# Patient Record
Sex: MALE | Race: WHITE | NOT HISPANIC OR LATINO | Employment: FULL TIME | URBAN - METROPOLITAN AREA
[De-identification: names, ages, dates, MRNs, and addresses within clinical notes are randomized per-mention and may not be internally consistent; named-entity substitution may affect disease eponyms.]

---

## 2017-01-12 DIAGNOSIS — N05.1 FSGS (FOCAL SEGMENTAL GLOMERULOSCLEROSIS): ICD-10-CM

## 2017-01-12 LAB
ALBUMIN SERPL-MCNC: 2.5 G/DL (ref 3.4–5)
ANION GAP SERPL CALCULATED.3IONS-SCNC: 6 MMOL/L (ref 3–14)
BUN SERPL-MCNC: 11 MG/DL (ref 7–30)
CALCIUM SERPL-MCNC: 8.3 MG/DL (ref 8.5–10.1)
CHLORIDE SERPL-SCNC: 103 MMOL/L (ref 94–109)
CO2 SERPL-SCNC: 32 MMOL/L (ref 20–32)
CREAT SERPL-MCNC: 0.74 MG/DL (ref 0.66–1.25)
CREAT UR-MCNC: 119 MG/DL
GFR SERPL CREATININE-BSD FRML MDRD: ABNORMAL ML/MIN/1.7M2
GLUCOSE SERPL-MCNC: 92 MG/DL (ref 70–99)
PHOSPHATE SERPL-MCNC: 4.2 MG/DL (ref 2.5–4.5)
POTASSIUM SERPL-SCNC: 4.1 MMOL/L (ref 3.4–5.3)
PROT UR-MCNC: 3.29 G/L
PROT/CREAT 24H UR: 2.76 G/G CR (ref 0–0.2)
SODIUM SERPL-SCNC: 141 MMOL/L (ref 133–144)

## 2017-02-13 DIAGNOSIS — N04.9 NEPHROTIC SYNDROME: ICD-10-CM

## 2017-02-13 DIAGNOSIS — N05.1 FSGS (FOCAL SEGMENTAL GLOMERULOSCLEROSIS): Primary | ICD-10-CM

## 2017-02-13 NOTE — NURSING NOTE
Labs per clinic 2A protocol.  Follow up/FSGS  Last OV: 12/6/16  Seema Peralta LPN  Nephrology  [facility]  [phone]

## 2017-02-16 ENCOUNTER — TELEPHONE (OUTPATIENT)
Dept: NEPHROLOGY | Facility: CLINIC | Age: 22
End: 2017-02-16

## 2017-02-16 NOTE — TELEPHONE ENCOUNTER
Call to patient regarding prednisone prescription 100 mg daily. Dr. Perez will discuss with patient when she sees him 2.23. Left voice message for patient to call if is out of this medication and that Dr. Welsh will discuss dosing next week.  Seema Peralta LPN  Nephrology  [facility]  [phone]

## 2017-02-17 ENCOUNTER — TELEPHONE (OUTPATIENT)
Dept: NEPHROLOGY | Facility: CLINIC | Age: 22
End: 2017-02-17

## 2017-02-17 DIAGNOSIS — N05.1 FSGS (FOCAL SEGMENTAL GLOMERULOSCLEROSIS): ICD-10-CM

## 2017-02-17 RX ORDER — PREDNISONE 50 MG/1
100 TABLET ORAL DAILY
Qty: 60 TABLET | Refills: 0 | Status: SHIPPED | OUTPATIENT
Start: 2017-02-17 | End: 2017-02-23

## 2017-02-17 NOTE — TELEPHONE ENCOUNTER
Reviewed with bayron Steele to refill prednisone 100 mg daily for 1 month. Informed patient and sent to pharmacy.  Seema Peralta LPN  Nephrology  [facility]  [phone]

## 2017-02-23 ENCOUNTER — OFFICE VISIT (OUTPATIENT)
Dept: NEPHROLOGY | Facility: CLINIC | Age: 22
End: 2017-02-23
Attending: INTERNAL MEDICINE
Payer: COMMERCIAL

## 2017-02-23 VITALS
DIASTOLIC BLOOD PRESSURE: 79 MMHG | WEIGHT: 256.8 LBS | BODY MASS INDEX: 31.93 KG/M2 | HEIGHT: 75 IN | HEART RATE: 90 BPM | SYSTOLIC BLOOD PRESSURE: 125 MMHG | TEMPERATURE: 97.8 F | OXYGEN SATURATION: 96 %

## 2017-02-23 DIAGNOSIS — N05.1 FSGS (FOCAL SEGMENTAL GLOMERULOSCLEROSIS): Primary | ICD-10-CM

## 2017-02-23 DIAGNOSIS — N04.9 NEPHROTIC SYNDROME: ICD-10-CM

## 2017-02-23 DIAGNOSIS — N05.1 FSGS (FOCAL SEGMENTAL GLOMERULOSCLEROSIS): ICD-10-CM

## 2017-02-23 LAB
ALBUMIN SERPL-MCNC: 3 G/DL (ref 3.4–5)
ANION GAP SERPL CALCULATED.3IONS-SCNC: 8 MMOL/L (ref 3–14)
BUN SERPL-MCNC: 12 MG/DL (ref 7–30)
CALCIUM SERPL-MCNC: 8.4 MG/DL (ref 8.5–10.1)
CHLORIDE SERPL-SCNC: 103 MMOL/L (ref 94–109)
CO2 SERPL-SCNC: 29 MMOL/L (ref 20–32)
CREAT SERPL-MCNC: 0.86 MG/DL (ref 0.66–1.25)
CREAT UR-MCNC: 254 MG/DL
FERRITIN SERPL-MCNC: 74 NG/ML (ref 26–388)
GFR SERPL CREATININE-BSD FRML MDRD: ABNORMAL ML/MIN/1.7M2
GLUCOSE SERPL-MCNC: 112 MG/DL (ref 70–99)
HGB BLD-MCNC: 15.5 G/DL (ref 13.3–17.7)
IRON SATN MFR SERPL: 52 % (ref 15–46)
IRON SERPL-MCNC: 129 UG/DL (ref 35–180)
PHOSPHATE SERPL-MCNC: 3.2 MG/DL (ref 2.5–4.5)
POTASSIUM SERPL-SCNC: 3.6 MMOL/L (ref 3.4–5.3)
PROT UR-MCNC: 3.04 G/L
PROT/CREAT 24H UR: 1.19 G/G CR (ref 0–0.2)
PTH-INTACT SERPL-MCNC: 12 PG/ML (ref 12–72)
SODIUM SERPL-SCNC: 140 MMOL/L (ref 133–144)
TIBC SERPL-MCNC: 248 UG/DL (ref 240–430)

## 2017-02-23 PROCEDURE — 82306 VITAMIN D 25 HYDROXY: CPT | Performed by: INTERNAL MEDICINE

## 2017-02-23 PROCEDURE — 83970 ASSAY OF PARATHORMONE: CPT | Performed by: INTERNAL MEDICINE

## 2017-02-23 PROCEDURE — 84156 ASSAY OF PROTEIN URINE: CPT | Performed by: INTERNAL MEDICINE

## 2017-02-23 PROCEDURE — 85018 HEMOGLOBIN: CPT | Performed by: INTERNAL MEDICINE

## 2017-02-23 PROCEDURE — 99213 OFFICE O/P EST LOW 20 MIN: CPT | Mod: ZF

## 2017-02-23 PROCEDURE — 82728 ASSAY OF FERRITIN: CPT | Performed by: INTERNAL MEDICINE

## 2017-02-23 PROCEDURE — 83540 ASSAY OF IRON: CPT | Performed by: INTERNAL MEDICINE

## 2017-02-23 PROCEDURE — 83550 IRON BINDING TEST: CPT | Performed by: INTERNAL MEDICINE

## 2017-02-23 PROCEDURE — 36415 COLL VENOUS BLD VENIPUNCTURE: CPT | Performed by: INTERNAL MEDICINE

## 2017-02-23 PROCEDURE — 80069 RENAL FUNCTION PANEL: CPT | Performed by: INTERNAL MEDICINE

## 2017-02-23 RX ORDER — FUROSEMIDE 20 MG
20 TABLET ORAL DAILY
Qty: 30 TABLET | Refills: 11 | Status: SHIPPED | OUTPATIENT
Start: 2017-02-23 | End: 2017-12-23

## 2017-02-23 RX ORDER — LISINOPRIL 20 MG/1
20 TABLET ORAL DAILY
Qty: 30 TABLET | Refills: 11 | Status: SHIPPED | OUTPATIENT
Start: 2017-02-23 | End: 2017-05-24

## 2017-02-23 RX ORDER — PREDNISONE 50 MG/1
50 TABLET ORAL DAILY
Qty: 60 TABLET | Refills: 11 | Status: SHIPPED | OUTPATIENT
Start: 2017-02-23 | End: 2018-01-31

## 2017-02-23 RX ORDER — PREDNISONE 10 MG/1
10 TABLET ORAL DAILY
Qty: 60 TABLET | Refills: 11 | Status: SHIPPED | OUTPATIENT
Start: 2017-02-23 | End: 2021-10-18

## 2017-02-23 RX ORDER — PREDNISONE 20 MG/1
20 TABLET ORAL DAILY
Qty: 60 TABLET | Refills: 11 | Status: SHIPPED | OUTPATIENT
Start: 2017-02-23 | End: 2021-10-18

## 2017-02-23 NOTE — MR AVS SNAPSHOT
After Visit Summary   2/23/2017    Ethan Trevino    MRN: [id number]           Patient Information     Date Of Birth          1995        Visit Information        Provider Department      2/23/2017 2:05 PM Anna Welsh MD [facility]        Today's Diagnoses     FSGS (focal segmental glomerulosclerosis)    -  1    Nephrotic syndrome           Follow-ups after your visit        Follow-up notes from your care team     Return in about 3 months (around 6/7/2017).      Your next 10 appointments already scheduled     Mar 30, 2017  9:30 AM CDT   Lab with  LAB   [facility] ([facility])    [address] [place] [id number] [phone]            May 04, 2017  9:30 AM CDT   Lab with  LAB   [facility] ([facility])    [address] [place] [id number] [phone]            May 24, 2017  9:55 AM CDT   (Arrive by 9:25 AM)   Return Visit with Anna Welsh MD   [facility] ([facility])    [address] [place] [id number] [phone]              Who to contact     If you have questions or need follow up information about today's clinic visit or your schedule please contact [facility] directly at [phone].  Normal or non-critical lab and imaging results will be communicated to you by MyChart, letter or phone within 4 business days after the clinic has received the results. If you do not hear from us within 7 days, please contact the clinic through MyChart or phone. If you have a critical or abnormal lab result, we will notify you by phone as soon as possible.  Submit refill requests through [employer] or call your pharmacy and they will forward the refill request to us. Please allow 3 business days for your refill to be completed.          Additional Information About Your Visit        [facility]  "Information     [facility] gives you secure access to your electronic health record. If you see a primary care provider, you can also send messages to your care team and make appointments. If you have questions, please call your primary care clinic.  If you do not have a primary care provider, please call [phone] and they will assist you.        Care EveryWhere ID     This is your Care EveryWhere ID. This could be used by other organizations to access your [facility] medical records  [id number]        Your Vitals Were     Pulse Temperature Height Pulse Oximetry BMI (Body Mass Index)       90 97.8  F (36.6  C) (Oral) 1.892 m (6' 2.5\") 96% 32.53 kg/m2        Blood Pressure from Last 3 Encounters:   02/23/17 125/79   12/08/16 113/75   09/15/16 123/79    Weight from Last 3 Encounters:   02/23/17 116.5 kg (256 lb 12.8 oz)   12/08/16 111.5 kg (245 lb 14.4 oz)   09/15/16 110.7 kg (244 lb)              Today, you had the following     No orders found for display         Today's Medication Changes          These changes are accurate as of: 2/23/17  2:42 PM.  If you have any questions, ask your nurse or doctor.               These medicines have changed or have updated prescriptions.        Dose/Directions    furosemide 20 MG tablet   Commonly known as:  LASIX   This may have changed:    - medication strength  - how much to take  - additional instructions   Used for:  Nephrotic syndrome, FSGS (focal segmental glomerulosclerosis)   Changed by:  Anna Welsh MD        Dose:  20 mg   Take 1 tablet (20 mg) by mouth daily As needed   Quantity:  30 tablet   Refills:  11       lisinopril 20 MG tablet   Commonly known as:  PRINIVIL/ZESTRIL   This may have changed:  how much to take   Used for:  Nephrotic syndrome   Changed by:  Anna Welsh MD        Dose:  20 mg   Take 1 tablet (20 mg) by mouth daily   Quantity:  30 tablet   Refills:  11       * predniSONE 50 MG tablet   Commonly known as:  DELTASONE   This " may have changed:  Another medication with the same name was added. Make sure you understand how and when to take each.   Used for:  FSGS (focal segmental glomerulosclerosis)   Changed by:  Anna Welsh MD        Dose:  100 mg   Take 2 tablets (100 mg) by mouth daily   Quantity:  60 tablet   Refills:  0       * predniSONE 20 MG tablet   Commonly known as:  DELTASONE   This may have changed:  You were already taking a medication with the same name, and this prescription was added. Make sure you understand how and when to take each.   Used for:  FSGS (focal segmental glomerulosclerosis)   Changed by:  Anna Welsh MD        Dose:  20 mg   Take 1 tablet (20 mg) by mouth daily   Quantity:  60 tablet   Refills:  11       * predniSONE 10 MG tablet   Commonly known as:  DELTASONE   This may have changed:  You were already taking a medication with the same name, and this prescription was added. Make sure you understand how and when to take each.   Used for:  FSGS (focal segmental glomerulosclerosis)   Changed by:  Anna Welsh MD        Dose:  10 mg   Take 1 tablet (10 mg) by mouth daily   Quantity:  60 tablet   Refills:  11       * Notice:  This list has 3 medication(s) that are the same as other medications prescribed for you. Read the directions carefully, and ask your doctor or other care provider to review them with you.         Where to get your medicines      These medications were sent to [facility]/pharmacy #[id number] - [place], [place] - [address] [address]     Phone:  [phone]     furosemide 20 MG tablet    lisinopril 20 MG tablet    predniSONE 10 MG tablet    predniSONE 20 MG tablet                Primary Care Provider Fax #    [facility] [phone]       [address] [place] [id number]        Thank you!     Thank you for choosing [facility]  for your care. Our goal is always to provide you with excellent  care. Hearing back from our patients is one way we can continue to improve our services. Please take a few minutes to complete the written survey that you may receive in the mail after your visit with us. Thank you!             Your Updated Medication List - Protect others around you: Learn how to safely use, store and throw away your medicines at www.disposemymeds.org.          This list is accurate as of: 2/23/17  2:42 PM.  Always use your most recent med list.                   Brand Name Dispense Instructions for use    furosemide 20 MG tablet    LASIX    30 tablet    Take 1 tablet (20 mg) by mouth daily As needed       lisinopril 20 MG tablet    PRINIVIL/ZESTRIL    30 tablet    Take 1 tablet (20 mg) by mouth daily       omeprazole 20 MG CR capsule    priLOSEC    30 capsule    Take 1 capsule (20 mg) by mouth daily       * predniSONE 50 MG tablet    DELTASONE    60 tablet    Take 2 tablets (100 mg) by mouth daily       * predniSONE 20 MG tablet    DELTASONE    60 tablet    Take 1 tablet (20 mg) by mouth daily       * predniSONE 10 MG tablet    DELTASONE    60 tablet    Take 1 tablet (10 mg) by mouth daily       sulfamethoxazole-trimethoprim 800-160 MG per tablet    BACTRIM DS/SEPTRA DS    15 tablet    Take 1 tablet by mouth three times a week       * Notice:  This list has 3 medication(s) that are the same as other medications prescribed for you. Read the directions carefully, and ask your doctor or other care provider to review them with you.

## 2017-02-23 NOTE — PROGRESS NOTES
Assessment and Plan:   22 year old male with 6+ months history of edema and discovered with nephrotic syndrome, biopsy in August 2016 showing FSGS. Steroids started 8/23/2016. He returns for followup.    1. FSGS- he had 8-9 grams of proteinuria, down to 6 grams in December 2016, which is partial response after 3 months of therapy and today his proteinuria is down to   . Biopsy done and prednisone 100mg daily started end of August. He is tolerating failry well so far.  - will decrease 2/23 to 80mg, if labs stable, in 6 weeks, will decrease to 60mg , then decrease by 10mg every 4-6 weeks thereafter   His SCr is stable near 0.8mg/dL   - proteinuria is significantly improved, with albumin improved from 1.4 to 2.1 to 3 now  2. Renal function- preserved  3. Blood pressure- low normal, on lisinopril 20mg, not taking furosemide 1-2 times a week   - tolerating  lisinopril 20mg daily, increase to 30mg today and plan to increase to 40mg in a few weeks if tolerated.  4. Anemia- normal hemoglobin  4. Edema- has some edema which has improved with lasix - it is mild at this point, using compression stockings.  5. Prophylaxis- on bactrim thrice weekly, omeprazole; had GI upset first week but now settled  6. Cramps- occasionally with furosemide use, encouraged high K diet   7. Electrolytes- potassium low normal- high K diet especially if taking furosemide  Assessment and plan was discussed with patient and he voiced his understanding and agreement.    Reason for Visit:  Ethan Trevino is a 22 year old male with FSGS, who presents for routine follow up.     HPI:  He is a pleasant young male who in summer 2016 presented with several months history of swelling, noted to be nephrotic, and renal biopsy in August 2016 showed FSGS. He was started on steroids 8/23/16 and he presents for followup,.    He has been on steroids since end of August and is tolerating fairly. He has more and more abdominal striae and his acne is significant,  but he is managing with topical treatment and willing to tolerate this for a few more months.   His proteinuria is down to 1-2 grams now, 6 months into therapy, from >9 grams, which is good. He is tolerating lisinopril at 20mg   Labs today show Scr stable at 0.8 mg/dL .  He is taking furosemide as needed, usually 1-2 times a week  He is happy to hear about weaning steroids      Home BP: Not checked    Baseline Cr: 0.8    ROS:  A comprehensive review of systems was obtained and negative, except as noted in the HPI or PMH.    Active Medical Problems:  Patient Active Problem List   Diagnosis     FSGS (focal segmental glomerulosclerosis)     Nephrotic syndrome       Personal Hx:   Social History     Social History     Marital status: Single     Spouse name: N/A     Number of children: N/A     Years of education: N/A     Occupational History     Not on file.     Social History Main Topics     Smoking status: Never Smoker     Smokeless tobacco: Not on file     Alcohol use Yes      Comment: social     Drug use: No     Sexual activity: Not on file     Other Topics Concern     Not on file     Social History Narrative       Allergies:  Allergies   Allergen Reactions     Erythromycin      Penicillins        Medications:  Prior to Admission medications    Medication Sig Start Date End Date Taking? Authorizing Provider   lisinopril (PRINIVIL,ZESTRIL) 20 MG tablet Take 1 tablet (20 mg) by mouth daily 9/15/16  Yes Anna Welsh MD   predniSONE (DELTASONE) 50 MG tablet Take 2 tablets (100 mg) by mouth daily 8/23/16  Yes Anna Welsh MD   omeprazole (PRILOSEC) 20 MG capsule Take 1 capsule (20 mg) by mouth daily 8/23/16  Yes Anna Welsh MD   sulfamethoxazole-trimethoprim (BACTRIM DS,SEPTRA DS) 800-160 MG per tablet Take 1 tablet by mouth three times a week 8/23/16  Yes Anna Welsh MD   FUROSEMIDE PO Take by mouth daily   Yes Reported, Patient       Vitals:  /79  Pulse 90  Temp  "97.8  F (36.6  C) (Oral)  Ht 1.892 m (6' 2.5\")  Wt 116.5 kg (256 lb 12.8 oz)  SpO2 96%  BMI 32.53 kg/m2    Exam:  GENERAL APPEARANCE: alert and no distress  HENT: mouth without ulcers or lesions  LYMPHATICS: no cervical or supraclavicular nodes  RESP: lungs clear to auscultation - no rales, rhonchi or wheezes  CV: regular rhythm, normal rate, no rub, no murmur  EDEMA: trace LE edema bilaterally  ABDOMEN: soft, nondistended, nontender, bowel sounds normal  MS: extremities normal - no gross deformities noted, no evidence of inflammation in joints, no muscle tenderness  SKIN: no rash    Results:  Component      Latest Ref Rng & Units 9/15/2016 10/10/2016 12/8/2016 1/12/2017   Sodium      133 - 144 mmol/L 138 137 139 141   Potassium      3.4 - 5.3 mmol/L 3.9 4.5 4.4 4.1   Chloride      94 - 109 mmol/L 104 100 102 103   Carbon Dioxide      20 - 32 mmol/L 31 29 30 32   Anion Gap      3 - 14 mmol/L 3 7 7 6   Glucose      70 - 99 mg/dL 91 114 (H) 95 92   Urea Nitrogen      7 - 30 mg/dL 12 10 21 11   Creatinine      0.66 - 1.25 mg/dL 0.82 0.92 0.86 0.74   GFR Estimate      >60 mL/min/1.7m2 >90 . . . >90 . . . >90 . . . >90 . . .   GFR Estimate If Black      >60 mL/min/1.7m2 >90 . . . >90 . . . >90 . . . >90 . . .   Calcium      8.5 - 10.1 mg/dL 7.9 (L) 8.2 (L) 8.1 (L) 8.3 (L)   Phosphorus      2.5 - 4.5 mg/dL 2.5 3.6 3.2 4.2   Albumin      3.4 - 5.0 g/dL 1.4 (L) 1.7 (L) 2.1 (L) 2.5 (L)   Protein Random Urine      g/L 1.26 2.35 2.49 3.29   Protein Total Urine g/gr Creatinine      0 - 0.2 g/g Cr 8.68 (H) 9.20 (H) 5.60 (H) 2.76 (H)   Creatinine Urine      mg/dL    119     Component      Latest Ref Rng & Units 2/23/2017   Sodium      133 - 144 mmol/L 140   Potassium      3.4 - 5.3 mmol/L 3.6   Chloride      94 - 109 mmol/L 103   Carbon Dioxide      20 - 32 mmol/L 29   Anion Gap      3 - 14 mmol/L 8   Glucose      70 - 99 mg/dL 112 (H)   Urea Nitrogen      7 - 30 mg/dL 12   Creatinine      0.66 - 1.25 mg/dL 0.86   GFR Estimate    "   >60 mL/min/1.7m2 >90 . . .   GFR Estimate If Black      >60 mL/min/1.7m2 >90 . . .   Calcium      8.5 - 10.1 mg/dL 8.4 (L)   Phosphorus      2.5 - 4.5 mg/dL 3.2   Albumin      3.4 - 5.0 g/dL 3.0 (L)   Protein Random Urine      g/L 3.04   Protein Total Urine g/gr Creatinine      0 - 0.2 g/g Cr 1.19 (H)   Creatinine Urine      mg/dL 254     Component      Latest Ref Rng 7/27/2016 8/10/2016            8:40 AM   WBC      4.0 - 11.0 10e9/L  7.0   RBC Count      4.4 - 5.9 10e12/L  5.18   Hemoglobin      13.3 - 17.7 g/dL  15.1   Hematocrit      40.0 - 53.0 %  44.0   MCV      78 - 100 fl  85   MCH      26.5 - 33.0 pg  29.2   MCHC      31.5 - 36.5 g/dL  34.3   RDW      10.0 - 15.0 %  13.2   Platelet Count      150 - 450 10e9/L  340   Diff Method        Automated Method   % Neutrophils        40.5   % Lymphocytes        45.7   % Monocytes        7.0   % Eosinophils        5.6   % Basophils        0.9   % Immature Granulocytes        0.3   Nucleated RBCs      0 /100  0   Absolute Neutrophil      1.6 - 8.3 10e9/L  2.8   Absolute Lymphocytes      0.8 - 5.3 10e9/L  3.2   Absolute Monocytes      0.0 - 1.3 10e9/L  0.5   Absolute Eosinophils      0.0 - 0.7 10e9/L  0.4   Absolute Basophils      0.0 - 0.2 10e9/L  0.1   Abs Immature Granulocytes      0 - 0.4 10e9/L  0.0   Absolute Nucleated RBC        0.0   Sodium      133 - 144 mmol/L  144   Potassium      3.4 - 5.3 mmol/L  3.6   Chloride      94 - 109 mmol/L  109   Carbon Dioxide      20 - 32 mmol/L  26   Anion Gap      3 - 14 mmol/L  9   Glucose      70 - 99 mg/dL  75   Urea Nitrogen      7 - 30 mg/dL  8   Creatinine      0.66 - 1.25 mg/dL  0.88   GFR Estimate      >60 mL/min/1.7m2  >90 . . .   GFR Estimate If Black      >60 mL/min/1.7m2  >90 . . .   Calcium      8.5 - 10.1 mg/dL  7.5 (L)   Phosphorus      2.5 - 4.5 mg/dL     Albumin      3.4 - 5.0 g/dL     Protein Random Urine       14.29    Protein Total Urine g/gr Creatinine      0 - 0.2 g/g Cr 8.41 (H)    INR      0.86 - 1.14   1.11   Copath Report           Creatinine Urine             Component      Latest Ref Rng 8/10/2016           9:15 AM   WBC      4.0 - 11.0 10e9/L    RBC Count      4.4 - 5.9 10e12/L    Hemoglobin      13.3 - 17.7 g/dL    Hematocrit      40.0 - 53.0 %    MCV      78 - 100 fl    MCH      26.5 - 33.0 pg    MCHC      31.5 - 36.5 g/dL    RDW      10.0 - 15.0 %    Platelet Count      150 - 450 10e9/L    Diff Method          % Neutrophils          % Lymphocytes          % Monocytes          % Eosinophils          % Basophils          % Immature Granulocytes          Nucleated RBCs      0 /100    Absolute Neutrophil      1.6 - 8.3 10e9/L    Absolute Lymphocytes      0.8 - 5.3 10e9/L    Absolute Monocytes      0.0 - 1.3 10e9/L    Absolute Eosinophils      0.0 - 0.7 10e9/L    Absolute Basophils      0.0 - 0.2 10e9/L    Abs Immature Granulocytes      0 - 0.4 10e9/L    Absolute Nucleated RBC          Sodium      133 - 144 mmol/L    Potassium      3.4 - 5.3 mmol/L    Chloride      94 - 109 mmol/L    Carbon Dioxide      20 - 32 mmol/L    Anion Gap      3 - 14 mmol/L    Glucose      70 - 99 mg/dL    Urea Nitrogen      7 - 30 mg/dL    Creatinine      0.66 - 1.25 mg/dL    GFR Estimate      >60 mL/min/1.7m2    GFR Estimate If Black      >60 mL/min/1.7m2    Calcium      8.5 - 10.1 mg/dL    Phosphorus      2.5 - 4.5 mg/dL    Albumin      3.4 - 5.0 g/dL    Protein Random Urine          Protein Total Urine g/gr Creatinine      0 - 0.2 g/g Cr    INR      0.86 - 1.14    Copath Report       Patient Name: HAM SWAIN Марина [place]   Creatinine Urine            Component      Latest Ref Rng 8/10/2016 9/15/2016           2:30 PM    WBC      4.0 - 11.0 10e9/L     RBC Count      4.4 - 5.9 10e12/L     Hemoglobin      13.3 - 17.7 g/dL 14.0 15.4   Hematocrit      40.0 - 53.0 %     MCV      78 - 100 fl     MCH      26.5 - 33.0 pg     MCHC      31.5 - 36.5 g/dL     RDW      10.0 - 15.0 %     Platelet Count      150 - 450 10e9/L     Diff Method            % Neutrophils           % Lymphocytes           % Monocytes           % Eosinophils           % Basophils           % Immature Granulocytes           Nucleated RBCs      0 /100     Absolute Neutrophil      1.6 - 8.3 10e9/L     Absolute Lymphocytes      0.8 - 5.3 10e9/L     Absolute Monocytes      0.0 - 1.3 10e9/L     Absolute Eosinophils      0.0 - 0.7 10e9/L     Absolute Basophils      0.0 - 0.2 10e9/L     Abs Immature Granulocytes      0 - 0.4 10e9/L     Absolute Nucleated RBC           Sodium      133 - 144 mmol/L  138   Potassium      3.4 - 5.3 mmol/L  3.9   Chloride      94 - 109 mmol/L  104   Carbon Dioxide      20 - 32 mmol/L  31   Anion Gap      3 - 14 mmol/L  3   Glucose      70 - 99 mg/dL  91   Urea Nitrogen      7 - 30 mg/dL  12   Creatinine      0.66 - 1.25 mg/dL  0.82   GFR Estimate      >60 mL/min/1.7m2  >90 . . .   GFR Estimate If Black      >60 mL/min/1.7m2  >90 . . .   Calcium      8.5 - 10.1 mg/dL  7.9 (L)   Phosphorus      2.5 - 4.5 mg/dL  2.5   Albumin      3.4 - 5.0 g/dL  1.4 (L)   Protein Random Urine        1.26   Protein Total Urine g/gr Creatinine      0 - 0.2 g/g Cr  8.68 (H)   INR      0.86 - 1.14     Copath Report           Creatinine Urine        14     This biopsy shows segmental sclerosis and hyalinosis affecting   approximately 30% of glomeruli examined in a pattern diagnostic for   focal segmental glomerulosclerosis.  Although sclerotic segments focally   exhibit increased cellularity, capillary distention by intra-luminal   inflammatory cells is not observed and this specimen does not meet   strict criteria for cellular variant of focal segmental   glomerulosclerosis.  However, in view of the proportion of glomeruli   exhibiting segmental sclerosis and the increased cellularity observed in   sclerotic segments it is expected that this condition may follow an   aggressive course.                   Answers for HPI/ROS submitted by the patient on 9/13/2016   General Symptoms:  No  Skin Symptoms: Yes  HENT Symptoms: No  EYE SYMPTOMS: No  HEART SYMPTOMS: Yes  LUNG SYMPTOMS: No  INTESTINAL SYMPTOMS: No  URINARY SYMPTOMS: Yes  REPRODUCTIVE SYMPTOMS: No  SKELETAL SYMPTOMS: Yes  BLOOD SYMPTOMS: No  NERVOUS SYSTEM SYMPTOMS: No  MENTAL HEALTH SYMPTOMS: Yes  Changes in hair: No  Changes in moles/birth marks: No  Itching: No  Rashes: No  Changes in nails: No  Acne: Yes  Change in facial hair: No  Warts: No  Non-healing sores: No  Scarring: No  Flaking of skin: No  Color changes of hands/feet in cold : No  Sun sensitivity: Yes  Skin thickening: No  Chest pain or pressure: No  Fast or irregular heartbeat: Yes  Pain in legs with walking: No  Swelling in feet or ankles: Yes  Trouble breathing while lying down: No  Fingers or Toes appear blue: No  High blood pressure: Yes  Low blood pressure: No  Fainting: No  Murmurs: No  Chest pain on exertion: No  Chest pain at rest: No  Cramping pain in leg during exercise: Yes  Pacemaker: No  Varicose veins: No  Edema or swelling: Yes  Fast heart beat: Yes  Wake up at night with shortness of breath: No  Heart flutters: No  Light-headedness: No  Exercise intolerance: No  Trouble holding urine or incontinence: No  Pain or burning: No  Trouble starting or stopping: No  Increased frequency of urination: Yes  Blood in urine: No  Decreased frequency of urination: No  Frequent nighttime urination: No  Flank pain: No  Difficulty emptying bladder: No  Back pain: No  Muscle aches: No  Neck pain: No  Swollen joints: No  Joint pain: No  Bone pain: No  Muscle cramps: Yes  Muscle weakness: No  Joint stiffness: No  Bone fracture: No  Nervous or Anxious: No  Depression: No  Trouble sleeping: Yes  Trouble thinking or concentrating: Yes  Mood changes: No  Panic attacks: No

## 2017-02-23 NOTE — LETTER
2/23/2017      RE: Ethan Trevino  [address]  [facility] [address]       Assessment and Plan:   22 year old male with 6+ months history of edema and discovered with nephrotic syndrome, biopsy in August 2016 showing FSGS. Steroids started 8/23/2016. He returns for followup.    1. FSGS- he had 8-9 grams of proteinuria, down to 6 grams in December 2016, which is partial response after 3 months of therapy and today his proteinuria is down to   . Biopsy done and prednisone 100mg daily started end of August. He is tolerating failry well so far.  - will decrease 2/23 to 80mg, if labs stable, in 6 weeks, will decrease to 60mg , then decrease by 10mg every 4-6 weeks thereafter   His SCr is stable near 0.8mg/dL   - proteinuria is significantly improved, with albumin improved from 1.4 to 2.1 to 3 now  2. Renal function- preserved  3. Blood pressure- low normal, on lisinopril 20mg, not taking furosemide 1-2 times a week   - tolerating  lisinopril 20mg daily, increase to 30mg today and plan to increase to 40mg in a few weeks if tolerated.  4. Anemia- normal hemoglobin  4. Edema- has some edema which has improved with lasix - it is mild at this point, using compression stockings.  5. Prophylaxis- on bactrim thrice weekly, omeprazole; had GI upset first week but now settled  6. Cramps- occasionally with furosemide use, encouraged high K diet   7. Electrolytes- potassium low normal- high K diet especially if taking furosemide  Assessment and plan was discussed with patient and he voiced his understanding and agreement.    Reason for Visit:  Ethan Trevino is a 22 year old male with FSGS, who presents for routine follow up.     HPI:  He is a pleasant young male who in summer 2016 presented with several months history of swelling, noted to be nephrotic, and renal biopsy in August 2016 showed FSGS. He was started on steroids 8/23/16 and he presents for followup,.    He has been on steroids since end of August and is  tolerating fairly. He has more and more abdominal striae and his acne is significant, but he is managing with topical treatment and willing to tolerate this for a few more months.   His proteinuria is down to 1-2 grams now, 6 months into therapy, from >9 grams, which is good. He is tolerating lisinopril at 20mg   Labs today show Scr stable at 0.8 mg/dL .  He is taking furosemide as needed, usually 1-2 times a week  He is happy to hear about weaning steroids      Home BP: Not checked    Baseline Cr: 0.8    ROS:  A comprehensive review of systems was obtained and negative, except as noted in the HPI or PMH.    Active Medical Problems:  Patient Active Problem List   Diagnosis     FSGS (focal segmental glomerulosclerosis)     Nephrotic syndrome       Personal Hx:   Social History     Social History     Marital status: Single     Spouse name: N/A     Number of children: N/A     Years of education: N/A     Occupational History     Not on file.     Social History Main Topics     Smoking status: Never Smoker     Smokeless tobacco: Not on file     Alcohol use Yes      Comment: social     Drug use: No     Sexual activity: Not on file     Other Topics Concern     Not on file     Social History Narrative       Allergies:  Allergies   Allergen Reactions     Erythromycin      Penicillins        Medications:  Prior to Admission medications    Medication Sig Start Date End Date Taking? Authorizing Provider   lisinopril (PRINIVIL,ZESTRIL) 20 MG tablet Take 1 tablet (20 mg) by mouth daily 9/15/16  Yes Anna Welsh MD   predniSONE (DELTASONE) 50 MG tablet Take 2 tablets (100 mg) by mouth daily 8/23/16  Yes Anna Welsh MD   omeprazole (PRILOSEC) 20 MG capsule Take 1 capsule (20 mg) by mouth daily 8/23/16  Yes Anna Welsh MD   sulfamethoxazole-trimethoprim (BACTRIM DS,SEPTRA DS) 800-160 MG per tablet Take 1 tablet by mouth three times a week 8/23/16  Yes Anna Welsh MD   FUROSEMIDE PO  "Take by mouth daily   Yes Reported, Patient       Vitals:  /79  Pulse 90  Temp 97.8  F (36.6  C) (Oral)  Ht 1.892 m (6' 2.5\")  Wt 116.5 kg (256 lb 12.8 oz)  SpO2 96%  BMI 32.53 kg/m2    Exam:  GENERAL APPEARANCE: alert and no distress  HENT: mouth without ulcers or lesions  LYMPHATICS: no cervical or supraclavicular nodes  RESP: lungs clear to auscultation - no rales, rhonchi or wheezes  CV: regular rhythm, normal rate, no rub, no murmur  EDEMA: trace LE edema bilaterally  ABDOMEN: soft, nondistended, nontender, bowel sounds normal  MS: extremities normal - no gross deformities noted, no evidence of inflammation in joints, no muscle tenderness  SKIN: no rash    Results:  Component      Latest Ref Rng & Units 9/15/2016 10/10/2016 12/8/2016 1/12/2017   Sodium      133 - 144 mmol/L 138 137 139 141   Potassium      3.4 - 5.3 mmol/L 3.9 4.5 4.4 4.1   Chloride      94 - 109 mmol/L 104 100 102 103   Carbon Dioxide      20 - 32 mmol/L 31 29 30 32   Anion Gap      3 - 14 mmol/L 3 7 7 6   Glucose      70 - 99 mg/dL 91 114 (H) 95 92   Urea Nitrogen      7 - 30 mg/dL 12 10 21 11   Creatinine      0.66 - 1.25 mg/dL 0.82 0.92 0.86 0.74   GFR Estimate      >60 mL/min/1.7m2 >90 . . . >90 . . . >90 . . . >90 . . .   GFR Estimate If Black      >60 mL/min/1.7m2 >90 . . . >90 . . . >90 . . . >90 . . .   Calcium      8.5 - 10.1 mg/dL 7.9 (L) 8.2 (L) 8.1 (L) 8.3 (L)   Phosphorus      2.5 - 4.5 mg/dL 2.5 3.6 3.2 4.2   Albumin      3.4 - 5.0 g/dL 1.4 (L) 1.7 (L) 2.1 (L) 2.5 (L)   Protein Random Urine      g/L 1.26 2.35 2.49 3.29   Protein Total Urine g/gr Creatinine      0 - 0.2 g/g Cr 8.68 (H) 9.20 (H) 5.60 (H) 2.76 (H)   Creatinine Urine      mg/dL    119     Component      Latest Ref Rng & Units 2/23/2017   Sodium      133 - 144 mmol/L 140   Potassium      3.4 - 5.3 mmol/L 3.6   Chloride      94 - 109 mmol/L 103   Carbon Dioxide      20 - 32 mmol/L 29   Anion Gap      3 - 14 mmol/L 8   Glucose      70 - 99 mg/dL 112 (H)   Urea " Nitrogen      7 - 30 mg/dL 12   Creatinine      0.66 - 1.25 mg/dL 0.86   GFR Estimate      >60 mL/min/1.7m2 >90 . . .   GFR Estimate If Black      >60 mL/min/1.7m2 >90 . . .   Calcium      8.5 - 10.1 mg/dL 8.4 (L)   Phosphorus      2.5 - 4.5 mg/dL 3.2   Albumin      3.4 - 5.0 g/dL 3.0 (L)   Protein Random Urine      g/L 3.04   Protein Total Urine g/gr Creatinine      0 - 0.2 g/g Cr 1.19 (H)   Creatinine Urine      mg/dL 254     Component      Latest Ref Rng 7/27/2016 8/10/2016            8:40 AM   WBC      4.0 - 11.0 10e9/L  7.0   RBC Count      4.4 - 5.9 10e12/L  5.18   Hemoglobin      13.3 - 17.7 g/dL  15.1   Hematocrit      40.0 - 53.0 %  44.0   MCV      78 - 100 fl  85   MCH      26.5 - 33.0 pg  29.2   MCHC      31.5 - 36.5 g/dL  34.3   RDW      10.0 - 15.0 %  13.2   Platelet Count      150 - 450 10e9/L  340   Diff Method        Automated Method   % Neutrophils        40.5   % Lymphocytes        45.7   % Monocytes        7.0   % Eosinophils        5.6   % Basophils        0.9   % Immature Granulocytes        0.3   Nucleated RBCs      0 /100  0   Absolute Neutrophil      1.6 - 8.3 10e9/L  2.8   Absolute Lymphocytes      0.8 - 5.3 10e9/L  3.2   Absolute Monocytes      0.0 - 1.3 10e9/L  0.5   Absolute Eosinophils      0.0 - 0.7 10e9/L  0.4   Absolute Basophils      0.0 - 0.2 10e9/L  0.1   Abs Immature Granulocytes      0 - 0.4 10e9/L  0.0   Absolute Nucleated RBC        0.0   Sodium      133 - 144 mmol/L  144   Potassium      3.4 - 5.3 mmol/L  3.6   Chloride      94 - 109 mmol/L  109   Carbon Dioxide      20 - 32 mmol/L  26   Anion Gap      3 - 14 mmol/L  9   Glucose      70 - 99 mg/dL  75   Urea Nitrogen      7 - 30 mg/dL  8   Creatinine      0.66 - 1.25 mg/dL  0.88   GFR Estimate      >60 mL/min/1.7m2  >90 . . .   GFR Estimate If Black      >60 mL/min/1.7m2  >90 . . .   Calcium      8.5 - 10.1 mg/dL  7.5 (L)   Phosphorus      2.5 - 4.5 mg/dL     Albumin      3.4 - 5.0 g/dL     Protein Random Urine       14.29     Protein Total Urine g/gr Creatinine      0 - 0.2 g/g Cr 8.41 (H)    INR      0.86 - 1.14  1.11   Copath Report           Creatinine Urine             Component      Latest Ref Rng 8/10/2016           9:15 AM   WBC      4.0 - 11.0 10e9/L    RBC Count      4.4 - 5.9 10e12/L    Hemoglobin      13.3 - 17.7 g/dL    Hematocrit      40.0 - 53.0 %    MCV      78 - 100 fl    MCH      26.5 - 33.0 pg    MCHC      31.5 - 36.5 g/dL    RDW      10.0 - 15.0 %    Platelet Count      150 - 450 10e9/L    Diff Method          % Neutrophils          % Lymphocytes          % Monocytes          % Eosinophils          % Basophils          % Immature Granulocytes          Nucleated RBCs      0 /100    Absolute Neutrophil      1.6 - 8.3 10e9/L    Absolute Lymphocytes      0.8 - 5.3 10e9/L    Absolute Monocytes      0.0 - 1.3 10e9/L    Absolute Eosinophils      0.0 - 0.7 10e9/L    Absolute Basophils      0.0 - 0.2 10e9/L    Abs Immature Granulocytes      0 - 0.4 10e9/L    Absolute Nucleated RBC          Sodium      133 - 144 mmol/L    Potassium      3.4 - 5.3 mmol/L    Chloride      94 - 109 mmol/L    Carbon Dioxide      20 - 32 mmol/L    Anion Gap      3 - 14 mmol/L    Glucose      70 - 99 mg/dL    Urea Nitrogen      7 - 30 mg/dL    Creatinine      0.66 - 1.25 mg/dL    GFR Estimate      >60 mL/min/1.7m2    GFR Estimate If Black      >60 mL/min/1.7m2    Calcium      8.5 - 10.1 mg/dL    Phosphorus      2.5 - 4.5 mg/dL    Albumin      3.4 - 5.0 g/dL    Protein Random Urine          Protein Total Urine g/gr Creatinine      0 - 0.2 g/g Cr    INR      0.86 - 1.14    Copath Report       Patient Name: MANUELATRINOHAM   Creatinine Urine            Component      Latest Ref Rng 8/10/2016 9/15/2016           2:30 PM    WBC      4.0 - 11.0 10e9/L     RBC Count      4.4 - 5.9 10e12/L     Hemoglobin      13.3 - 17.7 g/dL 14.0 15.4   Hematocrit      40.0 - 53.0 %     MCV      78 - 100 fl     MCH      26.5 - 33.0 pg     MCHC      31.5 - 36.5  g/dL     RDW      10.0 - 15.0 %     Platelet Count      150 - 450 10e9/L     Diff Method           % Neutrophils           % Lymphocytes           % Monocytes           % Eosinophils           % Basophils           % Immature Granulocytes           Nucleated RBCs      0 /100     Absolute Neutrophil      1.6 - 8.3 10e9/L     Absolute Lymphocytes      0.8 - 5.3 10e9/L     Absolute Monocytes      0.0 - 1.3 10e9/L     Absolute Eosinophils      0.0 - 0.7 10e9/L     Absolute Basophils      0.0 - 0.2 10e9/L     Abs Immature Granulocytes      0 - 0.4 10e9/L     Absolute Nucleated RBC           Sodium      133 - 144 mmol/L  138   Potassium      3.4 - 5.3 mmol/L  3.9   Chloride      94 - 109 mmol/L  104   Carbon Dioxide      20 - 32 mmol/L  31   Anion Gap      3 - 14 mmol/L  3   Glucose      70 - 99 mg/dL  91   Urea Nitrogen      7 - 30 mg/dL  12   Creatinine      0.66 - 1.25 mg/dL  0.82   GFR Estimate      >60 mL/min/1.7m2  >90 . . .   GFR Estimate If Black      >60 mL/min/1.7m2  >90 . . .   Calcium      8.5 - 10.1 mg/dL  7.9 (L)   Phosphorus      2.5 - 4.5 mg/dL  2.5   Albumin      3.4 - 5.0 g/dL  1.4 (L)   Protein Random Urine        1.26   Protein Total Urine g/gr Creatinine      0 - 0.2 g/g Cr  8.68 (H)   INR      0.86 - 1.14     Copath Report           Creatinine Urine        14     This biopsy shows segmental sclerosis and hyalinosis affecting   approximately 30% of glomeruli examined in a pattern diagnostic for   focal segmental glomerulosclerosis.  Although sclerotic segments focally   exhibit increased cellularity, capillary distention by intra-luminal   inflammatory cells is not observed and this specimen does not meet   strict criteria for cellular variant of focal segmental   glomerulosclerosis.  However, in view of the proportion of glomeruli   exhibiting segmental sclerosis and the increased cellularity observed in   sclerotic segments it is expected that this condition may follow an   aggressive course.                    Answers for HPI/ROS submitted by the patient on 9/13/2016   General Symptoms: No  Skin Symptoms: Yes  HENT Symptoms: No  EYE SYMPTOMS: No  HEART SYMPTOMS: Yes  LUNG SYMPTOMS: No  INTESTINAL SYMPTOMS: No  URINARY SYMPTOMS: Yes  REPRODUCTIVE SYMPTOMS: No  SKELETAL SYMPTOMS: Yes  BLOOD SYMPTOMS: No  NERVOUS SYSTEM SYMPTOMS: No  MENTAL HEALTH SYMPTOMS: Yes  Changes in hair: No  Changes in moles/birth marks: No  Itching: No  Rashes: No  Changes in nails: No  Acne: Yes  Change in facial hair: No  Warts: No  Non-healing sores: No  Scarring: No  Flaking of skin: No  Color changes of hands/feet in cold : No  Sun sensitivity: Yes  Skin thickening: No  Chest pain or pressure: No  Fast or irregular heartbeat: Yes  Pain in legs with walking: No  Swelling in feet or ankles: Yes  Trouble breathing while lying down: No  Fingers or Toes appear blue: No  High blood pressure: Yes  Low blood pressure: No  Fainting: No  Murmurs: No  Chest pain on exertion: No  Chest pain at rest: No  Cramping pain in leg during exercise: Yes  Pacemaker: No  Varicose veins: No  Edema or swelling: Yes  Fast heart beat: Yes  Wake up at night with shortness of breath: No  Heart flutters: No  Light-headedness: No  Exercise intolerance: No  Trouble holding urine or incontinence: No  Pain or burning: No  Trouble starting or stopping: No  Increased frequency of urination: Yes  Blood in urine: No  Decreased frequency of urination: No  Frequent nighttime urination: No  Flank pain: No  Difficulty emptying bladder: No  Back pain: No  Muscle aches: No  Neck pain: No  Swollen joints: No  Joint pain: No  Bone pain: No  Muscle cramps: Yes  Muscle weakness: No  Joint stiffness: No  Bone fracture: No  Nervous or Anxious: No  Depression: No  Trouble sleeping: Yes  Trouble thinking or concentrating: Yes  Mood changes: No  Panic attacks: No        Anna Lyndon Welsh MD

## 2017-02-23 NOTE — NURSING NOTE
"Chief Complaint   Patient presents with     RECHECK     Follow up medication review       Initial /79  Pulse 90  Temp 97.8  F (36.6  C) (Oral)  Ht 1.892 m (6' 2.5\")  Wt 116.5 kg (256 lb 12.8 oz)  SpO2 96%  BMI 32.53 kg/m2 Estimated body mass index is 32.53 kg/(m^2) as calculated from the following:    Height as of this encounter: 1.892 m (6' 2.5\").    Weight as of this encounter: 116.5 kg (256 lb 12.8 oz).  Medication Reconciliation: complete   Hortensia LEOS CMA    "

## 2017-02-24 LAB — DEPRECATED CALCIDIOL+CALCIFEROL SERPL-MC: 19 UG/L (ref 20–75)

## 2017-03-20 ENCOUNTER — MYC REFILL (OUTPATIENT)
Dept: NEPHROLOGY | Facility: CLINIC | Age: 22
End: 2017-03-20

## 2017-03-20 DIAGNOSIS — N05.1 FSGS (FOCAL SEGMENTAL GLOMERULOSCLEROSIS): ICD-10-CM

## 2017-03-20 NOTE — TELEPHONE ENCOUNTER
Message from [facility]:  Seema Peralta LPN Mon Mar 20, 2017 11:25 AM        ----- Message -----   From: Ethan Trevino   Sent: 3/20/2017 11:21 AM   To: Nephrology Nurses-  Subject: Medication Renewal Request     Original authorizing provider: MD Ethan Caba would like a refill of the following medications:  omeprazole (PRILOSEC) 20 MG capsule [Anna Welsh MD]    Preferred pharmacy: [id number] - [address]    Comment:

## 2017-03-30 DIAGNOSIS — N05.1 FSGS (FOCAL SEGMENTAL GLOMERULOSCLEROSIS): ICD-10-CM

## 2017-03-30 LAB
ALBUMIN SERPL-MCNC: 3.4 G/DL (ref 3.4–5)
ANION GAP SERPL CALCULATED.3IONS-SCNC: 10 MMOL/L (ref 3–14)
BUN SERPL-MCNC: 15 MG/DL (ref 7–30)
CALCIUM SERPL-MCNC: 8.8 MG/DL (ref 8.5–10.1)
CHLORIDE SERPL-SCNC: 102 MMOL/L (ref 94–109)
CO2 SERPL-SCNC: 30 MMOL/L (ref 20–32)
CREAT SERPL-MCNC: 0.89 MG/DL (ref 0.66–1.25)
CREAT UR-MCNC: 279 MG/DL
GFR SERPL CREATININE-BSD FRML MDRD: NORMAL ML/MIN/1.7M2
GLUCOSE SERPL-MCNC: 89 MG/DL (ref 70–99)
PHOSPHATE SERPL-MCNC: 3.3 MG/DL (ref 2.5–4.5)
POTASSIUM SERPL-SCNC: 3.6 MMOL/L (ref 3.4–5.3)
PROT UR-MCNC: 3.78 G/L
PROT/CREAT 24H UR: 1.36 G/G CR (ref 0–0.2)
SODIUM SERPL-SCNC: 142 MMOL/L (ref 133–144)

## 2017-05-10 DIAGNOSIS — N05.1 FSGS (FOCAL SEGMENTAL GLOMERULOSCLEROSIS): ICD-10-CM

## 2017-05-10 DIAGNOSIS — N04.9 NEPHROTIC SYNDROME: ICD-10-CM

## 2017-05-10 LAB
ALBUMIN SERPL-MCNC: 3.5 G/DL (ref 3.4–5)
ANION GAP SERPL CALCULATED.3IONS-SCNC: 8 MMOL/L (ref 3–14)
BUN SERPL-MCNC: 16 MG/DL (ref 7–30)
CALCIUM SERPL-MCNC: 8.6 MG/DL (ref 8.5–10.1)
CHLORIDE SERPL-SCNC: 103 MMOL/L (ref 94–109)
CO2 SERPL-SCNC: 28 MMOL/L (ref 20–32)
CREAT SERPL-MCNC: 0.96 MG/DL (ref 0.66–1.25)
CREAT UR-MCNC: 310 MG/DL
GFR SERPL CREATININE-BSD FRML MDRD: ABNORMAL ML/MIN/1.7M2
GLUCOSE SERPL-MCNC: 102 MG/DL (ref 70–99)
PHOSPHATE SERPL-MCNC: 3.4 MG/DL (ref 2.5–4.5)
POTASSIUM SERPL-SCNC: 3.6 MMOL/L (ref 3.4–5.3)
PROT UR-MCNC: 2.28 G/L
PROT/CREAT 24H UR: 0.74 G/G CR (ref 0–0.2)
SODIUM SERPL-SCNC: 140 MMOL/L (ref 133–144)

## 2017-05-24 ENCOUNTER — OFFICE VISIT (OUTPATIENT)
Dept: NEPHROLOGY | Facility: CLINIC | Age: 22
End: 2017-05-24
Attending: INTERNAL MEDICINE
Payer: COMMERCIAL

## 2017-05-24 VITALS
DIASTOLIC BLOOD PRESSURE: 87 MMHG | HEART RATE: 107 BPM | TEMPERATURE: 97.5 F | OXYGEN SATURATION: 96 % | SYSTOLIC BLOOD PRESSURE: 124 MMHG | WEIGHT: 268.2 LBS | HEIGHT: 75 IN | BODY MASS INDEX: 33.35 KG/M2

## 2017-05-24 DIAGNOSIS — L70.0 ACNE VULGARIS: Primary | ICD-10-CM

## 2017-05-24 DIAGNOSIS — N04.9 NEPHROTIC SYNDROME: ICD-10-CM

## 2017-05-24 DIAGNOSIS — E87.6 HYPOKALEMIA: ICD-10-CM

## 2017-05-24 DIAGNOSIS — E87.6 HYPOKALEMIA: Primary | ICD-10-CM

## 2017-05-24 DIAGNOSIS — N05.1 FSGS (FOCAL SEGMENTAL GLOMERULOSCLEROSIS): ICD-10-CM

## 2017-05-24 PROCEDURE — 99213 OFFICE O/P EST LOW 20 MIN: CPT | Mod: ZF

## 2017-05-24 RX ORDER — LISINOPRIL 10 MG/1
10 TABLET ORAL EVERY MORNING
Qty: 90 TABLET | Refills: 3 | Status: SHIPPED | OUTPATIENT
Start: 2017-05-24 | End: 2018-01-31

## 2017-05-24 RX ORDER — LISINOPRIL 20 MG/1
20 TABLET ORAL AT BEDTIME
Qty: 30 TABLET | Refills: 11 | Status: SHIPPED | OUTPATIENT
Start: 2017-05-24 | End: 2018-01-31

## 2017-05-24 ASSESSMENT — PAIN SCALES - GENERAL: PAINLEVEL: NO PAIN (0)

## 2017-05-24 NOTE — LETTER
5/24/2017      RE: Ethan Trevino  [address] [place] [address]       Assessment and Plan:   22 year old male with 6+ months history of edema and discovered with nephrotic syndrome, biopsy in August 2016 showing FSGS. Steroids started 8/23/2016 and weaning started February 2017. He returns for followup.    1. FSGS- he had 8-9 grams of proteinuria, down to 6 grams in December 2016, which is partial response after 3 months of therapy and today his proteinuria is down to 0.7 grams  . Biopsy done and prednisone 100mg daily started end of August 2016. He tolerated fairly well and now with weaning he is better, concentration is better. Still has acne and wants to see dermatology.  - steroids decreased 2/23/17 to 80mg,and then 60mg and he follows up today  - will plan to now decrease by 10mg every 4-6 weeks thereafter- being conservative so will do 6 weeks, consider accelerating to decrease every 4 weeks, based on next couple labs.   His SCr is stable near 0.8mg/dL   - proteinuria is significantly improved, with albumin improved from 1.4 to 2.1 to 3.5 now  2. Renal function- preserved  3. Blood pressure- low normal, on lisinopril 20mg, not taking furosemide 1-2 times a week   - tolerating  lisinopril 20mg daily, felt woozy on higher dose, will try to do 10mg in AM and 20mg at bedtime.   4. Anemia- normal hemoglobin  4. Edema- has some edema which has improved with lasix - it is mild at this point, using compression stockings.  5. Prophylaxis- on bactrim thrice weekly to be continued until prednisone is less than 20mg, continue omeprazole; had GI upset first week but now settled  6. Cramps- occasionally with furosemide use, encouraged high K diet  Higher lisinopril may help  - recheck Mag - was 1.7 in Fall 2016, may be lower due to PPI  7. Electrolytes- potassium low normal- high K diet especially if taking furosemide  Assessment and plan was discussed with patient and he voiced his understanding and  agreement.    Reason for Visit:  Ethan Trevino is a 22 year old male with FSGS, who presents for routine follow up.     HPI:  He is a pleasant young male who in summer 2016 presented with several months history of swelling, noted to be nephrotic, and renal biopsy in August 2016 showed FSGS. He was started on steroids 8/23/16 and he presents for followup,.    He has been on steroids since end of August 2016 and is tolerating fairly. He has more and more abdominal striae and his acne is significant, but he is managing with topical treatment and willing to tolerate this for a few more months.   His proteinuria was down to 1-2 grams now subgram at 0.7g which is great, and now weaning steroids, down from 100mg to 60mg, 6 months into therapy, from >9 grams, which is good. He is tolerating lisinopril at 20mg   Labs today show Scr stable at 0.8 mg/dL .  He is taking furosemide as needed, usually minimally  He is concentrating better on lower dose steroids      Home BP: Not checked    Baseline Cr: 0.8    ROS:  A comprehensive review of systems was obtained and negative, except as noted in the HPI or PMH.    Active Medical Problems:  Patient Active Problem List   Diagnosis     FSGS (focal segmental glomerulosclerosis)     Nephrotic syndrome       Personal Hx:   Social History     Social History     Marital status: Single     Spouse name: N/A     Number of children: N/A     Years of education: N/A     Occupational History     Not on file.     Social History Main Topics     Smoking status: Never Smoker     Smokeless tobacco: Not on file     Alcohol use Yes      Comment: social     Drug use: No     Sexual activity: Not on file     Other Topics Concern     Not on file     Social History Narrative       Allergies:  Allergies   Allergen Reactions     Amoxicillin      Other reaction(s): EENT - other (explain in comments), SKIN - rash  Rash and lip swelling after 8 days of Amoxicillin     Erythromycin      Penicillins   "      Medications:  Prior to Admission medications    Medication Sig Start Date End Date Taking? Authorizing Provider   lisinopril (PRINIVIL,ZESTRIL) 20 MG tablet Take 1 tablet (20 mg) by mouth daily 9/15/16  Yes Anna Welsh MD   predniSONE (DELTASONE) 50 MG tablet Take 2 tablets (100 mg) by mouth daily 8/23/16  Yes Anna Welsh MD   omeprazole (PRILOSEC) 20 MG capsule Take 1 capsule (20 mg) by mouth daily 8/23/16  Yes Anna Welsh MD   sulfamethoxazole-trimethoprim (BACTRIM DS,SEPTRA DS) 800-160 MG per tablet Take 1 tablet by mouth three times a week 8/23/16  Yes Anna Welsh MD   FUROSEMIDE PO Take by mouth daily   Yes Reported, Patient       Vitals:  /87  Pulse 107  Temp 97.5  F (36.4  C) (Oral)  Ht 1.892 m (6' 2.5\")  Wt 121.7 kg (268 lb 3.2 oz)  SpO2 96%  BMI 33.97 kg/m2    Exam:  GENERAL APPEARANCE: alert and no distress  HENT: mouth without ulcers or lesions  LYMPHATICS: no cervical or supraclavicular nodes  RESP: lungs clear to auscultation - no rales, rhonchi or wheezes  CV: regular rhythm, normal rate, no rub, no murmur  EDEMA: trace LE edema bilaterally  ABDOMEN: soft, nondistended, nontender, bowel sounds normal  MS: extremities normal - no gross deformities noted, no evidence of inflammation in joints, no muscle tenderness  SKIN: no rash    Results:  Component      Latest Ref Rng & Units 9/15/2016 10/10/2016 12/8/2016 1/12/2017   Sodium      133 - 144 mmol/L 138 137 139 141   Potassium      3.4 - 5.3 mmol/L 3.9 4.5 4.4 4.1   Chloride      94 - 109 mmol/L 104 100 102 103   Carbon Dioxide      20 - 32 mmol/L 31 29 30 32   Anion Gap      3 - 14 mmol/L 3 7 7 6   Glucose      70 - 99 mg/dL 91 114 (H) 95 92   Urea Nitrogen      7 - 30 mg/dL 12 10 21 11   Creatinine      0.66 - 1.25 mg/dL 0.82 0.92 0.86 0.74   GFR Estimate      >60 mL/min/1.7m2 >90 . . . >90 . . . >90 . . . >90 . . .   GFR Estimate If Black      >60 mL/min/1.7m2 >90 . . . >90 . . . " >90 . . . >90 . . .   Calcium      8.5 - 10.1 mg/dL 7.9 (L) 8.2 (L) 8.1 (L) 8.3 (L)   Phosphorus      2.5 - 4.5 mg/dL 2.5 3.6 3.2 4.2   Albumin      3.4 - 5.0 g/dL 1.4 (L) 1.7 (L) 2.1 (L) 2.5 (L)   Protein Random Urine      g/L 1.26 2.35 2.49 3.29   Protein Total Urine g/gr Creatinine      0 - 0.2 g/g Cr 8.68 (H) 9.20 (H) 5.60 (H) 2.76 (H)   Creatinine Urine      mg/dL    119     Component      Latest Ref Rng & Units 2/23/2017   Sodium      133 - 144 mmol/L 140   Potassium      3.4 - 5.3 mmol/L 3.6   Chloride      94 - 109 mmol/L 103   Carbon Dioxide      20 - 32 mmol/L 29   Anion Gap      3 - 14 mmol/L 8   Glucose      70 - 99 mg/dL 112 (H)   Urea Nitrogen      7 - 30 mg/dL 12   Creatinine      0.66 - 1.25 mg/dL 0.86   GFR Estimate      >60 mL/min/1.7m2 >90 . . .   GFR Estimate If Black      >60 mL/min/1.7m2 >90 . . .   Calcium      8.5 - 10.1 mg/dL 8.4 (L)   Phosphorus      2.5 - 4.5 mg/dL 3.2   Albumin      3.4 - 5.0 g/dL 3.0 (L)   Protein Random Urine      g/L 3.04   Protein Total Urine g/gr Creatinine      0 - 0.2 g/g Cr 1.19 (H)   Creatinine Urine      mg/dL 254     Component      Latest Ref Rng 7/27/2016 8/10/2016            8:40 AM   WBC      4.0 - 11.0 10e9/L  7.0   RBC Count      4.4 - 5.9 10e12/L  5.18   Hemoglobin      13.3 - 17.7 g/dL  15.1   Hematocrit      40.0 - 53.0 %  44.0   MCV      78 - 100 fl  85   MCH      26.5 - 33.0 pg  29.2   MCHC      31.5 - 36.5 g/dL  34.3   RDW      10.0 - 15.0 %  13.2   Platelet Count      150 - 450 10e9/L  340   Diff Method        Automated Method   % Neutrophils        40.5   % Lymphocytes        45.7   % Monocytes        7.0   % Eosinophils        5.6   % Basophils        0.9   % Immature Granulocytes        0.3   Nucleated RBCs      0 /100  0   Absolute Neutrophil      1.6 - 8.3 10e9/L  2.8   Absolute Lymphocytes      0.8 - 5.3 10e9/L  3.2   Absolute Monocytes      0.0 - 1.3 10e9/L  0.5   Absolute Eosinophils      0.0 - 0.7 10e9/L  0.4   Absolute Basophils      0.0 -  0.2 10e9/L  0.1   Abs Immature Granulocytes      0 - 0.4 10e9/L  0.0   Absolute Nucleated RBC        0.0   Sodium      133 - 144 mmol/L  144   Potassium      3.4 - 5.3 mmol/L  3.6   Chloride      94 - 109 mmol/L  109   Carbon Dioxide      20 - 32 mmol/L  26   Anion Gap      3 - 14 mmol/L  9   Glucose      70 - 99 mg/dL  75   Urea Nitrogen      7 - 30 mg/dL  8   Creatinine      0.66 - 1.25 mg/dL  0.88   GFR Estimate      >60 mL/min/1.7m2  >90 . . .   GFR Estimate If Black      >60 mL/min/1.7m2  >90 . . .   Calcium      8.5 - 10.1 mg/dL  7.5 (L)   Phosphorus      2.5 - 4.5 mg/dL     Albumin      3.4 - 5.0 g/dL     Protein Random Urine       14.29    Protein Total Urine g/gr Creatinine      0 - 0.2 g/g Cr 8.41 (H)    INR      0.86 - 1.14  1.11   Copath Report           Creatinine Urine             Component      Latest Ref Rn 8/10/2016           9:15 AM   WBC      4.0 - 11.0 10e9/L    RBC Count      4.4 - 5.9 10e12/L    Hemoglobin      13.3 - 17.7 g/dL    Hematocrit      40.0 - 53.0 %    MCV      78 - 100 fl    MCH      26.5 - 33.0 pg    MCHC      31.5 - 36.5 g/dL    RDW      10.0 - 15.0 %    Platelet Count      150 - 450 10e9/L    Diff Method          % Neutrophils          % Lymphocytes          % Monocytes          % Eosinophils          % Basophils          % Immature Granulocytes          Nucleated RBCs      0 /100    Absolute Neutrophil      1.6 - 8.3 10e9/L    Absolute Lymphocytes      0.8 - 5.3 10e9/L    Absolute Monocytes      0.0 - 1.3 10e9/L    Absolute Eosinophils      0.0 - 0.7 10e9/L    Absolute Basophils      0.0 - 0.2 10e9/L    Abs Immature Granulocytes      0 - 0.4 10e9/L    Absolute Nucleated RBC          Sodium      133 - 144 mmol/L    Potassium      3.4 - 5.3 mmol/L    Chloride      94 - 109 mmol/L    Carbon Dioxide      20 - 32 mmol/L    Anion Gap      3 - 14 mmol/L    Glucose      70 - 99 mg/dL    Urea Nitrogen      7 - 30 mg/dL    Creatinine      0.66 - 1.25 mg/dL    GFR Estimate      >60  mL/min/1.7m2    GFR Estimate If Black      >60 mL/min/1.7m2    Calcium      8.5 - 10.1 mg/dL    Phosphorus      2.5 - 4.5 mg/dL    Albumin      3.4 - 5.0 g/dL    Protein Random Urine          Protein Total Urine g/gr Creatinine      0 - 0.2 g/g Cr    INR      0.86 - 1.14    Copath Report       Patient Name: HAM SWAIN   Creatinine Urine            Component      Latest Ref Rng 8/10/2016 9/15/2016           2:30 PM    WBC      4.0 - 11.0 10e9/L     RBC Count      4.4 - 5.9 10e12/L     Hemoglobin      13.3 - 17.7 g/dL 14.0 15.4   Hematocrit      40.0 - 53.0 %     MCV      78 - 100 fl     MCH      26.5 - 33.0 pg     MCHC      31.5 - 36.5 g/dL     RDW      10.0 - 15.0 %     Platelet Count      150 - 450 10e9/L     Diff Method           % Neutrophils           % Lymphocytes           % Monocytes           % Eosinophils           % Basophils           % Immature Granulocytes           Nucleated RBCs      0 /100     Absolute Neutrophil      1.6 - 8.3 10e9/L     Absolute Lymphocytes      0.8 - 5.3 10e9/L     Absolute Monocytes      0.0 - 1.3 10e9/L     Absolute Eosinophils      0.0 - 0.7 10e9/L     Absolute Basophils      0.0 - 0.2 10e9/L     Abs Immature Granulocytes      0 - 0.4 10e9/L     Absolute Nucleated RBC           Sodium      133 - 144 mmol/L  138   Potassium      3.4 - 5.3 mmol/L  3.9   Chloride      94 - 109 mmol/L  104   Carbon Dioxide      20 - 32 mmol/L  31   Anion Gap      3 - 14 mmol/L  3   Glucose      70 - 99 mg/dL  91   Urea Nitrogen      7 - 30 mg/dL  12   Creatinine      0.66 - 1.25 mg/dL  0.82   GFR Estimate      >60 mL/min/1.7m2  >90 . . .   GFR Estimate If Black      >60 mL/min/1.7m2  >90 . . .   Calcium      8.5 - 10.1 mg/dL  7.9 (L)   Phosphorus      2.5 - 4.5 mg/dL  2.5   Albumin      3.4 - 5.0 g/dL  1.4 (L)   Protein Random Urine        1.26   Protein Total Urine g/gr Creatinine      0 - 0.2 g/g Cr  8.68 (H)   INR      0.86 - 1.14     Copath Report           Creatinine Urine         14     This biopsy shows segmental sclerosis and hyalinosis affecting   approximately 30% of glomeruli examined in a pattern diagnostic for   focal segmental glomerulosclerosis.  Although sclerotic segments focally   exhibit increased cellularity, capillary distention by intra-luminal   inflammatory cells is not observed and this specimen does not meet   strict criteria for cellular variant of focal segmental   glomerulosclerosis.  However, in view of the proportion of glomeruli   exhibiting segmental sclerosis and the increased cellularity observed in   sclerotic segments it is expected that this condition may follow an   aggressive course.     Anna Welsh MD

## 2017-05-24 NOTE — PROGRESS NOTES
Assessment and Plan:   22 year old male with 6+ months history of edema and discovered with nephrotic syndrome, biopsy in August 2016 showing FSGS. Steroids started 8/23/2016 and weaning started February 2017. He returns for followup.    1. FSGS- he had 8-9 grams of proteinuria, down to 6 grams in December 2016, which is partial response after 3 months of therapy and today his proteinuria is down to 0.7 grams  . Biopsy done and prednisone 100mg daily started end of August 2016. He tolerated fairly well and now with weaning he is better, concentration is better. Still has acne and wants to see dermatology.  - steroids decreased 2/23/17 to 80mg,and then 60mg and he follows up today  - will plan to now decrease by 10mg every 4-6 weeks thereafter- being conservative so will do 6 weeks, consider accelerating to decrease every 4 weeks, based on next couple labs.   His SCr is stable near 0.8mg/dL   - proteinuria is significantly improved, with albumin improved from 1.4 to 2.1 to 3.5 now  2. Renal function- preserved  3. Blood pressure- low normal, on lisinopril 20mg, not taking furosemide 1-2 times a week   - tolerating  lisinopril 20mg daily, felt woozy on higher dose, will try to do 10mg in AM and 20mg at bedtime.   4. Anemia- normal hemoglobin  4. Edema- has some edema which has improved with lasix - it is mild at this point, using compression stockings.  5. Prophylaxis- on bactrim thrice weekly to be continued until prednisone is less than 20mg, continue omeprazole; had GI upset first week but now settled  6. Cramps- occasionally with furosemide use, encouraged high K diet  Higher lisinopril may help  - recheck Mag - was 1.7 in Fall 2016, may be lower due to PPI  7. Electrolytes- potassium low normal- high K diet especially if taking furosemide  Assessment and plan was discussed with patient and he voiced his understanding and agreement.    Reason for Visit:  Ethan Trevino is a 22 year old male with FSGS, who  presents for routine follow up.     HPI:  He is a pleasant young male who in summer 2016 presented with several months history of swelling, noted to be nephrotic, and renal biopsy in August 2016 showed FSGS. He was started on steroids 8/23/16 and he presents for followup,.    He has been on steroids since end of August 2016 and is tolerating fairly. He has more and more abdominal striae and his acne is significant, but he is managing with topical treatment and willing to tolerate this for a few more months.   His proteinuria was down to 1-2 grams now subgram at 0.7g which is great, and now weaning steroids, down from 100mg to 60mg, 6 months into therapy, from >9 grams, which is good. He is tolerating lisinopril at 20mg   Labs today show Scr stable at 0.8 mg/dL .  He is taking furosemide as needed, usually minimally  He is concentrating better on lower dose steroids      Home BP: Not checked    Baseline Cr: 0.8    ROS:  A comprehensive review of systems was obtained and negative, except as noted in the HPI or PMH.    Active Medical Problems:  Patient Active Problem List   Diagnosis     FSGS (focal segmental glomerulosclerosis)     Nephrotic syndrome       Personal Hx:   Social History     Social History     Marital status: Single     Spouse name: N/A     Number of children: N/A     Years of education: N/A     Occupational History     Not on file.     Social History Main Topics     Smoking status: Never Smoker     Smokeless tobacco: Not on file     Alcohol use Yes      Comment: social     Drug use: No     Sexual activity: Not on file     Other Topics Concern     Not on file     Social History Narrative       Allergies:  Allergies   Allergen Reactions     Amoxicillin      Other reaction(s): EENT - other (explain in comments), SKIN - rash  Rash and lip swelling after 8 days of Amoxicillin     Erythromycin      Penicillins        Medications:  Prior to Admission medications    Medication Sig Start Date End Date Taking?  "Authorizing Provider   lisinopril (PRINIVIL,ZESTRIL) 20 MG tablet Take 1 tablet (20 mg) by mouth daily 9/15/16  Yes Anna Welsh MD   predniSONE (DELTASONE) 50 MG tablet Take 2 tablets (100 mg) by mouth daily 8/23/16  Yes Anna Welsh MD   omeprazole (PRILOSEC) 20 MG capsule Take 1 capsule (20 mg) by mouth daily 8/23/16  Yes Anna Welsh MD   sulfamethoxazole-trimethoprim (BACTRIM DS,SEPTRA DS) 800-160 MG per tablet Take 1 tablet by mouth three times a week 8/23/16  Yes Anna Welsh MD   FUROSEMIDE PO Take by mouth daily   Yes Reported, Patient       Vitals:  /87  Pulse 107  Temp 97.5  F (36.4  C) (Oral)  Ht 1.892 m (6' 2.5\")  Wt 121.7 kg (268 lb 3.2 oz)  SpO2 96%  BMI 33.97 kg/m2    Exam:  GENERAL APPEARANCE: alert and no distress  HENT: mouth without ulcers or lesions  LYMPHATICS: no cervical or supraclavicular nodes  RESP: lungs clear to auscultation - no rales, rhonchi or wheezes  CV: regular rhythm, normal rate, no rub, no murmur  EDEMA: trace LE edema bilaterally  ABDOMEN: soft, nondistended, nontender, bowel sounds normal  MS: extremities normal - no gross deformities noted, no evidence of inflammation in joints, no muscle tenderness  SKIN: no rash    Results:  Component      Latest Ref Rng & Units 9/15/2016 10/10/2016 12/8/2016 1/12/2017   Sodium      133 - 144 mmol/L 138 137 139 141   Potassium      3.4 - 5.3 mmol/L 3.9 4.5 4.4 4.1   Chloride      94 - 109 mmol/L 104 100 102 103   Carbon Dioxide      20 - 32 mmol/L 31 29 30 32   Anion Gap      3 - 14 mmol/L 3 7 7 6   Glucose      70 - 99 mg/dL 91 114 (H) 95 92   Urea Nitrogen      7 - 30 mg/dL 12 10 21 11   Creatinine      0.66 - 1.25 mg/dL 0.82 0.92 0.86 0.74   GFR Estimate      >60 mL/min/1.7m2 >90 . . . >90 . . . >90 . . . >90 . . .   GFR Estimate If Black      >60 mL/min/1.7m2 >90 . . . >90 . . . >90 . . . >90 . . .   Calcium      8.5 - 10.1 mg/dL 7.9 (L) 8.2 (L) 8.1 (L) 8.3 (L)   Phosphorus    "   2.5 - 4.5 mg/dL 2.5 3.6 3.2 4.2   Albumin      3.4 - 5.0 g/dL 1.4 (L) 1.7 (L) 2.1 (L) 2.5 (L)   Protein Random Urine      g/L 1.26 2.35 2.49 3.29   Protein Total Urine g/gr Creatinine      0 - 0.2 g/g Cr 8.68 (H) 9.20 (H) 5.60 (H) 2.76 (H)   Creatinine Urine      mg/dL    119     Component      Latest Ref Rng & Units 2/23/2017   Sodium      133 - 144 mmol/L 140   Potassium      3.4 - 5.3 mmol/L 3.6   Chloride      94 - 109 mmol/L 103   Carbon Dioxide      20 - 32 mmol/L 29   Anion Gap      3 - 14 mmol/L 8   Glucose      70 - 99 mg/dL 112 (H)   Urea Nitrogen      7 - 30 mg/dL 12   Creatinine      0.66 - 1.25 mg/dL 0.86   GFR Estimate      >60 mL/min/1.7m2 >90 . . .   GFR Estimate If Black      >60 mL/min/1.7m2 >90 . . .   Calcium      8.5 - 10.1 mg/dL 8.4 (L)   Phosphorus      2.5 - 4.5 mg/dL 3.2   Albumin      3.4 - 5.0 g/dL 3.0 (L)   Protein Random Urine      g/L 3.04   Protein Total Urine g/gr Creatinine      0 - 0.2 g/g Cr 1.19 (H)   Creatinine Urine      mg/dL 254     Component      Latest Ref Rng 7/27/2016 8/10/2016            8:40 AM   WBC      4.0 - 11.0 10e9/L  7.0   RBC Count      4.4 - 5.9 10e12/L  5.18   Hemoglobin      13.3 - 17.7 g/dL  15.1   Hematocrit      40.0 - 53.0 %  44.0   MCV      78 - 100 fl  85   MCH      26.5 - 33.0 pg  29.2   MCHC      31.5 - 36.5 g/dL  34.3   RDW      10.0 - 15.0 %  13.2   Platelet Count      150 - 450 10e9/L  340   Diff Method        Automated Method   % Neutrophils        40.5   % Lymphocytes        45.7   % Monocytes        7.0   % Eosinophils        5.6   % Basophils        0.9   % Immature Granulocytes        0.3   Nucleated RBCs      0 /100  0   Absolute Neutrophil      1.6 - 8.3 10e9/L  2.8   Absolute Lymphocytes      0.8 - 5.3 10e9/L  3.2   Absolute Monocytes      0.0 - 1.3 10e9/L  0.5   Absolute Eosinophils      0.0 - 0.7 10e9/L  0.4   Absolute Basophils      0.0 - 0.2 10e9/L  0.1   Abs Immature Granulocytes      0 - 0.4 10e9/L  0.0   Absolute Nucleated RBC         0.0   Sodium      133 - 144 mmol/L  144   Potassium      3.4 - 5.3 mmol/L  3.6   Chloride      94 - 109 mmol/L  109   Carbon Dioxide      20 - 32 mmol/L  26   Anion Gap      3 - 14 mmol/L  9   Glucose      70 - 99 mg/dL  75   Urea Nitrogen      7 - 30 mg/dL  8   Creatinine      0.66 - 1.25 mg/dL  0.88   GFR Estimate      >60 mL/min/1.7m2  >90 . . .   GFR Estimate If Black      >60 mL/min/1.7m2  >90 . . .   Calcium      8.5 - 10.1 mg/dL  7.5 (L)   Phosphorus      2.5 - 4.5 mg/dL     Albumin      3.4 - 5.0 g/dL     Protein Random Urine       14.29    Protein Total Urine g/gr Creatinine      0 - 0.2 g/g Cr 8.41 (H)    INR      0.86 - 1.14  1.11   Copath Report           Creatinine Urine             Component      Latest Ref Rng 8/10/2016           9:15 AM   WBC      4.0 - 11.0 10e9/L    RBC Count      4.4 - 5.9 10e12/L    Hemoglobin      13.3 - 17.7 g/dL    Hematocrit      40.0 - 53.0 %    MCV      78 - 100 fl    MCH      26.5 - 33.0 pg    MCHC      31.5 - 36.5 g/dL    RDW      10.0 - 15.0 %    Platelet Count      150 - 450 10e9/L    Diff Method          % Neutrophils          % Lymphocytes          % Monocytes          % Eosinophils          % Basophils          % Immature Granulocytes          Nucleated RBCs      0 /100    Absolute Neutrophil      1.6 - 8.3 10e9/L    Absolute Lymphocytes      0.8 - 5.3 10e9/L    Absolute Monocytes      0.0 - 1.3 10e9/L    Absolute Eosinophils      0.0 - 0.7 10e9/L    Absolute Basophils      0.0 - 0.2 10e9/L    Abs Immature Granulocytes      0 - 0.4 10e9/L    Absolute Nucleated RBC          Sodium      133 - 144 mmol/L    Potassium      3.4 - 5.3 mmol/L    Chloride      94 - 109 mmol/L    Carbon Dioxide      20 - 32 mmol/L    Anion Gap      3 - 14 mmol/L    Glucose      70 - 99 mg/dL    Urea Nitrogen      7 - 30 mg/dL    Creatinine      0.66 - 1.25 mg/dL    GFR Estimate      >60 mL/min/1.7m2    GFR Estimate If Black      >60 mL/min/1.7m2    Calcium      8.5 - 10.1 mg/dL     Phosphorus      2.5 - 4.5 mg/dL    Albumin      3.4 - 5.0 g/dL    Protein Random Urine          Protein Total Urine g/gr Creatinine      0 - 0.2 g/g Cr    INR      0.86 - 1.14    Copath Report       Patient Name: HAM SWAIN   Creatinine Urine            Component      Latest Ref Rng 8/10/2016 9/15/2016           2:30 PM    WBC      4.0 - 11.0 10e9/L     RBC Count      4.4 - 5.9 10e12/L     Hemoglobin      13.3 - 17.7 g/dL 14.0 15.4   Hematocrit      40.0 - 53.0 %     MCV      78 - 100 fl     MCH      26.5 - 33.0 pg     MCHC      31.5 - 36.5 g/dL     RDW      10.0 - 15.0 %     Platelet Count      150 - 450 10e9/L     Diff Method           % Neutrophils           % Lymphocytes           % Monocytes           % Eosinophils           % Basophils           % Immature Granulocytes           Nucleated RBCs      0 /100     Absolute Neutrophil      1.6 - 8.3 10e9/L     Absolute Lymphocytes      0.8 - 5.3 10e9/L     Absolute Monocytes      0.0 - 1.3 10e9/L     Absolute Eosinophils      0.0 - 0.7 10e9/L     Absolute Basophils      0.0 - 0.2 10e9/L     Abs Immature Granulocytes      0 - 0.4 10e9/L     Absolute Nucleated RBC           Sodium      133 - 144 mmol/L  138   Potassium      3.4 - 5.3 mmol/L  3.9   Chloride      94 - 109 mmol/L  104   Carbon Dioxide      20 - 32 mmol/L  31   Anion Gap      3 - 14 mmol/L  3   Glucose      70 - 99 mg/dL  91   Urea Nitrogen      7 - 30 mg/dL  12   Creatinine      0.66 - 1.25 mg/dL  0.82   GFR Estimate      >60 mL/min/1.7m2  >90 . . .   GFR Estimate If Black      >60 mL/min/1.7m2  >90 . . .   Calcium      8.5 - 10.1 mg/dL  7.9 (L)   Phosphorus      2.5 - 4.5 mg/dL  2.5   Albumin      3.4 - 5.0 g/dL  1.4 (L)   Protein Random Urine        1.26   Protein Total Urine g/gr Creatinine      0 - 0.2 g/g Cr  8.68 (H)   INR      0.86 - 1.14     Copath Report           Creatinine Urine        14     This biopsy shows segmental sclerosis and hyalinosis affecting   approximately 30%  of glomeruli examined in a pattern diagnostic for   focal segmental glomerulosclerosis.  Although sclerotic segments focally   exhibit increased cellularity, capillary distention by intra-luminal   inflammatory cells is not observed and this specimen does not meet   strict criteria for cellular variant of focal segmental   glomerulosclerosis.  However, in view of the proportion of glomeruli   exhibiting segmental sclerosis and the increased cellularity observed in   sclerotic segments it is expected that this condition may follow an   aggressive course.                   Answers for HPI/ROS submitted by the patient on 9/13/2016   General Symptoms: No  Skin Symptoms: Yes  HENT Symptoms: No  EYE SYMPTOMS: No  HEART SYMPTOMS: Yes  LUNG SYMPTOMS: No  INTESTINAL SYMPTOMS: No  URINARY SYMPTOMS: Yes  REPRODUCTIVE SYMPTOMS: No  SKELETAL SYMPTOMS: Yes  BLOOD SYMPTOMS: No  NERVOUS SYSTEM SYMPTOMS: No  MENTAL HEALTH SYMPTOMS: Yes  Changes in hair: No  Changes in moles/birth marks: No  Itching: No  Rashes: No  Changes in nails: No  Acne: Yes  Change in facial hair: No  Warts: No  Non-healing sores: No  Scarring: No  Flaking of skin: No  Color changes of hands/feet in cold : No  Sun sensitivity: Yes  Skin thickening: No  Chest pain or pressure: No  Fast or irregular heartbeat: Yes  Pain in legs with walking: No  Swelling in feet or ankles: Yes  Trouble breathing while lying down: No  Fingers or Toes appear blue: No  High blood pressure: Yes  Low blood pressure: No  Fainting: No  Murmurs: No  Chest pain on exertion: No  Chest pain at rest: No  Cramping pain in leg during exercise: Yes  Pacemaker: No  Varicose veins: No  Edema or swelling: Yes  Fast heart beat: Yes  Wake up at night with shortness of breath: No  Heart flutters: No  Light-headedness: No  Exercise intolerance: No  Trouble holding urine or incontinence: No  Pain or burning: No  Trouble starting or stopping: No  Increased frequency of urination: Yes  Blood in urine:  No  Decreased frequency of urination: No  Frequent nighttime urination: No  Flank pain: No  Difficulty emptying bladder: No  Back pain: No  Muscle aches: No  Neck pain: No  Swollen joints: No  Joint pain: No  Bone pain: No  Muscle cramps: Yes  Muscle weakness: No  Joint stiffness: No  Bone fracture: No  Nervous or Anxious: No  Depression: No  Trouble sleeping: Yes  Trouble thinking or concentrating: Yes  Mood changes: No  Panic attacks: No

## 2017-05-24 NOTE — MR AVS SNAPSHOT
After Visit Summary   5/24/2017    Ethan Trevino    MRN: [id number]           Patient Information     Date Of Birth          1995        Visit Information        Provider Department      5/24/2017 9:55 AM Anna Welsh MD [facility]        Today's Diagnoses     Acne vulgaris    -  1    Nephrotic syndrome        FSGS (focal segmental glomerulosclerosis)        Hypokalemia          Care Instructions    Wean steroids , lower by 10mg every 6 weeks (60, 50, 40, 30, 20, 10, 5mg, 4,3,2,1 mg)  Labs every 6 weeks          Follow-ups after your visit        Additional Services     DERMATOLOGY REFERRAL       Your provider has referred you to: [facility]    Please be aware that coverage of these services is subject to the terms and limitations of your health insurance plan.  Call member services at your health plan with any benefit or coverage questions.      Please bring the following with you to your appointment:    (1) Any X-Rays, CTs or MRIs which have been performed.  Contact the facility where they were done to arrange for  prior to your scheduled appointment.    (2) List of current medications  (3) This referral request   (4) Any documents/labs given to you for this referral                  Follow-up notes from your care team     Return in about 6 months (around 11/24/2017).      Your next 10 appointments already scheduled     Jun 14, 2017 11:00 AM CDT   LAB with  LAB   [facility] ([facility])    [address] [place] [id number] [phone]           Patient must bring picture ID.  Patient should be prepared to give a urine specimen  Please do not eat 10-12 hours before your appointment if you are coming in fasting for labs on lipids, cholesterol, or glucose (sugar).  Pregnant women should follow their Care Team instructions. Water with medications is okay. Do not drink coffee or other fluids.   If you have concerns  about taking  your medications, please ask at office or if scheduling via [employer], send a message by clicking on Secure Messaging, Message Your Care Team.            Jul 26, 2017 11:00 AM CDT   LAB with [facility] LAB   [facility] ([facility])    [address] [place] [id number] [phone]           Patient must bring picture ID.  Patient should be prepared to give a urine specimen  Please do not eat 10-12 hours before your appointment if you are coming in fasting for labs on lipids, cholesterol, or glucose (sugar).  Pregnant women should follow their Care Team instructions. Water with medications is okay. Do not drink coffee or other fluids.   If you have concerns about taking  your medications, please ask at office or if scheduling via [employer], send a message by clicking on Secure Messaging, Message Your Care Team.            Nov 22, 2017  9:55 AM CST   (Arrive by 9:25 AM)   Return Visit with Anna Welsh MD   [facility] ([facility])    [address] [place] [id number] [phone]              Who to contact     If you have questions or need follow up information about today's clinic visit or your schedule please contact [facility] directly at [phone].  Normal or non-critical lab and imaging results will be communicated to you by MyChart, letter or phone within 4 business days after the clinic has received the results. If you do not hear from us within 7 days, please contact the clinic through MyChart or phone. If you have a critical or abnormal lab result, we will notify you by phone as soon as possible.  Submit refill requests through [employer] or call your pharmacy and they will forward the refill request to us. Please allow 3 business days for your refill to be completed.          Additional Information About Your Visit        [employer] Information     [employer] gives you secure  "access to your electronic health record. If you see a primary care provider, you can also send messages to your care team and make appointments. If you have questions, please call your primary care clinic.  If you do not have a primary care provider, please call [phone] and they will assist you.        Care EveryWhere ID     This is your Care EveryWhere ID. This could be used by other organizations to access your [facility] medical records  [id number]        Your Vitals Were     Pulse Temperature Height Pulse Oximetry BMI (Body Mass Index)       107 97.5  F (36.4  C) (Oral) 1.892 m (6' 2.5\") 96% 33.97 kg/m2        Blood Pressure from Last 3 Encounters:   05/24/17 124/87   02/23/17 125/79   12/08/16 113/75    Weight from Last 3 Encounters:   05/24/17 121.7 kg (268 lb 3.2 oz)   02/23/17 116.5 kg (256 lb 12.8 oz)   12/08/16 111.5 kg (245 lb 14.4 oz)              We Performed the Following     DERMATOLOGY REFERRAL     Magnesium          Today's Medication Changes          These changes are accurate as of: 5/24/17 10:33 AM.  If you have any questions, ask your nurse or doctor.               These medicines have changed or have updated prescriptions.        Dose/Directions    * lisinopril 20 MG tablet   Commonly known as:  PRINIVIL/ZESTRIL   This may have changed:  when to take this   Used for:  Nephrotic syndrome        Dose:  20 mg   Take 1 tablet (20 mg) by mouth At Bedtime   Quantity:  30 tablet   Refills:  11       * lisinopril 10 MG tablet   Commonly known as:  PRINIVIL/ZESTRIL   This may have changed:  You were already taking a medication with the same name, and this prescription was added. Make sure you understand how and when to take each.   Used for:  Nephrotic syndrome        Dose:  10 mg   Take 1 tablet (10 mg) by mouth every morning   Quantity:  90 tablet   Refills:  3       * Notice:  This list has 2 medication(s) that are the same as other medications prescribed for you. Read the directions carefully, " and ask your doctor or other care provider to review them with you.         Where to get your medicines      These medications were sent to [facility] [id number] [facility] - [place], [place] - [address]  [address] , [facility] [address]     Phone:  [phone]     lisinopril 10 MG tablet    lisinopril 20 MG tablet                Primary Care Provider Fax #    [facility] [employer] [facility] [phone]       [address] [place] [phone]        Thank you!     Thank you for choosing [facility]  for your care. Our goal is always to provide you with excellent care. Hearing back from our patients is one way we can continue to improve our services. Please take a few minutes to complete the written survey that you may receive in the mail after your visit with us. Thank you!             Your Updated Medication List - Protect others around you: Learn how to safely use, store and throw away your medicines at www.disposemymeds.org.          This list is accurate as of: 5/24/17 10:33 AM.  Always use your most recent med list.                   Brand Name Dispense Instructions for use    furosemide 20 MG tablet    LASIX    30 tablet    Take 1 tablet (20 mg) by mouth daily As needed       * lisinopril 20 MG tablet    PRINIVIL/ZESTRIL    30 tablet    Take 1 tablet (20 mg) by mouth At Bedtime       * lisinopril 10 MG tablet    PRINIVIL/ZESTRIL    90 tablet    Take 1 tablet (10 mg) by mouth every morning       omeprazole 20 MG CR capsule    priLOSEC    30 capsule    Take 1 capsule (20 mg) by mouth daily       * predniSONE 20 MG tablet    DELTASONE    60 tablet    Take 1 tablet (20 mg) by mouth daily       * predniSONE 10 MG tablet    DELTASONE    60 tablet    Take 1 tablet (10 mg) by mouth daily       * predniSONE 50 MG tablet    DELTASONE    60 tablet    Take 1 tablet (50 mg) by mouth daily       sulfamethoxazole-trimethoprim 800-160 MG per tablet    BACTRIM DS/SEPTRA DS    15 tablet    Take 1 tablet by mouth  three times a week       * Notice:  This list has 5 medication(s) that are the same as other medications prescribed for you. Read the directions carefully, and ask your doctor or other care provider to review them with you.

## 2017-05-24 NOTE — NURSING NOTE
"Chief Complaint   Patient presents with     RECHECK     Follow up nephrotic syndrome.       Initial /87  Pulse 107  Temp 97.5  F (36.4  C) (Oral)  Ht 1.892 m (6' 2.5\")  Wt 121.7 kg (268 lb 3.2 oz)  SpO2 96%  BMI 33.97 kg/m2 Estimated body mass index is 33.97 kg/(m^2) as calculated from the following:    Height as of this encounter: 1.892 m (6' 2.5\").    Weight as of this encounter: 121.7 kg (268 lb 3.2 oz).  Medication Reconciliation: complete   Hortensia Prakash., CMA    "

## 2017-05-24 NOTE — PATIENT INSTRUCTIONS
Wean steroids , lower by 10mg every 6 weeks (60, 50, 40, 30, 20, 10, 5mg, 4,3,2,1 mg)  Labs every 6 weeks

## 2017-06-22 DIAGNOSIS — N05.1 FSGS (FOCAL SEGMENTAL GLOMERULOSCLEROSIS): ICD-10-CM

## 2017-06-22 RX ORDER — SULFAMETHOXAZOLE/TRIMETHOPRIM 800-160 MG
1 TABLET ORAL
Qty: 15 TABLET | Refills: 6 | Status: SHIPPED | OUTPATIENT
Start: 2017-06-22 | End: 2018-04-25

## 2017-06-22 NOTE — TELEPHONE ENCOUNTER
Last Office Visit with Nephrologist:  5/24/17.  Medication refilled per Nephrology Clinic protocol.     Majo Gonzales RN

## 2017-07-29 ENCOUNTER — HOSPITAL ENCOUNTER (OUTPATIENT)
Facility: CLINIC | Age: 22
Setting detail: SPECIMEN
Discharge: HOME | End: 2017-07-29
Admitting: INTERNAL MEDICINE
Payer: COMMERCIAL

## 2017-07-29 DIAGNOSIS — E87.6 HYPOKALEMIA: ICD-10-CM

## 2017-07-29 DIAGNOSIS — N05.1 FSGS (FOCAL SEGMENTAL GLOMERULOSCLEROSIS): ICD-10-CM

## 2017-07-29 DIAGNOSIS — N04.9 NEPHROTIC SYNDROME: ICD-10-CM

## 2017-07-29 LAB
ALBUMIN SERPL-MCNC: 3.8 G/DL (ref 3.4–5)
ANION GAP SERPL CALCULATED.3IONS-SCNC: 6 MMOL/L (ref 3–14)
BUN SERPL-MCNC: 14 MG/DL (ref 7–30)
CALCIUM SERPL-MCNC: 8.9 MG/DL (ref 8.5–10.1)
CHLORIDE SERPL-SCNC: 103 MMOL/L (ref 94–109)
CO2 SERPL-SCNC: 29 MMOL/L (ref 20–32)
CREAT SERPL-MCNC: 0.9 MG/DL (ref 0.66–1.25)
CREAT UR-MCNC: 119 MG/DL
GFR SERPL CREATININE-BSD FRML MDRD: ABNORMAL ML/MIN/1.7M2
GLUCOSE SERPL-MCNC: 108 MG/DL (ref 70–99)
MAGNESIUM SERPL-MCNC: 1.9 MG/DL (ref 1.6–2.3)
PHOSPHATE SERPL-MCNC: 2.3 MG/DL (ref 2.5–4.5)
POTASSIUM SERPL-SCNC: 4.3 MMOL/L (ref 3.4–5.3)
PROT UR-MCNC: 1.06 G/L
PROT/CREAT 24H UR: 0.89 G/G CR (ref 0–0.2)
SODIUM SERPL-SCNC: 138 MMOL/L (ref 133–144)

## 2017-07-29 PROCEDURE — 36415 COLL VENOUS BLD VENIPUNCTURE: CPT | Performed by: INTERNAL MEDICINE

## 2017-07-29 NOTE — LETTER
August 1, 2017       TO: Ethan Trevino  [address]  [facility] [address]       Dear Mr. Trevino,    We are writing to inform you of your test results. Your creatinine is stable. Your proteinuria is up slightly. You should continue to taper per your current plan set up by Dr Welsh. Would recommend a repeat renal panel and UPCR in 1 month. Labs have been entered.        Resulted Orders   Renal panel   Result Value Ref Range    Sodium 138 133 - 144 mmol/L    Potassium 4.3 3.4 - 5.3 mmol/L    Chloride 103 94 - 109 mmol/L    Carbon Dioxide 29 20 - 32 mmol/L    Anion Gap 6 3 - 14 mmol/L    Glucose 108 (H) 70 - 99 mg/dL    Urea Nitrogen 14 7 - 30 mg/dL    Creatinine 0.90 0.66 - 1.25 mg/dL    GFR Estimate >90  Non  GFR Calc   >60 mL/min/1.7m2    GFR Estimate If Black >90   GFR Calc   >60 mL/min/1.7m2    Calcium 8.9 8.5 - 10.1 mg/dL    Phosphorus 2.3 (L) 2.5 - 4.5 mg/dL    Albumin 3.8 3.4 - 5.0 g/dL   Protein  random urine   Result Value Ref Range    Protein Random Urine 1.06 g/L    Protein Total Urine g/gr Creatinine 0.89 (H) 0 - 0.2 g/g Cr   Magnesium   Result Value Ref Range    Magnesium 1.9 1.6 - 2.3 mg/dL   Creatinine urine calculation only   Result Value Ref Range    Creatinine Urine 119 mg/dL       It was a pleasure to see you at your recent visit. Please contact us at [phone] if you have any questions or concerns. Thank you, we look forward to seeing you at your next visit.       Sincerely,    Anna Welsh MD.   of Medicine  Division of Kidney Disease and Hypertension  [facility]  Nephrology Clinic   Third Floor  [address] [place] [address]

## 2017-09-02 ENCOUNTER — HOSPITAL ENCOUNTER (OUTPATIENT)
Facility: CLINIC | Age: 22
Setting detail: SPECIMEN
Discharge: HOME | End: 2017-09-02
Admitting: INTERNAL MEDICINE
Payer: COMMERCIAL

## 2017-09-02 DIAGNOSIS — N04.9 NEPHROTIC SYNDROME: ICD-10-CM

## 2017-09-02 DIAGNOSIS — N05.1 FSGS (FOCAL SEGMENTAL GLOMERULOSCLEROSIS): ICD-10-CM

## 2017-09-02 DIAGNOSIS — E87.6 HYPOKALEMIA: ICD-10-CM

## 2017-09-02 LAB
ALBUMIN SERPL-MCNC: 3.4 G/DL (ref 3.4–5)
ANION GAP SERPL CALCULATED.3IONS-SCNC: 5 MMOL/L (ref 3–14)
BUN SERPL-MCNC: 13 MG/DL (ref 7–30)
CALCIUM SERPL-MCNC: 8.3 MG/DL (ref 8.5–10.1)
CHLORIDE SERPL-SCNC: 106 MMOL/L (ref 94–109)
CO2 SERPL-SCNC: 30 MMOL/L (ref 20–32)
CREAT SERPL-MCNC: 0.89 MG/DL (ref 0.66–1.25)
CREAT UR-MCNC: 187 MG/DL
GFR SERPL CREATININE-BSD FRML MDRD: >90 ML/MIN/1.7M2
GLUCOSE SERPL-MCNC: 89 MG/DL (ref 70–99)
PHOSPHATE SERPL-MCNC: 3.6 MG/DL (ref 2.5–4.5)
POTASSIUM SERPL-SCNC: 3.9 MMOL/L (ref 3.4–5.3)
PROT UR-MCNC: 0.46 G/L
PROT/CREAT 24H UR: 0.25 G/G CR (ref 0–0.2)
SODIUM SERPL-SCNC: 141 MMOL/L (ref 133–144)

## 2017-09-02 PROCEDURE — 84156 ASSAY OF PROTEIN URINE: CPT | Performed by: INTERNAL MEDICINE

## 2017-09-02 PROCEDURE — 36415 COLL VENOUS BLD VENIPUNCTURE: CPT | Performed by: INTERNAL MEDICINE

## 2017-09-02 PROCEDURE — 80069 RENAL FUNCTION PANEL: CPT | Performed by: INTERNAL MEDICINE

## 2017-10-11 DIAGNOSIS — N05.1 FSGS (FOCAL SEGMENTAL GLOMERULOSCLEROSIS): ICD-10-CM

## 2017-12-04 DIAGNOSIS — N04.9 NEPHROTIC SYNDROME: Primary | ICD-10-CM

## 2017-12-04 DIAGNOSIS — N05.1 FSGS (FOCAL SEGMENTAL GLOMERULOSCLEROSIS): ICD-10-CM

## 2017-12-23 ENCOUNTER — MYC REFILL (OUTPATIENT)
Dept: NEPHROLOGY | Facility: CLINIC | Age: 22
End: 2017-12-23

## 2017-12-23 DIAGNOSIS — N05.1 FSGS (FOCAL SEGMENTAL GLOMERULOSCLEROSIS): ICD-10-CM

## 2017-12-23 DIAGNOSIS — N04.9 NEPHROTIC SYNDROME: ICD-10-CM

## 2017-12-26 RX ORDER — FUROSEMIDE 20 MG
20 TABLET ORAL DAILY
Qty: 30 TABLET | Refills: 11 | Status: SHIPPED | OUTPATIENT
Start: 2017-12-26 | End: 2019-03-02

## 2017-12-26 NOTE — TELEPHONE ENCOUNTER
Message from MyChart:  Original authorizing provider: MD Ethan Caba would like a refill of the following medications:  furosemide (LASIX) 20 MG tablet [Anna Welsh MD]    Preferred pharmacy: [facility] [id number] [place] [address]    Comment:

## 2018-01-31 ENCOUNTER — OFFICE VISIT (OUTPATIENT)
Dept: NEPHROLOGY | Facility: CLINIC | Age: 23
End: 2018-01-31
Attending: INTERNAL MEDICINE
Payer: COMMERCIAL

## 2018-01-31 VITALS
OXYGEN SATURATION: 100 % | WEIGHT: 268.8 LBS | SYSTOLIC BLOOD PRESSURE: 121 MMHG | DIASTOLIC BLOOD PRESSURE: 82 MMHG | HEART RATE: 86 BPM | HEIGHT: 75 IN | TEMPERATURE: 97.8 F | BODY MASS INDEX: 33.42 KG/M2

## 2018-01-31 DIAGNOSIS — N05.1 FSGS (FOCAL SEGMENTAL GLOMERULOSCLEROSIS): Primary | ICD-10-CM

## 2018-01-31 DIAGNOSIS — N04.9 NEPHROTIC SYNDROME: ICD-10-CM

## 2018-01-31 DIAGNOSIS — N05.1 FSGS (FOCAL SEGMENTAL GLOMERULOSCLEROSIS): ICD-10-CM

## 2018-01-31 LAB
ALBUMIN SERPL-MCNC: 1.5 G/DL (ref 3.4–5)
ALBUMIN UR-MCNC: >499 MG/DL
ANION GAP SERPL CALCULATED.3IONS-SCNC: 4 MMOL/L (ref 3–14)
APPEARANCE UR: ABNORMAL
BILIRUB UR QL STRIP: NEGATIVE
BUN SERPL-MCNC: 13 MG/DL (ref 7–30)
CALCIUM SERPL-MCNC: 7.9 MG/DL (ref 8.5–10.1)
CHLORIDE SERPL-SCNC: 107 MMOL/L (ref 94–109)
CO2 SERPL-SCNC: 30 MMOL/L (ref 20–32)
COLOR UR AUTO: YELLOW
CREAT SERPL-MCNC: 0.84 MG/DL (ref 0.66–1.25)
CREAT UR-MCNC: 258 MG/DL
ERYTHROCYTE [DISTWIDTH] IN BLOOD BY AUTOMATED COUNT: 12.5 % (ref 10–15)
GFR SERPL CREATININE-BSD FRML MDRD: >90 ML/MIN/1.7M2
GLUCOSE SERPL-MCNC: 80 MG/DL (ref 70–99)
GLUCOSE UR STRIP-MCNC: NEGATIVE MG/DL
HCT VFR BLD AUTO: 44 % (ref 40–53)
HGB BLD-MCNC: 14.6 G/DL (ref 13.3–17.7)
HGB UR QL STRIP: ABNORMAL
HYALINE CASTS #/AREA URNS LPF: 6 /LPF (ref 0–2)
KETONES UR STRIP-MCNC: NEGATIVE MG/DL
LEUKOCYTE ESTERASE UR QL STRIP: NEGATIVE
MCH RBC QN AUTO: 28.6 PG (ref 26.5–33)
MCHC RBC AUTO-ENTMCNC: 33.2 G/DL (ref 31.5–36.5)
MCV RBC AUTO: 86 FL (ref 78–100)
MUCOUS THREADS #/AREA URNS LPF: PRESENT /LPF
NITRATE UR QL: NEGATIVE
PH UR STRIP: 5 PH (ref 5–7)
PHOSPHATE SERPL-MCNC: 3.8 MG/DL (ref 2.5–4.5)
PLATELET # BLD AUTO: 348 10E9/L (ref 150–450)
POTASSIUM SERPL-SCNC: 4 MMOL/L (ref 3.4–5.3)
PROT UR-MCNC: 12.72 G/L
PROT/CREAT 24H UR: 4.93 G/G CR (ref 0–0.2)
RBC # BLD AUTO: 5.11 10E12/L (ref 4.4–5.9)
RBC #/AREA URNS AUTO: 1 /HPF (ref 0–2)
SODIUM SERPL-SCNC: 142 MMOL/L (ref 133–144)
SOURCE: ABNORMAL
SP GR UR STRIP: 1.02 (ref 1–1.03)
UROBILINOGEN UR STRIP-MCNC: 0 MG/DL (ref 0–2)
WBC # BLD AUTO: 7.1 10E9/L (ref 4–11)
WBC #/AREA URNS AUTO: 4 /HPF (ref 0–2)

## 2018-01-31 PROCEDURE — G0463 HOSPITAL OUTPT CLINIC VISIT: HCPCS | Mod: ZF

## 2018-01-31 PROCEDURE — 85027 COMPLETE CBC AUTOMATED: CPT | Performed by: INTERNAL MEDICINE

## 2018-01-31 PROCEDURE — 36415 COLL VENOUS BLD VENIPUNCTURE: CPT | Performed by: INTERNAL MEDICINE

## 2018-01-31 PROCEDURE — 81001 URINALYSIS AUTO W/SCOPE: CPT | Performed by: INTERNAL MEDICINE

## 2018-01-31 PROCEDURE — 80069 RENAL FUNCTION PANEL: CPT | Performed by: INTERNAL MEDICINE

## 2018-01-31 PROCEDURE — 84156 ASSAY OF PROTEIN URINE: CPT | Performed by: INTERNAL MEDICINE

## 2018-01-31 RX ORDER — PREDNISONE 50 MG/1
100 TABLET ORAL DAILY
Qty: 90 TABLET | Refills: 11 | Status: SHIPPED | OUTPATIENT
Start: 2018-01-31 | End: 2019-09-24

## 2018-01-31 RX ORDER — LISINOPRIL 10 MG/1
10 TABLET ORAL 2 TIMES DAILY
Qty: 90 TABLET | Refills: 3 | Status: SHIPPED | OUTPATIENT
Start: 2018-01-31 | End: 2018-04-25

## 2018-01-31 ASSESSMENT — PAIN SCALES - GENERAL: PAINLEVEL: NO PAIN (0)

## 2018-01-31 NOTE — PROGRESS NOTES
Assessment and Plan:   23 year old male with 6month history of edema and discovered with nephrotic syndrome, biopsy in August 2016 showing FSGS. Steroids started 8/23/2016 and weaning started February 2017. He returns for followup.  Proteinuria up from 0.28 g/g to 4.9 grams on 1/31/18  1. FSGS- he had 8-9 grams of proteinuria, down to 6 grams in December 2016, which is partial response after 3 months of therapy and by 6 months his proteinuria is down to 0.7 grams. He is currently down to 10mg but unfortunately his proteinura is back up to 4-5 grams / day. He did not a little more swelling and has been taking furosemide daily  - he also is taking less lisinopril as he was feeling a little dizzy on higher dose, currently taking 10mg daily  . Biopsy done and prednisone 100mg daily started end of August 2016. He tolerated fairly but not great, but willing to repulse with oral steroids given relapse.  - steroids decreased 2/23/17 to 80mg,and then 60mg, then decreased by 10mg every 4-6 weeks thereafter-  - will thus restart 100mg daily for 6 weeks , decrease to 80mg x 6 weeks, then 60mg daily and return for re-evaluation. Might want to taper more slowly or introduce cyclosporine as a steroid sparing agent if response is not ideal.    His SCr is stable near 0.8mg/dL   - discussed risks and benefits of prednisone vs cyclosporine, possibility of rituximab mentioned.   2. Renal function- preserved  3. Blood pressure- low normal, on lisinopril 10mg,   - increase to 10mg bid if tolerated, use furosemide sparingly  4. Anemia- normal hemoglobin  4. Edema- has some edema which has improved with lasix - it is mild at this point, using compression stockings.  5. Prophylaxis- on bactrim thrice weekly to be continued, recommended vitamin D/ calcium and to continue omeprazole; had GI upset first week but now settled  6. Cramps- occasionally with furosemide use, encouraged high K diet  Higher lisinopril may help  - recheck Mag - was 1.7  in Fall 2016, may be lower due to PPI  - use furosemide sparingly to allow for room for lisinopril for K.  7. Electrolytes- potassium low normal- high K diet especially if taking furosemide  Assessment and plan was discussed with patient and he voiced his understanding and agreement.    Reason for Visit:  Ethan Trevino is a 23 year old male with FSGS, who presents for routine follow up.     HPI:  He is a pleasant young male who in summer 2016 presented with several months history of swelling, noted to be nephrotic, and renal biopsy in August 2016 showed FSGS. He was started on steroids 8/23/16 and he presents for followup,.    He has been on steroids since end of August 2016 and is tolerating fairly. He has more and more abdominal striae and his acne is significant, but he is managing with topical treatment and willing to tolerate this as necessary  His proteinuria was down to 1-2 grams then subgram at 0.7g which is great, but unfortunately now ast prednisone of 10mg daily, his proteinuria has increased back to 5 grams.  His serum albumin is 1.5, his creatinine is stable at 0.8 mg/dL .  He is taking furosemide daily in recent weeks due to increasing edema  He continues to work (2 jobs at this time)  We discussed his increased proteinuria, and modes of therapy      Home BP: Not checked    Baseline Cr: 0.8    ROS:  A comprehensive review of systems was obtained and negative, except as noted in the HPI or PMH.    Active Medical Problems:  Patient Active Problem List   Diagnosis     FSGS (focal segmental glomerulosclerosis)     Nephrotic syndrome       Personal Hx:   Social History     Social History     Marital status: Single     Spouse name: N/A     Number of children: N/A     Years of education: N/A     Occupational History     Not on file.     Social History Main Topics     Smoking status: Never Smoker     Smokeless tobacco: Not on file     Alcohol use Yes      Comment: social     Drug use: No     Sexual  "activity: Not on file     Other Topics Concern     Not on file     Social History Narrative       Allergies:  Allergies   Allergen Reactions     Amoxicillin      Other reaction(s): EENT - other (explain in comments), SKIN - rash  Rash and lip swelling after 8 days of Amoxicillin     Erythromycin      Penicillins        Medications:  Prior to Admission medications    Medication Sig Start Date End Date Taking? Authorizing Provider   lisinopril (PRINIVIL,ZESTRIL) 20 MG tablet Take 1 tablet (20 mg) by mouth daily 9/15/16  Yes Anna Welsh MD   predniSONE (DELTASONE) 50 MG tablet Take 2 tablets (100 mg) by mouth daily 8/23/16  Yes Anna Welsh MD   omeprazole (PRILOSEC) 20 MG capsule Take 1 capsule (20 mg) by mouth daily 8/23/16  Yes Anna Welsh MD   sulfamethoxazole-trimethoprim (BACTRIM DS,SEPTRA DS) 800-160 MG per tablet Take 1 tablet by mouth three times a week 8/23/16  Yes Anna Welsh MD   FUROSEMIDE PO Take by mouth daily   Yes Reported, Patient       Vitals:  /82  Pulse 86  Temp 97.8  F (36.6  C) (Oral)  Ht 1.892 m (6' 2.5\")  Wt 121.9 kg (268 lb 12.8 oz)  SpO2 100%  BMI 34.05 kg/m2    Exam:  GENERAL APPEARANCE: alert and no distress  HENT: mouth without ulcers or lesions  LYMPHATICS: no cervical or supraclavicular nodes  RESP: lungs clear to auscultation - no rales, rhonchi or wheezes  CV: regular rhythm, normal rate, no rub, no murmur  EDEMA: trace LE edema bilaterally  ABDOMEN: soft, nondistended, nontender, bowel sounds normal  MS: extremities normal - no gross deformities noted, no evidence of inflammation in joints, no muscle tenderness  SKIN: no rash    Results:  Component      Latest Ref Rng & Units 9/15/2016 10/10/2016 12/8/2016 1/12/2017   Sodium      133 - 144 mmol/L 138 137 139 141   Potassium      3.4 - 5.3 mmol/L 3.9 4.5 4.4 4.1   Chloride      94 - 109 mmol/L 104 100 102 103   Carbon Dioxide      20 - 32 mmol/L 31 29 30 32   Anion Gap      " 3 - 14 mmol/L 3 7 7 6   Glucose      70 - 99 mg/dL 91 114 (H) 95 92   Urea Nitrogen      7 - 30 mg/dL 12 10 21 11   Creatinine      0.66 - 1.25 mg/dL 0.82 0.92 0.86 0.74   GFR Estimate      >60 mL/min/1.7m2 >90 . . . >90 . . . >90 . . . >90 . . .   GFR Estimate If Black      >60 mL/min/1.7m2 >90 . . . >90 . . . >90 . . . >90 . . .   Calcium      8.5 - 10.1 mg/dL 7.9 (L) 8.2 (L) 8.1 (L) 8.3 (L)   Phosphorus      2.5 - 4.5 mg/dL 2.5 3.6 3.2 4.2   Albumin      3.4 - 5.0 g/dL 1.4 (L) 1.7 (L) 2.1 (L) 2.5 (L)   Protein Random Urine      g/L 1.26 2.35 2.49 3.29   Protein Total Urine g/gr Creatinine      0 - 0.2 g/g Cr 8.68 (H) 9.20 (H) 5.60 (H) 2.76 (H)   Creatinine Urine      mg/dL    119     Component      Latest Ref Rng & Units 2/23/2017   Sodium      133 - 144 mmol/L 140   Potassium      3.4 - 5.3 mmol/L 3.6   Chloride      94 - 109 mmol/L 103   Carbon Dioxide      20 - 32 mmol/L 29   Anion Gap      3 - 14 mmol/L 8   Glucose      70 - 99 mg/dL 112 (H)   Urea Nitrogen      7 - 30 mg/dL 12   Creatinine      0.66 - 1.25 mg/dL 0.86   GFR Estimate      >60 mL/min/1.7m2 >90 . . .   GFR Estimate If Black      >60 mL/min/1.7m2 >90 . . .   Calcium      8.5 - 10.1 mg/dL 8.4 (L)   Phosphorus      2.5 - 4.5 mg/dL 3.2   Albumin      3.4 - 5.0 g/dL 3.0 (L)   Protein Random Urine      g/L 3.04   Protein Total Urine g/gr Creatinine      0 - 0.2 g/g Cr 1.19 (H)   Creatinine Urine      mg/dL 254     Component      Latest Ref Rng 7/27/2016 8/10/2016            8:40 AM   WBC      4.0 - 11.0 10e9/L  7.0   RBC Count      4.4 - 5.9 10e12/L  5.18   Hemoglobin      13.3 - 17.7 g/dL  15.1   Hematocrit      40.0 - 53.0 %  44.0   MCV      78 - 100 fl  85   MCH      26.5 - 33.0 pg  29.2   MCHC      31.5 - 36.5 g/dL  34.3   RDW      10.0 - 15.0 %  13.2   Platelet Count      150 - 450 10e9/L  340   Diff Method        Automated Method   % Neutrophils        40.5   % Lymphocytes        45.7   % Monocytes        7.0   % Eosinophils        5.6   %  Basophils        0.9   % Immature Granulocytes        0.3   Nucleated RBCs      0 /100  0   Absolute Neutrophil      1.6 - 8.3 10e9/L  2.8   Absolute Lymphocytes      0.8 - 5.3 10e9/L  3.2   Absolute Monocytes      0.0 - 1.3 10e9/L  0.5   Absolute Eosinophils      0.0 - 0.7 10e9/L  0.4   Absolute Basophils      0.0 - 0.2 10e9/L  0.1   Abs Immature Granulocytes      0 - 0.4 10e9/L  0.0   Absolute Nucleated RBC        0.0   Sodium      133 - 144 mmol/L  144   Potassium      3.4 - 5.3 mmol/L  3.6   Chloride      94 - 109 mmol/L  109   Carbon Dioxide      20 - 32 mmol/L  26   Anion Gap      3 - 14 mmol/L  9   Glucose      70 - 99 mg/dL  75   Urea Nitrogen      7 - 30 mg/dL  8   Creatinine      0.66 - 1.25 mg/dL  0.88   GFR Estimate      >60 mL/min/1.7m2  >90 . . .   GFR Estimate If Black      >60 mL/min/1.7m2  >90 . . .   Calcium      8.5 - 10.1 mg/dL  7.5 (L)   Phosphorus      2.5 - 4.5 mg/dL     Albumin      3.4 - 5.0 g/dL     Protein Random Urine       14.29    Protein Total Urine g/gr Creatinine      0 - 0.2 g/g Cr 8.41 (H)    INR      0.86 - 1.14  1.11   Copath Report           Creatinine Urine             Component      Latest Ref Rng 8/10/2016           9:15 AM   WBC      4.0 - 11.0 10e9/L    RBC Count      4.4 - 5.9 10e12/L    Hemoglobin      13.3 - 17.7 g/dL    Hematocrit      40.0 - 53.0 %    MCV      78 - 100 fl    MCH      26.5 - 33.0 pg    MCHC      31.5 - 36.5 g/dL    RDW      10.0 - 15.0 %    Platelet Count      150 - 450 10e9/L    Diff Method          % Neutrophils          % Lymphocytes          % Monocytes          % Eosinophils          % Basophils          % Immature Granulocytes          Nucleated RBCs      0 /100    Absolute Neutrophil      1.6 - 8.3 10e9/L    Absolute Lymphocytes      0.8 - 5.3 10e9/L    Absolute Monocytes      0.0 - 1.3 10e9/L    Absolute Eosinophils      0.0 - 0.7 10e9/L    Absolute Basophils      0.0 - 0.2 10e9/L    Abs Immature Granulocytes      0 - 0.4 10e9/L    Absolute  Nucleated RBC          Sodium      133 - 144 mmol/L    Potassium      3.4 - 5.3 mmol/L    Chloride      94 - 109 mmol/L    Carbon Dioxide      20 - 32 mmol/L    Anion Gap      3 - 14 mmol/L    Glucose      70 - 99 mg/dL    Urea Nitrogen      7 - 30 mg/dL    Creatinine      0.66 - 1.25 mg/dL    GFR Estimate      >60 mL/min/1.7m2    GFR Estimate If Black      >60 mL/min/1.7m2    Calcium      8.5 - 10.1 mg/dL    Phosphorus      2.5 - 4.5 mg/dL    Albumin      3.4 - 5.0 g/dL    Protein Random Urine          Protein Total Urine g/gr Creatinine      0 - 0.2 g/g Cr    INR      0.86 - 1.14    Copath Report       Patient Name: HAM SWAIN MARIO [place]   Creatinine Urine            Component      Latest Ref Rng 8/10/2016 9/15/2016           2:30 PM    WBC      4.0 - 11.0 10e9/L     RBC Count      4.4 - 5.9 10e12/L     Hemoglobin      13.3 - 17.7 g/dL 14.0 15.4   Hematocrit      40.0 - 53.0 %     MCV      78 - 100 fl     MCH      26.5 - 33.0 pg     MCHC      31.5 - 36.5 g/dL     RDW      10.0 - 15.0 %     Platelet Count      150 - 450 10e9/L     Diff Method           % Neutrophils           % Lymphocytes           % Monocytes           % Eosinophils           % Basophils           % Immature Granulocytes           Nucleated RBCs      0 /100     Absolute Neutrophil      1.6 - 8.3 10e9/L     Absolute Lymphocytes      0.8 - 5.3 10e9/L     Absolute Monocytes      0.0 - 1.3 10e9/L     Absolute Eosinophils      0.0 - 0.7 10e9/L     Absolute Basophils      0.0 - 0.2 10e9/L     Abs Immature Granulocytes      0 - 0.4 10e9/L     Absolute Nucleated RBC           Sodium      133 - 144 mmol/L  138   Potassium      3.4 - 5.3 mmol/L  3.9   Chloride      94 - 109 mmol/L  104   Carbon Dioxide      20 - 32 mmol/L  31   Anion Gap      3 - 14 mmol/L  3   Glucose      70 - 99 mg/dL  91   Urea Nitrogen      7 - 30 mg/dL  12   Creatinine      0.66 - 1.25 mg/dL  0.82   GFR Estimate      >60 mL/min/1.7m2  >90 . . .   GFR Estimate If Black      >60  mL/min/1.7m2  >90 . . .   Calcium      8.5 - 10.1 mg/dL  7.9 (L)   Phosphorus      2.5 - 4.5 mg/dL  2.5   Albumin      3.4 - 5.0 g/dL  1.4 (L)   Protein Random Urine        1.26   Protein Total Urine g/gr Creatinine      0 - 0.2 g/g Cr  8.68 (H)   INR      0.86 - 1.14     Copath Report           Creatinine Urine        14     This biopsy shows segmental sclerosis and hyalinosis affecting   approximately 30% of glomeruli examined in a pattern diagnostic for   focal segmental glomerulosclerosis.  Although sclerotic segments focally   exhibit increased cellularity, capillary distention by intra-luminal   inflammatory cells is not observed and this specimen does not meet   strict criteria for cellular variant of focal segmental   glomerulosclerosis.  However, in view of the proportion of glomeruli   exhibiting segmental sclerosis and the increased cellularity observed in   sclerotic segments it is expected that this condition may follow an   aggressive course.                   Answers for HPI/ROS submitted by the patient on 9/13/2016   General Symptoms: No  Skin Symptoms: Yes  HENT Symptoms: No  EYE SYMPTOMS: No  HEART SYMPTOMS: Yes  LUNG SYMPTOMS: No  INTESTINAL SYMPTOMS: No  URINARY SYMPTOMS: Yes  REPRODUCTIVE SYMPTOMS: No  SKELETAL SYMPTOMS: Yes  BLOOD SYMPTOMS: No  NERVOUS SYSTEM SYMPTOMS: No  MENTAL HEALTH SYMPTOMS: Yes  Changes in hair: No  Changes in moles/birth marks: No  Itching: No  Rashes: No  Changes in nails: No  Acne: Yes  Change in facial hair: No  Warts: No  Non-healing sores: No  Scarring: No  Flaking of skin: No  Color changes of hands/feet in cold : No  Sun sensitivity: Yes  Skin thickening: No  Chest pain or pressure: No  Fast or irregular heartbeat: Yes  Pain in legs with walking: No  Swelling in feet or ankles: Yes  Trouble breathing while lying down: No  Fingers or Toes appear blue: No  High blood pressure: Yes  Low blood pressure: No  Fainting: No  Murmurs: No  Chest pain on exertion: No  Chest  pain at rest: No  Cramping pain in leg during exercise: Yes  Pacemaker: No  Varicose veins: No  Edema or swelling: Yes  Fast heart beat: Yes  Wake up at night with shortness of breath: No  Heart flutters: No  Light-headedness: No  Exercise intolerance: No  Trouble holding urine or incontinence: No  Pain or burning: No  Trouble starting or stopping: No  Increased frequency of urination: Yes  Blood in urine: No  Decreased frequency of urination: No  Frequent nighttime urination: No  Flank pain: No  Difficulty emptying bladder: No  Back pain: No  Muscle aches: No  Neck pain: No  Swollen joints: No  Joint pain: No  Bone pain: No  Muscle cramps: Yes  Muscle weakness: No  Joint stiffness: No  Bone fracture: No  Nervous or Anxious: No  Depression: No  Trouble sleeping: Yes  Trouble thinking or concentrating: Yes  Mood changes: No  Panic attacks: No

## 2018-01-31 NOTE — LETTER
1/31/2018      RE: Ethan Trevino  [address] [facility] [address]       Assessment and Plan:   23 year old male with 6month history of edema and discovered with nephrotic syndrome, biopsy in August 2016 showing FSGS. Steroids started 8/23/2016 and weaning started February 2017. He returns for followup.  Proteinuria up from 0.28 g/g to 4.9 grams on 1/31/18  1. FSGS- he had 8-9 grams of proteinuria, down to 6 grams in December 2016, which is partial response after 3 months of therapy and by 6 months his proteinuria is down to 0.7 grams. He is currently down to 10mg but unfortunately his proteinura is back up to 4-5 grams / day. He did not a little more swelling and has been taking furosemide daily  - he also is taking less lisinopril as he was feeling a little dizzy on higher dose, currently taking 10mg daily  . Biopsy done and prednisone 100mg daily started end of August 2016. He tolerated fairly but not great, but willing to repulse with oral steroids given relapse.  - steroids decreased 2/23/17 to 80mg,and then 60mg, then decreased by 10mg every 4-6 weeks thereafter-  - will thus restart 100mg daily for 6 weeks , decrease to 80mg x 6 weeks, then 60mg daily and return for re-evaluation. Might want to taper more slowly or introduce cyclosporine as a steroid sparing agent if response is not ideal.    His SCr is stable near 0.8mg/dL   - discussed risks and benefits of prednisone vs cyclosporine, possibility of rituximab mentioned.   2. Renal function- preserved  3. Blood pressure- low normal, on lisinopril 10mg,   - increase to 10mg bid if tolerated, use furosemide sparingly  4. Anemia- normal hemoglobin  4. Edema- has some edema which has improved with lasix - it is mild at this point, using compression stockings.  5. Prophylaxis- on bactrim thrice weekly to be continued, recommended vitamin D/ calcium and to continue omeprazole; had GI upset first week but now settled  6. Cramps- occasionally  with furosemide use, encouraged high K diet  Higher lisinopril may help  - recheck Mag - was 1.7 in Fall 2016, may be lower due to PPI  - use furosemide sparingly to allow for room for lisinopril for K.  7. Electrolytes- potassium low normal- high K diet especially if taking furosemide  Assessment and plan was discussed with patient and he voiced his understanding and agreement.    Reason for Visit:  Ethan Trevino is a 23 year old male with FSGS, who presents for routine follow up.     HPI:  He is a pleasant young male who in summer 2016 presented with several months history of swelling, noted to be nephrotic, and renal biopsy in August 2016 showed FSGS. He was started on steroids 8/23/16 and he presents for followup,.    He has been on steroids since end of August 2016 and is tolerating fairly. He has more and more abdominal striae and his acne is significant, but he is managing with topical treatment and willing to tolerate this as necessary  His proteinuria was down to 1-2 grams then subgram at 0.7g which is great, but unfortunately now ast prednisone of 10mg daily, his proteinuria has increased back to 5 grams.  His serum albumin is 1.5, his creatinine is stable at 0.8 mg/dL .  He is taking furosemide daily in recent weeks due to increasing edema  He continues to work (2 jobs at this time)  We discussed his increased proteinuria, and modes of therapy      Home BP: Not checked    Baseline Cr: 0.8    ROS:  A comprehensive review of systems was obtained and negative, except as noted in the HPI or PMH.    Active Medical Problems:  Patient Active Problem List   Diagnosis     FSGS (focal segmental glomerulosclerosis)     Nephrotic syndrome       Personal Hx:   Social History     Social History     Marital status: Single     Spouse name: N/A     Number of children: N/A     Years of education: N/A     Occupational History     Not on file.     Social History Main Topics     Smoking status: Never Smoker      "Smokeless tobacco: Not on file     Alcohol use Yes      Comment: social     Drug use: No     Sexual activity: Not on file     Other Topics Concern     Not on file     Social History Narrative       Allergies:  Allergies   Allergen Reactions     Amoxicillin      Other reaction(s): EENT - other (explain in comments), SKIN - rash  Rash and lip swelling after 8 days of Amoxicillin     Erythromycin      Penicillins        Medications:  Prior to Admission medications    Medication Sig Start Date End Date Taking? Authorizing Provider   lisinopril (PRINIVIL,ZESTRIL) 20 MG tablet Take 1 tablet (20 mg) by mouth daily 9/15/16  Yes Anna Welsh MD   predniSONE (DELTASONE) 50 MG tablet Take 2 tablets (100 mg) by mouth daily 8/23/16  Yes Anna Welsh MD   omeprazole (PRILOSEC) 20 MG capsule Take 1 capsule (20 mg) by mouth daily 8/23/16  Yes Anna Welsh MD   sulfamethoxazole-trimethoprim (BACTRIM DS,SEPTRA DS) 800-160 MG per tablet Take 1 tablet by mouth three times a week 8/23/16  Yes Anna Welsh MD   FUROSEMIDE PO Take by mouth daily   Yes Reported, Patient       Vitals:  /82  Pulse 86  Temp 97.8  F (36.6  C) (Oral)  Ht 1.892 m (6' 2.5\")  Wt 121.9 kg (268 lb 12.8 oz)  SpO2 100%  BMI 34.05 kg/m2    Exam:  GENERAL APPEARANCE: alert and no distress  HENT: mouth without ulcers or lesions  LYMPHATICS: no cervical or supraclavicular nodes  RESP: lungs clear to auscultation - no rales, rhonchi or wheezes  CV: regular rhythm, normal rate, no rub, no murmur  EDEMA: trace LE edema bilaterally  ABDOMEN: soft, nondistended, nontender, bowel sounds normal  MS: extremities normal - no gross deformities noted, no evidence of inflammation in joints, no muscle tenderness  SKIN: no rash    Results:  Component      Latest Ref Rng & Units 9/15/2016 10/10/2016 12/8/2016 1/12/2017   Sodium      133 - 144 mmol/L 138 137 139 141   Potassium      3.4 - 5.3 mmol/L 3.9 4.5 4.4 4.1   Chloride    "   94 - 109 mmol/L 104 100 102 103   Carbon Dioxide      20 - 32 mmol/L 31 29 30 32   Anion Gap      3 - 14 mmol/L 3 7 7 6   Glucose      70 - 99 mg/dL 91 114 (H) 95 92   Urea Nitrogen      7 - 30 mg/dL 12 10 21 11   Creatinine      0.66 - 1.25 mg/dL 0.82 0.92 0.86 0.74   GFR Estimate      >60 mL/min/1.7m2 >90 . . . >90 . . . >90 . . . >90 . . .   GFR Estimate If Black      >60 mL/min/1.7m2 >90 . . . >90 . . . >90 . . . >90 . . .   Calcium      8.5 - 10.1 mg/dL 7.9 (L) 8.2 (L) 8.1 (L) 8.3 (L)   Phosphorus      2.5 - 4.5 mg/dL 2.5 3.6 3.2 4.2   Albumin      3.4 - 5.0 g/dL 1.4 (L) 1.7 (L) 2.1 (L) 2.5 (L)   Protein Random Urine      g/L 1.26 2.35 2.49 3.29   Protein Total Urine g/gr Creatinine      0 - 0.2 g/g Cr 8.68 (H) 9.20 (H) 5.60 (H) 2.76 (H)   Creatinine Urine      mg/dL    119     Component      Latest Ref Rng & Units 2/23/2017   Sodium      133 - 144 mmol/L 140   Potassium      3.4 - 5.3 mmol/L 3.6   Chloride      94 - 109 mmol/L 103   Carbon Dioxide      20 - 32 mmol/L 29   Anion Gap      3 - 14 mmol/L 8   Glucose      70 - 99 mg/dL 112 (H)   Urea Nitrogen      7 - 30 mg/dL 12   Creatinine      0.66 - 1.25 mg/dL 0.86   GFR Estimate      >60 mL/min/1.7m2 >90 . . .   GFR Estimate If Black      >60 mL/min/1.7m2 >90 . . .   Calcium      8.5 - 10.1 mg/dL 8.4 (L)   Phosphorus      2.5 - 4.5 mg/dL 3.2   Albumin      3.4 - 5.0 g/dL 3.0 (L)   Protein Random Urine      g/L 3.04   Protein Total Urine g/gr Creatinine      0 - 0.2 g/g Cr 1.19 (H)   Creatinine Urine      mg/dL 254     Component      Latest Ref Rng 7/27/2016 8/10/2016            8:40 AM   WBC      4.0 - 11.0 10e9/L  7.0   RBC Count      4.4 - 5.9 10e12/L  5.18   Hemoglobin      13.3 - 17.7 g/dL  15.1   Hematocrit      40.0 - 53.0 %  44.0   MCV      78 - 100 fl  85   MCH      26.5 - 33.0 pg  29.2   MCHC      31.5 - 36.5 g/dL  34.3   RDW      10.0 - 15.0 %  13.2   Platelet Count      150 - 450 10e9/L  340   Diff Method        Automated Method   % Neutrophils         40.5   % Lymphocytes        45.7   % Monocytes        7.0   % Eosinophils        5.6   % Basophils        0.9   % Immature Granulocytes        0.3   Nucleated RBCs      0 /100  0   Absolute Neutrophil      1.6 - 8.3 10e9/L  2.8   Absolute Lymphocytes      0.8 - 5.3 10e9/L  3.2   Absolute Monocytes      0.0 - 1.3 10e9/L  0.5   Absolute Eosinophils      0.0 - 0.7 10e9/L  0.4   Absolute Basophils      0.0 - 0.2 10e9/L  0.1   Abs Immature Granulocytes      0 - 0.4 10e9/L  0.0   Absolute Nucleated RBC        0.0   Sodium      133 - 144 mmol/L  144   Potassium      3.4 - 5.3 mmol/L  3.6   Chloride      94 - 109 mmol/L  109   Carbon Dioxide      20 - 32 mmol/L  26   Anion Gap      3 - 14 mmol/L  9   Glucose      70 - 99 mg/dL  75   Urea Nitrogen      7 - 30 mg/dL  8   Creatinine      0.66 - 1.25 mg/dL  0.88   GFR Estimate      >60 mL/min/1.7m2  >90 . . .   GFR Estimate If Black      >60 mL/min/1.7m2  >90 . . .   Calcium      8.5 - 10.1 mg/dL  7.5 (L)   Phosphorus      2.5 - 4.5 mg/dL     Albumin      3.4 - 5.0 g/dL     Protein Random Urine       14.29    Protein Total Urine g/gr Creatinine      0 - 0.2 g/g Cr 8.41 (H)    INR      0.86 - 1.14  1.11   Copath Report           Creatinine Urine             Component      Latest Ref Rng 8/10/2016           9:15 AM   WBC      4.0 - 11.0 10e9/L    RBC Count      4.4 - 5.9 10e12/L    Hemoglobin      13.3 - 17.7 g/dL    Hematocrit      40.0 - 53.0 %    MCV      78 - 100 fl    MCH      26.5 - 33.0 pg    MCHC      31.5 - 36.5 g/dL    RDW      10.0 - 15.0 %    Platelet Count      150 - 450 10e9/L    Diff Method          % Neutrophils          % Lymphocytes          % Monocytes          % Eosinophils          % Basophils          % Immature Granulocytes          Nucleated RBCs      0 /100    Absolute Neutrophil      1.6 - 8.3 10e9/L    Absolute Lymphocytes      0.8 - 5.3 10e9/L    Absolute Monocytes      0.0 - 1.3 10e9/L    Absolute Eosinophils      0.0 - 0.7 10e9/L    Absolute  Basophils      0.0 - 0.2 10e9/L    Abs Immature Granulocytes      0 - 0.4 10e9/L    Absolute Nucleated RBC          Sodium      133 - 144 mmol/L    Potassium      3.4 - 5.3 mmol/L    Chloride      94 - 109 mmol/L    Carbon Dioxide      20 - 32 mmol/L    Anion Gap      3 - 14 mmol/L    Glucose      70 - 99 mg/dL    Urea Nitrogen      7 - 30 mg/dL    Creatinine      0.66 - 1.25 mg/dL    GFR Estimate      >60 mL/min/1.7m2    GFR Estimate If Black      >60 mL/min/1.7m2    Calcium      8.5 - 10.1 mg/dL    Phosphorus      2.5 - 4.5 mg/dL    Albumin      3.4 - 5.0 g/dL    Protein Random Urine          Protein Total Urine g/gr Creatinine      0 - 0.2 g/g Cr    INR      0.86 - 1.14    Copath Report       Patient Name: HAM SWAIN Марина Parish   Creatinine Urine            Component      Latest Ref Rng 8/10/2016 9/15/2016           2:30 PM    WBC      4.0 - 11.0 10e9/L     RBC Count      4.4 - 5.9 10e12/L     Hemoglobin      13.3 - 17.7 g/dL 14.0 15.4   Hematocrit      40.0 - 53.0 %     MCV      78 - 100 fl     MCH      26.5 - 33.0 pg     MCHC      31.5 - 36.5 g/dL     RDW      10.0 - 15.0 %     Platelet Count      150 - 450 10e9/L     Diff Method           % Neutrophils           % Lymphocytes           % Monocytes           % Eosinophils           % Basophils           % Immature Granulocytes           Nucleated RBCs      0 /100     Absolute Neutrophil      1.6 - 8.3 10e9/L     Absolute Lymphocytes      0.8 - 5.3 10e9/L     Absolute Monocytes      0.0 - 1.3 10e9/L     Absolute Eosinophils      0.0 - 0.7 10e9/L     Absolute Basophils      0.0 - 0.2 10e9/L     Abs Immature Granulocytes      0 - 0.4 10e9/L     Absolute Nucleated RBC           Sodium      133 - 144 mmol/L  138   Potassium      3.4 - 5.3 mmol/L  3.9   Chloride      94 - 109 mmol/L  104   Carbon Dioxide      20 - 32 mmol/L  31   Anion Gap      3 - 14 mmol/L  3   Glucose      70 - 99 mg/dL  91   Urea Nitrogen      7 - 30 mg/dL  12   Creatinine      0.66 - 1.25  mg/dL  0.82   GFR Estimate      >60 mL/min/1.7m2  >90 . . .   GFR Estimate If Black      >60 mL/min/1.7m2  >90 . . .   Calcium      8.5 - 10.1 mg/dL  7.9 (L)   Phosphorus      2.5 - 4.5 mg/dL  2.5   Albumin      3.4 - 5.0 g/dL  1.4 (L)   Protein Random Urine        1.26   Protein Total Urine g/gr Creatinine      0 - 0.2 g/g Cr  8.68 (H)   INR      0.86 - 1.14     Copath Report           Creatinine Urine        14     This biopsy shows segmental sclerosis and hyalinosis affecting   approximately 30% of glomeruli examined in a pattern diagnostic for   focal segmental glomerulosclerosis.  Although sclerotic segments focally   exhibit increased cellularity, capillary distention by intra-luminal   inflammatory cells is not observed and this specimen does not meet   strict criteria for cellular variant of focal segmental   glomerulosclerosis.  However, in view of the proportion of glomeruli   exhibiting segmental sclerosis and the increased cellularity observed in   sclerotic segments it is expected that this condition may follow an   aggressive course.                   Answers for HPI/ROS submitted by the patient on 9/13/2016   General Symptoms: No  Skin Symptoms: Yes  HENT Symptoms: No  EYE SYMPTOMS: No  HEART SYMPTOMS: Yes  LUNG SYMPTOMS: No  INTESTINAL SYMPTOMS: No  URINARY SYMPTOMS: Yes  REPRODUCTIVE SYMPTOMS: No  SKELETAL SYMPTOMS: Yes  BLOOD SYMPTOMS: No  NERVOUS SYSTEM SYMPTOMS: No  MENTAL HEALTH SYMPTOMS: Yes  Changes in hair: No  Changes in moles/birth marks: No  Itching: No  Rashes: No  Changes in nails: No  Acne: Yes  Change in facial hair: No  Warts: No  Non-healing sores: No  Scarring: No  Flaking of skin: No  Color changes of hands/feet in cold : No  Sun sensitivity: Yes  Skin thickening: No  Chest pain or pressure: No  Fast or irregular heartbeat: Yes  Pain in legs with walking: No  Swelling in feet or ankles: Yes  Trouble breathing while lying down: No  Fingers or Toes appear blue: No  High blood  pressure: Yes  Low blood pressure: No  Fainting: No  Murmurs: No  Chest pain on exertion: No  Chest pain at rest: No  Cramping pain in leg during exercise: Yes  Pacemaker: No  Varicose veins: No  Edema or swelling: Yes  Fast heart beat: Yes  Wake up at night with shortness of breath: No  Heart flutters: No  Light-headedness: No  Exercise intolerance: No  Trouble holding urine or incontinence: No  Pain or burning: No  Trouble starting or stopping: No  Increased frequency of urination: Yes  Blood in urine: No  Decreased frequency of urination: No  Frequent nighttime urination: No  Flank pain: No  Difficulty emptying bladder: No  Back pain: No  Muscle aches: No  Neck pain: No  Swollen joints: No  Joint pain: No  Bone pain: No  Muscle cramps: Yes  Muscle weakness: No  Joint stiffness: No  Bone fracture: No  Nervous or Anxious: No  Depression: No  Trouble sleeping: Yes  Trouble thinking or concentrating: Yes  Mood changes: No  Panic attacks: No        Anna Lyndon Welsh MD

## 2018-01-31 NOTE — MR AVS SNAPSHOT
After Visit Summary   1/31/2018    Ethan Trevino    MRN: [id number]           Patient Information     Date Of Birth          1995        Visit Information        Provider Department      1/31/2018 8:40 AM Anna Welsh MD [facility]        Today's Diagnoses     FSGS (focal segmental glomerulosclerosis)    -  1    Nephrotic syndrome           Follow-ups after your visit        Your next 10 appointments already scheduled     Mar 03, 2018  9:00 AM CST   LAB with  LAB   [facility] ([facility])    [address] [place] [id number] [phone]           Please do not eat 10-12 hours before your appointment if you are coming in fasting for labs on lipids, cholesterol, or glucose (sugar). This does not apply to pregnant women. Water, hot tea and black coffee (with nothing added) are okay. Do not drink other fluids, diet soda or chew gum.            Mar 31, 2018  9:00 AM CDT   LAB with [facility] ([facility])    [address] [place] [id number] [phone]           Please do not eat 10-12 hours before your appointment if you are coming in fasting for labs on lipids, cholesterol, or glucose (sugar). This does not apply to pregnant women. Water, hot tea and black coffee (with nothing added) are okay. Do not drink other fluids, diet soda or chew gum.            Apr 25, 2018  2:15 PM CDT   Lab with [facility] ([facility])    [address] [place] [id number] [phone]            Apr 25, 2018  3:15 PM CDT   (Arrive by 2:45 PM)   Return Visit with Anna Welsh MD   [facility] ([facility])    [address] [place] [id number] [phone]              Who to contact     If you have questions or need follow up information about  "today's clinic visit or your schedule please contact [facility] directly at [phone].  Normal or non-critical lab and imaging results will be communicated to you by [employer], letter or phone within 4 business days after the clinic has received the results. If you do not hear from us within 7 days, please contact the clinic through [employer] or phone. If you have a critical or abnormal lab result, we will notify you by phone as soon as possible.  Submit refill requests through [employer] or call your pharmacy and they will forward the refill request to us. Please allow 3 business days for your refill to be completed.          Additional Information About Your Visit        [employer]ish[employer] Information     [employer] gives you secure access to your electronic health record. If you see a primary care provider, you can also send messages to your care team and make appointments. If you have questions, please call your primary care clinic.  If you do not have a primary care provider, please call [phone] and they will assist you.        Care EveryWhere ID     This is your Care EveryWhere ID. This could be used by other organizations to access your [facility] medical records  [id number]        Your Vitals Were     Pulse Temperature Height Pulse Oximetry BMI (Body Mass Index)       86 97.8  F (36.6  C) (Oral) 1.892 m (6' 2.5\") 100% 34.05 kg/m2        Blood Pressure from Last 3 Encounters:   01/31/18 121/82   05/24/17 124/87   02/23/17 125/79    Weight from Last 3 Encounters:   01/31/18 121.9 kg (268 lb 12.8 oz)   05/24/17 121.7 kg (268 lb 3.2 oz)   02/23/17 116.5 kg (256 lb 12.8 oz)              Today, you had the following     No orders found for display         Today's Medication Changes          These changes are accurate as of 1/31/18  9:49 AM.  If you have any questions, ask your nurse or doctor.               These medicines have changed or have updated prescriptions.        Dose/Directions    lisinopril 10 MG tablet "   Commonly known as:  PRINIVIL/ZESTRIL   This may have changed:    - when to take this  - Another medication with the same name was removed. Continue taking this medication, and follow the directions you see here.   Used for:  Nephrotic syndrome   Changed by:  Anna Welsh MD        Dose:  10 mg   Take 1 tablet (10 mg) by mouth 2 times daily   Quantity:  90 tablet   Refills:  3       * predniSONE 20 MG tablet   Commonly known as:  DELTASONE   This may have changed:  Another medication with the same name was added. Make sure you understand how and when to take each.   Used for:  FSGS (focal segmental glomerulosclerosis)   Changed by:  Anna Welsh MD        Dose:  20 mg   Take 1 tablet (20 mg) by mouth daily   Quantity:  60 tablet   Refills:  11       * predniSONE 10 MG tablet   Commonly known as:  DELTASONE   This may have changed:  Another medication with the same name was added. Make sure you understand how and when to take each.   Used for:  FSGS (focal segmental glomerulosclerosis)   Changed by:  Anna Welsh MD        Dose:  10 mg   Take 1 tablet (10 mg) by mouth daily   Quantity:  60 tablet   Refills:  11       * predniSONE 50 MG tablet   Commonly known as:  DELTASONE   This may have changed:  You were already taking a medication with the same name, and this prescription was added. Make sure you understand how and when to take each.   Used for:  FSGS (focal segmental glomerulosclerosis)   Changed by:  Anna Welsh MD        Dose:  100 mg   Take 2 tablets (100 mg) by mouth daily   Quantity:  90 tablet   Refills:  11       * Notice:  This list has 3 medication(s) that are the same as other medications prescribed for you. Read the directions carefully, and ask your doctor or other care provider to review them with you.         Where to get your medicines      These medications were sent to [facility] [id number] [place] [facility] - [place], [place] - [address]   [address]  [address], [facility] [address]     Phone:  [phone]     lisinopril 10 MG tablet    predniSONE 50 MG tablet                Primary Care Provider Fax #    [facility] [phone]       [address] [place] [address]        Equal Access to Services     AGUEDA MAHER : Hadii aad ku hadlateshamanuel Somagdyali, waaxda luqadaha, qaybta kaalmada adeegyada, john erika sumansean gonzalezgueroefrem cabezas. So [facility] [phone].    ATENCIÓN: Si habla español, tiene a mix disposición servicios gratuitos de asistencia lingüística. Llame al [phone].    We comply with applicable federal civil rights laws and [place] laws. We do not discriminate on the basis of race, color, national origin, age, disability, sex, sexual orientation, or gender identity.            Thank you!     Thank you for choosing [facility]  for your care. Our goal is always to provide you with excellent care. Hearing back from our patients is one way we can continue to improve our services. Please take a few minutes to complete the written survey that you may receive in the mail after your visit with us. Thank you!             Your Updated Medication List - Protect others around you: Learn how to safely use, store and throw away your medicines at www.disposemymeds.org.          This list is accurate as of 1/31/18  9:49 AM.  Always use your most recent med list.                   Brand Name Dispense Instructions for use Diagnosis    calcium 500 +D 500-400 MG-UNIT Tabs   Generic drug:  Calcium Carb-Cholecalciferol     30 tablet    Take 500 mg by mouth daily    FSGS (focal segmental glomerulosclerosis), Nephrotic syndrome       furosemide 20 MG tablet    LASIX    30 tablet    Take 1 tablet (20 mg) by mouth daily As needed    Nephrotic syndrome, FSGS (focal segmental glomerulosclerosis)       lisinopril 10 MG tablet    PRINIVIL/ZESTRIL    90 tablet    Take 1 tablet (10 mg) by mouth 2 times daily    Nephrotic syndrome       omeprazole 20 MG CR  capsule    priLOSEC    30 capsule    TAKE 1 CAPSULE (20 MG) BY MOUTH DAILY    FSGS (focal segmental glomerulosclerosis)       * predniSONE 20 MG tablet    DELTASONE    60 tablet    Take 1 tablet (20 mg) by mouth daily    FSGS (focal segmental glomerulosclerosis)       * predniSONE 10 MG tablet    DELTASONE    60 tablet    Take 1 tablet (10 mg) by mouth daily    FSGS (focal segmental glomerulosclerosis)       * predniSONE 50 MG tablet    DELTASONE    90 tablet    Take 2 tablets (100 mg) by mouth daily    FSGS (focal segmental glomerulosclerosis)       sulfamethoxazole-trimethoprim 800-160 MG per tablet    BACTRIM DS/SEPTRA DS    15 tablet    Take 1 tablet by mouth three times a week    FSGS (focal segmental glomerulosclerosis)       * Notice:  This list has 3 medication(s) that are the same as other medications prescribed for you. Read the directions carefully, and ask your doctor or other care provider to review them with you.

## 2018-01-31 NOTE — NURSING NOTE
"Chief Complaint   Patient presents with     RECHECK     Kidney follow up       Initial /82  Pulse 86  Temp 97.8  F (36.6  C) (Oral)  Ht 1.892 m (6' 2.5\")  Wt 121.9 kg (268 lb 12.8 oz)  SpO2 100%  BMI 34.05 kg/m2 Estimated body mass index is 34.05 kg/(m^2) as calculated from the following:    Height as of this encounter: 1.892 m (6' 2.5\").    Weight as of this encounter: 121.9 kg (268 lb 12.8 oz).  Medication Reconciliation: complete   BRII CHRISTINA CMA      "

## 2018-03-03 DIAGNOSIS — N04.9 NEPHROTIC SYNDROME: ICD-10-CM

## 2018-03-03 DIAGNOSIS — N05.1 FSGS (FOCAL SEGMENTAL GLOMERULOSCLEROSIS): ICD-10-CM

## 2018-03-03 LAB
ALBUMIN SERPL-MCNC: 1.9 G/DL (ref 3.4–5)
ANION GAP SERPL CALCULATED.3IONS-SCNC: 4 MMOL/L (ref 3–14)
BUN SERPL-MCNC: 15 MG/DL (ref 7–30)
CALCIUM SERPL-MCNC: 7.8 MG/DL (ref 8.5–10.1)
CHLORIDE SERPL-SCNC: 101 MMOL/L (ref 94–109)
CO2 SERPL-SCNC: 31 MMOL/L (ref 20–32)
CREAT SERPL-MCNC: 0.87 MG/DL (ref 0.66–1.25)
CREAT UR-MCNC: 38 MG/DL
GFR SERPL CREATININE-BSD FRML MDRD: >90 ML/MIN/1.7M2
GLUCOSE SERPL-MCNC: 94 MG/DL (ref 70–99)
PHOSPHATE SERPL-MCNC: 4.3 MG/DL (ref 2.5–4.5)
POTASSIUM SERPL-SCNC: 4.3 MMOL/L (ref 3.4–5.3)
PROT UR-MCNC: 1.49 G/L
PROT/CREAT 24H UR: 3.89 G/G CR (ref 0–0.2)
SODIUM SERPL-SCNC: 136 MMOL/L (ref 133–144)

## 2018-03-06 RX ORDER — PREDNISONE 20 MG/1
TABLET ORAL
Qty: 60 TABLET | Refills: 8 | OUTPATIENT
Start: 2018-03-06

## 2018-03-31 DIAGNOSIS — N05.1 FSGS (FOCAL SEGMENTAL GLOMERULOSCLEROSIS): ICD-10-CM

## 2018-03-31 DIAGNOSIS — N04.9 NEPHROTIC SYNDROME: ICD-10-CM

## 2018-03-31 LAB
ALBUMIN SERPL-MCNC: 2.2 G/DL (ref 3.4–5)
ANION GAP SERPL CALCULATED.3IONS-SCNC: 6 MMOL/L (ref 3–14)
BUN SERPL-MCNC: 17 MG/DL (ref 7–30)
CALCIUM SERPL-MCNC: 8 MG/DL (ref 8.5–10.1)
CHLORIDE SERPL-SCNC: 105 MMOL/L (ref 94–109)
CO2 SERPL-SCNC: 29 MMOL/L (ref 20–32)
CREAT SERPL-MCNC: 0.75 MG/DL (ref 0.66–1.25)
CREAT UR-MCNC: 203 MG/DL
GFR SERPL CREATININE-BSD FRML MDRD: >90 ML/MIN/1.7M2
GLUCOSE SERPL-MCNC: 112 MG/DL (ref 70–99)
PHOSPHATE SERPL-MCNC: 2.3 MG/DL (ref 2.5–4.5)
POTASSIUM SERPL-SCNC: 3.8 MMOL/L (ref 3.4–5.3)
PROT UR-MCNC: 9.6 G/L
PROT/CREAT 24H UR: 4.73 G/G CR (ref 0–0.2)
SODIUM SERPL-SCNC: 140 MMOL/L (ref 133–144)

## 2018-04-13 DIAGNOSIS — N04.9 NEPHROTIC SYNDROME: ICD-10-CM

## 2018-04-13 DIAGNOSIS — N05.1 FSGS (FOCAL SEGMENTAL GLOMERULOSCLEROSIS): Primary | ICD-10-CM

## 2018-04-25 ENCOUNTER — OFFICE VISIT (OUTPATIENT)
Dept: NEPHROLOGY | Facility: CLINIC | Age: 23
End: 2018-04-25
Attending: INTERNAL MEDICINE
Payer: COMMERCIAL

## 2018-04-25 VITALS
OXYGEN SATURATION: 95 % | WEIGHT: 261.1 LBS | SYSTOLIC BLOOD PRESSURE: 119 MMHG | BODY MASS INDEX: 32.46 KG/M2 | HEIGHT: 75 IN | HEART RATE: 80 BPM | DIASTOLIC BLOOD PRESSURE: 79 MMHG

## 2018-04-25 DIAGNOSIS — T14.8XXA BRUISING: Primary | ICD-10-CM

## 2018-04-25 DIAGNOSIS — N04.9 NEPHROTIC SYNDROME: ICD-10-CM

## 2018-04-25 DIAGNOSIS — N05.1 FSGS (FOCAL SEGMENTAL GLOMERULOSCLEROSIS): ICD-10-CM

## 2018-04-25 DIAGNOSIS — E83.42 HYPOMAGNESEMIA: ICD-10-CM

## 2018-04-25 LAB
ALBUMIN SERPL-MCNC: 2.6 G/DL (ref 3.4–5)
ALBUMIN UR-MCNC: >499 MG/DL
ANION GAP SERPL CALCULATED.3IONS-SCNC: 6 MMOL/L (ref 3–14)
APPEARANCE UR: CLEAR
BILIRUB UR QL STRIP: NEGATIVE
BUN SERPL-MCNC: 14 MG/DL (ref 7–30)
CALCIUM SERPL-MCNC: 8.4 MG/DL (ref 8.5–10.1)
CHLORIDE SERPL-SCNC: 102 MMOL/L (ref 94–109)
CO2 SERPL-SCNC: 29 MMOL/L (ref 20–32)
COLOR UR AUTO: YELLOW
CREAT SERPL-MCNC: 0.74 MG/DL (ref 0.66–1.25)
CREAT UR-MCNC: 86 MG/DL
ERYTHROCYTE [DISTWIDTH] IN BLOOD BY AUTOMATED COUNT: 13.6 % (ref 10–15)
FERRITIN SERPL-MCNC: 65 NG/ML (ref 26–388)
GFR SERPL CREATININE-BSD FRML MDRD: >90 ML/MIN/1.7M2
GLUCOSE SERPL-MCNC: 109 MG/DL (ref 70–99)
GLUCOSE UR STRIP-MCNC: NEGATIVE MG/DL
HCT VFR BLD AUTO: 48.5 % (ref 40–53)
HGB BLD-MCNC: 16.1 G/DL (ref 13.3–17.7)
HGB BLD-MCNC: 16.2 G/DL (ref 13.3–17.7)
HGB UR QL STRIP: NEGATIVE
IRON SATN MFR SERPL: 39 % (ref 15–46)
IRON SERPL-MCNC: 96 UG/DL (ref 35–180)
KETONES UR STRIP-MCNC: NEGATIVE MG/DL
LEUKOCYTE ESTERASE UR QL STRIP: NEGATIVE
MCH RBC QN AUTO: 29.9 PG (ref 26.5–33)
MCHC RBC AUTO-ENTMCNC: 33.4 G/DL (ref 31.5–36.5)
MCV RBC AUTO: 90 FL (ref 78–100)
NITRATE UR QL: NEGATIVE
PH UR STRIP: 5 PH (ref 5–7)
PHOSPHATE SERPL-MCNC: 2.2 MG/DL (ref 2.5–4.5)
PLATELET # BLD AUTO: 393 10E9/L (ref 150–450)
POTASSIUM SERPL-SCNC: 4.4 MMOL/L (ref 3.4–5.3)
PROT UR-MCNC: 2.35 G/L
PROT/CREAT 24H UR: 2.74 G/G CR (ref 0–0.2)
PTH-INTACT SERPL-MCNC: 26 PG/ML (ref 18–80)
RBC # BLD AUTO: 5.41 10E12/L (ref 4.4–5.9)
RBC #/AREA URNS AUTO: 1 /HPF (ref 0–2)
SODIUM SERPL-SCNC: 137 MMOL/L (ref 133–144)
SOURCE: ABNORMAL
SP GR UR STRIP: 1.01 (ref 1–1.03)
TIBC SERPL-MCNC: 247 UG/DL (ref 240–430)
UROBILINOGEN UR STRIP-MCNC: 0 MG/DL (ref 0–2)
WBC # BLD AUTO: 13.8 10E9/L (ref 4–11)
WBC #/AREA URNS AUTO: 1 /HPF (ref 0–5)

## 2018-04-25 PROCEDURE — 83735 ASSAY OF MAGNESIUM: CPT | Performed by: INTERNAL MEDICINE

## 2018-04-25 PROCEDURE — 85027 COMPLETE CBC AUTOMATED: CPT | Performed by: INTERNAL MEDICINE

## 2018-04-25 PROCEDURE — 83550 IRON BINDING TEST: CPT | Performed by: INTERNAL MEDICINE

## 2018-04-25 PROCEDURE — 83970 ASSAY OF PARATHORMONE: CPT | Performed by: INTERNAL MEDICINE

## 2018-04-25 PROCEDURE — 84156 ASSAY OF PROTEIN URINE: CPT | Performed by: INTERNAL MEDICINE

## 2018-04-25 PROCEDURE — 81001 URINALYSIS AUTO W/SCOPE: CPT | Performed by: INTERNAL MEDICINE

## 2018-04-25 PROCEDURE — 82728 ASSAY OF FERRITIN: CPT | Performed by: INTERNAL MEDICINE

## 2018-04-25 PROCEDURE — 85018 HEMOGLOBIN: CPT | Performed by: INTERNAL MEDICINE

## 2018-04-25 PROCEDURE — 36415 COLL VENOUS BLD VENIPUNCTURE: CPT | Performed by: INTERNAL MEDICINE

## 2018-04-25 PROCEDURE — G0463 HOSPITAL OUTPT CLINIC VISIT: HCPCS | Mod: ZF

## 2018-04-25 PROCEDURE — 82306 VITAMIN D 25 HYDROXY: CPT | Performed by: INTERNAL MEDICINE

## 2018-04-25 PROCEDURE — 80069 RENAL FUNCTION PANEL: CPT | Performed by: INTERNAL MEDICINE

## 2018-04-25 PROCEDURE — 83540 ASSAY OF IRON: CPT | Performed by: INTERNAL MEDICINE

## 2018-04-25 RX ORDER — LISINOPRIL 20 MG/1
20 TABLET ORAL 2 TIMES DAILY
Qty: 60 TABLET | Refills: 11 | Status: SHIPPED | OUTPATIENT
Start: 2018-04-25 | End: 2018-09-12

## 2018-04-25 RX ORDER — SULFAMETHOXAZOLE/TRIMETHOPRIM 800-160 MG
1 TABLET ORAL
Qty: 15 TABLET | Refills: 6 | Status: SHIPPED | OUTPATIENT
Start: 2018-04-26 | End: 2020-06-04

## 2018-04-25 ASSESSMENT — PAIN SCALES - GENERAL: PAINLEVEL: NO PAIN (0)

## 2018-04-25 NOTE — LETTER
4/25/2018      RE: Ethna Trevino  [address] [facility] [address]       Assessment and Plan:   23 year old male with 6month history of edema and discovered with nephrotic syndrome, biopsy in August 2016 showing FSGS. Steroids started 8/23/2016 and weaning started February 2017. He returns for followup.  Proteinuria up from 0.28 g/g to 4.9 grams on 1/31/18  1. FSGS- he had 8-9 grams of proteinuria, down to 6 grams in December 2016, which is partial response after 3 months of therapy and by 6 months his proteinuria is down to 0.7 grams. He is currently down to 10mg but unfortunately his proteinura is back up to 4-5 grams / day. He did not a little more swelling and has been taking furosemide daily  - he also is taking less lisinopril as he was feeling a little dizzy on higher dose, currently taking 10mg daily  . Biopsy done and prednisone 100mg daily started end of August 2016. He tolerated fairly but not great, but willing to repulse with oral steroids given relapse.  - steroids decreased 2/23/17 to 80mg,and then 60mg, then decreased by 10mg every 4-6 weeks thereafter-  - will thus restart 100mg daily for 6 weeks , decrease to 80mg x 6 weeks, then 60mg daily and return for re-evaluation. Might want to taper more slowly or introduce CNI as a steroid sparing agent if response is not ideal.    His SCr is stable near 0.8mg/dL   - discussed risks and benefits of prednisone vs tacro/ cyclosporine, possibility of rituximab mentioned.     If tascro is used, we would start with 0.1 mg/kg per day in two divided doses, targeting a trough level between 5 and 10 ng/mL.  2. Renal function- preserved  3. Blood pressure- low normal, on lisinopril 10mg bid, increase to 20mg bid, if tolerated, use furosemide sparingly  4. Anemia- normal hemoglobin  4. Edema- has some edema which has improved with lasix - it is mild at this point, using compression stockings.  5. Prophylaxis- on bactrim thrice weekly to be  continued, recommended vitamin D/ calcium and to continue omeprazole; had GI upset first week but now settled  6. Cramps- occasionally with furosemide use, encouraged high K diet  Higher lisinopril may help  - recheck Mag - was 1.7 in Fall 2016, may be lower due to PPI  - use furosemide sparingly to allow for room for lisinopril for K.  7. Electrolytes- potassium low normal- high K diet especially if taking furosemide  Assessment and plan was discussed with patient and he voiced his understanding and agreement.    Reason for Visit:  Ethan Trevino is a 23 year old male with FSGS, who presents for routine follow up.     HPI:  He is a pleasant young male who in summer 2016 presented with several months history of swelling, noted to be nephrotic, and renal biopsy in August 2016 showed FSGS. He was started on steroids 8/23/16 and he presents for followup,.    He has been on steroids since end of August 2016 and is tolerating fairly. He has more and more abdominal striae and his acne is significant, but he is managing with topical treatment and willing to tolerate this as necessary  His proteinuria was down to 1-2 grams then subgram at 0.7g which is great, but unfortunately when he was down to 10mg prednisone , his proteinuria increased back to 5 grams. Along with with low albumin and swelling.  His creatinine is stable at 0.8 mg/dL .  He is taking furosemide daily in recent weeks due to increasing edema  He continues to work which is good  We discussed his increased proteinuria, and modes of therapy and elected to increase prednisone back up and if we do not see at least partial remission, consider starting steroid sparing agent.      Home BP: Not checked    Baseline Cr: 0.8    ROS:  A comprehensive review of systems was obtained and negative, except as noted in the HPI or PMH.    Active Medical Problems:  Patient Active Problem List   Diagnosis     FSGS (focal segmental glomerulosclerosis)     Nephrotic syndrome  "      Personal Hx:   Social History     Social History     Marital status: Single     Spouse name: N/A     Number of children: N/A     Years of education: N/A     Occupational History     Not on file.     Social History Main Topics     Smoking status: Never Smoker     Smokeless tobacco: Never Used     Alcohol use Yes      Comment: social     Drug use: No     Sexual activity: Not on file     Other Topics Concern     Not on file     Social History Narrative       Allergies:  Allergies   Allergen Reactions     Amoxicillin      Other reaction(s): EENT - other (explain in comments), SKIN - rash  Rash and lip swelling after 8 days of Amoxicillin     Erythromycin      Penicillins        Medications:  Prior to Admission medications    Medication Sig Start Date End Date Taking? Authorizing Provider   lisinopril (PRINIVIL,ZESTRIL) 20 MG tablet Take 1 tablet (20 mg) by mouth daily 9/15/16  Yes Anna Welsh MD   predniSONE (DELTASONE) 50 MG tablet Take 2 tablets (100 mg) by mouth daily 8/23/16  Yes Anna Welsh MD   omeprazole (PRILOSEC) 20 MG capsule Take 1 capsule (20 mg) by mouth daily 8/23/16  Yes Anna Welsh MD   sulfamethoxazole-trimethoprim (BACTRIM DS,SEPTRA DS) 800-160 MG per tablet Take 1 tablet by mouth three times a week 8/23/16  Yes Anna Welsh MD   FUROSEMIDE PO Take by mouth daily   Yes Reported, Patient       Vitals:  /79  Pulse 80  Ht 1.892 m (6' 2.5\")  Wt 118.4 kg (261 lb 1.6 oz)  SpO2 95%  BMI 33.07 kg/m2    Exam:  GENERAL APPEARANCE: alert and no distress  HENT: mouth without ulcers or lesions  LYMPHATICS: no cervical or supraclavicular nodes  RESP: lungs clear to auscultation - no rales, rhonchi or wheezes  CV: regular rhythm, normal rate, no rub, no murmur  EDEMA: trace LE edema bilaterally  ABDOMEN: soft, nondistended, nontender, bowel sounds normal  MS: extremities normal - no gross deformities noted, no evidence of inflammation in joints, no " muscle tenderness  SKIN: no rash    Results:  Component      Latest Ref Rng & Units 9/15/2016 10/10/2016 12/8/2016 1/12/2017   Sodium      133 - 144 mmol/L 138 137 139 141   Potassium      3.4 - 5.3 mmol/L 3.9 4.5 4.4 4.1   Chloride      94 - 109 mmol/L 104 100 102 103   Carbon Dioxide      20 - 32 mmol/L 31 29 30 32   Anion Gap      3 - 14 mmol/L 3 7 7 6   Glucose      70 - 99 mg/dL 91 114 (H) 95 92   Urea Nitrogen      7 - 30 mg/dL 12 10 21 11   Creatinine      0.66 - 1.25 mg/dL 0.82 0.92 0.86 0.74   GFR Estimate      >60 mL/min/1.7m2 >90 . . . >90 . . . >90 . . . >90 . . .   GFR Estimate If Black      >60 mL/min/1.7m2 >90 . . . >90 . . . >90 . . . >90 . . .   Calcium      8.5 - 10.1 mg/dL 7.9 (L) 8.2 (L) 8.1 (L) 8.3 (L)   Phosphorus      2.5 - 4.5 mg/dL 2.5 3.6 3.2 4.2   Albumin      3.4 - 5.0 g/dL 1.4 (L) 1.7 (L) 2.1 (L) 2.5 (L)   Protein Random Urine      g/L 1.26 2.35 2.49 3.29   Protein Total Urine g/gr Creatinine      0 - 0.2 g/g Cr 8.68 (H) 9.20 (H) 5.60 (H) 2.76 (H)   Creatinine Urine      mg/dL    119     Component      Latest Ref Rng & Units 2/23/2017   Sodium      133 - 144 mmol/L 140   Potassium      3.4 - 5.3 mmol/L 3.6   Chloride      94 - 109 mmol/L 103   Carbon Dioxide      20 - 32 mmol/L 29   Anion Gap      3 - 14 mmol/L 8   Glucose      70 - 99 mg/dL 112 (H)   Urea Nitrogen      7 - 30 mg/dL 12   Creatinine      0.66 - 1.25 mg/dL 0.86   GFR Estimate      >60 mL/min/1.7m2 >90 . . .   GFR Estimate If Black      >60 mL/min/1.7m2 >90 . . .   Calcium      8.5 - 10.1 mg/dL 8.4 (L)   Phosphorus      2.5 - 4.5 mg/dL 3.2   Albumin      3.4 - 5.0 g/dL 3.0 (L)   Protein Random Urine      g/L 3.04   Protein Total Urine g/gr Creatinine      0 - 0.2 g/g Cr 1.19 (H)   Creatinine Urine      mg/dL 254     Component      Latest Ref Rng 7/27/2016 8/10/2016            8:40 AM   WBC      4.0 - 11.0 10e9/L  7.0   RBC Count      4.4 - 5.9 10e12/L  5.18   Hemoglobin      13.3 - 17.7 g/dL  15.1   Hematocrit      40.0 -  53.0 %  44.0   MCV      78 - 100 fl  85   MCH      26.5 - 33.0 pg  29.2   MCHC      31.5 - 36.5 g/dL  34.3   RDW      10.0 - 15.0 %  13.2   Platelet Count      150 - 450 10e9/L  340   Diff Method        Automated Method   % Neutrophils        40.5   % Lymphocytes        45.7   % Monocytes        7.0   % Eosinophils        5.6   % Basophils        0.9   % Immature Granulocytes        0.3   Nucleated RBCs      0 /100  0   Absolute Neutrophil      1.6 - 8.3 10e9/L  2.8   Absolute Lymphocytes      0.8 - 5.3 10e9/L  3.2   Absolute Monocytes      0.0 - 1.3 10e9/L  0.5   Absolute Eosinophils      0.0 - 0.7 10e9/L  0.4   Absolute Basophils      0.0 - 0.2 10e9/L  0.1   Abs Immature Granulocytes      0 - 0.4 10e9/L  0.0   Absolute Nucleated RBC        0.0   Sodium      133 - 144 mmol/L  144   Potassium      3.4 - 5.3 mmol/L  3.6   Chloride      94 - 109 mmol/L  109   Carbon Dioxide      20 - 32 mmol/L  26   Anion Gap      3 - 14 mmol/L  9   Glucose      70 - 99 mg/dL  75   Urea Nitrogen      7 - 30 mg/dL  8   Creatinine      0.66 - 1.25 mg/dL  0.88   GFR Estimate      >60 mL/min/1.7m2  >90 . . .   GFR Estimate If Black      >60 mL/min/1.7m2  >90 . . .   Calcium      8.5 - 10.1 mg/dL  7.5 (L)   Phosphorus      2.5 - 4.5 mg/dL     Albumin      3.4 - 5.0 g/dL     Protein Random Urine       14.29    Protein Total Urine g/gr Creatinine      0 - 0.2 g/g Cr 8.41 (H)    INR      0.86 - 1.14  1.11   Copath Report           Creatinine Urine             Component      Latest Ref Rng 8/10/2016           9:15 AM   WBC      4.0 - 11.0 10e9/L    RBC Count      4.4 - 5.9 10e12/L    Hemoglobin      13.3 - 17.7 g/dL    Hematocrit      40.0 - 53.0 %    MCV      78 - 100 fl    MCH      26.5 - 33.0 pg    MCHC      31.5 - 36.5 g/dL    RDW      10.0 - 15.0 %    Platelet Count      150 - 450 10e9/L    Diff Method          % Neutrophils          % Lymphocytes          % Monocytes          % Eosinophils          % Basophils          % Immature  Granulocytes          Nucleated RBCs      0 /100    Absolute Neutrophil      1.6 - 8.3 10e9/L    Absolute Lymphocytes      0.8 - 5.3 10e9/L    Absolute Monocytes      0.0 - 1.3 10e9/L    Absolute Eosinophils      0.0 - 0.7 10e9/L    Absolute Basophils      0.0 - 0.2 10e9/L    Abs Immature Granulocytes      0 - 0.4 10e9/L    Absolute Nucleated RBC          Sodium      133 - 144 mmol/L    Potassium      3.4 - 5.3 mmol/L    Chloride      94 - 109 mmol/L    Carbon Dioxide      20 - 32 mmol/L    Anion Gap      3 - 14 mmol/L    Glucose      70 - 99 mg/dL    Urea Nitrogen      7 - 30 mg/dL    Creatinine      0.66 - 1.25 mg/dL    GFR Estimate      >60 mL/min/1.7m2    GFR Estimate If Black      >60 mL/min/1.7m2    Calcium      8.5 - 10.1 mg/dL    Phosphorus      2.5 - 4.5 mg/dL    Albumin      3.4 - 5.0 g/dL    Protein Random Urine          Protein Total Urine g/gr Creatinine      0 - 0.2 g/g Cr    INR      0.86 - 1.14    Copath Report       Patient Name: HAM SWAIN MARIO Parish   Creatinine Urine            Component      Latest Ref Rng 8/10/2016 9/15/2016           2:30 PM    WBC      4.0 - 11.0 10e9/L     RBC Count      4.4 - 5.9 10e12/L     Hemoglobin      13.3 - 17.7 g/dL 14.0 15.4   Hematocrit      40.0 - 53.0 %     MCV      78 - 100 fl     MCH      26.5 - 33.0 pg     MCHC      31.5 - 36.5 g/dL     RDW      10.0 - 15.0 %     Platelet Count      150 - 450 10e9/L     Diff Method           % Neutrophils           % Lymphocytes           % Monocytes           % Eosinophils           % Basophils           % Immature Granulocytes           Nucleated RBCs      0 /100     Absolute Neutrophil      1.6 - 8.3 10e9/L     Absolute Lymphocytes      0.8 - 5.3 10e9/L     Absolute Monocytes      0.0 - 1.3 10e9/L     Absolute Eosinophils      0.0 - 0.7 10e9/L     Absolute Basophils      0.0 - 0.2 10e9/L     Abs Immature Granulocytes      0 - 0.4 10e9/L     Absolute Nucleated RBC           Sodium      133 - 144 mmol/L  138    Potassium      3.4 - 5.3 mmol/L  3.9   Chloride      94 - 109 mmol/L  104   Carbon Dioxide      20 - 32 mmol/L  31   Anion Gap      3 - 14 mmol/L  3   Glucose      70 - 99 mg/dL  91   Urea Nitrogen      7 - 30 mg/dL  12   Creatinine      0.66 - 1.25 mg/dL  0.82   GFR Estimate      >60 mL/min/1.7m2  >90 . . .   GFR Estimate If Black      >60 mL/min/1.7m2  >90 . . .   Calcium      8.5 - 10.1 mg/dL  7.9 (L)   Phosphorus      2.5 - 4.5 mg/dL  2.5   Albumin      3.4 - 5.0 g/dL  1.4 (L)   Protein Random Urine        1.26   Protein Total Urine g/gr Creatinine      0 - 0.2 g/g Cr  8.68 (H)   INR      0.86 - 1.14       Copath Report           Creatinine Urine        14       FINAL DIAGNOSIS:   A:     Kidney, native, LEFT; percutaneous needle biopsy:   - Focal segmental glomerulosclerosis     This biopsy shows segmental sclerosis and hyalinosis affecting   approximately 30% of glomeruli examined in a pattern diagnostic for   focal segmental glomerulosclerosis.  Although sclerotic segments focally   exhibit increased cellularity, capillary distention by intra-luminal   inflammatory cells is not observed and this specimen does not meet   strict criteria for cellular variant of focal segmental   glomerulosclerosis.  However, in view of the proportion of glomeruli   exhibiting segmental sclerosis and the increased cellularity observed in   sclerotic segments it is expected that this condition may follow an   aggressive course.     Anna Welsh MD

## 2018-04-25 NOTE — MR AVS SNAPSHOT
After Visit Summary   4/25/2018    Ethan Trevino    MRN: [id number]           Patient Information     Date Of Birth          1995        Visit Information        Provider Department      4/25/2018 3:15 PM Anna Welsh MD [facility]        Today's Diagnoses     Bruising    -  1    Nephrotic syndrome        FSGS (focal segmental glomerulosclerosis)        Hypomagnesemia           Follow-ups after your visit        Follow-up notes from your care team     Return in about 2 months (around 6/25/2018).      Your next 10 appointments already scheduled     Jun 02, 2018 11:00 AM CDT   LAB with  LAB   [facility] ([facility])    [address] [id number] [phone]           Please do not eat 10-12 hours before your appointment if you are coming in fasting for labs on lipids, cholesterol, or glucose (sugar). This does not apply to pregnant women. Water, hot tea and black coffee (with nothing added) are okay. Do not drink other fluids, diet soda or chew gum.            Jul 11, 2018  2:15 PM CDT   Lab with  LAB   [facility] ([facility])    [address] [id number] [phone]            Jul 11, 2018  3:15 PM CDT   (Arrive by 2:45 PM)   Return Visit with Anna Welsh MD   [facility] ([facility])    [address] [place] [address]  [address] [place] [id number] [phone]              Who to contact     If you have questions or need follow up information about today's clinic visit or your schedule please contact [facility] directly at [phone].  Normal or non-critical lab and imaging results will be communicated to you by MyChart, letter or phone within 4 business days after the clinic has received the results. If you do not hear from us within 7 days, please contact the clinic through [employer] or  "phone. If you have a critical or abnormal lab result, we will notify you by phone as soon as possible.  Submit refill requests through [employer] or call your pharmacy and they will forward the refill request to us. Please allow 3 business days for your refill to be completed.          Additional Information About Your Visit        [employer] Information     [employer] gives you secure access to your electronic health record. If you see a primary care provider, you can also send messages to your care team and make appointments. If you have questions, please call your primary care clinic.  If you do not have a primary care provider, please call [phone] and they will assist you.        Care EveryWhere ID     This is your Care EveryWhere ID. This could be used by other organizations to access your [facility] medical records  [id number]        Your Vitals Were     Pulse Height Pulse Oximetry BMI (Body Mass Index)          80 1.892 m (6' 2.5\") 95% 33.07 kg/m2         Blood Pressure from Last 3 Encounters:   04/25/18 119/79   01/31/18 121/82   05/24/17 124/87    Weight from Last 3 Encounters:   04/25/18 118.4 kg (261 lb 1.6 oz)   01/31/18 121.9 kg (268 lb 12.8 oz)   05/24/17 121.7 kg (268 lb 3.2 oz)              We Performed the Following     CBC with platelets     Magnesium          Today's Medication Changes          These changes are accurate as of 4/25/18 11:59 PM.  If you have any questions, ask your nurse or doctor.               These medicines have changed or have updated prescriptions.        Dose/Directions    lisinopril 20 MG tablet   Commonly known as:  PRINIVIL/ZESTRIL   This may have changed:    - medication strength  - how much to take   Used for:  Nephrotic syndrome, FSGS (focal segmental glomerulosclerosis)   Changed by:  Anna Welsh MD        Dose:  20 mg   Take 1 tablet (20 mg) by mouth 2 times daily   Quantity:  60 tablet   Refills:  11            Where to get your medicines      These " medications were sent to [facility] [facility] - [place], [place] - [address]  [address], [facility] [address]     Phone:  [phone]     lisinopril 20 MG tablet    sulfamethoxazole-trimethoprim 800-160 MG per tablet                Primary Care Provider Fax #    Zoey [facility] [phone]       [address]  [facility] [address]        Equal Access to Services     [facility]EN [facility] : Hadii aad ku hadasho Soomaali, waaxda luqadaha, qaybta kaalmada adeegyada, waxay idiin hayaan adeeg khgueroefrem laradha ah. So [facility] [phone].    ATENCIÓN: Si habla espmartínez, tiene a mix disposición servicios gratuitos de asistencia lingüística. Bobby al [phone].    We comply with applicable federal civil rights laws and [place] laws. We do not discriminate on the basis of race, color, national origin, age, disability, sex, sexual orientation, or gender identity.            Thank you!     Thank you for choosing [facility]  for your care. Our goal is always to provide you with excellent care. Hearing back from our patients is one way we can continue to improve our services. Please take a few minutes to complete the written survey that you may receive in the mail after your visit with us. Thank you!             Your Updated Medication List - Protect others around you: Learn how to safely use, store and throw away your medicines at www.disposemymeds.org.          This list is accurate as of 4/25/18 11:59 PM.  Always use your most recent med list.                   Brand Name Dispense Instructions for use Diagnosis    calcium 500 +D 500-400 MG-UNIT Tabs   Generic drug:  Calcium Carb-Cholecalciferol     30 tablet    Take 500 mg by mouth daily    FSGS (focal segmental glomerulosclerosis), Nephrotic syndrome       furosemide 20 MG tablet    LASIX    30 tablet    Take 1 tablet (20 mg) by mouth daily As needed    Nephrotic syndrome, FSGS (focal segmental glomerulosclerosis)       lisinopril 20 MG tablet     PRINIVIL/ZESTRIL    60 tablet    Take 1 tablet (20 mg) by mouth 2 times daily    Nephrotic syndrome, FSGS (focal segmental glomerulosclerosis)       omeprazole 20 MG CR capsule    priLOSEC    30 capsule    TAKE 1 CAPSULE (20 MG) BY MOUTH DAILY    FSGS (focal segmental glomerulosclerosis)       * predniSONE 20 MG tablet    DELTASONE    60 tablet    Take 1 tablet (20 mg) by mouth daily    FSGS (focal segmental glomerulosclerosis)       * predniSONE 10 MG tablet    DELTASONE    60 tablet    Take 1 tablet (10 mg) by mouth daily    FSGS (focal segmental glomerulosclerosis)       * predniSONE 50 MG tablet    DELTASONE    90 tablet    Take 2 tablets (100 mg) by mouth daily    FSGS (focal segmental glomerulosclerosis), Nephrotic syndrome       sulfamethoxazole-trimethoprim 800-160 MG per tablet    BACTRIM DS/SEPTRA DS    15 tablet    Take 1 tablet by mouth three times a week    FSGS (focal segmental glomerulosclerosis)       * Notice:  This list has 3 medication(s) that are the same as other medications prescribed for you. Read the directions carefully, and ask your doctor or other care provider to review them with you.

## 2018-04-25 NOTE — NURSING NOTE
"Chief Complaint   Patient presents with     RECHECK     nephrotic syndrome        Initial /79  Pulse 80  Ht 1.892 m (6' 2.5\")  Wt 118.4 kg (261 lb 1.6 oz)  SpO2 95%  BMI 33.07 kg/m2 Estimated body mass index is 33.07 kg/(m^2) as calculated from the following:    Height as of this encounter: 1.892 m (6' 2.5\").    Weight as of this encounter: 118.4 kg (261 lb 1.6 oz).  Medication Reconciliation: complete   Beau Skelton, JACOB      "

## 2018-04-25 NOTE — PROGRESS NOTES
Assessment and Plan:   23 year old male with 6month history of edema and discovered with nephrotic syndrome, biopsy in August 2016 showing FSGS. Steroids started 8/23/2016 and weaning started February 2017. He returns for followup.  Proteinuria up from 0.28 g/g to 4.9 grams on 1/31/18  1. FSGS- he had 8-9 grams of proteinuria, down to 6 grams in December 2016, which is partial response after 3 months of therapy and by 6 months his proteinuria is down to 0.7 grams. He is currently down to 10mg but unfortunately his proteinura is back up to 4-5 grams / day. He did not a little more swelling and has been taking furosemide daily  - he also is taking less lisinopril as he was feeling a little dizzy on higher dose, currently taking 10mg daily  . Biopsy done and prednisone 100mg daily started end of August 2016. He tolerated fairly but not great, but willing to repulse with oral steroids given relapse.  - steroids decreased 2/23/17 to 80mg,and then 60mg, then decreased by 10mg every 4-6 weeks thereafter-  - will thus restart 100mg daily for 6 weeks , decrease to 80mg x 6 weeks, then 60mg daily and return for re-evaluation. Might want to taper more slowly or introduce CNI as a steroid sparing agent if response is not ideal.    His SCr is stable near 0.8mg/dL   - discussed risks and benefits of prednisone vs tacro/ cyclosporine, possibility of rituximab mentioned.     If tascro is used, we would start with 0.1 mg/kg per day in two divided doses, targeting a trough level between 5 and 10 ng/mL.  2. Renal function- preserved  3. Blood pressure- low normal, on lisinopril 10mg bid, increase to 20mg bid, if tolerated, use furosemide sparingly  4. Anemia- normal hemoglobin  4. Edema- has some edema which has improved with lasix - it is mild at this point, using compression stockings.  5. Prophylaxis- on bactrim thrice weekly to be continued, recommended vitamin D/ calcium and to continue omeprazole; had GI upset first week but now  settled  6. Cramps- occasionally with furosemide use, encouraged high K diet  Higher lisinopril may help  - recheck Mag - was 1.7 in Fall 2016, may be lower due to PPI  - use furosemide sparingly to allow for room for lisinopril for K.  7. Electrolytes- potassium low normal- high K diet especially if taking furosemide  Assessment and plan was discussed with patient and he voiced his understanding and agreement.    Reason for Visit:  Ethan Trevino is a 23 year old male with FSGS, who presents for routine follow up.     HPI:  He is a pleasant young male who in summer 2016 presented with several months history of swelling, noted to be nephrotic, and renal biopsy in August 2016 showed FSGS. He was started on steroids 8/23/16 and he presents for followup,.    He has been on steroids since end of August 2016 and is tolerating fairly. He has more and more abdominal striae and his acne is significant, but he is managing with topical treatment and willing to tolerate this as necessary  His proteinuria was down to 1-2 grams then subgram at 0.7g which is great, but unfortunately when he was down to 10mg prednisone , his proteinuria increased back to 5 grams. Along with with low albumin and swelling.  His creatinine is stable at 0.8 mg/dL .  He is taking furosemide daily in recent weeks due to increasing edema  He continues to work which is good  We discussed his increased proteinuria, and modes of therapy and elected to increase prednisone back up and if we do not see at least partial remission, consider starting steroid sparing agent.      Home BP: Not checked    Baseline Cr: 0.8    ROS:  A comprehensive review of systems was obtained and negative, except as noted in the HPI or PMH.    Active Medical Problems:  Patient Active Problem List   Diagnosis     FSGS (focal segmental glomerulosclerosis)     Nephrotic syndrome       Personal Hx:   Social History     Social History     Marital status: Single     Spouse name: N/A  "    Number of children: N/A     Years of education: N/A     Occupational History     Not on file.     Social History Main Topics     Smoking status: Never Smoker     Smokeless tobacco: Never Used     Alcohol use Yes      Comment: social     Drug use: No     Sexual activity: Not on file     Other Topics Concern     Not on file     Social History Narrative       Allergies:  Allergies   Allergen Reactions     Amoxicillin      Other reaction(s): EENT - other (explain in comments), SKIN - rash  Rash and lip swelling after 8 days of Amoxicillin     Erythromycin      Penicillins        Medications:  Prior to Admission medications    Medication Sig Start Date End Date Taking? Authorizing Provider   lisinopril (PRINIVIL,ZESTRIL) 20 MG tablet Take 1 tablet (20 mg) by mouth daily 9/15/16  Yes Anna Welsh MD   predniSONE (DELTASONE) 50 MG tablet Take 2 tablets (100 mg) by mouth daily 8/23/16  Yes Anna Welsh MD   omeprazole (PRILOSEC) 20 MG capsule Take 1 capsule (20 mg) by mouth daily 8/23/16  Yes Anna Welsh MD   sulfamethoxazole-trimethoprim (BACTRIM DS,SEPTRA DS) 800-160 MG per tablet Take 1 tablet by mouth three times a week 8/23/16  Yes Anna Welsh MD   FUROSEMIDE PO Take by mouth daily   Yes Reported, Patient       Vitals:  /79  Pulse 80  Ht 1.892 m (6' 2.5\")  Wt 118.4 kg (261 lb 1.6 oz)  SpO2 95%  BMI 33.07 kg/m2    Exam:  GENERAL APPEARANCE: alert and no distress  HENT: mouth without ulcers or lesions  LYMPHATICS: no cervical or supraclavicular nodes  RESP: lungs clear to auscultation - no rales, rhonchi or wheezes  CV: regular rhythm, normal rate, no rub, no murmur  EDEMA: trace LE edema bilaterally  ABDOMEN: soft, nondistended, nontender, bowel sounds normal  MS: extremities normal - no gross deformities noted, no evidence of inflammation in joints, no muscle tenderness  SKIN: no rash    Results:  Component      Latest Ref Rng & Units 9/15/2016 10/10/2016 " 12/8/2016 1/12/2017   Sodium      133 - 144 mmol/L 138 137 139 141   Potassium      3.4 - 5.3 mmol/L 3.9 4.5 4.4 4.1   Chloride      94 - 109 mmol/L 104 100 102 103   Carbon Dioxide      20 - 32 mmol/L 31 29 30 32   Anion Gap      3 - 14 mmol/L 3 7 7 6   Glucose      70 - 99 mg/dL 91 114 (H) 95 92   Urea Nitrogen      7 - 30 mg/dL 12 10 21 11   Creatinine      0.66 - 1.25 mg/dL 0.82 0.92 0.86 0.74   GFR Estimate      >60 mL/min/1.7m2 >90 . . . >90 . . . >90 . . . >90 . . .   GFR Estimate If Black      >60 mL/min/1.7m2 >90 . . . >90 . . . >90 . . . >90 . . .   Calcium      8.5 - 10.1 mg/dL 7.9 (L) 8.2 (L) 8.1 (L) 8.3 (L)   Phosphorus      2.5 - 4.5 mg/dL 2.5 3.6 3.2 4.2   Albumin      3.4 - 5.0 g/dL 1.4 (L) 1.7 (L) 2.1 (L) 2.5 (L)   Protein Random Urine      g/L 1.26 2.35 2.49 3.29   Protein Total Urine g/gr Creatinine      0 - 0.2 g/g Cr 8.68 (H) 9.20 (H) 5.60 (H) 2.76 (H)   Creatinine Urine      mg/dL    119     Component      Latest Ref Rng & Units 2/23/2017   Sodium      133 - 144 mmol/L 140   Potassium      3.4 - 5.3 mmol/L 3.6   Chloride      94 - 109 mmol/L 103   Carbon Dioxide      20 - 32 mmol/L 29   Anion Gap      3 - 14 mmol/L 8   Glucose      70 - 99 mg/dL 112 (H)   Urea Nitrogen      7 - 30 mg/dL 12   Creatinine      0.66 - 1.25 mg/dL 0.86   GFR Estimate      >60 mL/min/1.7m2 >90 . . .   GFR Estimate If Black      >60 mL/min/1.7m2 >90 . . .   Calcium      8.5 - 10.1 mg/dL 8.4 (L)   Phosphorus      2.5 - 4.5 mg/dL 3.2   Albumin      3.4 - 5.0 g/dL 3.0 (L)   Protein Random Urine      g/L 3.04   Protein Total Urine g/gr Creatinine      0 - 0.2 g/g Cr 1.19 (H)   Creatinine Urine      mg/dL 254     Component      Latest Ref Rng 7/27/2016 8/10/2016            8:40 AM   WBC      4.0 - 11.0 10e9/L  7.0   RBC Count      4.4 - 5.9 10e12/L  5.18   Hemoglobin      13.3 - 17.7 g/dL  15.1   Hematocrit      40.0 - 53.0 %  44.0   MCV      78 - 100 fl  85   MCH      26.5 - 33.0 pg  29.2   MCHC      31.5 - 36.5 g/dL  34.3    RDW      10.0 - 15.0 %  13.2   Platelet Count      150 - 450 10e9/L  340   Diff Method        Automated Method   % Neutrophils        40.5   % Lymphocytes        45.7   % Monocytes        7.0   % Eosinophils        5.6   % Basophils        0.9   % Immature Granulocytes        0.3   Nucleated RBCs      0 /100  0   Absolute Neutrophil      1.6 - 8.3 10e9/L  2.8   Absolute Lymphocytes      0.8 - 5.3 10e9/L  3.2   Absolute Monocytes      0.0 - 1.3 10e9/L  0.5   Absolute Eosinophils      0.0 - 0.7 10e9/L  0.4   Absolute Basophils      0.0 - 0.2 10e9/L  0.1   Abs Immature Granulocytes      0 - 0.4 10e9/L  0.0   Absolute Nucleated RBC        0.0   Sodium      133 - 144 mmol/L  144   Potassium      3.4 - 5.3 mmol/L  3.6   Chloride      94 - 109 mmol/L  109   Carbon Dioxide      20 - 32 mmol/L  26   Anion Gap      3 - 14 mmol/L  9   Glucose      70 - 99 mg/dL  75   Urea Nitrogen      7 - 30 mg/dL  8   Creatinine      0.66 - 1.25 mg/dL  0.88   GFR Estimate      >60 mL/min/1.7m2  >90 . . .   GFR Estimate If Black      >60 mL/min/1.7m2  >90 . . .   Calcium      8.5 - 10.1 mg/dL  7.5 (L)   Phosphorus      2.5 - 4.5 mg/dL     Albumin      3.4 - 5.0 g/dL     Protein Random Urine       14.29    Protein Total Urine g/gr Creatinine      0 - 0.2 g/g Cr 8.41 (H)    INR      0.86 - 1.14  1.11   Copath Report           Creatinine Urine             Component      Latest Ref Rng 8/10/2016           9:15 AM   WBC      4.0 - 11.0 10e9/L    RBC Count      4.4 - 5.9 10e12/L    Hemoglobin      13.3 - 17.7 g/dL    Hematocrit      40.0 - 53.0 %    MCV      78 - 100 fl    MCH      26.5 - 33.0 pg    MCHC      31.5 - 36.5 g/dL    RDW      10.0 - 15.0 %    Platelet Count      150 - 450 10e9/L    Diff Method          % Neutrophils          % Lymphocytes          % Monocytes          % Eosinophils          % Basophils          % Immature Granulocytes          Nucleated RBCs      0 /100    Absolute Neutrophil      1.6 - 8.3 10e9/L    Absolute  Lymphocytes      0.8 - 5.3 10e9/L    Absolute Monocytes      0.0 - 1.3 10e9/L    Absolute Eosinophils      0.0 - 0.7 10e9/L    Absolute Basophils      0.0 - 0.2 10e9/L    Abs Immature Granulocytes      0 - 0.4 10e9/L    Absolute Nucleated RBC          Sodium      133 - 144 mmol/L    Potassium      3.4 - 5.3 mmol/L    Chloride      94 - 109 mmol/L    Carbon Dioxide      20 - 32 mmol/L    Anion Gap      3 - 14 mmol/L    Glucose      70 - 99 mg/dL    Urea Nitrogen      7 - 30 mg/dL    Creatinine      0.66 - 1.25 mg/dL    GFR Estimate      >60 mL/min/1.7m2    GFR Estimate If Black      >60 mL/min/1.7m2    Calcium      8.5 - 10.1 mg/dL    Phosphorus      2.5 - 4.5 mg/dL    Albumin      3.4 - 5.0 g/dL    Protein Random Urine          Protein Total Urine g/gr Creatinine      0 - 0.2 g/g Cr    INR      0.86 - 1.14    Copath Report       Patient Name: WILIAM HAMSHINE Parish   Creatinine Urine            Component      Latest Ref Rng 8/10/2016 9/15/2016           2:30 PM    WBC      4.0 - 11.0 10e9/L     RBC Count      4.4 - 5.9 10e12/L     Hemoglobin      13.3 - 17.7 g/dL 14.0 15.4   Hematocrit      40.0 - 53.0 %     MCV      78 - 100 fl     MCH      26.5 - 33.0 pg     MCHC      31.5 - 36.5 g/dL     RDW      10.0 - 15.0 %     Platelet Count      150 - 450 10e9/L     Diff Method           % Neutrophils           % Lymphocytes           % Monocytes           % Eosinophils           % Basophils           % Immature Granulocytes           Nucleated RBCs      0 /100     Absolute Neutrophil      1.6 - 8.3 10e9/L     Absolute Lymphocytes      0.8 - 5.3 10e9/L     Absolute Monocytes      0.0 - 1.3 10e9/L     Absolute Eosinophils      0.0 - 0.7 10e9/L     Absolute Basophils      0.0 - 0.2 10e9/L     Abs Immature Granulocytes      0 - 0.4 10e9/L     Absolute Nucleated RBC           Sodium      133 - 144 mmol/L  138   Potassium      3.4 - 5.3 mmol/L  3.9   Chloride      94 - 109 mmol/L  104   Carbon Dioxide      20 - 32 mmol/L  31    Anion Gap      3 - 14 mmol/L  3   Glucose      70 - 99 mg/dL  91   Urea Nitrogen      7 - 30 mg/dL  12   Creatinine      0.66 - 1.25 mg/dL  0.82   GFR Estimate      >60 mL/min/1.7m2  >90 . . .   GFR Estimate If Black      >60 mL/min/1.7m2  >90 . . .   Calcium      8.5 - 10.1 mg/dL  7.9 (L)   Phosphorus      2.5 - 4.5 mg/dL  2.5   Albumin      3.4 - 5.0 g/dL  1.4 (L)   Protein Random Urine        1.26   Protein Total Urine g/gr Creatinine      0 - 0.2 g/g Cr  8.68 (H)   INR      0.86 - 1.14       Copath Report           Creatinine Urine        14       FINAL DIAGNOSIS:   A:     Kidney, native, LEFT; percutaneous needle biopsy:   - Focal segmental glomerulosclerosis     This biopsy shows segmental sclerosis and hyalinosis affecting   approximately 30% of glomeruli examined in a pattern diagnostic for   focal segmental glomerulosclerosis.  Although sclerotic segments focally   exhibit increased cellularity, capillary distention by intra-luminal   inflammatory cells is not observed and this specimen does not meet   strict criteria for cellular variant of focal segmental   glomerulosclerosis.  However, in view of the proportion of glomeruli   exhibiting segmental sclerosis and the increased cellularity observed in   sclerotic segments it is expected that this condition may follow an   aggressive course.                   Answers for HPI/ROS submitted by the patient on 9/13/2016   General Symptoms: No  Skin Symptoms: Yes  HENT Symptoms: No  EYE SYMPTOMS: No  HEART SYMPTOMS: Yes  LUNG SYMPTOMS: No  INTESTINAL SYMPTOMS: No  URINARY SYMPTOMS: Yes  REPRODUCTIVE SYMPTOMS: No  SKELETAL SYMPTOMS: Yes  BLOOD SYMPTOMS: No  NERVOUS SYSTEM SYMPTOMS: No  MENTAL HEALTH SYMPTOMS: Yes  Changes in hair: No  Changes in moles/birth marks: No  Itching: No  Rashes: No  Changes in nails: No  Acne: Yes  Change in facial hair: No  Warts: No  Non-healing sores: No  Scarring: No  Flaking of skin: No  Color changes of hands/feet in cold :  No  Sun sensitivity: Yes  Skin thickening: No  Chest pain or pressure: No  Fast or irregular heartbeat: Yes  Pain in legs with walking: No  Swelling in feet or ankles: Yes  Trouble breathing while lying down: No  Fingers or Toes appear blue: No  High blood pressure: Yes  Low blood pressure: No  Fainting: No  Murmurs: No  Chest pain on exertion: No  Chest pain at rest: No  Cramping pain in leg during exercise: Yes  Pacemaker: No  Varicose veins: No  Edema or swelling: Yes  Fast heart beat: Yes  Wake up at night with shortness of breath: No  Heart flutters: No  Light-headedness: No  Exercise intolerance: No  Trouble holding urine or incontinence: No  Pain or burning: No  Trouble starting or stopping: No  Increased frequency of urination: Yes  Blood in urine: No  Decreased frequency of urination: No  Frequent nighttime urination: No  Flank pain: No  Difficulty emptying bladder: No  Back pain: No  Muscle aches: No  Neck pain: No  Swollen joints: No  Joint pain: No  Bone pain: No  Muscle cramps: Yes  Muscle weakness: No  Joint stiffness: No  Bone fracture: No  Nervous or Anxious: No  Depression: No  Trouble sleeping: Yes  Trouble thinking or concentrating: Yes  Mood changes: No  Panic attacks: No

## 2018-04-26 LAB — DEPRECATED CALCIDIOL+CALCIFEROL SERPL-MC: 16 UG/L (ref 20–75)

## 2018-04-27 LAB — MAGNESIUM SERPL-MCNC: 2.2 MG/DL (ref 1.6–2.3)

## 2018-06-02 DIAGNOSIS — N04.9 NEPHROTIC SYNDROME: ICD-10-CM

## 2018-06-02 DIAGNOSIS — N05.1 FSGS (FOCAL SEGMENTAL GLOMERULOSCLEROSIS): ICD-10-CM

## 2018-06-02 LAB
ALBUMIN SERPL-MCNC: 2.9 G/DL (ref 3.4–5)
ANION GAP SERPL CALCULATED.3IONS-SCNC: 7 MMOL/L (ref 3–14)
BUN SERPL-MCNC: 20 MG/DL (ref 7–30)
CALCIUM SERPL-MCNC: 8.8 MG/DL (ref 8.5–10.1)
CHLORIDE SERPL-SCNC: 103 MMOL/L (ref 94–109)
CO2 SERPL-SCNC: 30 MMOL/L (ref 20–32)
CREAT SERPL-MCNC: 0.96 MG/DL (ref 0.66–1.25)
CREAT UR-MCNC: 117 MG/DL
GFR SERPL CREATININE-BSD FRML MDRD: >90 ML/MIN/1.7M2
GLUCOSE SERPL-MCNC: 96 MG/DL (ref 70–99)
PHOSPHATE SERPL-MCNC: 4.4 MG/DL (ref 2.5–4.5)
POTASSIUM SERPL-SCNC: 4.4 MMOL/L (ref 3.4–5.3)
PROT UR-MCNC: 1.98 G/L
PROT/CREAT 24H UR: 1.69 G/G CR (ref 0–0.2)
SODIUM SERPL-SCNC: 141 MMOL/L (ref 133–144)

## 2018-07-09 DIAGNOSIS — N05.1 FSGS (FOCAL SEGMENTAL GLOMERULOSCLEROSIS): Primary | ICD-10-CM

## 2018-07-09 DIAGNOSIS — N04.9 NEPHROTIC SYNDROME: ICD-10-CM

## 2018-07-26 DIAGNOSIS — N05.1 FSGS (FOCAL SEGMENTAL GLOMERULOSCLEROSIS): ICD-10-CM

## 2018-07-26 DIAGNOSIS — N04.9 NEPHROTIC SYNDROME: ICD-10-CM

## 2018-07-26 LAB
ALBUMIN SERPL-MCNC: 3.7 G/DL (ref 3.4–5)
ALBUMIN UR-MCNC: >499 MG/DL
ANION GAP SERPL CALCULATED.3IONS-SCNC: 5 MMOL/L (ref 3–14)
APPEARANCE UR: CLEAR
BILIRUB UR QL STRIP: NEGATIVE
BUN SERPL-MCNC: 13 MG/DL (ref 7–30)
CALCIUM SERPL-MCNC: 8.8 MG/DL (ref 8.5–10.1)
CHLORIDE SERPL-SCNC: 102 MMOL/L (ref 94–109)
CO2 SERPL-SCNC: 30 MMOL/L (ref 20–32)
COLOR UR AUTO: YELLOW
CREAT SERPL-MCNC: 0.77 MG/DL (ref 0.66–1.25)
CREAT UR-MCNC: 123 MG/DL
ERYTHROCYTE [DISTWIDTH] IN BLOOD BY AUTOMATED COUNT: 13.3 % (ref 10–15)
GFR SERPL CREATININE-BSD FRML MDRD: >90 ML/MIN/1.7M2
GLUCOSE SERPL-MCNC: 102 MG/DL (ref 70–99)
GLUCOSE UR STRIP-MCNC: NEGATIVE MG/DL
HCT VFR BLD AUTO: 46.1 % (ref 40–53)
HGB BLD-MCNC: 15.4 G/DL (ref 13.3–17.7)
HGB UR QL STRIP: NEGATIVE
KETONES UR STRIP-MCNC: NEGATIVE MG/DL
LEUKOCYTE ESTERASE UR QL STRIP: NEGATIVE
MCH RBC QN AUTO: 29.7 PG (ref 26.5–33)
MCHC RBC AUTO-ENTMCNC: 33.4 G/DL (ref 31.5–36.5)
MCV RBC AUTO: 89 FL (ref 78–100)
MUCOUS THREADS #/AREA URNS LPF: PRESENT /LPF
NITRATE UR QL: NEGATIVE
PH UR STRIP: 6 PH (ref 5–7)
PHOSPHATE SERPL-MCNC: 3.3 MG/DL (ref 2.5–4.5)
PLATELET # BLD AUTO: 391 10E9/L (ref 150–450)
POTASSIUM SERPL-SCNC: 4.4 MMOL/L (ref 3.4–5.3)
PROT UR-MCNC: 2.46 G/L
PROT/CREAT 24H UR: 2 G/G CR (ref 0–0.2)
RBC # BLD AUTO: 5.18 10E12/L (ref 4.4–5.9)
RBC #/AREA URNS AUTO: 2 /HPF (ref 0–2)
SODIUM SERPL-SCNC: 137 MMOL/L (ref 133–144)
SOURCE: ABNORMAL
SP GR UR STRIP: 1.02 (ref 1–1.03)
UROBILINOGEN UR STRIP-MCNC: 0 MG/DL (ref 0–2)
WBC # BLD AUTO: 14.2 10E9/L (ref 4–11)
WBC #/AREA URNS AUTO: <1 /HPF (ref 0–5)

## 2018-09-12 ENCOUNTER — OFFICE VISIT (OUTPATIENT)
Dept: NEPHROLOGY | Facility: CLINIC | Age: 23
End: 2018-09-12
Attending: INTERNAL MEDICINE
Payer: COMMERCIAL

## 2018-09-12 VITALS
SYSTOLIC BLOOD PRESSURE: 119 MMHG | HEART RATE: 79 BPM | HEIGHT: 75 IN | WEIGHT: 276.4 LBS | DIASTOLIC BLOOD PRESSURE: 81 MMHG | OXYGEN SATURATION: 98 % | BODY MASS INDEX: 34.37 KG/M2

## 2018-09-12 DIAGNOSIS — N04.9 NEPHROTIC SYNDROME: ICD-10-CM

## 2018-09-12 DIAGNOSIS — N05.1 FSGS (FOCAL SEGMENTAL GLOMERULOSCLEROSIS): ICD-10-CM

## 2018-09-12 LAB
ALBUMIN SERPL-MCNC: 3.8 G/DL (ref 3.4–5)
ANION GAP SERPL CALCULATED.3IONS-SCNC: 6 MMOL/L (ref 3–14)
BUN SERPL-MCNC: 15 MG/DL (ref 7–30)
CALCIUM SERPL-MCNC: 9 MG/DL (ref 8.5–10.1)
CHLORIDE SERPL-SCNC: 100 MMOL/L (ref 94–109)
CO2 SERPL-SCNC: 29 MMOL/L (ref 20–32)
CREAT SERPL-MCNC: 0.98 MG/DL (ref 0.66–1.25)
CREAT UR-MCNC: 80 MG/DL
GFR SERPL CREATININE-BSD FRML MDRD: >90 ML/MIN/1.7M2
GLUCOSE SERPL-MCNC: 110 MG/DL (ref 70–99)
PHOSPHATE SERPL-MCNC: 3.7 MG/DL (ref 2.5–4.5)
POTASSIUM SERPL-SCNC: 4.2 MMOL/L (ref 3.4–5.3)
PROT UR-MCNC: 1.01 G/L
PROT/CREAT 24H UR: 1.26 G/G CR (ref 0–0.2)
SODIUM SERPL-SCNC: 136 MMOL/L (ref 133–144)

## 2018-09-12 PROCEDURE — 36415 COLL VENOUS BLD VENIPUNCTURE: CPT | Performed by: INTERNAL MEDICINE

## 2018-09-12 PROCEDURE — 80069 RENAL FUNCTION PANEL: CPT | Performed by: INTERNAL MEDICINE

## 2018-09-12 PROCEDURE — 84156 ASSAY OF PROTEIN URINE: CPT | Performed by: INTERNAL MEDICINE

## 2018-09-12 PROCEDURE — G0463 HOSPITAL OUTPT CLINIC VISIT: HCPCS | Mod: ZF

## 2018-09-12 RX ORDER — LISINOPRIL 20 MG/1
TABLET ORAL
Qty: 90 TABLET | Refills: 11 | Status: SHIPPED | OUTPATIENT
Start: 2018-09-12 | End: 2019-09-18

## 2018-09-12 RX ORDER — PREDNISONE 20 MG/1
80 TABLET ORAL DAILY
Qty: 120 TABLET | Refills: 3 | Status: SHIPPED | OUTPATIENT
Start: 2018-09-12 | End: 2021-10-18

## 2018-09-12 ASSESSMENT — PAIN SCALES - GENERAL: PAINLEVEL: NO PAIN (0)

## 2018-09-12 NOTE — NURSING NOTE
"Chief Complaint   Patient presents with     RECHECK     Kidney follow up     /81  Pulse 79  Ht 1.892 m (6' 2.5\")  Wt 125.4 kg (276 lb 6.4 oz)  SpO2 98%  BMI 35.01 kg/m2  BRII CHRISTINA CMA    "

## 2018-09-12 NOTE — MR AVS SNAPSHOT
After Visit Summary   9/12/2018    Ethan Trevino    MRN: [id number]           Patient Information     Date Of Birth          1995        Visit Information        Provider Department      9/12/2018 1:30 PM Anna Welsh MD [facility]        Today's Diagnoses     Nephrotic syndrome        FSGS (focal segmental glomerulosclerosis)          Care Instructions    80mg x 6 weeks  70mg x 6 weeks  60mg x 6 weeks  50mg x 6 weeks  40mg x 6 weeks  30mg x 6 weeks  20mg x 6 weeks  10mg x 6 weeks            Follow-ups after your visit        Follow-up notes from your care team     Return in about 12 weeks (around 12/5/2018).      Your next 10 appointments already scheduled     Dec 05, 2018  7:00 AM CST   Lab with  LAB   [facility] ([facility])    [address] [place] [address]  1st Floor  [facility] [address] [phone]            Dec 05, 2018  7:50 AM CST   (Arrive by 7:20 AM)   Return Visit with Anna Welsh MD   [facility] ([facility])    [address] [place] [address]  [address] [place] [id number] [phone]              Who to contact     If you have questions or need follow up information about today's clinic visit or your schedule please contact [facility] directly at [phone].  Normal or non-critical lab and imaging results will be communicated to you by MyChart, letter or phone within 4 business days after the clinic has received the results. If you do not hear from us within 7 days, please contact the clinic through MyChart or phone. If you have a critical or abnormal lab result, we will notify you by phone as soon as possible.  Submit refill requests through [email or web address] or call your pharmacy and they will forward the refill request to us. Please allow 3 business days for your refill to be completed.          Additional Information About Your Visit        MyChart Information     [email or web address] gives  "you secure access to your electronic health record. If you see a primary care provider, you can also send messages to your care team and make appointments. If you have questions, please call your primary care clinic.  If you do not have a primary care provider, please call [phone] and they will assist you.        Care EveryWhere ID     This is your Care EveryWhere ID. This could be used by other organizations to access your [facility] medical records  [id number]        Your Vitals Were     Pulse Height Pulse Oximetry BMI (Body Mass Index)          79 1.892 m (6' 2.5\") 98% 35.01 kg/m2         Blood Pressure from Last 3 Encounters:   09/12/18 119/81   04/25/18 119/79   01/31/18 121/82    Weight from Last 3 Encounters:   09/12/18 125.4 kg (276 lb 6.4 oz)   04/25/18 118.4 kg (261 lb 1.6 oz)   01/31/18 121.9 kg (268 lb 12.8 oz)              Today, you had the following     No orders found for display         Today's Medication Changes          These changes are accurate as of 9/12/18 10:57 PM.  If you have any questions, ask your nurse or doctor.               These medicines have changed or have updated prescriptions.        Dose/Directions    lisinopril 20 MG tablet   Commonly known as:  PRINIVIL/ZESTRIL   This may have changed:    - how much to take  - how to take this  - when to take this  - additional instructions   Used for:  Nephrotic syndrome, FSGS (focal segmental glomerulosclerosis)   Changed by:  Anna Welsh MD        Take two tablets in AM and one tablet in PM   Quantity:  90 tablet   Refills:  11       * predniSONE 20 MG tablet   Commonly known as:  DELTASONE   This may have changed:  Another medication with the same name was added. Make sure you understand how and when to take each.   Used for:  FSGS (focal segmental glomerulosclerosis)   Changed by:  Anna Welsh MD        Dose:  20 mg   Take 1 tablet (20 mg) by mouth daily   Quantity:  60 tablet   Refills:  11       * " predniSONE 10 MG tablet   Commonly known as:  DELTASONE   This may have changed:  Another medication with the same name was added. Make sure you understand how and when to take each.   Used for:  FSGS (focal segmental glomerulosclerosis)   Changed by:  Anna Welsh MD        Dose:  10 mg   Take 1 tablet (10 mg) by mouth daily   Quantity:  60 tablet   Refills:  11       * predniSONE 50 MG tablet   Commonly known as:  DELTASONE   This may have changed:  Another medication with the same name was added. Make sure you understand how and when to take each.   Used for:  FSGS (focal segmental glomerulosclerosis), Nephrotic syndrome   Changed by:  Anna Welsh MD        Dose:  100 mg   Take 2 tablets (100 mg) by mouth daily   Quantity:  90 tablet   Refills:  11       * predniSONE 20 MG tablet   Commonly known as:  DELTASONE   This may have changed:  You were already taking a medication with the same name, and this prescription was added. Make sure you understand how and when to take each.   Used for:  FSGS (focal segmental glomerulosclerosis)   Changed by:  Anna Welsh MD        Dose:  80 mg   Take 4 tablets (80 mg) by mouth daily   Quantity:  120 tablet   Refills:  3       * Notice:  This list has 4 medication(s) that are the same as other medications prescribed for you. Read the directions carefully, and ask your doctor or other care provider to review them with you.         Where to get your medicines      These medications were sent to [facility] [id number] [facility] - [place], [place] - [address] [place] [address]  [address] [place] [address]     Phone:  [phone]     lisinopril 20 MG tablet    predniSONE 20 MG tablet                Primary Care Provider Fax #    [facility] [phone]       [address] [place] [address]        Equal Access to Services     AGUEDA MAHER AH: Lynsey Campa, karla angelo, john macias  bradenbautista lisahossein laOpalaan ah. So [facility] [phone].    ATENCIÓN: Si habla corrine, tiene a mix disposición servicios gratuitos de asistencia lingüística. Bobby al [phone].    We comply with applicable federal civil rights laws and [place] laws. We do not discriminate on the basis of race, color, national origin, age, disability, sex, sexual orientation, or gender identity.            Thank you!     Thank you for choosing [facility]  for your care. Our goal is always to provide you with excellent care. Hearing back from our patients is one way we can continue to improve our services. Please take a few minutes to complete the written survey that you may receive in the mail after your visit with us. Thank you!             Your Updated Medication List - Protect others around you: Learn how to safely use, store and throw away your medicines at www.disposemymeds.org.          This list is accurate as of 9/12/18 10:57 PM.  Always use your most recent med list.                   Brand Name Dispense Instructions for use Diagnosis    calcium 500 +D 500-400 MG-UNIT Tabs   Generic drug:  Calcium Carb-Cholecalciferol     30 tablet    Take 500 mg by mouth daily    FSGS (focal segmental glomerulosclerosis), Nephrotic syndrome       furosemide 20 MG tablet    LASIX    30 tablet    Take 1 tablet (20 mg) by mouth daily As needed    Nephrotic syndrome, FSGS (focal segmental glomerulosclerosis)       lisinopril 20 MG tablet    PRINIVIL/ZESTRIL    90 tablet    Take two tablets in AM and one tablet in PM    Nephrotic syndrome, FSGS (focal segmental glomerulosclerosis)       omeprazole 20 MG CR capsule    priLOSEC    30 capsule    TAKE 1 CAPSULE (20 MG) BY MOUTH DAILY    FSGS (focal segmental glomerulosclerosis)       * predniSONE 20 MG tablet    DELTASONE    60 tablet    Take 1 tablet (20 mg) by mouth daily    FSGS (focal segmental glomerulosclerosis)       * predniSONE 10 MG tablet    DELTASONE    60 tablet    Take 1 tablet (10  mg) by mouth daily    FSGS (focal segmental glomerulosclerosis)       * predniSONE 50 MG tablet    DELTASONE    90 tablet    Take 2 tablets (100 mg) by mouth daily    FSGS (focal segmental glomerulosclerosis), Nephrotic syndrome       * predniSONE 20 MG tablet    DELTASONE    120 tablet    Take 4 tablets (80 mg) by mouth daily    FSGS (focal segmental glomerulosclerosis)       sulfamethoxazole-trimethoprim 800-160 MG per tablet    BACTRIM DS/SEPTRA DS    15 tablet    Take 1 tablet by mouth three times a week    FSGS (focal segmental glomerulosclerosis)       * Notice:  This list has 4 medication(s) that are the same as other medications prescribed for you. Read the directions carefully, and ask your doctor or other care provider to review them with you.

## 2018-09-12 NOTE — PROGRESS NOTES
Assessment and Plan:   23 year old male with 6month history of edema and discovered with nephrotic syndrome, biopsy in August 2016 showing FSGS. Steroids started 8/23/2016 and relapsed during weaning in January 2018, with proteinuria up from 0.28 g/g rocky to 4.9 grams on 1/31/18  1. FSGS- he had 8-9 grams of proteinuria, steroids started August 2016, down to 6 grams in December 2016, which is partial response after 3 months of therapy and by 6 months his proteinuria is down to 0.7 grams. He was down to 10mg but unfortunately his proteinura was noted back up to 4-5 grams / day January 2018  . Biopsy done and prednisone 100mg daily started end of August 2016. He tolerated steroids fairly, but did have weight gain and acne issues.  - steroids decreased 2/23/17 to 80mg,and then 60mg, then decreased by 10mg every 4-6 weeks thereafter- proteinuria increased again in January 2018, and thus restarted prednisone 100mg daily February 2018, and now will taper after 6 months    - can do slower wean but this would leave him on steroids for almost a year.    - discussed possibility of introducing steroid sparing, can be done now or if relapse recurs.     His SCr is stable near 0.8mg/dL  (0.9 ?due to higher dose lisinopril)    If tacro is used, we would start with 0.1 mg/kg per day in two divided doses, targeting a trough level between 5 and 10 ng/mL. ? Slightly better side effect profile than CSA.  2. Renal function- preserved  3. Blood pressure- normal, on lisinopril  20mg bid, increase to 40/20mg for proteinuria.  4. Anemia- normal hemoglobin  4. Edema- has some edema which has improved with lasix - it is mild at this point, using compression stockings.  5. Prophylaxis- on bactrim thrice weekly to be continued, recommended vitamin D/ calcium and to continue omeprazole; had GI upset first week but now settled  6. Cramps- occasionally with furosemide use, encouraged high K diet  Higher lisinopril may help  - recheck Mag  stable  - use furosemide sparingly - will try to do every other day, to allow for room for lisinopril   7. Electrolytes- potassium low normal- high K diet especially if taking furosemide    Assessment and plan was discussed with patient and he voiced his understanding and agreement.    Reason for Visit:  Ethan Trevino is a 23 year old male with FSGS, who presents for routine follow up.     HPI:  He is a pleasant young male who in summer 2016 presented with several months history of swelling, noted to be nephrotic, and renal biopsy in August 2016 showed FSGS. He was started on steroids 8/23/16 and he presents for followup,.    He has been on steroids since end of August 2016 and is tolerating fairly. He has more and more abdominal striae and his acne is significant, but he is managing with topical treatment and willing to tolerate this as necessary  His proteinuria was down to 1-2 grams then subgram at 0.7g which is great, but unfortunately when he was down to 10mg prednisone , his proteinuria increased back to 5 grams. Along with with low albumin and swelling.  His creatinine is stable at 0.8 mg/dL .  He is taking furosemide daily since the relapse  He continues to work which is good  We discussed his increased proteinuria, and modes of therapy and elected to increase prednisone back up and if we do not see at least partial remission, consider starting steroid sparing agent.  His proteinuria at 6 months is 1.2 grams.  He feels well and will think about calcineurin inhibitor therapy if proteinuria seems to worsen and we are in need of a second agent.  I also discussed the option of a second opinion with Dr Jean and we will look into this.    Home BP: Not checked    Baseline Cr: 0.8    ROS:  A comprehensive review of systems was obtained and negative, except as noted in the HPI or PMH.    Active Medical Problems:  Patient Active Problem List   Diagnosis     FSGS (focal segmental glomerulosclerosis)      "Nephrotic syndrome       Personal Hx:   Social History     Social History     Marital status: Single     Spouse name: N/A     Number of children: N/A     Years of education: N/A     Occupational History     Not on file.     Social History Main Topics     Smoking status: Never Smoker     Smokeless tobacco: Never Used     Alcohol use Yes      Comment: social     Drug use: No     Sexual activity: Not on file     Other Topics Concern     Not on file     Social History Narrative       Allergies:  Allergies   Allergen Reactions     Amoxicillin      Other reaction(s): EENT - other (explain in comments), SKIN - rash  Rash and lip swelling after 8 days of Amoxicillin     Erythromycin      Penicillins        Medications:  Prior to Admission medications    Medication Sig Start Date End Date Taking? Authorizing Provider   lisinopril (PRINIVIL,ZESTRIL) 20 MG tablet Take 1 tablet (20 mg) by mouth daily 9/15/16  Yes Anna Welsh MD   predniSONE (DELTASONE) 50 MG tablet Take 2 tablets (100 mg) by mouth daily 8/23/16  Yes Anna Welsh MD   omeprazole (PRILOSEC) 20 MG capsule Take 1 capsule (20 mg) by mouth daily 8/23/16  Yes Anna Welsh MD   sulfamethoxazole-trimethoprim (BACTRIM DS,SEPTRA DS) 800-160 MG per tablet Take 1 tablet by mouth three times a week 8/23/16  Yes Anna Welsh MD   FUROSEMIDE PO Take by mouth daily   Yes Reported, Patient       Vitals:  /81  Pulse 79  Ht 1.892 m (6' 2.5\")  Wt 125.4 kg (276 lb 6.4 oz)  SpO2 98%  BMI 35.01 kg/m2    Exam:  GENERAL APPEARANCE: alert and no distress  HENT: mouth without ulcers or lesions  LYMPHATICS: no cervical or supraclavicular nodes  RESP: lungs clear to auscultation - no rales, rhonchi or wheezes  CV: regular rhythm, normal rate, no rub, no murmur  EDEMA: trace LE edema bilaterally  ABDOMEN: soft, nondistended, nontender, bowel sounds normal  MS: extremities normal - no gross deformities noted, no evidence of " inflammation in joints, no muscle tenderness  SKIN: no rash    Results:  Component      Latest Ref Rng & Units 9/15/2016 10/10/2016 12/8/2016 1/12/2017   Sodium      133 - 144 mmol/L 138 137 139 141   Potassium      3.4 - 5.3 mmol/L 3.9 4.5 4.4 4.1   Chloride      94 - 109 mmol/L 104 100 102 103   Carbon Dioxide      20 - 32 mmol/L 31 29 30 32   Anion Gap      3 - 14 mmol/L 3 7 7 6   Glucose      70 - 99 mg/dL 91 114 (H) 95 92   Urea Nitrogen      7 - 30 mg/dL 12 10 21 11   Creatinine      0.66 - 1.25 mg/dL 0.82 0.92 0.86 0.74   GFR Estimate      >60 mL/min/1.7m2 >90 . . . >90 . . . >90 . . . >90 . . .   GFR Estimate If Black      >60 mL/min/1.7m2 >90 . . . >90 . . . >90 . . . >90 . . .   Calcium      8.5 - 10.1 mg/dL 7.9 (L) 8.2 (L) 8.1 (L) 8.3 (L)   Phosphorus      2.5 - 4.5 mg/dL 2.5 3.6 3.2 4.2   Albumin      3.4 - 5.0 g/dL 1.4 (L) 1.7 (L) 2.1 (L) 2.5 (L)   Protein Random Urine      g/L 1.26 2.35 2.49 3.29   Protein Total Urine g/gr Creatinine      0 - 0.2 g/g Cr 8.68 (H) 9.20 (H) 5.60 (H) 2.76 (H)   Creatinine Urine      mg/dL    119     Component      Latest Ref Rng & Units 2/23/2017   Sodium      133 - 144 mmol/L 140   Potassium      3.4 - 5.3 mmol/L 3.6   Chloride      94 - 109 mmol/L 103   Carbon Dioxide      20 - 32 mmol/L 29   Anion Gap      3 - 14 mmol/L 8   Glucose      70 - 99 mg/dL 112 (H)   Urea Nitrogen      7 - 30 mg/dL 12   Creatinine      0.66 - 1.25 mg/dL 0.86   GFR Estimate      >60 mL/min/1.7m2 >90 . . .   GFR Estimate If Black      >60 mL/min/1.7m2 >90 . . .   Calcium      8.5 - 10.1 mg/dL 8.4 (L)   Phosphorus      2.5 - 4.5 mg/dL 3.2   Albumin      3.4 - 5.0 g/dL 3.0 (L)   Protein Random Urine      g/L 3.04   Protein Total Urine g/gr Creatinine      0 - 0.2 g/g Cr 1.19 (H)   Creatinine Urine      mg/dL 254     Component      Latest Ref Rng 7/27/2016 8/10/2016            8:40 AM   WBC      4.0 - 11.0 10e9/L  7.0   RBC Count      4.4 - 5.9 10e12/L  5.18   Hemoglobin      13.3 - 17.7 g/dL  15.1    Hematocrit      40.0 - 53.0 %  44.0   MCV      78 - 100 fl  85   MCH      26.5 - 33.0 pg  29.2   MCHC      31.5 - 36.5 g/dL  34.3   RDW      10.0 - 15.0 %  13.2   Platelet Count      150 - 450 10e9/L  340   Diff Method        Automated Method   % Neutrophils        40.5   % Lymphocytes        45.7   % Monocytes        7.0   % Eosinophils        5.6   % Basophils        0.9   % Immature Granulocytes        0.3   Nucleated RBCs      0 /100  0   Absolute Neutrophil      1.6 - 8.3 10e9/L  2.8   Absolute Lymphocytes      0.8 - 5.3 10e9/L  3.2   Absolute Monocytes      0.0 - 1.3 10e9/L  0.5   Absolute Eosinophils      0.0 - 0.7 10e9/L  0.4   Absolute Basophils      0.0 - 0.2 10e9/L  0.1   Abs Immature Granulocytes      0 - 0.4 10e9/L  0.0   Absolute Nucleated RBC        0.0   Sodium      133 - 144 mmol/L  144   Potassium      3.4 - 5.3 mmol/L  3.6   Chloride      94 - 109 mmol/L  109   Carbon Dioxide      20 - 32 mmol/L  26   Anion Gap      3 - 14 mmol/L  9   Glucose      70 - 99 mg/dL  75   Urea Nitrogen      7 - 30 mg/dL  8   Creatinine      0.66 - 1.25 mg/dL  0.88   GFR Estimate      >60 mL/min/1.7m2  >90 . . .   GFR Estimate If Black      >60 mL/min/1.7m2  >90 . . .   Calcium      8.5 - 10.1 mg/dL  7.5 (L)   Phosphorus      2.5 - 4.5 mg/dL     Albumin      3.4 - 5.0 g/dL     Protein Random Urine       14.29    Protein Total Urine g/gr Creatinine      0 - 0.2 g/g Cr 8.41 (H)    INR      0.86 - 1.14  1.11   Copath Report           Creatinine Urine             Component      Latest Ref Rng 8/10/2016           9:15 AM   WBC      4.0 - 11.0 10e9/L    RBC Count      4.4 - 5.9 10e12/L    Hemoglobin      13.3 - 17.7 g/dL    Hematocrit      40.0 - 53.0 %    MCV      78 - 100 fl    MCH      26.5 - 33.0 pg    MCHC      31.5 - 36.5 g/dL    RDW      10.0 - 15.0 %    Platelet Count      150 - 450 10e9/L    Diff Method          % Neutrophils          % Lymphocytes          % Monocytes          % Eosinophils          % Basophils           % Immature Granulocytes          Nucleated RBCs      0 /100    Absolute Neutrophil      1.6 - 8.3 10e9/L    Absolute Lymphocytes      0.8 - 5.3 10e9/L    Absolute Monocytes      0.0 - 1.3 10e9/L    Absolute Eosinophils      0.0 - 0.7 10e9/L    Absolute Basophils      0.0 - 0.2 10e9/L    Abs Immature Granulocytes      0 - 0.4 10e9/L    Absolute Nucleated RBC          Sodium      133 - 144 mmol/L    Potassium      3.4 - 5.3 mmol/L    Chloride      94 - 109 mmol/L    Carbon Dioxide      20 - 32 mmol/L    Anion Gap      3 - 14 mmol/L    Glucose      70 - 99 mg/dL    Urea Nitrogen      7 - 30 mg/dL    Creatinine      0.66 - 1.25 mg/dL    GFR Estimate      >60 mL/min/1.7m2    GFR Estimate If Black      >60 mL/min/1.7m2    Calcium      8.5 - 10.1 mg/dL    Phosphorus      2.5 - 4.5 mg/dL    Albumin      3.4 - 5.0 g/dL    Protein Random Urine          Protein Total Urine g/gr Creatinine      0 - 0.2 g/g Cr    INR      0.86 - 1.14    Copath Report       Patient Name: HAM SWAIN MARIO [facility]   Creatinine Urine            Component      Latest Ref Rng 8/10/2016 9/15/2016           2:30 PM    WBC      4.0 - 11.0 10e9/L     RBC Count      4.4 - 5.9 10e12/L     Hemoglobin      13.3 - 17.7 g/dL 14.0 15.4   Hematocrit      40.0 - 53.0 %     MCV      78 - 100 fl     MCH      26.5 - 33.0 pg     MCHC      31.5 - 36.5 g/dL     RDW      10.0 - 15.0 %     Platelet Count      150 - 450 10e9/L     Diff Method           % Neutrophils           % Lymphocytes           % Monocytes           % Eosinophils           % Basophils           % Immature Granulocytes           Nucleated RBCs      0 /100     Absolute Neutrophil      1.6 - 8.3 10e9/L     Absolute Lymphocytes      0.8 - 5.3 10e9/L     Absolute Monocytes      0.0 - 1.3 10e9/L     Absolute Eosinophils      0.0 - 0.7 10e9/L     Absolute Basophils      0.0 - 0.2 10e9/L     Abs Immature Granulocytes      0 - 0.4 10e9/L     Absolute Nucleated RBC           Sodium      133 - 144 mmol/L   138   Potassium      3.4 - 5.3 mmol/L  3.9   Chloride      94 - 109 mmol/L  104   Carbon Dioxide      20 - 32 mmol/L  31   Anion Gap      3 - 14 mmol/L  3   Glucose      70 - 99 mg/dL  91   Urea Nitrogen      7 - 30 mg/dL  12   Creatinine      0.66 - 1.25 mg/dL  0.82   GFR Estimate      >60 mL/min/1.7m2  >90 . . .   GFR Estimate If Black      >60 mL/min/1.7m2  >90 . . .   Calcium      8.5 - 10.1 mg/dL  7.9 (L)   Phosphorus      2.5 - 4.5 mg/dL  2.5   Albumin      3.4 - 5.0 g/dL  1.4 (L)   Protein Random Urine        1.26   Protein Total Urine g/gr Creatinine      0 - 0.2 g/g Cr  8.68 (H)   INR      0.86 - 1.14       Copath Report           Creatinine Urine        14       FINAL DIAGNOSIS:   A:     Kidney, native, LEFT; percutaneous needle biopsy:   - Focal segmental glomerulosclerosis     This biopsy shows segmental sclerosis and hyalinosis affecting   approximately 30% of glomeruli examined in a pattern diagnostic for   focal segmental glomerulosclerosis.  Although sclerotic segments focally   exhibit increased cellularity, capillary distention by intra-luminal   inflammatory cells is not observed and this specimen does not meet   strict criteria for cellular variant of focal segmental   glomerulosclerosis.  However, in view of the proportion of glomeruli   exhibiting segmental sclerosis and the increased cellularity observed in   sclerotic segments it is expected that this condition may follow an   aggressive course.

## 2018-09-12 NOTE — LETTER
9/12/2018       RE: Ethan Trevino  [address]  [facility] [address]       Assessment and Plan:   23 year old male with 6month history of edema and discovered with nephrotic syndrome, biopsy in August 2016 showing FSGS. Steroids started 8/23/2016 and relapsed during weaning in January 2018, with proteinuria up from 0.28 g/g rocky to 4.9 grams on 1/31/18  1. FSGS- he had 8-9 grams of proteinuria, steroids started August 2016, down to 6 grams in December 2016, which is partial response after 3 months of therapy and by 6 months his proteinuria is down to 0.7 grams. He was down to 10mg but unfortunately his proteinura was noted back up to 4-5 grams / day January 2018  . Biopsy done and prednisone 100mg daily started end of August 2016. He tolerated steroids fairly, but did have weight gain and acne issues.  - steroids decreased 2/23/17 to 80mg,and then 60mg, then decreased by 10mg every 4-6 weeks thereafter- proteinuria increased again in January 2018, and thus restarted prednisone 100mg daily February 2018, and now will taper after 6 months    - can do slower wean but this would leave him on steroids for almost a year.    - discussed possibility of introducing steroid sparing, can be done now or if relapse recurs.     His SCr is stable near 0.8mg/dL  (0.9 ?due to higher dose lisinopril)    If tacro is used, we would start with 0.1 mg/kg per day in two divided doses, targeting a trough level between 5 and 10 ng/mL. ? Slightly better side effect profile than CSA.  2. Renal function- preserved  3. Blood pressure- normal, on lisinopril  20mg bid, increase to 40/20mg for proteinuria.  4. Anemia- normal hemoglobin  4. Edema- has some edema which has improved with lasix - it is mild at this point, using compression stockings.  5. Prophylaxis- on bactrim thrice weekly to be continued, recommended vitamin D/ calcium and to continue omeprazole; had GI upset first week but now settled  6. Cramps- occasionally  with furosemide use, encouraged high K diet  Higher lisinopril may help  - recheck Mag stable  - use furosemide sparingly - will try to do every other day, to allow for room for lisinopril   7. Electrolytes- potassium low normal- high K diet especially if taking furosemide    Assessment and plan was discussed with patient and he voiced his understanding and agreement.    Reason for Visit:  Ethan Trevino is a 23 year old male with FSGS, who presents for routine follow up.     HPI:  He is a pleasant young male who in summer 2016 presented with several months history of swelling, noted to be nephrotic, and renal biopsy in August 2016 showed FSGS. He was started on steroids 8/23/16 and he presents for followup,.    He has been on steroids since end of August 2016 and is tolerating fairly. He has more and more abdominal striae and his acne is significant, but he is managing with topical treatment and willing to tolerate this as necessary  His proteinuria was down to 1-2 grams then subgram at 0.7g which is great, but unfortunately when he was down to 10mg prednisone , his proteinuria increased back to 5 grams. Along with with low albumin and swelling.  His creatinine is stable at 0.8 mg/dL .  He is taking furosemide daily since the relapse  He continues to work which is good  We discussed his increased proteinuria, and modes of therapy and elected to increase prednisone back up and if we do not see at least partial remission, consider starting steroid sparing agent.  His proteinuria at 6 months is 1.2 grams.  He feels well and will think about calcineurin inhibitor therapy if proteinuria seems to worsen and we are in need of a second agent.  I also discussed the option of a second opinion with Dr Jean and we will look into this.    Home BP: Not checked    Baseline Cr: 0.8    ROS:  A comprehensive review of systems was obtained and negative, except as noted in the HPI or PMH.    Active Medical Problems:  Patient  "Active Problem List   Diagnosis     FSGS (focal segmental glomerulosclerosis)     Nephrotic syndrome       Personal Hx:   Social History     Social History     Marital status: Single     Spouse name: N/A     Number of children: N/A     Years of education: N/A     Occupational History     Not on file.     Social History Main Topics     Smoking status: Never Smoker     Smokeless tobacco: Never Used     Alcohol use Yes      Comment: social     Drug use: No     Sexual activity: Not on file     Other Topics Concern     Not on file     Social History Narrative       Allergies:  Allergies   Allergen Reactions     Amoxicillin      Other reaction(s): EENT - other (explain in comments), SKIN - rash  Rash and lip swelling after 8 days of Amoxicillin     Erythromycin      Penicillins        Medications:  Prior to Admission medications    Medication Sig Start Date End Date Taking? Authorizing Provider   lisinopril (PRINIVIL,ZESTRIL) 20 MG tablet Take 1 tablet (20 mg) by mouth daily 9/15/16  Yes Anna Welsh MD   predniSONE (DELTASONE) 50 MG tablet Take 2 tablets (100 mg) by mouth daily 8/23/16  Yes Anna Welsh MD   omeprazole (PRILOSEC) 20 MG capsule Take 1 capsule (20 mg) by mouth daily 8/23/16  Yes Anna Welsh MD   sulfamethoxazole-trimethoprim (BACTRIM DS,SEPTRA DS) 800-160 MG per tablet Take 1 tablet by mouth three times a week 8/23/16  Yes Anna Welsh MD   FUROSEMIDE PO Take by mouth daily   Yes Reported, Patient       Vitals:  /81  Pulse 79  Ht 1.892 m (6' 2.5\")  Wt 125.4 kg (276 lb 6.4 oz)  SpO2 98%  BMI 35.01 kg/m2    Exam:  GENERAL APPEARANCE: alert and no distress  HENT: mouth without ulcers or lesions  LYMPHATICS: no cervical or supraclavicular nodes  RESP: lungs clear to auscultation - no rales, rhonchi or wheezes  CV: regular rhythm, normal rate, no rub, no murmur  EDEMA: trace LE edema bilaterally  ABDOMEN: soft, nondistended, nontender, bowel sounds " normal  MS: extremities normal - no gross deformities noted, no evidence of inflammation in joints, no muscle tenderness  SKIN: no rash    Results:  Component      Latest Ref Rng & Units 9/15/2016 10/10/2016 12/8/2016 1/12/2017   Sodium      133 - 144 mmol/L 138 137 139 141   Potassium      3.4 - 5.3 mmol/L 3.9 4.5 4.4 4.1   Chloride      94 - 109 mmol/L 104 100 102 103   Carbon Dioxide      20 - 32 mmol/L 31 29 30 32   Anion Gap      3 - 14 mmol/L 3 7 7 6   Glucose      70 - 99 mg/dL 91 114 (H) 95 92   Urea Nitrogen      7 - 30 mg/dL 12 10 21 11   Creatinine      0.66 - 1.25 mg/dL 0.82 0.92 0.86 0.74   GFR Estimate      >60 mL/min/1.7m2 >90 . . . >90 . . . >90 . . . >90 . . .   GFR Estimate If Black      >60 mL/min/1.7m2 >90 . . . >90 . . . >90 . . . >90 . . .   Calcium      8.5 - 10.1 mg/dL 7.9 (L) 8.2 (L) 8.1 (L) 8.3 (L)   Phosphorus      2.5 - 4.5 mg/dL 2.5 3.6 3.2 4.2   Albumin      3.4 - 5.0 g/dL 1.4 (L) 1.7 (L) 2.1 (L) 2.5 (L)   Protein Random Urine      g/L 1.26 2.35 2.49 3.29   Protein Total Urine g/gr Creatinine      0 - 0.2 g/g Cr 8.68 (H) 9.20 (H) 5.60 (H) 2.76 (H)   Creatinine Urine      mg/dL    119     Component      Latest Ref Rng & Units 2/23/2017   Sodium      133 - 144 mmol/L 140   Potassium      3.4 - 5.3 mmol/L 3.6   Chloride      94 - 109 mmol/L 103   Carbon Dioxide      20 - 32 mmol/L 29   Anion Gap      3 - 14 mmol/L 8   Glucose      70 - 99 mg/dL 112 (H)   Urea Nitrogen      7 - 30 mg/dL 12   Creatinine      0.66 - 1.25 mg/dL 0.86   GFR Estimate      >60 mL/min/1.7m2 >90 . . .   GFR Estimate If Black      >60 mL/min/1.7m2 >90 . . .   Calcium      8.5 - 10.1 mg/dL 8.4 (L)   Phosphorus      2.5 - 4.5 mg/dL 3.2   Albumin      3.4 - 5.0 g/dL 3.0 (L)   Protein Random Urine      g/L 3.04   Protein Total Urine g/gr Creatinine      0 - 0.2 g/g Cr 1.19 (H)   Creatinine Urine      mg/dL 254     Component      Latest Ref Rng 7/27/2016 8/10/2016            8:40 AM   WBC      4.0 - 11.0 10e9/L  7.0   RBC  Count      4.4 - 5.9 10e12/L  5.18   Hemoglobin      13.3 - 17.7 g/dL  15.1   Hematocrit      40.0 - 53.0 %  44.0   MCV      78 - 100 fl  85   MCH      26.5 - 33.0 pg  29.2   MCHC      31.5 - 36.5 g/dL  34.3   RDW      10.0 - 15.0 %  13.2   Platelet Count      150 - 450 10e9/L  340   Diff Method        Automated Method   % Neutrophils        40.5   % Lymphocytes        45.7   % Monocytes        7.0   % Eosinophils        5.6   % Basophils        0.9   % Immature Granulocytes        0.3   Nucleated RBCs      0 /100  0   Absolute Neutrophil      1.6 - 8.3 10e9/L  2.8   Absolute Lymphocytes      0.8 - 5.3 10e9/L  3.2   Absolute Monocytes      0.0 - 1.3 10e9/L  0.5   Absolute Eosinophils      0.0 - 0.7 10e9/L  0.4   Absolute Basophils      0.0 - 0.2 10e9/L  0.1   Abs Immature Granulocytes      0 - 0.4 10e9/L  0.0   Absolute Nucleated RBC        0.0   Sodium      133 - 144 mmol/L  144   Potassium      3.4 - 5.3 mmol/L  3.6   Chloride      94 - 109 mmol/L  109   Carbon Dioxide      20 - 32 mmol/L  26   Anion Gap      3 - 14 mmol/L  9   Glucose      70 - 99 mg/dL  75   Urea Nitrogen      7 - 30 mg/dL  8   Creatinine      0.66 - 1.25 mg/dL  0.88   GFR Estimate      >60 mL/min/1.7m2  >90 . . .   GFR Estimate If Black      >60 mL/min/1.7m2  >90 . . .   Calcium      8.5 - 10.1 mg/dL  7.5 (L)   Phosphorus      2.5 - 4.5 mg/dL     Albumin      3.4 - 5.0 g/dL     Protein Random Urine       14.29    Protein Total Urine g/gr Creatinine      0 - 0.2 g/g Cr 8.41 (H)    INR      0.86 - 1.14  1.11   Copath Report           Creatinine Urine             Component      Latest Ref Rng 8/10/2016           9:15 AM   WBC      4.0 - 11.0 10e9/L    RBC Count      4.4 - 5.9 10e12/L    Hemoglobin      13.3 - 17.7 g/dL    Hematocrit      40.0 - 53.0 %    MCV      78 - 100 fl    MCH      26.5 - 33.0 pg    MCHC      31.5 - 36.5 g/dL    RDW      10.0 - 15.0 %    Platelet Count      150 - 450 10e9/L    Diff Method          % Neutrophils          %  Lymphocytes          % Monocytes          % Eosinophils          % Basophils          % Immature Granulocytes          Nucleated RBCs      0 /100    Absolute Neutrophil      1.6 - 8.3 10e9/L    Absolute Lymphocytes      0.8 - 5.3 10e9/L    Absolute Monocytes      0.0 - 1.3 10e9/L    Absolute Eosinophils      0.0 - 0.7 10e9/L    Absolute Basophils      0.0 - 0.2 10e9/L    Abs Immature Granulocytes      0 - 0.4 10e9/L    Absolute Nucleated RBC          Sodium      133 - 144 mmol/L    Potassium      3.4 - 5.3 mmol/L    Chloride      94 - 109 mmol/L    Carbon Dioxide      20 - 32 mmol/L    Anion Gap      3 - 14 mmol/L    Glucose      70 - 99 mg/dL    Urea Nitrogen      7 - 30 mg/dL    Creatinine      0.66 - 1.25 mg/dL    GFR Estimate      >60 mL/min/1.7m2    GFR Estimate If Black      >60 mL/min/1.7m2    Calcium      8.5 - 10.1 mg/dL    Phosphorus      2.5 - 4.5 mg/dL    Albumin      3.4 - 5.0 g/dL    Protein Random Urine          Protein Total Urine g/gr Creatinine      0 - 0.2 g/g Cr    INR      0.86 - 1.14    Copath Report       Patient Name: HAM SWAIN MARIO Parish   Creatinine Urine            Component      Latest Ref Rng 8/10/2016 9/15/2016           2:30 PM    WBC      4.0 - 11.0 10e9/L     RBC Count      4.4 - 5.9 10e12/L     Hemoglobin      13.3 - 17.7 g/dL 14.0 15.4   Hematocrit      40.0 - 53.0 %     MCV      78 - 100 fl     MCH      26.5 - 33.0 pg     MCHC      31.5 - 36.5 g/dL     RDW      10.0 - 15.0 %     Platelet Count      150 - 450 10e9/L     Diff Method           % Neutrophils           % Lymphocytes           % Monocytes           % Eosinophils           % Basophils           % Immature Granulocytes           Nucleated RBCs      0 /100     Absolute Neutrophil      1.6 - 8.3 10e9/L     Absolute Lymphocytes      0.8 - 5.3 10e9/L     Absolute Monocytes      0.0 - 1.3 10e9/L     Absolute Eosinophils      0.0 - 0.7 10e9/L     Absolute Basophils      0.0 - 0.2 10e9/L     Abs Immature Granulocytes       0 - 0.4 10e9/L     Absolute Nucleated RBC           Sodium      133 - 144 mmol/L  138   Potassium      3.4 - 5.3 mmol/L  3.9   Chloride      94 - 109 mmol/L  104   Carbon Dioxide      20 - 32 mmol/L  31   Anion Gap      3 - 14 mmol/L  3   Glucose      70 - 99 mg/dL  91   Urea Nitrogen      7 - 30 mg/dL  12   Creatinine      0.66 - 1.25 mg/dL  0.82   GFR Estimate      >60 mL/min/1.7m2  >90 . . .   GFR Estimate If Black      >60 mL/min/1.7m2  >90 . . .   Calcium      8.5 - 10.1 mg/dL  7.9 (L)   Phosphorus      2.5 - 4.5 mg/dL  2.5   Albumin      3.4 - 5.0 g/dL  1.4 (L)   Protein Random Urine        1.26   Protein Total Urine g/gr Creatinine      0 - 0.2 g/g Cr  8.68 (H)   INR      0.86 - 1.14       Copath Report           Creatinine Urine        14       FINAL DIAGNOSIS:   A:     Kidney, native, LEFT; percutaneous needle biopsy:   - Focal segmental glomerulosclerosis     This biopsy shows segmental sclerosis and hyalinosis affecting   approximately 30% of glomeruli examined in a pattern diagnostic for   focal segmental glomerulosclerosis.  Although sclerotic segments focally   exhibit increased cellularity, capillary distention by intra-luminal   inflammatory cells is not observed and this specimen does not meet   strict criteria for cellular variant of focal segmental   glomerulosclerosis.  However, in view of the proportion of glomeruli   exhibiting segmental sclerosis and the increased cellularity observed in   sclerotic segments it is expected that this condition may follow an   aggressive course.       Anna Welsh MD

## 2018-09-12 NOTE — PATIENT INSTRUCTIONS
80mg x 6 weeks  70mg x 6 weeks  60mg x 6 weeks  50mg x 6 weeks  40mg x 6 weeks  30mg x 6 weeks  20mg x 6 weeks  10mg x 6 weeks

## 2018-09-26 DIAGNOSIS — N05.1 FSGS (FOCAL SEGMENTAL GLOMERULOSCLEROSIS): ICD-10-CM

## 2018-10-06 ENCOUNTER — HOSPITAL ENCOUNTER (EMERGENCY)
Facility: CLINIC | Age: 23
Discharge: HOME | End: 2018-10-07
Attending: FAMILY MEDICINE | Admitting: FAMILY MEDICINE
Payer: COMMERCIAL

## 2018-10-06 ENCOUNTER — APPOINTMENT (OUTPATIENT)
Dept: GENERAL RADIOLOGY | Facility: CLINIC | Age: 23
End: 2018-10-06
Attending: FAMILY MEDICINE
Payer: COMMERCIAL

## 2018-10-06 ENCOUNTER — NURSE TRIAGE (OUTPATIENT)
Dept: NURSING | Facility: CLINIC | Age: 23
End: 2018-10-06

## 2018-10-06 DIAGNOSIS — S92.514A CLOSED NONDISPLACED FRACTURE OF PROXIMAL PHALANX OF LESSER TOE OF RIGHT FOOT, INITIAL ENCOUNTER: ICD-10-CM

## 2018-10-06 PROCEDURE — 73660 X-RAY EXAM OF TOE(S): CPT | Mod: RT

## 2018-10-06 PROCEDURE — 99283 EMERGENCY DEPT VISIT LOW MDM: CPT | Mod: Z6 | Performed by: FAMILY MEDICINE

## 2018-10-06 PROCEDURE — 99283 EMERGENCY DEPT VISIT LOW MDM: CPT | Performed by: FAMILY MEDICINE

## 2018-10-06 NOTE — ED AVS SNAPSHOT
[facility], Emergency Department    [address]    [place] [address]    Phone:  [phone]    Fax:  [phone]                                       Ethan Trevino   MRN: [id number]    Department:  [facility], Emergency Department   Date of Visit:  10/6/2018           Patient Information     Date Of Birth          1995        Your diagnoses for this visit were:     Closed nondisplaced fracture of proximal phalanx of lesser toe of right foot, initial encounter        You were seen by Shiv Judd MD.      Follow-up Information     Follow up with Follow-up with primary clinic as needed..        Discharge Instructions         Closed Toe Fracture  Your toe is broken (fractured). This causes local pain, swelling, and sometimes bruising. This injury usually takes about 4 to 6 weeks to heal, but can sometimes take longer. Toe injuries are often treated by taping the injured toe to the next one (buddy taping). This protects the injured toe and holds it in position.     If the toenail has been severely injured, it may fall off in 1 to 2 weeks. It takes up to 12 months for a new toenail to grow back.  Home care  Follow these guidelines when caring for yourself at home:    You may be given a cast shoe to wear to keep your toe from moving. If not, you can use a sandal or any shoe that doesn t put pressure on the injured toe until the swelling and pain go away. If using a sandal, be careful not to strike your foot against anything. Another injury could make the fracture worse. If you were given crutches, don t put full weight on the injured foot until you can do so without pain, or as directed by your healthcare provider.    Keep your foot elevated to reduce pain and swelling. When sleeping, put a pillow under the injured leg. When sitting, support the injured leg so it is above your waist. This is very important during the first 2 days (48 hours).    Put an ice pack on the injured  area. Do this for 20 minutes every 1 to 2 hours the first day for pain relief. You can make an ice pack by wrapping a plastic bag of ice cubes in a thin towel. As the ice melts, be careful that any cloth or paper tape doesn t get wet. Continue using the ice pack 3 to 4 times a day for the next 2 days. Then use the ice pack as needed to ease pain and swelling.    If buddy tape was used and it becomes wet or dirty, change it. You may replace it with paper, plastic, or cloth tape. Cloth tape and paper tapes must be kept dry.    You may use acetaminophen or ibuprofen to control pain, unless another pain medicine was prescribed. If you have chronic liver or kidney disease, talk with your healthcare provider before using these medicines. Also talk with your provider if you ve had a stomach ulcer or gastrointestinal bleeding.    You may return to sports or physical education activities after 4 weeks when you can run without pain, or as directed by your healthcare provider.  Follow-up care  Follow up with your healthcare provider in 1 week, or as advised. This is to make sure the bone is healing the way it should.  X-rays may be taken. You will be told of any new findings that may affect your care.  When to seek medical advice  Call your healthcare provider right away if any of these occur:    Pain or swelling gets worse    The cast/splint cracks    The cast and padding get wet and stays wet more than 24 hours    Bad odor from the cast/splint or wound fluid stains the cast    Tightness or pressure under the cast/splint gets worse    Toe becomes cold, blue, numb, or tingly    You can t move the toe    Signs of infection: fever, redness, warmth, swelling, or drainage from the wound or cast    Fever of 100.4 F (38 C) or higher, or as directed by your healthcare provider  Date Last Reviewed: 2/1/2017 2000-2017 [employer]. [address] [place] [address], [place] [address]. All rights reserved. This information is not  intended as a substitute for professional medical care. Always follow your healthcare professional's instructions.          Your next 10 appointments already scheduled     Dec 05, 2018  7:00 AM CST   Lab with  LAB   [facility] ([facility])    [address]  [address]st Floor  [place] [address] [phone]            Dec 05, 2018  7:50 AM CST   (Arrive by 7:20 AM)   Return Visit with Anna Welsh MD   [facility] ([facility])    [address]  [address]  [place] [address] [phone]              24 Hour Appointment Hotline       To make an appointment at any [facility]3], call [phone] ([phone]). If you don't have a family doctor or clinic, we will help you find one. [facility] are conveniently located to serve the needs of you and your family.             Review of your medicines      Our records show that you are taking the medicines listed below. If these are incorrect, please call your family doctor or clinic.        Dose / Directions Last dose taken    calcium 500 +D 500-400 MG-UNIT Tabs   Dose:  500 mg   Quantity:  30 tablet   Generic drug:  Calcium Carb-Cholecalciferol        Take 500 mg by mouth daily   Refills:  0        furosemide 20 MG tablet   Commonly known as:  LASIX   Dose:  20 mg   Quantity:  30 tablet        Take 1 tablet (20 mg) by mouth daily As needed   Refills:  11        lisinopril 20 MG tablet   Commonly known as:  PRINIVIL/ZESTRIL   Quantity:  90 tablet        Take two tablets in AM and one tablet in PM   Refills:  11        omeprazole 20 MG CR capsule   Commonly known as:  priLOSEC   Quantity:  30 capsule        TAKE 1 CAPSULE (20 MG) BY MOUTH DAILY   Refills:  11        * predniSONE 20 MG tablet   Commonly known as:  DELTASONE   Dose:  20 mg   Quantity:  60 tablet        Take 1 tablet (20 mg) by mouth daily   Refills:  11        * predniSONE 10 MG tablet   Commonly  known as:  DELTASONE   Dose:  10 mg   Quantity:  60 tablet        Take 1 tablet (10 mg) by mouth daily   Refills:  11        * predniSONE 50 MG tablet   Commonly known as:  DELTASONE   Dose:  100 mg   Quantity:  90 tablet        Take 2 tablets (100 mg) by mouth daily   Refills:  11        * predniSONE 20 MG tablet   Commonly known as:  DELTASONE   Dose:  80 mg   Quantity:  120 tablet        Take 4 tablets (80 mg) by mouth daily   Refills:  3        sulfamethoxazole-trimethoprim 800-160 MG per tablet   Commonly known as:  BACTRIM DS/SEPTRA DS   Dose:  1 tablet   Quantity:  15 tablet        Take 1 tablet by mouth three times a week   Refills:  6        * Notice:  This list has 4 medication(s) that are the same as other medications prescribed for you. Read the directions carefully, and ask your doctor or other care provider to review them with you.            Procedures and tests performed during your visit     XR Toe Right G/E 2 Views      Orders Needing Specimen Collection     None      Pending Results     Date and Time Order Name Status Description    10/6/2018 2255 XR Toe Right G/E 2 Views Preliminary             Pending Culture Results     No orders found from 10/4/2018 to 10/7/2018.            Pending Results Instructions     If you had any lab results that were not finalized at the time of your Discharge, you can call the ED Lab Result RN at [phone]. You will be contacted by this team for any positive Lab results or changes in treatment. The nurses are available 7 days a week from 10A to 6:30P.  You can leave a message 24 hours per day and they will return your call.        Thank you for choosing [facility]       Thank you for choosing [facility] for your care. Our goal is always to provide you with excellent care. Hearing back from our patients is one way we can continue to improve our services. Please take a few minutes to complete the written survey that you may receive in the mail after you visit with us.  Thank you!        [employer] Information     [employer] gives you secure access to your electronic health record. If you see a primary care provider, you can also send messages to your care team and make appointments. If you have questions, please call your primary care clinic.  If you do not have a primary care provider, please call [phone] and they will assist you.        Care EveryWhere ID     This is your Care EveryWhere ID. This could be used by other organizations to access your [facility] medical records  [id number]        Equal Access to Services     AGUEDA MAHER : Lynsey Campa, wajuan r wagneradaha, qanancy kaalmamercedes bosch, john cabezas. So [facility] [phone].    ATENCIÓN: Si habla español, tiene a mix disposición servicios gratuitos de asistencia lingüística. Llame al [phone].    We comply with applicable federal civil rights laws and [place] laws. We do not discriminate on the basis of race, color, national origin, age, disability, sex, sexual orientation, or gender identity.            After Visit Summary       This is your record. Keep this with you and show to your community pharmacist(s) and doctor(s) at your next visit.

## 2018-10-06 NOTE — ED AVS SNAPSHOT
[facility], [place], Emergency Department    [address]    [facility] [address]    Phone:  [phone]    Fax:  [phone]                                       Ethan Trevino   MRN: [id number]    Department:  [facility], Emergency Department   Date of Visit:  10/6/2018           After Visit Summary Signature Page     I have received my discharge instructions, and my questions have been answered. I have discussed any challenges I see with this plan with the nurse or doctor.    ..........................................................................................................................................  Patient/Patient Representative Signature      ..........................................................................................................................................  Patient Representative Print Name and Relationship to Patient    ..................................................               ................................................  Date                                   Time    ..........................................................................................................................................  Reviewed by Signature/Title    ...................................................              ..............................................  Date                                               Time          22EPIC Rev 08/18

## 2018-10-06 NOTE — LETTER
October 7, 2018      To Whom It May Concern:      Ethan Trevino was seen in our Emergency Department today, 10/07/18.  I expect his condition to improve over the next 3-4 days.  He needs to ambulate as little as possible due to toe fracture until recheck this week.    Sincerely,        Shiv Judd MD

## 2018-10-07 VITALS
BODY MASS INDEX: 34.98 KG/M2 | HEIGHT: 75 IN | RESPIRATION RATE: 16 BRPM | OXYGEN SATURATION: 98 % | HEART RATE: 82 BPM | DIASTOLIC BLOOD PRESSURE: 72 MMHG | WEIGHT: 281.3 LBS | SYSTOLIC BLOOD PRESSURE: 131 MMHG | TEMPERATURE: 96 F

## 2018-10-07 NOTE — ED PROVIDER NOTES
History     Chief Complaint   Patient presents with     Toe Pain     was walking through house and hit little toe on right foot on the stone half-wall; redness, mild swelling; reports numbness and throbbing laterally     The history is provided by the patient.     Ethan Trevino is a 23 year old male with a history of chronic kidney disease who presents to the emergency department for toe pain, redness, and swelling. Patient reports he was walking through his house this evening (10/6/18) around 9:00pm and struck his little toe on the right foot. Patient states he is experiencing throbbing pain and numbness. Per chart review, patient has chronic kidney disease and is on Prednisone 80mg daily and Lisinopril 60mg daily.    Past Medical History:   Diagnosis Date     Chronic kidney disease        Past Surgical History:   Procedure Laterality Date     BIOPSY  2016    renal       No family history on file.    Social History   Substance Use Topics     Smoking status: Never Smoker     Smokeless tobacco: Never Used     Alcohol use Yes      Comment: social       No current facility-administered medications for this encounter.      Current Outpatient Prescriptions   Medication     Calcium Carb-Cholecalciferol (CALCIUM 500 +D) 500-400 MG-UNIT TABS     furosemide (LASIX) 20 MG tablet     lisinopril (PRINIVIL/ZESTRIL) 20 MG tablet     omeprazole (PRILOSEC) 20 MG CR capsule     predniSONE (DELTASONE) 10 MG tablet     sulfamethoxazole-trimethoprim (BACTRIM DS/SEPTRA DS) 800-160 MG per tablet     predniSONE (DELTASONE) 20 MG tablet     predniSONE (DELTASONE) 20 MG tablet     predniSONE (DELTASONE) 50 MG tablet        Allergies   Allergen Reactions     Amoxicillin      Other reaction(s): EENT - other (explain in comments), SKIN - rash  Rash and lip swelling after 8 days of Amoxicillin     Erythromycin      Penicillins        I have reviewed the Medications, Allergies, Past Medical and Surgical History, and Social History in the  "Epic system.    Review of Systems   Constitutional: Negative for fever.   Respiratory: Negative for shortness of breath.    Cardiovascular: Negative for chest pain.   Gastrointestinal: Negative for abdominal pain.   Musculoskeletal:        Right fifth toe very painful with any ambulation   All other systems reviewed and are negative.      Physical Exam   BP: 134/77  Pulse: 95  Temp: 96  F (35.6  C)  Resp: 16  Height: 190.5 cm (6' 3\")  Weight: 127.6 kg (281 lb 4.8 oz)  SpO2: 94 %      Physical Exam   Constitutional: He is oriented to person, place, and time. No distress.   HENT:   Head: Atraumatic.   Mouth/Throat: Oropharynx is clear and moist. No oropharyngeal exudate.   Eyes: Pupils are equal, round, and reactive to light. No scleral icterus.   Cardiovascular: Normal heart sounds and intact distal pulses.    Pulmonary/Chest: Breath sounds normal. No respiratory distress.   Abdominal: Soft. Bowel sounds are normal. There is no tenderness.   Musculoskeletal:        Right shoulder: He exhibits tenderness, swelling and pain.   Right fifth toe has tenderness swelling and ecchymosis consistent with possible fracture.   Neurological: He is alert and oriented to person, place, and time. No cranial nerve deficit. He exhibits normal muscle tone. Coordination normal.   Skin: Skin is warm. No rash noted. He is not diaphoretic.       ED Course     ED Course     Procedures         Critical Care time:  none       Results for orders placed or performed during the hospital encounter of 10/06/18   XR Toe Right G/E 2 Views    Narrative    XR TOE RIGHT GREATER THAN 2 VIEWS   10/6/2018 11:17 PM     INDICATION: Trauma.    COMPARISON: None.      Impression    IMPRESSION: Mildly displaced fracture involving the proximal aspect of  the right fifth proximal phalanx.  The fracture does not appear to  extend into the MTP joint.    MADHU RIVERA MD              Assessments & Plan (with Medical Decision Making)       I have reviewed the " nursing notes.    I have reviewed the findings, diagnosis, plan and need for follow up with the patient.  Patient with fracture of the proximal phalanx of the fifth toe on his right foot at this time patient has toe sofia taped and will continue to do so until recheck in his primary clinic later this week.      New Prescriptions    No medications on file       Final diagnoses:   None   IRosalva, am serving as a trained medical scribe to document services personally performed by Shiv Judd MD, based on the provider's statements to me.      Shiv THOMPSON MD, was physically present and have reviewed and verified the accuracy of this note documented by Rosalva Cardozo    10/6/2018   [facility], [place], EMERGENCY DEPARTMENT     Shiv Judd MD  10/08/18 [id number]

## 2018-10-07 NOTE — DISCHARGE INSTRUCTIONS
Closed Toe Fracture  Your toe is broken (fractured). This causes local pain, swelling, and sometimes bruising. This injury usually takes about 4 to 6 weeks to heal, but can sometimes take longer. Toe injuries are often treated by taping the injured toe to the next one (buddy taping). This protects the injured toe and holds it in position.     If the toenail has been severely injured, it may fall off in 1 to 2 weeks. It takes up to 12 months for a new toenail to grow back.  Home care  Follow these guidelines when caring for yourself at home:    You may be given a cast shoe to wear to keep your toe from moving. If not, you can use a sandal or any shoe that doesn t put pressure on the injured toe until the swelling and pain go away. If using a sandal, be careful not to strike your foot against anything. Another injury could make the fracture worse. If you were given crutches, don t put full weight on the injured foot until you can do so without pain, or as directed by your healthcare provider.    Keep your foot elevated to reduce pain and swelling. When sleeping, put a pillow under the injured leg. When sitting, support the injured leg so it is above your waist. This is very important during the first 2 days (48 hours).    Put an ice pack on the injured area. Do this for 20 minutes every 1 to 2 hours the first day for pain relief. You can make an ice pack by wrapping a plastic bag of ice cubes in a thin towel. As the ice melts, be careful that any cloth or paper tape doesn t get wet. Continue using the ice pack 3 to 4 times a day for the next 2 days. Then use the ice pack as needed to ease pain and swelling.    If buddy tape was used and it becomes wet or dirty, change it. You may replace it with paper, plastic, or cloth tape. Cloth tape and paper tapes must be kept dry.    You may use acetaminophen or ibuprofen to control pain, unless another pain medicine was prescribed. If you have chronic liver or kidney disease,  talk with your healthcare provider before using these medicines. Also talk with your provider if you ve had a stomach ulcer or gastrointestinal bleeding.    You may return to sports or physical education activities after 4 weeks when you can run without pain, or as directed by your healthcare provider.  Follow-up care  Follow up with your healthcare provider in 1 week, or as advised. This is to make sure the bone is healing the way it should.  X-rays may be taken. You will be told of any new findings that may affect your care.  When to seek medical advice  Call your healthcare provider right away if any of these occur:    Pain or swelling gets worse    The cast/splint cracks    The cast and padding get wet and stays wet more than 24 hours    Bad odor from the cast/splint or wound fluid stains the cast    Tightness or pressure under the cast/splint gets worse    Toe becomes cold, blue, numb, or tingly    You can t move the toe    Signs of infection: fever, redness, warmth, swelling, or drainage from the wound or cast    Fever of 100.4 F (38 C) or higher, or as directed by your healthcare provider  Date Last Reviewed: 2/1/2017 2000-2017 [employer]. [address] [place] [id number]. All rights reserved. This information is not intended as a substitute for professional medical care. Always follow your healthcare professional's instructions.

## 2018-10-07 NOTE — TELEPHONE ENCOUNTER
"  Reason for Disposition    Looks like a broken bone (e.g., crooked or deformed)    Additional Information    Negative: [1] Major bleeding (e.g., spurting blood) AND [2] can't be stopped    Negative: Foot or ankle injury    Negative: Looks infected    Negative: Amputated toe    Negative: Skin is split open or gaping  (or length > 1/2 inch or 12 mm)    Negative: [1] Bleeding AND [2] won't stop after 10 minutes of direct pressure (using correct technique)    Negative: [1] Dirt in the wound AND [2] not removed with 15 minutes of scrubbing    Negative: Sounds like a serious injury to the triager    Negative: [1] SEVERE pain AND [2] not improved 2 hours after pain medicine/ice packs    Negative: [1] MODERATE-SEVERE pain AND [2] blood present under the toenail    Answer Assessment - Initial Assessment Questions  1. MECHANISM: \"How did the injury happen?\"       Walked into wall  2. ONSET: \"When did the injury happen?\" (Minutes or hours ago)       past hour  3. LOCATION: \"What part of the toe is injured?\" \"Is the nail damaged?\"       Right pinky toe  4. APPEARANCE of TOE INJURY: \"What does the injury look like?\"       Swollen and bent  5. SEVERITY: \"Can you use the foot normally?\" \"Can you walk?\"       limping  6. SIZE: For cuts, bruises, or swelling, ask: \"How large is it?\" (e.g., inches or centimeters;  entire toe)       No bleeding  7. PAIN: \"Is there pain?\" If so, ask: \"How bad is the pain?\"   (e.g., Scale 1-10; or mild, moderate, severe)      2/10 throbbing  8. TETANUS: For any breaks in the skin, ask: \"When was the last tetanus booster?\"      unknown  9. DIABETES: \"Do you have a history of diabetes or poor circulation in the feet?\"      n/a    Protocols used: TOE INJURY-ADULT-AH    "

## 2018-10-08 ASSESSMENT — ENCOUNTER SYMPTOMS
SHORTNESS OF BREATH: 0
ABDOMINAL PAIN: 0
FEVER: 0

## 2018-11-01 DIAGNOSIS — N04.9 NEPHROTIC SYNDROME: ICD-10-CM

## 2018-11-01 DIAGNOSIS — N05.1 FSGS (FOCAL SEGMENTAL GLOMERULOSCLEROSIS): ICD-10-CM

## 2018-11-01 LAB
ALBUMIN SERPL-MCNC: 3.6 G/DL (ref 3.4–5)
ANION GAP SERPL CALCULATED.3IONS-SCNC: 8 MMOL/L (ref 3–14)
BUN SERPL-MCNC: 14 MG/DL (ref 7–30)
CALCIUM SERPL-MCNC: 8.7 MG/DL (ref 8.5–10.1)
CHLORIDE SERPL-SCNC: 102 MMOL/L (ref 94–109)
CO2 SERPL-SCNC: 28 MMOL/L (ref 20–32)
CREAT SERPL-MCNC: 0.93 MG/DL (ref 0.66–1.25)
CREAT UR-MCNC: 275 MG/DL
GFR SERPL CREATININE-BSD FRML MDRD: >90 ML/MIN/1.7M2
GLUCOSE SERPL-MCNC: 86 MG/DL (ref 70–99)
PHOSPHATE SERPL-MCNC: 3.7 MG/DL (ref 2.5–4.5)
POTASSIUM SERPL-SCNC: 3.6 MMOL/L (ref 3.4–5.3)
PROT UR-MCNC: 1.24 G/L
PROT/CREAT 24H UR: 0.45 G/G CR (ref 0–0.2)
SODIUM SERPL-SCNC: 139 MMOL/L (ref 133–144)

## 2018-11-29 ENCOUNTER — OFFICE VISIT (OUTPATIENT)
Dept: NEPHROLOGY | Facility: CLINIC | Age: 23
End: 2018-11-29
Attending: INTERNAL MEDICINE
Payer: COMMERCIAL

## 2018-11-29 VITALS
HEIGHT: 75 IN | OXYGEN SATURATION: 96 % | BODY MASS INDEX: 36.39 KG/M2 | WEIGHT: 292.7 LBS | DIASTOLIC BLOOD PRESSURE: 74 MMHG | HEART RATE: 96 BPM | SYSTOLIC BLOOD PRESSURE: 113 MMHG

## 2018-11-29 DIAGNOSIS — N05.1 FSGS (FOCAL SEGMENTAL GLOMERULOSCLEROSIS): ICD-10-CM

## 2018-11-29 DIAGNOSIS — N05.1 FSGS (FOCAL SEGMENTAL GLOMERULOSCLEROSIS): Primary | ICD-10-CM

## 2018-11-29 DIAGNOSIS — E78.5 HYPERLIPIDEMIA LDL GOAL <100: ICD-10-CM

## 2018-11-29 LAB
ALBUMIN SERPL-MCNC: 3.7 G/DL (ref 3.4–5)
ANION GAP SERPL CALCULATED.3IONS-SCNC: 7 MMOL/L (ref 3–14)
BASOPHILS # BLD AUTO: 0.1 10E9/L (ref 0–0.2)
BASOPHILS NFR BLD AUTO: 0.3 %
BUN SERPL-MCNC: 22 MG/DL (ref 7–30)
CALCIUM SERPL-MCNC: 8.3 MG/DL (ref 8.5–10.1)
CHLORIDE SERPL-SCNC: 104 MMOL/L (ref 94–109)
CHOLEST SERPL-MCNC: 229 MG/DL
CO2 SERPL-SCNC: 27 MMOL/L (ref 20–32)
CREAT SERPL-MCNC: 0.88 MG/DL (ref 0.66–1.25)
CREAT UR-MCNC: 144 MG/DL
DIFFERENTIAL METHOD BLD: ABNORMAL
EOSINOPHIL # BLD AUTO: 0 10E9/L (ref 0–0.7)
EOSINOPHIL NFR BLD AUTO: 0 %
ERYTHROCYTE [DISTWIDTH] IN BLOOD BY AUTOMATED COUNT: 13.4 % (ref 10–15)
GFR SERPL CREATININE-BSD FRML MDRD: >90 ML/MIN/1.7M2
GLUCOSE SERPL-MCNC: 138 MG/DL (ref 70–99)
HCT VFR BLD AUTO: 45 % (ref 40–53)
HDLC SERPL-MCNC: 34 MG/DL
HGB BLD-MCNC: 15 G/DL (ref 13.3–17.7)
IMM GRANULOCYTES # BLD: 0.3 10E9/L (ref 0–0.4)
IMM GRANULOCYTES NFR BLD: 2.1 %
LDLC SERPL CALC-MCNC: 140 MG/DL
LYMPHOCYTES # BLD AUTO: 1.1 10E9/L (ref 0.8–5.3)
LYMPHOCYTES NFR BLD AUTO: 7 %
MCH RBC QN AUTO: 29.5 PG (ref 26.5–33)
MCHC RBC AUTO-ENTMCNC: 33.3 G/DL (ref 31.5–36.5)
MCV RBC AUTO: 88 FL (ref 78–100)
MONOCYTES # BLD AUTO: 0.7 10E9/L (ref 0–1.3)
MONOCYTES NFR BLD AUTO: 4.9 %
NEUTROPHILS # BLD AUTO: 13 10E9/L (ref 1.6–8.3)
NEUTROPHILS NFR BLD AUTO: 85.7 %
NONHDLC SERPL-MCNC: 194 MG/DL
NRBC # BLD AUTO: 0 10*3/UL
NRBC BLD AUTO-RTO: 0 /100
PHOSPHATE SERPL-MCNC: 3 MG/DL (ref 2.5–4.5)
PLATELET # BLD AUTO: 395 10E9/L (ref 150–450)
POTASSIUM SERPL-SCNC: 4.2 MMOL/L (ref 3.4–5.3)
PROT UR-MCNC: 0.72 G/L
PROT/CREAT 24H UR: 0.5 G/G CR (ref 0–0.2)
RBC # BLD AUTO: 5.09 10E12/L (ref 4.4–5.9)
SODIUM SERPL-SCNC: 137 MMOL/L (ref 133–144)
TRIGL SERPL-MCNC: 272 MG/DL
WBC # BLD AUTO: 15.2 10E9/L (ref 4–11)

## 2018-11-29 PROCEDURE — G0463 HOSPITAL OUTPT CLINIC VISIT: HCPCS | Mod: ZF

## 2018-11-29 ASSESSMENT — PAIN SCALES - GENERAL: PAINLEVEL: NO PAIN (0)

## 2018-11-29 NOTE — LETTER
"11/29/2018      RE: Ethan Trveino  [address]  [facility] [address]         Reason for Visit:  Ethan Trevino is a 23 year old male with FSGS, who presents for routine follow up.     HPI:  He is a pleasant young male who in summer 2016 presented with several months history of swelling, noted to be nephrotic, and renal biopsy in August 2016 showed FSGS. He was started on steroids 8/23/16.    Rmainded on steroids 100 mg x several months.  He had more and more abdominal striae and his acne is significant  His proteinuria was down to 1-2 grams then subgram at 0.7g which is great, but unfortunately when he was down to 10mg prednisone, his proteinuria increased back to 5 grams In Jan 2018 Along with with low albumin and swelling.    He went back on Pred at 100 mg day  With a surprizing transient dip in protenuria to 1.5 in March, but eventually proteinuria decreased to UPCR < 3 startuing in April 2018   Last UPCR  1.24 g/g on 11/1/18.    Currently feeling well.  Still has edema in L Ext and periorbital in the am.  No swelling in hands (used to)  Has gained 20-30 lbs since starting pred in 2016.  Gained 10-15 lbs in last 3-4 months  Still having more striae.  Acne is under control  Prednisone is currently at 70 mg daily.        ROS:  A comprehensive review of systems was obtained and negative, except as noted in the HPI or PMH.  Reports Heartburn but no vomiting.  No diarrhea, melena or hematochezia.  No CP, SOB.  Hot flashes and \"sweating randomly\"      Active Medical Problems:  Patient Active Problem List   Diagnosis     FSGS (focal segmental glomerulosclerosis)     Nephrotic syndrome       Personal Hx:   Social History     Social History     Marital status: Single     Spouse name: N/A     Number of children: N/A     Years of education: N/A     Occupational History     Not on file.     Social History Main Topics     Smoking status: Never Smoker     Smokeless tobacco: Never Used     Alcohol use " Yes      Comment: social     Drug use: No     Sexual activity: Yes     Other Topics Concern     Not on file     Social History Narrative       Allergies:  Allergies   Allergen Reactions     Amoxicillin      Other reaction(s): EENT - other (explain in comments), SKIN - rash  Rash and lip swelling after 8 days of Amoxicillin     Erythromycin      Penicillins        Medications:  Prior to Admission medications    Medication Sig Start Date End Date Taking? Authorizing Provider   lisinopril (PRINIVIL,ZESTRIL) 20 MG tablet Take 1 tablet (20 mg) by mouth daily 9/15/16  Yes Anna Welsh MD   predniSONE (DELTASONE) 50 MG tablet Take 2 tablets (100 mg) by mouth daily 8/23/16 Currently 70 mg daily Yes Anna Welsh MD   omeprazole (PRILOSEC) 20 MG capsule Take 1 capsule (20 mg) by mouth daily 8/23/16  Yes Anna Welsh MD   sulfamethoxazole-trimethoprim (BACTRIM DS,SEPTRA DS) 800-160 MG per tablet Take 1 tablet by mouth three times a week 8/23/16  Yes Anna Welsh MD   FUROSEMIDE PO 20 mg Take by mouth daily   Yes Reported, Patient     Current Outpatient Prescriptions   Medication Sig Dispense Refill     Calcium Carb-Cholecalciferol (CALCIUM 500 +D) 500-400 MG-UNIT TABS Take 500 mg by mouth daily 30 tablet 0     furosemide (LASIX) 20 MG tablet Take 1 tablet (20 mg) by mouth daily As needed 30 tablet 11     lisinopril (PRINIVIL/ZESTRIL) 20 MG tablet Take two tablets in AM and one tablet in PM 90 tablet 11     omeprazole (PRILOSEC) 20 MG CR capsule TAKE 1 CAPSULE (20 MG) BY MOUTH DAILY 30 capsule 11     predniSONE (DELTASONE) 10 MG tablet Take 1 tablet (10 mg) by mouth daily (Patient taking differently: Take 20 mg by mouth 3 times daily ) 60 tablet 11     predniSONE (DELTASONE) 20 MG tablet Take 1 tablet (20 mg) by mouth daily 60 tablet 11     sulfamethoxazole-trimethoprim (BACTRIM DS/SEPTRA DS) 800-160 MG per tablet Take 1 tablet by mouth three times a week 15 tablet 6      "predniSONE (DELTASONE) 20 MG tablet Take 4 tablets (80 mg) by mouth daily (Patient not taking: Reported on 11/29/2018) 120 tablet 3     predniSONE (DELTASONE) 50 MG tablet Take 2 tablets (100 mg) by mouth daily (Patient not taking: Reported on 11/29/2018) 90 tablet 11         Vitals:  /76  Pulse 96  Ht 1.905 m (6' 3\")  Wt 132.8 kg (292 lb 11.2 oz)  SpO2 96%  BMI 36.58 kg/m2    Exam:  GENERAL APPEARANCE: alert and no distress  HENT: mouth without ulcers or lesions  LYMPHATICS: no cervical or supraclavicular nodes  RESP: lungs clear to auscultation - no rales, rhonchi or wheezes  CV: regular rhythm, normal rate, no rub, no murmur  EDEMA: trace LE edema bilaterally  ABDOMEN: soft, nondistended, nontender, bowel sounds normal  MS: extremities normal - no gross deformities noted, no evidence of inflammation in joints, no muscle tenderness  SKIN: no rash    Results:  Recent Results (from the past 504 hour(s))   Lipid panel reflex to direct LDL Non-fasting    Collection Time: 11/29/18  4:53 PM   Result Value Ref Range    Cholesterol 229 (H) <200 mg/dL    Triglycerides 272 (H) <150 mg/dL    HDL Cholesterol 34 (L) >39 mg/dL    LDL Cholesterol Calculated 140 (H) <100 mg/dL    Non HDL Cholesterol 194 (H) <130 mg/dL   Renal panel    Collection Time: 11/29/18  4:53 PM   Result Value Ref Range    Sodium 137 133 - 144 mmol/L    Potassium 4.2 3.4 - 5.3 mmol/L    Chloride 104 94 - 109 mmol/L    Carbon Dioxide 27 20 - 32 mmol/L    Anion Gap 7 3 - 14 mmol/L    Glucose 138 (H) 70 - 99 mg/dL    Urea Nitrogen 22 7 - 30 mg/dL    Creatinine 0.88 0.66 - 1.25 mg/dL    GFR Estimate >90 >60 mL/min/1.7m2    GFR Estimate If Black >90 >60 mL/min/1.7m2    Calcium 8.3 (L) 8.5 - 10.1 mg/dL    Phosphorus 3.0 2.5 - 4.5 mg/dL    Albumin 3.7 3.4 - 5.0 g/dL   CBC with platelets differential    Collection Time: 11/29/18  4:53 PM   Result Value Ref Range    WBC 15.2 (H) 4.0 - 11.0 10e9/L    RBC Count 5.09 4.4 - 5.9 10e12/L    Hemoglobin 15.0 " 13.3 - 17.7 g/dL    Hematocrit 45.0 40.0 - 53.0 %    MCV 88 78 - 100 fl    MCH 29.5 26.5 - 33.0 pg    MCHC 33.3 31.5 - 36.5 g/dL    RDW 13.4 10.0 - 15.0 %    Platelet Count 395 150 - 450 10e9/L    Diff Method Automated Method     % Neutrophils 85.7 %    % Lymphocytes 7.0 %    % Monocytes 4.9 %    % Eosinophils 0.0 %    % Basophils 0.3 %    % Immature Granulocytes 2.1 %    Nucleated RBCs 0 0 /100    Absolute Neutrophil 13.0 (H) 1.6 - 8.3 10e9/L    Absolute Lymphocytes 1.1 0.8 - 5.3 10e9/L    Absolute Monocytes 0.7 0.0 - 1.3 10e9/L    Absolute Eosinophils 0.0 0.0 - 0.7 10e9/L    Absolute Basophils 0.1 0.0 - 0.2 10e9/L    Abs Immature Granulocytes 0.3 0 - 0.4 10e9/L    Absolute Nucleated RBC 0.0    Protein  random urine with Creat Ratio    Collection Time: 11/29/18  4:59 PM   Result Value Ref Range    Protein Random Urine 0.72 g/L    Protein Total Urine g/gr Creatinine 0.50 (H) 0 - 0.2 g/g Cr   Creatinine urine calculation only    Collection Time: 11/29/18  4:59 PM   Result Value Ref Range    Creatinine Urine 144 mg/dL         FINAL DIAGNOSIS:   A:     Kidney, native, LEFT; percutaneous needle biopsy:   - Focal segmental glomerulosclerosis     This biopsy shows segmental sclerosis and hyalinosis affecting   approximately 30% of glomeruli examined in a pattern diagnostic for   focal segmental glomerulosclerosis.  Although sclerotic segments focally   exhibit increased cellularity, capillary distention by intra-luminal   inflammatory cells is not observed and this specimen does not meet   strict criteria for cellular variant of focal segmental   glomerulosclerosis.  However, in view of the proportion of glomeruli   exhibiting segmental sclerosis and the increased cellularity observed in   sclerotic segments it is expected that this condition may follow an   aggressive course.                 Assessment and Plan:   23 year old male with with nephrotic syndrome, biopsy in August 2016 showing FSGS. Steroids started  8/23/2016 and relapsed during weaning in January 2018, with proteinuria up from 0.28 g/g rocky to 4.9 grams on 1/31/18  1. FSGS- he has been on prednisone continuously for about 27 months and on high dose for 8 months consecutively. He has now suffered from significant adverse effects with weight gain, cushingoid facies, and extensive striae. Fortunately, he seems to be in convincing partial remission, with today's UPCR<1.  His renal function is well preserved.  I think it is time to taper down the steroids.  I have given him a tapering schedule to get down to a moderate dose of 20 mg daily by the end of December.  I asked him to call me if he develops recurrence of the swelling before then,  I plan to see him again in follow up in early January.  At that time, we will consider starting a calcineurin inhibitor (tacrolimus, as steroid sparing) (vs possibly enrolling in a clinical trial depending on his degree of residual proteinuria ).  I did not want to add a CNI in the setting of high dose steroids.   2. Prophylaxis- Continue on bactrim thrice weekly to be continued, recommended vitamin D/ calcium and to continue omeprazole; had GI upset first week but now settled  3. Hyperlipidemia.  I will start him on low dose atorvastatin.  It  may improve with decreasing the dose of prednisone, and possibly weight loss/increasing exercise.   I will also not increase the dose of statin until we know whether he will need a CNI and at what dose.    José Miguel Jean MD  Division of Renal Disease and Hypertension  Pager: [phone]  Patient seen on Nov 29, 2018 November 30, 2018  9:36 PM      José Miguel Jean MD

## 2018-11-29 NOTE — MR AVS SNAPSHOT
After Visit Summary   11/29/2018    Ethan Trevino    MRN: [id number]           Patient Information     Date Of Birth          1995        Visit Information        Provider Department      11/29/2018 3:45 PM José Miguel Jean MD [facility]        Today's Diagnoses     FSGS (focal segmental glomerulosclerosis)    -  1      Care Instructions    Please decrease the prednisone dose to   50 mg daily until 12/7/18 (included)  40 mg daily 12/8 to 12/14 (included)  30 mg daily 12/15 to 12/28 (included)  20 mg daily thereafter  I will plan to see you back in mid January  Please call me or Sandra Peralta if you feel the swelling is getting worse again.  Please pay close attention to diet and exercise to limit further weight gain.   It would be great if you could loose 1-2 lbs a week.  After we get you on a stable/low dose of prednisone, we will introduce another medication probably.          Follow-ups after your visit        Follow-up notes from your care team     Return in about 6 weeks (around 1/12/2019).      Your next 10 appointments already scheduled     Jan 12, 2019  8:30 AM CST   Lab with  LAB   [facility] ([facility])    [address] [place] [address]  1st Floor  [facility] [address] [phone]            Jan 12, 2019  9:30 AM CST   (Arrive by 9:00 AM)   RETURN GLOMERULAR with José Miguel Jean MD   [facility] ([facility])    [address] [place] [address]  [address]  [facility] [address] [phone]              Who to contact     If you have questions or need follow up information about today's clinic visit or your schedule please contact [facility] directly at [phone].  Normal or non-critical lab and imaging results will be communicated to you by MyChart, letter or phone within 4 business days after the clinic has received the results. If you do not hear from us within 7 days, please contact the clinic through  "MyChart or phone. If you have a critical or abnormal lab result, we will notify you by phone as soon as possible.  Submit refill requests through [employer] or call your pharmacy and they will forward the refill request to us. Please allow 3 business days for your refill to be completed.          Additional Information About Your Visit        [employer] Information     [employer] gives you secure access to your electronic health record. If you see a primary care provider, you can also send messages to your care team and make appointments. If you have questions, please call your primary care clinic.  If you do not have a primary care provider, please call [phone] and they will assist you.        Care EveryWhere ID     This is your Care EveryWhere ID. This could be used by other organizations to access your [facility] medical records  [id number]        Your Vitals Were     Pulse Height Pulse Oximetry BMI (Body Mass Index)          96 1.905 m (6' 3\") 96% 36.58 kg/m2         Blood Pressure from Last 3 Encounters:   11/29/18 113/74   10/07/18 131/72   09/12/18 119/81    Weight from Last 3 Encounters:   11/29/18 132.8 kg (292 lb 11.2 oz)   10/06/18 127.6 kg (281 lb 4.8 oz)   09/12/18 125.4 kg (276 lb 6.4 oz)                 Today's Medication Changes          These changes are accurate as of 11/29/18 11:59 PM.  If you have any questions, ask your nurse or doctor.               These medicines have changed or have updated prescriptions.        Dose/Directions    * predniSONE 20 MG tablet   Commonly known as:  DELTASONE   This may have changed:  Another medication with the same name was changed. Make sure you understand how and when to take each.   Used for:  FSGS (focal segmental glomerulosclerosis)        Dose:  20 mg   Take 1 tablet (20 mg) by mouth daily   Quantity:  60 tablet   Refills:  11       * predniSONE 10 MG tablet   Commonly known as:  DELTASONE   This may have changed:    - how much to take  - when to take this "   Used for:  FSGS (focal segmental glomerulosclerosis)        Dose:  10 mg   Take 1 tablet (10 mg) by mouth daily   Quantity:  60 tablet   Refills:  11       * predniSONE 50 MG tablet   Commonly known as:  DELTASONE   This may have changed:  Another medication with the same name was changed. Make sure you understand how and when to take each.   Used for:  FSGS (focal segmental glomerulosclerosis), Nephrotic syndrome        Dose:  100 mg   Take 2 tablets (100 mg) by mouth daily   Quantity:  90 tablet   Refills:  11       * predniSONE 20 MG tablet   Commonly known as:  DELTASONE   This may have changed:  Another medication with the same name was changed. Make sure you understand how and when to take each.   Used for:  FSGS (focal segmental glomerulosclerosis)        Dose:  80 mg   Take 4 tablets (80 mg) by mouth daily   Quantity:  120 tablet   Refills:  3       * Notice:  This list has 4 medication(s) that are the same as other medications prescribed for you. Read the directions carefully, and ask your doctor or other care provider to review them with you.             Primary Care Provider Fax #    Physician No Ref-Primary [phone]       No address on file        Equal Access to Services     [facility] : Hadii jefferson Campa, wajuan r angelo, qaybdiandra kaalandrew bosch, john oates . So [facility] [phone].    ATENCIÓN: Si habla español, tiene a mix disposición servicios gratuitos de asistencia lingüística. Llame al [phone].    We comply with applicable federal civil rights laws and [place] laws. We do not discriminate on the basis of race, color, national origin, age, disability, sex, sexual orientation, or gender identity.            Thank you!     Thank you for choosing [facility]  for your care. Our goal is always to provide you with excellent care. Hearing back from our patients is one way we can continue to improve our services. Please take a few minutes to  complete the written survey that you may receive in the mail after your visit with us. Thank you!             Your Updated Medication List - Protect others around you: Learn how to safely use, store and throw away your medicines at www.disposemymeds.org.          This list is accurate as of 11/29/18 11:59 PM.  Always use your most recent med list.                   Brand Name Dispense Instructions for use Diagnosis    calcium 500 +D 500-400 MG-UNIT Tabs   Generic drug:  Calcium Carb-Cholecalciferol     30 tablet    Take 500 mg by mouth daily    FSGS (focal segmental glomerulosclerosis), Nephrotic syndrome       furosemide 20 MG tablet    LASIX    30 tablet    Take 1 tablet (20 mg) by mouth daily As needed    Nephrotic syndrome, FSGS (focal segmental glomerulosclerosis)       lisinopril 20 MG tablet    PRINIVIL/ZESTRIL    90 tablet    Take two tablets in AM and one tablet in PM    Nephrotic syndrome, FSGS (focal segmental glomerulosclerosis)       omeprazole 20 MG DR capsule    priLOSEC    30 capsule    TAKE 1 CAPSULE (20 MG) BY MOUTH DAILY    FSGS (focal segmental glomerulosclerosis)       * predniSONE 20 MG tablet    DELTASONE    60 tablet    Take 1 tablet (20 mg) by mouth daily    FSGS (focal segmental glomerulosclerosis)       * predniSONE 10 MG tablet    DELTASONE    60 tablet    Take 1 tablet (10 mg) by mouth daily    FSGS (focal segmental glomerulosclerosis)       * predniSONE 50 MG tablet    DELTASONE    90 tablet    Take 2 tablets (100 mg) by mouth daily    FSGS (focal segmental glomerulosclerosis), Nephrotic syndrome       * predniSONE 20 MG tablet    DELTASONE    120 tablet    Take 4 tablets (80 mg) by mouth daily    FSGS (focal segmental glomerulosclerosis)       sulfamethoxazole-trimethoprim 800-160 MG tablet    BACTRIM DS/SEPTRA DS    15 tablet    Take 1 tablet by mouth three times a week    FSGS (focal segmental glomerulosclerosis)       * Notice:  This list has 4 medication(s) that are the same as  other medications prescribed for you. Read the directions carefully, and ask your doctor or other care provider to review them with you.

## 2018-11-29 NOTE — PROGRESS NOTES
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     HPI:  He is a pleasant young male who in summer 2016 presented with several months history of swelling, noted to be nephrotic, and renal biopsy in August 2016 showed FSGS. He was started on steroids 8/23/16.    Rmainded on steroids 100 mg x several months.  He had more and more abdominal striae and his acne is significant  His proteinuria was down to 1-2 grams then subgram at 0.7g which is great, but unfortunately when he was down to 10mg prednisone, his proteinuria increased back to 5 grams In Jan 2018 Along with with low albumin and swelling.    He went back on Pred at 100 mg day  With a surprizing transient dip in protenuria to 1.5 in March, but eventually proteinuria decreased to UPCR < 3 startuing in April 2018   Last UPCR  1.24 g/g on 11/1/18.    Currently feeling well.  Still has edema in L Ext and periorbital in the am.  No swelling in hands (used to)  Has gained 20-30 lbs since starting pred in 2016.  Gained 10-15 lbs in last 3-4 months  Still having more striae.  Acne is under control  Prednisone is currently at 70 mg daily.        ROS:  A comprehensive review of systems was obtained and negative, except as noted in the HPI or PMH.  Reports Heartburn but no vomiting.  No diarrhea, melena or hematochezia.  No CP, SOB.  Hot flashes and \"sweating randomly\"      Active Medical Problems:  Patient Active Problem List   Diagnosis     FSGS (focal segmental glomerulosclerosis)     Nephrotic syndrome       Personal Hx:   Social History     Social History     Marital status: Single     Spouse name: N/A     Number of children: N/A     Years of education: N/A     Occupational History     Not on file.     Social History Main Topics     Smoking status: Never Smoker     Smokeless tobacco: Never Used     Alcohol use Yes      Comment: social     Drug use: No     Sexual activity: Yes     Other Topics Concern     " Not on file     Social History Narrative       Allergies:  Allergies   Allergen Reactions     Amoxicillin      Other reaction(s): EENT - other (explain in comments), SKIN - rash  Rash and lip swelling after 8 days of Amoxicillin     Erythromycin      Penicillins        Medications:  Prior to Admission medications    Medication Sig Start Date End Date Taking? Authorizing Provider   lisinopril (PRINIVIL,ZESTRIL) 20 MG tablet Take 1 tablet (20 mg) by mouth daily 9/15/16  Yes Anna Welsh MD   predniSONE (DELTASONE) 50 MG tablet Take 2 tablets (100 mg) by mouth daily 8/23/16 Currently 70 mg daily Yes Anna Welsh MD   omeprazole (PRILOSEC) 20 MG capsule Take 1 capsule (20 mg) by mouth daily 8/23/16  Yes Anna Welsh MD   sulfamethoxazole-trimethoprim (BACTRIM DS,SEPTRA DS) 800-160 MG per tablet Take 1 tablet by mouth three times a week 8/23/16  Yes Anna Welsh MD   FUROSEMIDE PO 20 mg Take by mouth daily   Yes Reported, Patient     Current Outpatient Prescriptions   Medication Sig Dispense Refill     Calcium Carb-Cholecalciferol (CALCIUM 500 +D) 500-400 MG-UNIT TABS Take 500 mg by mouth daily 30 tablet 0     furosemide (LASIX) 20 MG tablet Take 1 tablet (20 mg) by mouth daily As needed 30 tablet 11     lisinopril (PRINIVIL/ZESTRIL) 20 MG tablet Take two tablets in AM and one tablet in PM 90 tablet 11     omeprazole (PRILOSEC) 20 MG CR capsule TAKE 1 CAPSULE (20 MG) BY MOUTH DAILY 30 capsule 11     predniSONE (DELTASONE) 10 MG tablet Take 1 tablet (10 mg) by mouth daily (Patient taking differently: Take 20 mg by mouth 3 times daily ) 60 tablet 11     predniSONE (DELTASONE) 20 MG tablet Take 1 tablet (20 mg) by mouth daily 60 tablet 11     sulfamethoxazole-trimethoprim (BACTRIM DS/SEPTRA DS) 800-160 MG per tablet Take 1 tablet by mouth three times a week 15 tablet 6     predniSONE (DELTASONE) 20 MG tablet Take 4 tablets (80 mg) by mouth daily (Patient not taking: Reported  "on 11/29/2018) 120 tablet 3     predniSONE (DELTASONE) 50 MG tablet Take 2 tablets (100 mg) by mouth daily (Patient not taking: Reported on 11/29/2018) 90 tablet 11         Vitals:  /76  Pulse 96  Ht 1.905 m (6' 3\")  Wt 132.8 kg (292 lb 11.2 oz)  SpO2 96%  BMI 36.58 kg/m2    Exam:  GENERAL APPEARANCE: alert and no distress  HENT: mouth without ulcers or lesions  LYMPHATICS: no cervical or supraclavicular nodes  RESP: lungs clear to auscultation - no rales, rhonchi or wheezes  CV: regular rhythm, normal rate, no rub, no murmur  EDEMA: trace LE edema bilaterally  ABDOMEN: soft, nondistended, nontender, bowel sounds normal  MS: extremities normal - no gross deformities noted, no evidence of inflammation in joints, no muscle tenderness  SKIN: no rash    Results:  Recent Results (from the past 504 hour(s))   Lipid panel reflex to direct LDL Non-fasting    Collection Time: 11/29/18  4:53 PM   Result Value Ref Range    Cholesterol 229 (H) <200 mg/dL    Triglycerides 272 (H) <150 mg/dL    HDL Cholesterol 34 (L) >39 mg/dL    LDL Cholesterol Calculated 140 (H) <100 mg/dL    Non HDL Cholesterol 194 (H) <130 mg/dL   Renal panel    Collection Time: 11/29/18  4:53 PM   Result Value Ref Range    Sodium 137 133 - 144 mmol/L    Potassium 4.2 3.4 - 5.3 mmol/L    Chloride 104 94 - 109 mmol/L    Carbon Dioxide 27 20 - 32 mmol/L    Anion Gap 7 3 - 14 mmol/L    Glucose 138 (H) 70 - 99 mg/dL    Urea Nitrogen 22 7 - 30 mg/dL    Creatinine 0.88 0.66 - 1.25 mg/dL    GFR Estimate >90 >60 mL/min/1.7m2    GFR Estimate If Black >90 >60 mL/min/1.7m2    Calcium 8.3 (L) 8.5 - 10.1 mg/dL    Phosphorus 3.0 2.5 - 4.5 mg/dL    Albumin 3.7 3.4 - 5.0 g/dL   CBC with platelets differential    Collection Time: 11/29/18  4:53 PM   Result Value Ref Range    WBC 15.2 (H) 4.0 - 11.0 10e9/L    RBC Count 5.09 4.4 - 5.9 10e12/L    Hemoglobin 15.0 13.3 - 17.7 g/dL    Hematocrit 45.0 40.0 - 53.0 %    MCV 88 78 - 100 fl    MCH 29.5 26.5 - 33.0 pg    MCHC " 33.3 31.5 - 36.5 g/dL    RDW 13.4 10.0 - 15.0 %    Platelet Count 395 150 - 450 10e9/L    Diff Method Automated Method     % Neutrophils 85.7 %    % Lymphocytes 7.0 %    % Monocytes 4.9 %    % Eosinophils 0.0 %    % Basophils 0.3 %    % Immature Granulocytes 2.1 %    Nucleated RBCs 0 0 /100    Absolute Neutrophil 13.0 (H) 1.6 - 8.3 10e9/L    Absolute Lymphocytes 1.1 0.8 - 5.3 10e9/L    Absolute Monocytes 0.7 0.0 - 1.3 10e9/L    Absolute Eosinophils 0.0 0.0 - 0.7 10e9/L    Absolute Basophils 0.1 0.0 - 0.2 10e9/L    Abs Immature Granulocytes 0.3 0 - 0.4 10e9/L    Absolute Nucleated RBC 0.0    Protein  random urine with Creat Ratio    Collection Time: 11/29/18  4:59 PM   Result Value Ref Range    Protein Random Urine 0.72 g/L    Protein Total Urine g/gr Creatinine 0.50 (H) 0 - 0.2 g/g Cr   Creatinine urine calculation only    Collection Time: 11/29/18  4:59 PM   Result Value Ref Range    Creatinine Urine 144 mg/dL         FINAL DIAGNOSIS:   A:     Kidney, native, LEFT; percutaneous needle biopsy:   - Focal segmental glomerulosclerosis     This biopsy shows segmental sclerosis and hyalinosis affecting   approximately 30% of glomeruli examined in a pattern diagnostic for   focal segmental glomerulosclerosis.  Although sclerotic segments focally   exhibit increased cellularity, capillary distention by intra-luminal   inflammatory cells is not observed and this specimen does not meet   strict criteria for cellular variant of focal segmental   glomerulosclerosis.  However, in view of the proportion of glomeruli   exhibiting segmental sclerosis and the increased cellularity observed in   sclerotic segments it is expected that this condition may follow an   aggressive course.                 Assessment and Plan:   23 year old male with with nephrotic syndrome, biopsy in August 2016 showing FSGS. Steroids started 8/23/2016 and relapsed during weaning in January 2018, with proteinuria up from 0.28 g/g rocky to 4.9 grams on  1/31/18  1. FSGS- he has been on prednisone continuously for about 27 months and on high dose for 8 months consecutively. He has now suffered from significant adverse effects with weight gain, cushingoid facies, and extensive striae. Fortunately, he seems to be in convincing partial remission, with today's UPCR<1.  His renal function is well preserved.  I think it is time to taper down the steroids.  I have given him a tapering schedule to get down to a moderate dose of 20 mg daily by the end of December.  I asked him to call me if he develops recurrence of the swelling before then,  I plan to see him again in follow up in early January.  At that time, we will consider starting a calcineurin inhibitor (tacrolimus, as steroid sparing) (vs possibly enrolling in a clinical trial depending on his degree of residual proteinuria ).  I did not want to add a CNI in the setting of high dose steroids.   2. Prophylaxis- Continue on bactrim thrice weekly to be continued, recommended vitamin D/ calcium and to continue omeprazole; had GI upset first week but now settled  3. Hyperlipidemia.  I will start him on low dose atorvastatin.  It  may improve with decreasing the dose of prednisone, and possibly weight loss/increasing exercise.   I will also not increase the dose of statin until we know whether he will need a CNI and at what dose.    José Miguel Jean MD  Division of Renal Disease and Hypertension  Pager: [phone]  Patient seen on Nov 29, 2018 November 30, 2018  9:36 PM

## 2018-11-29 NOTE — NURSING NOTE
"Chief Complaint   Patient presents with     RECHECK     FSGS 2nd opinion per Blaise     /76  Pulse 96  Ht 1.905 m (6' 3\")  Wt 132.8 kg (292 lb 11.2 oz)  SpO2 96%  BMI 36.58 kg/m2  Gladis Mendes CMA  /74  Pulse 96  Ht 1.905 m (6' 3\")  Wt 132.8 kg (292 lb 11.2 oz)  SpO2 96%  BMI 36.58 kg/m2    "

## 2018-11-30 RX ORDER — ATORVASTATIN CALCIUM 10 MG/1
10 TABLET, FILM COATED ORAL DAILY
Qty: 30 TABLET | Refills: 3 | Status: SHIPPED | OUTPATIENT
Start: 2018-11-30 | End: 2019-06-24

## 2019-01-07 DIAGNOSIS — N05.1 FSGS (FOCAL SEGMENTAL GLOMERULOSCLEROSIS): Primary | ICD-10-CM

## 2019-01-12 ENCOUNTER — OFFICE VISIT (OUTPATIENT)
Dept: NEPHROLOGY | Facility: CLINIC | Age: 24
End: 2019-01-12
Attending: INTERNAL MEDICINE
Payer: COMMERCIAL

## 2019-01-12 VITALS
WEIGHT: 287.2 LBS | HEIGHT: 75 IN | BODY MASS INDEX: 35.71 KG/M2 | TEMPERATURE: 98.3 F | OXYGEN SATURATION: 97 % | DIASTOLIC BLOOD PRESSURE: 79 MMHG | HEART RATE: 88 BPM | SYSTOLIC BLOOD PRESSURE: 115 MMHG

## 2019-01-12 DIAGNOSIS — N05.1 FSGS (FOCAL SEGMENTAL GLOMERULOSCLEROSIS): Primary | ICD-10-CM

## 2019-01-12 DIAGNOSIS — N05.1 FSGS (FOCAL SEGMENTAL GLOMERULOSCLEROSIS): ICD-10-CM

## 2019-01-12 LAB
ALBUMIN SERPL-MCNC: 3.6 G/DL (ref 3.4–5)
ALBUMIN UR-MCNC: 100 MG/DL
ANION GAP SERPL CALCULATED.3IONS-SCNC: 6 MMOL/L (ref 3–14)
APPEARANCE UR: CLEAR
BILIRUB UR QL STRIP: NEGATIVE
BUN SERPL-MCNC: 16 MG/DL (ref 7–30)
CALCIUM SERPL-MCNC: 8.5 MG/DL (ref 8.5–10.1)
CHLORIDE SERPL-SCNC: 104 MMOL/L (ref 94–109)
CO2 SERPL-SCNC: 29 MMOL/L (ref 20–32)
COLOR UR AUTO: YELLOW
CREAT SERPL-MCNC: 0.94 MG/DL (ref 0.66–1.25)
CREAT UR-MCNC: 260 MG/DL
ERYTHROCYTE [DISTWIDTH] IN BLOOD BY AUTOMATED COUNT: 13 % (ref 10–15)
GFR SERPL CREATININE-BSD FRML MDRD: >90 ML/MIN/{1.73_M2}
GLUCOSE SERPL-MCNC: 91 MG/DL (ref 70–99)
GLUCOSE UR STRIP-MCNC: NEGATIVE MG/DL
HCT VFR BLD AUTO: 43.7 % (ref 40–53)
HGB BLD-MCNC: 13.9 G/DL (ref 13.3–17.7)
HGB UR QL STRIP: NEGATIVE
KETONES UR STRIP-MCNC: NEGATIVE MG/DL
LEUKOCYTE ESTERASE UR QL STRIP: NEGATIVE
MCH RBC QN AUTO: 28.8 PG (ref 26.5–33)
MCHC RBC AUTO-ENTMCNC: 31.8 G/DL (ref 31.5–36.5)
MCV RBC AUTO: 91 FL (ref 78–100)
MUCOUS THREADS #/AREA URNS LPF: PRESENT /LPF
NITRATE UR QL: NEGATIVE
PH UR STRIP: 5 PH (ref 5–7)
PHOSPHATE SERPL-MCNC: 3.8 MG/DL (ref 2.5–4.5)
PLATELET # BLD AUTO: 387 10E9/L (ref 150–450)
POTASSIUM SERPL-SCNC: 4.2 MMOL/L (ref 3.4–5.3)
PROT UR-MCNC: 1.1 G/L
PROT/CREAT 24H UR: 0.42 G/G CR (ref 0–0.2)
RBC # BLD AUTO: 4.82 10E12/L (ref 4.4–5.9)
RBC #/AREA URNS AUTO: 1 /HPF (ref 0–2)
SODIUM SERPL-SCNC: 139 MMOL/L (ref 133–144)
SOURCE: ABNORMAL
SP GR UR STRIP: 1.02 (ref 1–1.03)
UROBILINOGEN UR STRIP-MCNC: 0 MG/DL (ref 0–2)
WBC # BLD AUTO: 10.4 10E9/L (ref 4–11)
WBC #/AREA URNS AUTO: 1 /HPF (ref 0–5)

## 2019-01-12 PROCEDURE — 84156 ASSAY OF PROTEIN URINE: CPT | Performed by: INTERNAL MEDICINE

## 2019-01-12 PROCEDURE — 36415 COLL VENOUS BLD VENIPUNCTURE: CPT | Performed by: INTERNAL MEDICINE

## 2019-01-12 PROCEDURE — 81001 URINALYSIS AUTO W/SCOPE: CPT | Performed by: INTERNAL MEDICINE

## 2019-01-12 PROCEDURE — G0463 HOSPITAL OUTPT CLINIC VISIT: HCPCS | Mod: ZF

## 2019-01-12 PROCEDURE — 85027 COMPLETE CBC AUTOMATED: CPT | Performed by: INTERNAL MEDICINE

## 2019-01-12 PROCEDURE — 80069 RENAL FUNCTION PANEL: CPT | Performed by: INTERNAL MEDICINE

## 2019-01-12 RX ORDER — TACROLIMUS 0.5 MG/1
0.5 CAPSULE, GELATIN COATED ORAL 2 TIMES DAILY
Qty: 180 CAPSULE | Refills: 3 | Status: SHIPPED | OUTPATIENT
Start: 2019-01-12 | End: 2019-02-09

## 2019-01-12 RX ORDER — PREDNISONE 5 MG/1
15 TABLET ORAL DAILY
Qty: 90 TABLET | Refills: 3 | Status: SHIPPED | OUTPATIENT
Start: 2019-01-12 | End: 2019-09-24

## 2019-01-12 ASSESSMENT — PAIN SCALES - GENERAL: PAINLEVEL: NO PAIN (0)

## 2019-01-12 ASSESSMENT — MIFFLIN-ST. JEOR: SCORE: 2373.36

## 2019-01-12 NOTE — LETTER
"1/12/2019      RE: Ethan Trevino  [address]  [facility] [address]         Reason for Visit:  Ethan Trevino is a 23 year old male with FSGS, who presents for routine follow up.     HPI:  He is a pleasant young male who in summer 2016 presented with several months history of swelling, noted to be nephrotic, and renal biopsy in August 2016 showed FSGS. He was started on steroids 8/23/16.    Rmainded on steroids 100 mg x several months.  He had more and more abdominal striae and his acne is significant  His proteinuria was down to 1-2 grams then subgram at 0.7g which is great, but unfortunately when he was down to 10mg prednisone, his proteinuria increased back to 5 grams In Jan 2018 Along with with low albumin and swelling.    He went back on Pred at 100 mg day  With a surprizing transient dip in protenuria to 1.5 in March, but eventually proteinuria decreased to UPCR < 3 startuing in April 2018   Last UPCR  1.24 g/g on 11/1/18.  He was seen on 11/29/18 at which time we initiated a tapering schedule of prednisone, as he was in partial remission on therapy (UPCR < 1).    1/12/19  Currently feeling well; \"a lot more normal\"\"not buzzing as much\"  He is down to Prednisone 20 mg daily  Still has edema in L Ext and periorbital in the am.  Swelling is stable compared to last visit  Has lost 2.5 kg since Nov.  Has not noted NEW striae.  Skin feels more dry (winter?)  Sleep ok.  Energy is good.  Appetite a bit decreased  Hot flashes and sweating are almost gone!       ROS:  A comprehensive review of systems was obtained and negative, except as noted in the HPI or PMH.  Reports some Heartburn at night, but less frequent.  No vomiting.  No diarrhea, melena or hematochezia.  No CP, SOB.      Active Medical Problems:  Patient Active Problem List   Diagnosis     FSGS (focal segmental glomerulosclerosis)     Nephrotic syndrome       Personal Hx:   Social History     Socioeconomic History     Marital " status: Single     Spouse name: Not on file     Number of children: Not on file     Years of education: Not on file     Highest education level: Not on file   Social Needs     Financial resource strain: Not on file     Food insecurity - worry: Not on file     Food insecurity - inability: Not on file     Transportation needs - medical: Not on file     Transportation needs - non-medical: Not on file   Occupational History     Not on file   Tobacco Use     Smoking status: Never Smoker     Smokeless tobacco: Never Used   Substance and Sexual Activity     Alcohol use: Yes     Comment: social     Drug use: No     Sexual activity: Yes   Other Topics Concern     Parent/sibling w/ CABG, MI or angioplasty before 65F 55M? Not Asked   Social History Narrative     Not on file       Allergies:  Allergies   Allergen Reactions     Amoxicillin      Other reaction(s): EENT - other (explain in comments), SKIN - rash  Rash and lip swelling after 8 days of Amoxicillin     Erythromycin      Penicillins        Medications:  Prior to Admission medications    Medication Sig Start Date End Date Taking? Authorizing Provider   lisinopril (PRINIVIL,ZESTRIL) 20 MG tablet Take 1 tablet (20 mg) by mouth daily 9/15/16  Yes Anna Welsh MD   predniSONE (DELTASONE) 50 MG tablet Take 2 tablets (100 mg) by mouth daily 8/23/16 Currently 70 mg daily Yes Anna Welsh MD   omeprazole (PRILOSEC) 20 MG capsule Take 1 capsule (20 mg) by mouth daily 8/23/16  Yes Anna Welsh MD   sulfamethoxazole-trimethoprim (BACTRIM DS,SEPTRA DS) 800-160 MG per tablet Take 1 tablet by mouth three times a week 8/23/16  Yes Anna Welsh MD   FUROSEMIDE PO 20 mg Take by mouth daily   Yes Reported, Patient     Current Outpatient Medications   Medication Sig Dispense Refill     Calcium Carb-Cholecalciferol (CALCIUM 500 +D) 500-400 MG-UNIT TABS Take 500 mg by mouth daily 30 tablet 0     lisinopril (PRINIVIL/ZESTRIL) 20 MG tablet Take  "two tablets in AM and one tablet in PM 90 tablet 11     omeprazole (PRILOSEC) 20 MG CR capsule TAKE 1 CAPSULE (20 MG) BY MOUTH DAILY 30 capsule 11     predniSONE (DELTASONE) 10 MG tablet Take 1 tablet (10 mg) by mouth daily (Patient taking differently: Take 20 mg by mouth 3 times daily ) Taking 20 mg daily 11     predniSONE (DELTASONE) 20 MG tablet Take 1 tablet (20 mg) by mouth daily 60 tablet 11     atorvastatin (LIPITOR) 10 MG tablet Take 1 tablet (10 mg) by mouth daily (Patient not taking: Reported on 1/12/2019) 30 tablet DID NOT START, NOT TAKING     furosemide (LASIX) 20 MG tablet Take 1 tablet (20 mg) by mouth daily As needed (Patient not taking: Reported on 1/12/2019) 30 tablet 11                     sulfamethoxazole-trimethoprim (BACTRIM DS/SEPTRA DS) 800-160 MG per tablet Take 1 tablet by mouth three times a week (Patient not taking: Reported on 1/12/2019) 15 tablet NOT TAKING         Vitals:  /79   Pulse 88   Temp 98.3  F (36.8  C) (Oral)   Ht 1.905 m (6' 3\")   Wt 130.3 kg (287 lb 3.2 oz)   SpO2 97%   BMI 35.90 kg/m       Exam:  GENERAL APPEARANCE: alert and no distress.  Cushingoid facies.  HENT: mouth without ulcers or lesions  LYMPHATICS: no cervical or supraclavicular nodes  RESP: lungs clear to auscultation - no rales, rhonchi or wheezes  CV: regular rhythm, normal rate, no rub, no murmur  EDEMA: trace-1+ LE edema bilaterally  ABDOMEN: soft, nondistended, nontender, bowel sounds normal  MS: extremities normal - no gross deformities noted, no evidence of inflammation in joints, no muscle tenderness  SKIN: no rash.  Marked diffuse striae    Results:  Recent Results (from the past 504 hour(s))   CBC with platelets    Collection Time: 01/12/19  8:43 AM   Result Value Ref Range    WBC 10.4 4.0 - 11.0 10e9/L    RBC Count 4.82 4.4 - 5.9 10e12/L    Hemoglobin 13.9 13.3 - 17.7 g/dL    Hematocrit 43.7 40.0 - 53.0 %    MCV 91 78 - 100 fl    MCH 28.8 26.5 - 33.0 pg    MCHC 31.8 31.5 - 36.5 g/dL    " RDW 13.0 10.0 - 15.0 %    Platelet Count 387 150 - 450 10e9/L   Renal panel    Collection Time: 01/12/19  8:43 AM   Result Value Ref Range    Sodium 139 133 - 144 mmol/L    Potassium 4.2 3.4 - 5.3 mmol/L    Chloride 104 94 - 109 mmol/L    Carbon Dioxide 29 20 - 32 mmol/L    Anion Gap 6 3 - 14 mmol/L    Glucose 91 70 - 99 mg/dL    Urea Nitrogen 16 7 - 30 mg/dL    Creatinine 0.94 0.66 - 1.25 mg/dL    GFR Estimate >90 >60 mL/min/[1.73_m2]    GFR Estimate If Black >90 >60 mL/min/[1.73_m2]    Calcium 8.5 8.5 - 10.1 mg/dL    Phosphorus 3.8 2.5 - 4.5 mg/dL    Albumin 3.6 3.4 - 5.0 g/dL   UA with Microscopic reflex to Culture    Collection Time: 01/12/19  9:01 AM   Result Value Ref Range    Color Urine Yellow     Appearance Urine Clear     Glucose Urine Negative NEG^Negative mg/dL    Bilirubin Urine Negative NEG^Negative    Ketones Urine Negative NEG^Negative mg/dL    Specific Gravity Urine 1.023 1.003 - 1.035    Blood Urine Negative NEG^Negative    pH Urine 5.0 5.0 - 7.0 pH    Protein Albumin Urine 100 (A) NEG^Negative mg/dL    Urobilinogen mg/dL 0.0 0.0 - 2.0 mg/dL    Nitrite Urine Negative NEG^Negative    Leukocyte Esterase Urine Negative NEG^Negative    Source Midstream Urine     WBC Urine 1 0 - 5 /HPF    RBC Urine 1 0 - 2 /HPF    Mucous Urine Present (A) NEG^Negative /LPF         FINAL DIAGNOSIS:   A:     Kidney, native, LEFT; percutaneous needle biopsy:   - Focal segmental glomerulosclerosis     This biopsy shows segmental sclerosis and hyalinosis affecting   approximately 30% of glomeruli examined in a pattern diagnostic for   focal segmental glomerulosclerosis.  Although sclerotic segments focally   exhibit increased cellularity, capillary distention by intra-luminal   inflammatory cells is not observed and this specimen does not meet   strict criteria for cellular variant of focal segmental   glomerulosclerosis.  However, in view of the proportion of glomeruli   exhibiting segmental sclerosis and the increased  cellularity observed in   sclerotic segments it is expected that this condition may follow an   aggressive course.                 Assessment and Plan:   23 year old male with with nephrotic syndrome, biopsy in August 2016 showing FSGS. Steroids started 8/23/2016 and relapsed during weaning in January 2018, with proteinuria up from 0.28 g/g rocky to 4.9 grams on 1/31/18  1. FSGS- he has tolerated the decrease in dose of prednisone well, with improvement in adverse effect and NO increase in signs or symptoms of nephrotic syndrome.  Today's UPCR remains <1.  His renal function and albumin are stable. I explained the rationale and potential risks of tacrolimus, starting at low dose today; with planned continued taper down of prednisone over the next 3-5 weeks.   2. Prophylaxis- Continue on bactrim thrice weekly to be continued, recommended vitamin D/ calcium and to continue omeprazole; had GI upset first week but now settled  3. Hyperlipidemia.  He did not start the atorvastatin as prescribed at the last visit.  I instructed him to hold off on starting it, as I do not wish to start both a CNI and Atorvastatin at the same time.  Will plan to start Atorvastatin when he is on a stable target dose of tacrolimus.  Plan to see him in follow up in mid Feb.    José Miguel Jean MD  Division of Renal Disease and Hypertension  Pager: [phone]  Patient seen on Nov 29, 2018 January 12, 2019        José Miguel Jean MD

## 2019-01-12 NOTE — PROGRESS NOTES
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     HPI:  He is a pleasant young male who in summer 2016 presented with several months history of swelling, noted to be nephrotic, and renal biopsy in August 2016 showed FSGS. He was started on steroids 8/23/16.    Rmainded on steroids 100 mg x several months.  He had more and more abdominal striae and his acne is significant  His proteinuria was down to 1-2 grams then subgram at 0.7g which is great, but unfortunately when he was down to 10mg prednisone, his proteinuria increased back to 5 grams In Jan 2018 Along with with low albumin and swelling.    He went back on Pred at 100 mg day  With a surprizing transient dip in protenuria to 1.5 in March, but eventually proteinuria decreased to UPCR < 3 startuing in April 2018   Last UPCR  1.24 g/g on 11/1/18.  He was seen on 11/29/18 at which time we initiated a tapering schedule of prednisone, as he was in partial remission on therapy (UPCR < 1).    1/12/19  Currently feeling well; \"a lot more normal\"\"not buzzing as much\"  He is down to Prednisone 20 mg daily  Still has edema in L Ext and periorbital in the am.  Swelling is stable compared to last visit  Has lost 2.5 kg since Nov.  Has not noted NEW striae.  Skin feels more dry (winter?)  Sleep ok.  Energy is good.  Appetite a bit decreased  Hot flashes and sweating are almost gone!       ROS:  A comprehensive review of systems was obtained and negative, except as noted in the HPI or PMH.  Reports some Heartburn at night, but less frequent.  No vomiting.  No diarrhea, melena or hematochezia.  No CP, SOB.      Active Medical Problems:  Patient Active Problem List   Diagnosis     FSGS (focal segmental glomerulosclerosis)     Nephrotic syndrome       Personal Hx:   Social History     Socioeconomic History     Marital status: Single     Spouse name: Not on file     Number of children: Not on file     Years of " education: Not on file     Highest education level: Not on file   Social Needs     Financial resource strain: Not on file     Food insecurity - worry: Not on file     Food insecurity - inability: Not on file     Transportation needs - medical: Not on file     Transportation needs - non-medical: Not on file   Occupational History     Not on file   Tobacco Use     Smoking status: Never Smoker     Smokeless tobacco: Never Used   Substance and Sexual Activity     Alcohol use: Yes     Comment: social     Drug use: No     Sexual activity: Yes   Other Topics Concern     Parent/sibling w/ CABG, MI or angioplasty before 65F 55M? Not Asked   Social History Narrative     Not on file       Allergies:  Allergies   Allergen Reactions     Amoxicillin      Other reaction(s): EENT - other (explain in comments), SKIN - rash  Rash and lip swelling after 8 days of Amoxicillin     Erythromycin      Penicillins        Medications:  Prior to Admission medications    Medication Sig Start Date End Date Taking? Authorizing Provider   lisinopril (PRINIVIL,ZESTRIL) 20 MG tablet Take 1 tablet (20 mg) by mouth daily 9/15/16  Yes Anna Welsh MD   predniSONE (DELTASONE) 50 MG tablet Take 2 tablets (100 mg) by mouth daily 8/23/16 Currently 70 mg daily Yes Anna Welsh MD   omeprazole (PRILOSEC) 20 MG capsule Take 1 capsule (20 mg) by mouth daily 8/23/16  Yes Anna Welsh MD   sulfamethoxazole-trimethoprim (BACTRIM DS,SEPTRA DS) 800-160 MG per tablet Take 1 tablet by mouth three times a week 8/23/16  Yes Anna Welsh MD   FUROSEMIDE PO 20 mg Take by mouth daily   Yes Reported, Patient     Current Outpatient Medications   Medication Sig Dispense Refill     Calcium Carb-Cholecalciferol (CALCIUM 500 +D) 500-400 MG-UNIT TABS Take 500 mg by mouth daily 30 tablet 0     lisinopril (PRINIVIL/ZESTRIL) 20 MG tablet Take two tablets in AM and one tablet in PM 90 tablet 11     omeprazole (PRILOSEC) 20 MG CR  "capsule TAKE 1 CAPSULE (20 MG) BY MOUTH DAILY 30 capsule 11     predniSONE (DELTASONE) 10 MG tablet Take 1 tablet (10 mg) by mouth daily (Patient taking differently: Take 20 mg by mouth 3 times daily ) Taking 20 mg daily 11     predniSONE (DELTASONE) 20 MG tablet Take 1 tablet (20 mg) by mouth daily 60 tablet 11     atorvastatin (LIPITOR) 10 MG tablet Take 1 tablet (10 mg) by mouth daily (Patient not taking: Reported on 1/12/2019) 30 tablet DID NOT START, NOT TAKING     furosemide (LASIX) 20 MG tablet Take 1 tablet (20 mg) by mouth daily As needed (Patient not taking: Reported on 1/12/2019) 30 tablet 11                     sulfamethoxazole-trimethoprim (BACTRIM DS/SEPTRA DS) 800-160 MG per tablet Take 1 tablet by mouth three times a week (Patient not taking: Reported on 1/12/2019) 15 tablet NOT TAKING         Vitals:  /79   Pulse 88   Temp 98.3  F (36.8  C) (Oral)   Ht 1.905 m (6' 3\")   Wt 130.3 kg (287 lb 3.2 oz)   SpO2 97%   BMI 35.90 kg/m      Exam:  GENERAL APPEARANCE: alert and no distress.  Cushingoid facies.  HENT: mouth without ulcers or lesions  LYMPHATICS: no cervical or supraclavicular nodes  RESP: lungs clear to auscultation - no rales, rhonchi or wheezes  CV: regular rhythm, normal rate, no rub, no murmur  EDEMA: trace-1+ LE edema bilaterally  ABDOMEN: soft, nondistended, nontender, bowel sounds normal  MS: extremities normal - no gross deformities noted, no evidence of inflammation in joints, no muscle tenderness  SKIN: no rash.  Marked diffuse striae    Results:  Recent Results (from the past 504 hour(s))   CBC with platelets    Collection Time: 01/12/19  8:43 AM   Result Value Ref Range    WBC 10.4 4.0 - 11.0 10e9/L    RBC Count 4.82 4.4 - 5.9 10e12/L    Hemoglobin 13.9 13.3 - 17.7 g/dL    Hematocrit 43.7 40.0 - 53.0 %    MCV 91 78 - 100 fl    MCH 28.8 26.5 - 33.0 pg    MCHC 31.8 31.5 - 36.5 g/dL    RDW 13.0 10.0 - 15.0 %    Platelet Count 387 150 - 450 10e9/L   Renal panel    Collection " Time: 01/12/19  8:43 AM   Result Value Ref Range    Sodium 139 133 - 144 mmol/L    Potassium 4.2 3.4 - 5.3 mmol/L    Chloride 104 94 - 109 mmol/L    Carbon Dioxide 29 20 - 32 mmol/L    Anion Gap 6 3 - 14 mmol/L    Glucose 91 70 - 99 mg/dL    Urea Nitrogen 16 7 - 30 mg/dL    Creatinine 0.94 0.66 - 1.25 mg/dL    GFR Estimate >90 >60 mL/min/[1.73_m2]    GFR Estimate If Black >90 >60 mL/min/[1.73_m2]    Calcium 8.5 8.5 - 10.1 mg/dL    Phosphorus 3.8 2.5 - 4.5 mg/dL    Albumin 3.6 3.4 - 5.0 g/dL   UA with Microscopic reflex to Culture    Collection Time: 01/12/19  9:01 AM   Result Value Ref Range    Color Urine Yellow     Appearance Urine Clear     Glucose Urine Negative NEG^Negative mg/dL    Bilirubin Urine Negative NEG^Negative    Ketones Urine Negative NEG^Negative mg/dL    Specific Gravity Urine 1.023 1.003 - 1.035    Blood Urine Negative NEG^Negative    pH Urine 5.0 5.0 - 7.0 pH    Protein Albumin Urine 100 (A) NEG^Negative mg/dL    Urobilinogen mg/dL 0.0 0.0 - 2.0 mg/dL    Nitrite Urine Negative NEG^Negative    Leukocyte Esterase Urine Negative NEG^Negative    Source Midstream Urine     WBC Urine 1 0 - 5 /HPF    RBC Urine 1 0 - 2 /HPF    Mucous Urine Present (A) NEG^Negative /LPF         FINAL DIAGNOSIS:   A:     Kidney, native, LEFT; percutaneous needle biopsy:   - Focal segmental glomerulosclerosis     This biopsy shows segmental sclerosis and hyalinosis affecting   approximately 30% of glomeruli examined in a pattern diagnostic for   focal segmental glomerulosclerosis.  Although sclerotic segments focally   exhibit increased cellularity, capillary distention by intra-luminal   inflammatory cells is not observed and this specimen does not meet   strict criteria for cellular variant of focal segmental   glomerulosclerosis.  However, in view of the proportion of glomeruli   exhibiting segmental sclerosis and the increased cellularity observed in   sclerotic segments it is expected that this condition may follow an    aggressive course.                 Assessment and Plan:   23 year old male with with nephrotic syndrome, biopsy in August 2016 showing FSGS. Steroids started 8/23/2016 and relapsed during weaning in January 2018, with proteinuria up from 0.28 g/g rocky to 4.9 grams on 1/31/18  1. FSGS- he has tolerated the decrease in dose of prednisone well, with improvement in adverse effect and NO increase in signs or symptoms of nephrotic syndrome.  Today's UPCR remains <1.  His renal function and albumin are stable. I explained the rationale and potential risks of tacrolimus, starting at low dose today; with planned continued taper down of prednisone over the next 3-5 weeks.   2. Prophylaxis- Continue on bactrim thrice weekly to be continued, recommended vitamin D/ calcium and to continue omeprazole; had GI upset first week but now settled  3. Hyperlipidemia.  He did not start the atorvastatin as prescribed at the last visit.  I instructed him to hold off on starting it, as I do not wish to start both a CNI and Atorvastatin at the same time.  Will plan to start Atorvastatin when he is on a stable target dose of tacrolimus.  Plan to see him in follow up in mid Feb.    José Miguel Jean MD  Division of Renal Disease and Hypertension  Pager: [phone]  Patient seen on Nov 29, 2018 January 12, 2019

## 2019-01-12 NOTE — PATIENT INSTRUCTIONS
1- Please DO NOT start ATORVASTATIN (lipitor) now.  We will start it later on, after you are on a stable target dose of tacrolimus  2- Please start TACROLIMUS 0.5 mg twice daily  3- Decrease PREDNISONE to 15 mg (3 x 5 mg) daily for 10 days (until 1/23/19),   Then decrease to 10 mg (2 x 5 mg) daily for 10 days (from 1/24 to 2/3/19),   then 10 mg (2 x 5 mg) alternating with 5 mg (1 tablet) every other day until your next visit.

## 2019-02-04 DIAGNOSIS — N05.1 FSGS (FOCAL SEGMENTAL GLOMERULOSCLEROSIS): Primary | ICD-10-CM

## 2019-02-04 DIAGNOSIS — N04.9 NEPHROTIC SYNDROME: ICD-10-CM

## 2019-02-09 ENCOUNTER — OFFICE VISIT (OUTPATIENT)
Dept: NEPHROLOGY | Facility: CLINIC | Age: 24
End: 2019-02-09
Attending: INTERNAL MEDICINE
Payer: COMMERCIAL

## 2019-02-09 VITALS
DIASTOLIC BLOOD PRESSURE: 84 MMHG | SYSTOLIC BLOOD PRESSURE: 129 MMHG | HEIGHT: 75 IN | WEIGHT: 289.6 LBS | OXYGEN SATURATION: 98 % | BODY MASS INDEX: 36.01 KG/M2 | HEART RATE: 81 BPM

## 2019-02-09 DIAGNOSIS — N05.1 FSGS (FOCAL SEGMENTAL GLOMERULOSCLEROSIS): Primary | ICD-10-CM

## 2019-02-09 DIAGNOSIS — N04.9 NEPHROTIC SYNDROME: ICD-10-CM

## 2019-02-09 DIAGNOSIS — N05.1 FSGS (FOCAL SEGMENTAL GLOMERULOSCLEROSIS): ICD-10-CM

## 2019-02-09 LAB
ALBUMIN SERPL-MCNC: 3 G/DL (ref 3.4–5)
ALBUMIN UR-MCNC: >499 MG/DL
ANION GAP SERPL CALCULATED.3IONS-SCNC: 4 MMOL/L (ref 3–14)
APPEARANCE UR: CLEAR
BILIRUB UR QL STRIP: NEGATIVE
BUN SERPL-MCNC: 13 MG/DL (ref 7–30)
CALCIUM SERPL-MCNC: 8 MG/DL (ref 8.5–10.1)
CHLORIDE SERPL-SCNC: 107 MMOL/L (ref 94–109)
CO2 SERPL-SCNC: 32 MMOL/L (ref 20–32)
COLOR UR AUTO: YELLOW
CREAT SERPL-MCNC: 0.93 MG/DL (ref 0.66–1.25)
CREAT UR-MCNC: 296 MG/DL
ERYTHROCYTE [DISTWIDTH] IN BLOOD BY AUTOMATED COUNT: 12.6 % (ref 10–15)
GFR SERPL CREATININE-BSD FRML MDRD: >90 ML/MIN/{1.73_M2}
GLUCOSE SERPL-MCNC: 83 MG/DL (ref 70–99)
GLUCOSE UR STRIP-MCNC: NEGATIVE MG/DL
HCT VFR BLD AUTO: 44.9 % (ref 40–53)
HGB BLD-MCNC: 14.2 G/DL (ref 13.3–17.7)
HGB UR QL STRIP: NEGATIVE
HYALINE CASTS #/AREA URNS LPF: 1 /LPF (ref 0–2)
KETONES UR STRIP-MCNC: NEGATIVE MG/DL
LEUKOCYTE ESTERASE UR QL STRIP: NEGATIVE
MCH RBC QN AUTO: 28.1 PG (ref 26.5–33)
MCHC RBC AUTO-ENTMCNC: 31.6 G/DL (ref 31.5–36.5)
MCV RBC AUTO: 89 FL (ref 78–100)
MUCOUS THREADS #/AREA URNS LPF: PRESENT /LPF
NITRATE UR QL: NEGATIVE
PH UR STRIP: 5 PH (ref 5–7)
PHOSPHATE SERPL-MCNC: 3.6 MG/DL (ref 2.5–4.5)
PLATELET # BLD AUTO: 395 10E9/L (ref 150–450)
POTASSIUM SERPL-SCNC: 4 MMOL/L (ref 3.4–5.3)
PROT UR-MCNC: 8.12 G/L
PROT/CREAT 24H UR: 2.74 G/G CR (ref 0–0.2)
RBC # BLD AUTO: 5.05 10E12/L (ref 4.4–5.9)
RBC #/AREA URNS AUTO: 1 /HPF (ref 0–2)
SODIUM SERPL-SCNC: 143 MMOL/L (ref 133–144)
SOURCE: ABNORMAL
SP GR UR STRIP: 1.02 (ref 1–1.03)
UROBILINOGEN UR STRIP-MCNC: 0 MG/DL (ref 0–2)
WBC # BLD AUTO: 7.8 10E9/L (ref 4–11)
WBC #/AREA URNS AUTO: 1 /HPF (ref 0–5)

## 2019-02-09 PROCEDURE — 36415 COLL VENOUS BLD VENIPUNCTURE: CPT | Performed by: INTERNAL MEDICINE

## 2019-02-09 PROCEDURE — G0463 HOSPITAL OUTPT CLINIC VISIT: HCPCS | Mod: ZF

## 2019-02-09 PROCEDURE — 84156 ASSAY OF PROTEIN URINE: CPT | Performed by: INTERNAL MEDICINE

## 2019-02-09 PROCEDURE — 81001 URINALYSIS AUTO W/SCOPE: CPT | Performed by: INTERNAL MEDICINE

## 2019-02-09 PROCEDURE — 80069 RENAL FUNCTION PANEL: CPT | Performed by: INTERNAL MEDICINE

## 2019-02-09 PROCEDURE — 85027 COMPLETE CBC AUTOMATED: CPT | Performed by: INTERNAL MEDICINE

## 2019-02-09 RX ORDER — TACROLIMUS 0.5 MG/1
1 CAPSULE, GELATIN COATED ORAL 2 TIMES DAILY
Qty: 360 CAPSULE | Refills: 3 | Status: SHIPPED | OUTPATIENT
Start: 2019-02-09 | End: 2019-03-02

## 2019-02-09 ASSESSMENT — PAIN SCALES - GENERAL: PAINLEVEL: NO PAIN (0)

## 2019-02-09 ASSESSMENT — MIFFLIN-ST. JEOR: SCORE: 2384.25

## 2019-02-09 NOTE — PROGRESS NOTES
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     HPI:    Initial presentation  Summer 2016: Presented with several months history of swelling, noted to be nephrotic, and renal biopsy in August 2016 showed FSGS. He was started on steroids 8/23/16.  Remainded on steroids 100 mg x several months.  He had more and more abdominal striae and his acne is significant  His proteinuria was down to 1-2 grams then subgram at 0.7g which is great, but unfortunately when he was down to 10mg prednisone, his proteinuria increased back to 5 grams In Jan 2018 Along with with low albumin and swelling.    He went back on Pred at 100 mg day  With a surprizing transient dip in protenuria to 1.5 in March, but eventually proteinuria decreased to UPCR < 3 startuing in April 2018   Last UPCR  1.24 g/g on 11/1/18.  He was seen on 11/29/18 at which time we initiated a tapering schedule of prednisone, as he was in partial remission on therapy (UPCR < 1).    1/12/19  Currently feeling well; \"a lot more normal\"\"not buzzing as much\"  He is down to Prednisone 20 mg daily  Still has edema in L Ext and periorbital in the am.  Swelling is stable compared to last visit  Has lost 2.5 kg since Nov.  Has not noted NEW striae.  Skin feels more dry (winter?)  Sleep ok.  Energy is good.  Appetite a bit decreased  Hot flashes and sweating are almost gone!       2/9/19  Feels generally well  Reports abd pain at night every few days, lasts 2-3 hours.  Started before we started Tacrol.  No N, V, Diarrhea.  Otherwise tolerating tacrolimus well.  Tapering down on prednisone.  Had some LExt swelling at end of the day.  Relatively mild.    ROS:  A comprehensive review of systems was obtained and negative, except as noted in the HPI or PMH.  Reports some Heartburn at night, but less frequent.  No vomiting.  No diarrhea, melena or hematochezia.  No CP, SOB.      Active Medical Problems:  Patient Active " Problem List   Diagnosis     FSGS (focal segmental glomerulosclerosis)     Nephrotic syndrome       Personal Hx:   Social History     Socioeconomic History     Marital status: Single     Spouse name: Not on file     Number of children: Not on file     Years of education: Not on file     Highest education level: Not on file   Social Needs     Financial resource strain: Not on file     Food insecurity - worry: Not on file     Food insecurity - inability: Not on file     Transportation needs - medical: Not on file     Transportation needs - non-medical: Not on file   Occupational History     Not on file   Tobacco Use     Smoking status: Never Smoker     Smokeless tobacco: Never Used   Substance and Sexual Activity     Alcohol use: Yes     Comment: social     Drug use: No     Sexual activity: Yes   Other Topics Concern     Parent/sibling w/ CABG, MI or angioplasty before 65F 55M? Not Asked   Social History Narrative     Not on file       Allergies:  Allergies   Allergen Reactions     Amoxicillin      Other reaction(s): EENT - other (explain in comments), SKIN - rash  Rash and lip swelling after 8 days of Amoxicillin     Erythromycin      Penicillins        Medications:  Current Outpatient Medications   Medication Sig Dispense Refill     Calcium Carb-Cholecalciferol (CALCIUM 500 +D) 500-400 MG-UNIT TABS Take 500 mg by mouth daily 30 tablet 0     lisinopril (PRINIVIL/ZESTRIL) 20 MG tablet Take two tablets in AM and one tablet in PM 90 tablet 11     omeprazole (PRILOSEC) 20 MG CR capsule TAKE 1 CAPSULE (20 MG) BY MOUTH DAILY 30 capsule 11     predniSONE (DELTASONE) 5 MG tablet Take 15 mg by mouth daily. 90 tablet 3     PROGRAF (BRAND) 0.5 MG capsule Take 1 capsule (0.5 mg) by mouth 2 times daily 180 capsule 3     atorvastatin (LIPITOR) 10 MG tablet Take 1 tablet (10 mg) by mouth daily (Patient not taking: Reported on 1/12/2019) 30 tablet Has not started.     furosemide (LASIX) 20 MG tablet Take 1 tablet (20 mg) by mouth  "daily As needed (Patient not taking: Reported on 1/12/2019) 30 tablet 11     predniSONE (DELTASONE) 10 MG tablet Take 1 tablet (10 mg) by mouth daily (Patient not taking: Reported on 2/9/2019.) 60 tablet Currently taking 10 mg alt 5 mg     sulfamethoxazole-trimethoprim (BACTRIM DS/SEPTRA DS) 800-160 MG per tablet Take 1 tablet by mouth three times a week (Patient not taking: Reported on 1/12/2019) 15 tablet Not taking...   Stopped a couple of months.           Vitals:  /84   Pulse 81   Ht 1.905 m (6' 3\")   Wt 131.4 kg (289 lb 9.6 oz)   SpO2 98%   BMI 36.20 kg/m      Exam:  GENERAL APPEARANCE: alert and no distress.  Cushingoid facies.  HENT: mouth without ulcers or lesions  LYMPHATICS: no cervical or supraclavicular nodes  RESP: lungs clear to auscultation - no rales, rhonchi or wheezes  CV: regular rhythm, normal rate, no rub, no murmur  EDEMA: trace-1+ LE edema bilaterally  ABDOMEN: soft, nondistended, nontender, bowel sounds normal  MS: extremities normal - no gross deformities noted, no evidence of inflammation in joints, no muscle tenderness  SKIN: no rash.  Marked diffuse striae    Results:  Recent Results (from the past 504 hour(s))   CBC with platelets    Collection Time: 02/09/19 10:01 AM   Result Value Ref Range    WBC 7.8 4.0 - 11.0 10e9/L    RBC Count 5.05 4.4 - 5.9 10e12/L    Hemoglobin 14.2 13.3 - 17.7 g/dL    Hematocrit 44.9 40.0 - 53.0 %    MCV 89 78 - 100 fl    MCH 28.1 26.5 - 33.0 pg    MCHC 31.6 31.5 - 36.5 g/dL    RDW 12.6 10.0 - 15.0 %    Platelet Count 395 150 - 450 10e9/L   Renal panel    Collection Time: 02/09/19 10:01 AM   Result Value Ref Range    Sodium 143 133 - 144 mmol/L    Potassium 4.0 3.4 - 5.3 mmol/L    Chloride 107 94 - 109 mmol/L    Carbon Dioxide 32 20 - 32 mmol/L    Anion Gap 4 3 - 14 mmol/L    Glucose 83 70 - 99 mg/dL    Urea Nitrogen 13 7 - 30 mg/dL    Creatinine 0.93 0.66 - 1.25 mg/dL    GFR Estimate >90 >60 mL/min/[1.73_m2]    GFR Estimate If Black >90 >60 " mL/min/[1.73_m2]    Calcium 8.0 (L) 8.5 - 10.1 mg/dL    Phosphorus 3.6 2.5 - 4.5 mg/dL    Albumin 3.0 (L) 3.4 - 5.0 g/dL   UA with Microscopic reflex to Culture    Collection Time: 02/09/19 10:15 AM   Result Value Ref Range    Color Urine Yellow     Appearance Urine Clear     Glucose Urine Negative NEG^Negative mg/dL    Bilirubin Urine Negative NEG^Negative    Ketones Urine Negative NEG^Negative mg/dL    Specific Gravity Urine 1.022 1.003 - 1.035    Blood Urine Negative NEG^Negative    pH Urine 5.0 5.0 - 7.0 pH    Protein Albumin Urine >499 (A) NEG^Negative mg/dL    Urobilinogen mg/dL 0.0 0.0 - 2.0 mg/dL    Nitrite Urine Negative NEG^Negative    Leukocyte Esterase Urine Negative NEG^Negative    Source Midstream Urine     WBC Urine 1 0 - 5 /HPF    RBC Urine 1 0 - 2 /HPF    Mucous Urine Present (A) NEG^Negative /LPF    Hyaline Casts 1 0 - 2 /LPF   Protein  random urine with Creat Ratio    Collection Time: 02/09/19 10:15 AM   Result Value Ref Range    Protein Random Urine 8.12 g/L    Protein Total Urine g/gr Creatinine 2.74 (H) 0 - 0.2 g/g Cr   Creatinine urine calculation only    Collection Time: 02/09/19 10:15 AM   Result Value Ref Range    Creatinine Urine 296 mg/dL         FINAL DIAGNOSIS:   A:     Kidney, native, LEFT; percutaneous needle biopsy:   - Focal segmental glomerulosclerosis     This biopsy shows segmental sclerosis and hyalinosis affecting   approximately 30% of glomeruli examined in a pattern diagnostic for   focal segmental glomerulosclerosis.  Although sclerotic segments focally   exhibit increased cellularity, capillary distention by intra-luminal   inflammatory cells is not observed and this specimen does not meet   strict criteria for cellular variant of focal segmental   glomerulosclerosis.  However, in view of the proportion of glomeruli   exhibiting segmental sclerosis and the increased cellularity observed in   sclerotic segments it is expected that this condition may follow an   aggressive  course.                 Assessment and Plan:   23 year old male with with nephrotic syndrome, biopsy in August 2016 showing FSGS. Steroids started 8/23/2016 and relapsed during weaning in January 2018, with proteinuria up from 0.28 g/g rocky to 4.9 grams on 1/31/18  1. FSGS- Mr Trevino has clear relapse of proteinuria and recurrent hypoalbuminemia after decreasing the dose of prednisone.  He is on a low dose of tacrolimus.  He has a tolerable amount of edema.  He is satisfied about no longer being on the high dose of prednisone.    Given the length of treatment with prednisone and its side effects, I have asked him to    A) continue on the same dose of prednisone - I.e. No further taper at this point   B) Increase tacrolimus to 1 mg po BID.  He is to come back for lab recheck and trough level tacrolimus on Wednesday Feb 13 or Thrusday Feb 14.  Thereafter, I will continue adjusting his dose of tacrolimus.   2. Prophylaxis- He has NOT been taking  bactrim   3. Hyperlipidemia.  He did not start the atorvastatin as prescribed at the last visit.  I instructed him to hold off on starting it, as I do not wish to start both a CNI and Atorvastatin at the same time.  Will plan to start Atorvastatin when he is on a stable target dose of tacrolimus.  Plan to see him in follow up in mid March.    José Miguel Jean MD  Division of Renal Disease and Hypertension  Pager: [phone]  February 9, 2019    Note completed February 11, 2019

## 2019-02-09 NOTE — LETTER
"2/9/2019      RE: Ethan Trevino  [address]  [facility] [address]         Reason for Visit:  Ethan Trevino is a 23 year old male with FSGS, who presents for routine follow up.     HPI:    Initial presentation  Summer 2016: Presented with several months history of swelling, noted to be nephrotic, and renal biopsy in August 2016 showed FSGS. He was started on steroids 8/23/16.  Remainded on steroids 100 mg x several months.  He had more and more abdominal striae and his acne is significant  His proteinuria was down to 1-2 grams then subgram at 0.7g which is great, but unfortunately when he was down to 10mg prednisone, his proteinuria increased back to 5 grams In Jan 2018 Along with with low albumin and swelling.    He went back on Pred at 100 mg day  With a surprizing transient dip in protenuria to 1.5 in March, but eventually proteinuria decreased to UPCR < 3 startuing in April 2018   Last UPCR  1.24 g/g on 11/1/18.  He was seen on 11/29/18 at which time we initiated a tapering schedule of prednisone, as he was in partial remission on therapy (UPCR < 1).    1/12/19  Currently feeling well; \"a lot more normal\"\"not buzzing as much\"  He is down to Prednisone 20 mg daily  Still has edema in L Ext and periorbital in the am.  Swelling is stable compared to last visit  Has lost 2.5 kg since Nov.  Has not noted NEW striae.  Skin feels more dry (winter?)  Sleep ok.  Energy is good.  Appetite a bit decreased  Hot flashes and sweating are almost gone!       2/9/19  Feels generally well  Reports abd pain at night every few days, lasts 2-3 hours.  Started before we started Tacrol.  No N, V, Diarrhea.  Otherwise tolerating tacrolimus well.  Tapering down on prednisone.  Had some LExt swelling at end of the day.  Relatively mild.    ROS:  A comprehensive review of systems was obtained and negative, except as noted in the HPI or PMH.  Reports some Heartburn at night, but less frequent.  No vomiting.  No " diarrhea, melena or hematochezia.  No CP, SOB.      Active Medical Problems:  Patient Active Problem List   Diagnosis     FSGS (focal segmental glomerulosclerosis)     Nephrotic syndrome       Personal Hx:   Social History     Socioeconomic History     Marital status: Single     Spouse name: Not on file     Number of children: Not on file     Years of education: Not on file     Highest education level: Not on file   Social Needs     Financial resource strain: Not on file     Food insecurity - worry: Not on file     Food insecurity - inability: Not on file     Transportation needs - medical: Not on file     Transportation needs - non-medical: Not on file   Occupational History     Not on file   Tobacco Use     Smoking status: Never Smoker     Smokeless tobacco: Never Used   Substance and Sexual Activity     Alcohol use: Yes     Comment: social     Drug use: No     Sexual activity: Yes   Other Topics Concern     Parent/sibling w/ CABG, MI or angioplasty before 65F 55M? Not Asked   Social History Narrative     Not on file       Allergies:  Allergies   Allergen Reactions     Amoxicillin      Other reaction(s): EENT - other (explain in comments), SKIN - rash  Rash and lip swelling after 8 days of Amoxicillin     Erythromycin      Penicillins        Medications:  Current Outpatient Medications   Medication Sig Dispense Refill     Calcium Carb-Cholecalciferol (CALCIUM 500 +D) 500-400 MG-UNIT TABS Take 500 mg by mouth daily 30 tablet 0     lisinopril (PRINIVIL/ZESTRIL) 20 MG tablet Take two tablets in AM and one tablet in PM 90 tablet 11     omeprazole (PRILOSEC) 20 MG CR capsule TAKE 1 CAPSULE (20 MG) BY MOUTH DAILY 30 capsule 11     predniSONE (DELTASONE) 5 MG tablet Take 15 mg by mouth daily. 90 tablet 3     PROGRAF (BRAND) 0.5 MG capsule Take 1 capsule (0.5 mg) by mouth 2 times daily 180 capsule 3     atorvastatin (LIPITOR) 10 MG tablet Take 1 tablet (10 mg) by mouth daily (Patient not taking: Reported on 1/12/2019) 30  "tablet Has not started.     furosemide (LASIX) 20 MG tablet Take 1 tablet (20 mg) by mouth daily As needed (Patient not taking: Reported on 1/12/2019) 30 tablet 11     predniSONE (DELTASONE) 10 MG tablet Take 1 tablet (10 mg) by mouth daily (Patient not taking: Reported on 2/9/2019.) 60 tablet Currently taking 10 mg alt 5 mg     sulfamethoxazole-trimethoprim (BACTRIM DS/SEPTRA DS) 800-160 MG per tablet Take 1 tablet by mouth three times a week (Patient not taking: Reported on 1/12/2019) 15 tablet Not taking...   Stopped a couple of months.           Vitals:  /84   Pulse 81   Ht 1.905 m (6' 3\")   Wt 131.4 kg (289 lb 9.6 oz)   SpO2 98%   BMI 36.20 kg/m       Exam:  GENERAL APPEARANCE: alert and no distress.  Cushingoid facies.  HENT: mouth without ulcers or lesions  LYMPHATICS: no cervical or supraclavicular nodes  RESP: lungs clear to auscultation - no rales, rhonchi or wheezes  CV: regular rhythm, normal rate, no rub, no murmur  EDEMA: trace-1+ LE edema bilaterally  ABDOMEN: soft, nondistended, nontender, bowel sounds normal  MS: extremities normal - no gross deformities noted, no evidence of inflammation in joints, no muscle tenderness  SKIN: no rash.  Marked diffuse striae    Results:  Recent Results (from the past 504 hour(s))   CBC with platelets    Collection Time: 02/09/19 10:01 AM   Result Value Ref Range    WBC 7.8 4.0 - 11.0 10e9/L    RBC Count 5.05 4.4 - 5.9 10e12/L    Hemoglobin 14.2 13.3 - 17.7 g/dL    Hematocrit 44.9 40.0 - 53.0 %    MCV 89 78 - 100 fl    MCH 28.1 26.5 - 33.0 pg    MCHC 31.6 31.5 - 36.5 g/dL    RDW 12.6 10.0 - 15.0 %    Platelet Count 395 150 - 450 10e9/L   Renal panel    Collection Time: 02/09/19 10:01 AM   Result Value Ref Range    Sodium 143 133 - 144 mmol/L    Potassium 4.0 3.4 - 5.3 mmol/L    Chloride 107 94 - 109 mmol/L    Carbon Dioxide 32 20 - 32 mmol/L    Anion Gap 4 3 - 14 mmol/L    Glucose 83 70 - 99 mg/dL    Urea Nitrogen 13 7 - 30 mg/dL    Creatinine 0.93 0.66 - " 1.25 mg/dL    GFR Estimate >90 >60 mL/min/[1.73_m2]    GFR Estimate If Black >90 >60 mL/min/[1.73_m2]    Calcium 8.0 (L) 8.5 - 10.1 mg/dL    Phosphorus 3.6 2.5 - 4.5 mg/dL    Albumin 3.0 (L) 3.4 - 5.0 g/dL   UA with Microscopic reflex to Culture    Collection Time: 02/09/19 10:15 AM   Result Value Ref Range    Color Urine Yellow     Appearance Urine Clear     Glucose Urine Negative NEG^Negative mg/dL    Bilirubin Urine Negative NEG^Negative    Ketones Urine Negative NEG^Negative mg/dL    Specific Gravity Urine 1.022 1.003 - 1.035    Blood Urine Negative NEG^Negative    pH Urine 5.0 5.0 - 7.0 pH    Protein Albumin Urine >499 (A) NEG^Negative mg/dL    Urobilinogen mg/dL 0.0 0.0 - 2.0 mg/dL    Nitrite Urine Negative NEG^Negative    Leukocyte Esterase Urine Negative NEG^Negative    Source Midstream Urine     WBC Urine 1 0 - 5 /HPF    RBC Urine 1 0 - 2 /HPF    Mucous Urine Present (A) NEG^Negative /LPF    Hyaline Casts 1 0 - 2 /LPF   Protein  random urine with Creat Ratio    Collection Time: 02/09/19 10:15 AM   Result Value Ref Range    Protein Random Urine 8.12 g/L    Protein Total Urine g/gr Creatinine 2.74 (H) 0 - 0.2 g/g Cr   Creatinine urine calculation only    Collection Time: 02/09/19 10:15 AM   Result Value Ref Range    Creatinine Urine 296 mg/dL         FINAL DIAGNOSIS:   A:     Kidney, native, LEFT; percutaneous needle biopsy:   - Focal segmental glomerulosclerosis     This biopsy shows segmental sclerosis and hyalinosis affecting   approximately 30% of glomeruli examined in a pattern diagnostic for   focal segmental glomerulosclerosis.  Although sclerotic segments focally   exhibit increased cellularity, capillary distention by intra-luminal   inflammatory cells is not observed and this specimen does not meet   strict criteria for cellular variant of focal segmental   glomerulosclerosis.  However, in view of the proportion of glomeruli   exhibiting segmental sclerosis and the increased cellularity observed in    sclerotic segments it is expected that this condition may follow an   aggressive course.                 Assessment and Plan:   23 year old male with with nephrotic syndrome, biopsy in August 2016 showing FSGS. Steroids started 8/23/2016 and relapsed during weaning in January 2018, with proteinuria up from 0.28 g/g rocky to 4.9 grams on 1/31/18  1. FSGS- Mr Trevino has clear relapse of proteinuria and recurrent hypoalbuminemia after decreasing the dose of prednisone.  He is on a low dose of tacrolimus.  He has a tolerable amount of edema.  He is satisfied about no longer being on the high dose of prednisone.    Given the length of treatment with prednisone and its side effects, I have asked him to    A) continue on the same dose of prednisone - I.e. No further taper at this point   B) Increase tacrolimus to 1 mg po BID.  He is to come back for lab recheck and trough level tacrolimus on Wednesday Feb 13 or Thrusday Feb 14.  Thereafter, I will continue adjusting his dose of tacrolimus.   2. Prophylaxis- He has NOT been taking  bactrim   3. Hyperlipidemia.  He did not start the atorvastatin as prescribed at the last visit.  I instructed him to hold off on starting it, as I do not wish to start both a CNI and Atorvastatin at the same time.  Will plan to start Atorvastatin when he is on a stable target dose of tacrolimus.  Plan to see him in follow up in mid March.    José Miguel Jean MD  Division of Renal Disease and Hypertension  Pager: [phone]  February 9, 2019    Note completed February 11, 2019

## 2019-02-09 NOTE — PATIENT INSTRUCTIONS
1-Please continue PREDNISONE at the same current dose (10 mg alternating with 5 mg every other day.  2- Please INCREASE TACROLIMUS to 1mg Twice daily (2 tablets twice daily) starting tonight.   [place] [facility] [place] BEFORE taking the Tacrolimus so we can check the medicine level in the blood and your blood chemistry.  3- I will see you again in clinic in 4 weeks.

## 2019-02-09 NOTE — NURSING NOTE
"Chief Complaint   Patient presents with     RECHECK     5 Wk kidney follow up     Blood pressure 129/84, pulse 81, height 1.905 m (6' 3\"), weight 131.4 kg (289 lb 9.6 oz), SpO2 98 %.    BRII CHRISTINA CMA    "

## 2019-02-11 ENCOUNTER — TELEPHONE (OUTPATIENT)
Dept: NEPHROLOGY | Facility: CLINIC | Age: 24
End: 2019-02-11

## 2019-02-11 NOTE — TELEPHONE ENCOUNTER
[facility] Call Center    Phone Message    May a detailed message be left on voicemail: yes    Reason for Call: Other: Please contact the [facility] [employer] [facility] [address].  There is an issue with the PROGRAF (BRAND) 0.5 MG capsule and contraindication with patient.  Please call [facility] at [phone]     Action Taken: Message routed to:  [facility] ([facility]): Nephrology

## 2019-02-14 DIAGNOSIS — N05.1 FSGS (FOCAL SEGMENTAL GLOMERULOSCLEROSIS): ICD-10-CM

## 2019-02-14 LAB
ALBUMIN SERPL-MCNC: 2.7 G/DL (ref 3.4–5)
ANION GAP SERPL CALCULATED.3IONS-SCNC: 6 MMOL/L (ref 3–14)
BUN SERPL-MCNC: 16 MG/DL (ref 7–30)
CALCIUM SERPL-MCNC: 8.2 MG/DL (ref 8.5–10.1)
CHLORIDE SERPL-SCNC: 110 MMOL/L (ref 94–109)
CO2 SERPL-SCNC: 28 MMOL/L (ref 20–32)
CREAT SERPL-MCNC: 0.86 MG/DL (ref 0.66–1.25)
GFR SERPL CREATININE-BSD FRML MDRD: >90 ML/MIN/{1.73_M2}
GLUCOSE SERPL-MCNC: 83 MG/DL (ref 70–99)
PHOSPHATE SERPL-MCNC: 3.5 MG/DL (ref 2.5–4.5)
POTASSIUM SERPL-SCNC: 4.2 MMOL/L (ref 3.4–5.3)
SODIUM SERPL-SCNC: 144 MMOL/L (ref 133–144)
TACROLIMUS BLD-MCNC: 3.4 UG/L (ref 5–15)
TME LAST DOSE: ABNORMAL H

## 2019-03-01 DIAGNOSIS — N04.9 NEPHROTIC SYNDROME: ICD-10-CM

## 2019-03-01 DIAGNOSIS — N05.1 FSGS (FOCAL SEGMENTAL GLOMERULOSCLEROSIS): Primary | ICD-10-CM

## 2019-03-02 ENCOUNTER — OFFICE VISIT (OUTPATIENT)
Dept: NEPHROLOGY | Facility: CLINIC | Age: 24
End: 2019-03-02
Attending: INTERNAL MEDICINE
Payer: COMMERCIAL

## 2019-03-02 VITALS
DIASTOLIC BLOOD PRESSURE: 88 MMHG | SYSTOLIC BLOOD PRESSURE: 145 MMHG | BODY MASS INDEX: 36.12 KG/M2 | HEART RATE: 73 BPM | WEIGHT: 289 LBS | RESPIRATION RATE: 16 BRPM

## 2019-03-02 DIAGNOSIS — N04.9 NEPHROTIC SYNDROME: ICD-10-CM

## 2019-03-02 DIAGNOSIS — N05.1 FSGS (FOCAL SEGMENTAL GLOMERULOSCLEROSIS): ICD-10-CM

## 2019-03-02 LAB
ALBUMIN SERPL-MCNC: 2.4 G/DL (ref 3.4–5)
ALBUMIN UR-MCNC: >499 MG/DL
ANION GAP SERPL CALCULATED.3IONS-SCNC: 6 MMOL/L (ref 3–14)
APPEARANCE UR: ABNORMAL
BILIRUB UR QL STRIP: NEGATIVE
BUN SERPL-MCNC: 12 MG/DL (ref 7–30)
CALCIUM SERPL-MCNC: 8 MG/DL (ref 8.5–10.1)
CHLORIDE SERPL-SCNC: 109 MMOL/L (ref 94–109)
CO2 SERPL-SCNC: 28 MMOL/L (ref 20–32)
COLOR UR AUTO: YELLOW
CREAT SERPL-MCNC: 0.99 MG/DL (ref 0.66–1.25)
CREAT UR-MCNC: 371 MG/DL
ERYTHROCYTE [DISTWIDTH] IN BLOOD BY AUTOMATED COUNT: 12.8 % (ref 10–15)
GFR SERPL CREATININE-BSD FRML MDRD: >90 ML/MIN/{1.73_M2}
GLUCOSE SERPL-MCNC: 86 MG/DL (ref 70–99)
GLUCOSE UR STRIP-MCNC: NEGATIVE MG/DL
HCT VFR BLD AUTO: 43.4 % (ref 40–53)
HGB BLD-MCNC: 14 G/DL (ref 13.3–17.7)
HGB UR QL STRIP: ABNORMAL
HYALINE CASTS #/AREA URNS LPF: 17 /LPF (ref 0–2)
KETONES UR STRIP-MCNC: NEGATIVE MG/DL
LEUKOCYTE ESTERASE UR QL STRIP: NEGATIVE
MCH RBC QN AUTO: 27.7 PG (ref 26.5–33)
MCHC RBC AUTO-ENTMCNC: 32.3 G/DL (ref 31.5–36.5)
MCV RBC AUTO: 86 FL (ref 78–100)
NITRATE UR QL: NEGATIVE
PH UR STRIP: 5 PH (ref 5–7)
PHOSPHATE SERPL-MCNC: 3.4 MG/DL (ref 2.5–4.5)
PLATELET # BLD AUTO: 386 10E9/L (ref 150–450)
POTASSIUM SERPL-SCNC: 4 MMOL/L (ref 3.4–5.3)
PROT UR-MCNC: 10.22 G/L
PROT/CREAT 24H UR: 2.75 G/G CR (ref 0–0.2)
RBC # BLD AUTO: 5.05 10E12/L (ref 4.4–5.9)
RBC #/AREA URNS AUTO: 1 /HPF (ref 0–2)
SODIUM SERPL-SCNC: 143 MMOL/L (ref 133–144)
SOURCE: ABNORMAL
SP GR UR STRIP: 1.02 (ref 1–1.03)
SQUAMOUS #/AREA URNS AUTO: <1 /HPF (ref 0–1)
UROBILINOGEN UR STRIP-MCNC: 0 MG/DL (ref 0–2)
WBC # BLD AUTO: 7.1 10E9/L (ref 4–11)
WBC #/AREA URNS AUTO: 3 /HPF (ref 0–5)

## 2019-03-02 PROCEDURE — 81001 URINALYSIS AUTO W/SCOPE: CPT | Performed by: INTERNAL MEDICINE

## 2019-03-02 PROCEDURE — 85027 COMPLETE CBC AUTOMATED: CPT | Performed by: INTERNAL MEDICINE

## 2019-03-02 PROCEDURE — G0463 HOSPITAL OUTPT CLINIC VISIT: HCPCS | Mod: ZF

## 2019-03-02 PROCEDURE — 36415 COLL VENOUS BLD VENIPUNCTURE: CPT | Performed by: INTERNAL MEDICINE

## 2019-03-02 PROCEDURE — 80069 RENAL FUNCTION PANEL: CPT | Performed by: INTERNAL MEDICINE

## 2019-03-02 PROCEDURE — 84156 ASSAY OF PROTEIN URINE: CPT | Performed by: INTERNAL MEDICINE

## 2019-03-02 RX ORDER — TACROLIMUS 0.5 MG/1
1.5 CAPSULE, GELATIN COATED ORAL 2 TIMES DAILY
Qty: 360 CAPSULE | Refills: 3 | Status: SHIPPED | OUTPATIENT
Start: 2019-03-02 | End: 2019-06-04

## 2019-03-02 RX ORDER — FUROSEMIDE 20 MG
20 TABLET ORAL 2 TIMES DAILY
Qty: 180 TABLET | Refills: 3 | Status: SHIPPED | OUTPATIENT
Start: 2019-03-02 | End: 2020-04-03

## 2019-03-02 ASSESSMENT — PAIN SCALES - GENERAL: PAINLEVEL: MILD PAIN (2)

## 2019-03-02 NOTE — NURSING NOTE
Chief Complaint   Patient presents with     RECHECK     Kidney follow up     Blood pressure 145/88, pulse 73, resp. rate 16, weight 131.1 kg (289 lb).    Geri Hewitt CMA on 3/2/2019 at 11:05 AM

## 2019-03-02 NOTE — PROGRESS NOTES
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     HPI:    Initial presentation  Summer 2016: Presented with several months history of swelling, noted to be nephrotic, and renal biopsy in August 2016 showed FSGS. He was started on steroids 8/23/16.  Remainded on steroids 100 mg x several months.  He had more and more abdominal striae and his acne is significant  His proteinuria was down to 1-2 grams then subgram at 0.7g which is great, but unfortunately when he was down to 10mg prednisone, his proteinuria increased back to 5 grams In Jan 2018 Along with with low albumin and swelling.    He went back on Pred at 100 mg day  With a surprizing transient dip in protenuria to 1.5 in March, but eventually proteinuria decreased to UPCR < 3 startuing in April 2018   Last UPCR  1.24 g/g on 11/1/18.  He was seen on 11/29/18 at which time we initiated a tapering schedule of prednisone, as he was in partial remission on therapy (UPCR < 1).    1/12/19  Currently feeling well; \"a lot more normal\"\"not buzzing as much\"  He is down to Prednisone 20 mg daily  Still has edema in L Ext and periorbital in the am.  Swelling is stable compared to last visit  Has lost 2.5 kg since Nov.  Has not noted NEW striae.  Skin feels more dry (winter?)  Sleep ok.  Energy is good.  Appetite a bit decreased  Hot flashes and sweating are almost gone!       2/9/19  Feels generally well  Reports abd pain at night every few days, lasts 2-3 hours.  Started before we started Tacrol.  No N, V, Diarrhea.  Otherwise tolerating tacrolimus well.  Tapering down on prednisone.  Had some LExt swelling at end of the day.  Relatively mild.    3/2/19  Reports some wooziness/dizziness on occasions.  No HA\  No tremors  Still having L Ext swelling.  Feels \"tighter\" compared to last month.      ROS:  A comprehensive review of systems was obtained and negative, except as noted in the HPI or PMH.  Reports some " Heartburn at night, but less frequent.  No vomiting.  No diarrhea, melena or hematochezia.  No CP, SOB.      Active Medical Problems:  Patient Active Problem List   Diagnosis     FSGS (focal segmental glomerulosclerosis)     Nephrotic syndrome       Personal Hx:   Social History     Socioeconomic History     Marital status: Single     Spouse name: Not on file     Number of children: Not on file     Years of education: Not on file     Highest education level: Not on file   Occupational History     Not on file   Social Needs     Financial resource strain: Not on file     Food insecurity:     Worry: Not on file     Inability: Not on file     Transportation needs:     Medical: Not on file     Non-medical: Not on file   Tobacco Use     Smoking status: Never Smoker     Smokeless tobacco: Never Used   Substance and Sexual Activity     Alcohol use: Yes     Comment: social     Drug use: No     Sexual activity: Yes   Lifestyle     Physical activity:     Days per week: Not on file     Minutes per session: Not on file     Stress: Not on file   Relationships     Social connections:     Talks on phone: Not on file     Gets together: Not on file     Attends Restorationism service: Not on file     Active member of club or organization: Not on file     Attends meetings of clubs or organizations: Not on file     Relationship status: Not on file     Intimate partner violence:     Fear of current or ex partner: Not on file     Emotionally abused: Not on file     Physically abused: Not on file     Forced sexual activity: Not on file   Other Topics Concern     Parent/sibling w/ CABG, MI or angioplasty before 65F 55M? Not Asked   Social History Narrative     Not on file       Allergies:  Allergies   Allergen Reactions     Amoxicillin      Other reaction(s): EENT - other (explain in comments), SKIN - rash  Rash and lip swelling after 8 days of Amoxicillin     Erythromycin      Penicillins        Medications:  Current Outpatient Medications    Medication Sig Dispense Refill     Calcium Carb-Cholecalciferol (CALCIUM 500 +D) 500-400 MG-UNIT TABS Take 500 mg by mouth daily 30 tablet 0     lisinopril (PRINIVIL/ZESTRIL) 20 MG tablet Take two tablets in AM and one tablet in PM 90 tablet 11     omeprazole (PRILOSEC) 20 MG CR capsule TAKE 1 CAPSULE (20 MG) BY MOUTH DAILY 30 capsule 11     PROGRAF (BRAND) 0.5 MG capsule Take 2 capsules (1mg mg) by mouth 2 times daily 180 capsule 3     atorvastatin (LIPITOR) 10 MG tablet Take 1 tablet (10 mg) by mouth daily (Patient not taking: Reported on 1/12/2019) 30 tablet Has not started.     furosemide (LASIX) 20 MG tablet Take 1 tablet (20 mg) by mouth daily As needed (Patient not taking: Reported on 1/12/2019) 30 tablet NOT TAKING     predniSONE (DELTASONE) 10 MG tablet Take 1 tablet (10 mg) by mouth daily (Patient not taking: Reported on 2/9/2019.) 60 tablet Currently taking 10 mg alt 5 mg     sulfamethoxazole-trimethoprim (BACTRIM DS/SEPTRA DS) 800-160 MG per tablet Take 1 tablet by mouth three times a week (Patient not taking: Reported on 1/12/2019) 15 tablet Not taking...   Stopped a couple of months.           Vitals:  /88   Pulse 73   Resp 16   Wt 131.1 kg (289 lb)   BMI 36.12 kg/m      Exam:  GENERAL APPEARANCE: alert and no distress.  Cushingoid facies.  HENT: mouth without ulcers or lesions  LYMPHATICS: no cervical or supraclavicular nodes  RESP: lungs clear to auscultation - no rales, rhonchi or wheezes  CV: regular rhythm, normal rate, no rub, no murmur  EDEMA: trace-1+ LE edema bilaterally  ABDOMEN: soft, nondistended, nontender, bowel sounds normal  MS: extremities normal - no gross deformities noted, no evidence of inflammation in joints, no muscle tenderness  SKIN: no rash.  Marked diffuse striae    Results:  Recent Results (from the past 504 hour(s))   Tacrolimus level    Collection Time: 02/14/19 10:51 AM   Result Value Ref Range    Tacrolimus Last Dose 1030p     Tacrolimus Level 3.4 (L) 5.0 -  15.0 ug/L   Renal panel    Collection Time: 02/14/19 10:56 AM   Result Value Ref Range    Sodium 144 133 - 144 mmol/L    Potassium 4.2 3.4 - 5.3 mmol/L    Chloride 110 (H) 94 - 109 mmol/L    Carbon Dioxide 28 20 - 32 mmol/L    Anion Gap 6 3 - 14 mmol/L    Glucose 83 70 - 99 mg/dL    Urea Nitrogen 16 7 - 30 mg/dL    Creatinine 0.86 0.66 - 1.25 mg/dL    GFR Estimate >90 >60 mL/min/[1.73_m2]    GFR Estimate If Black >90 >60 mL/min/[1.73_m2]    Calcium 8.2 (L) 8.5 - 10.1 mg/dL    Phosphorus 3.5 2.5 - 4.5 mg/dL    Albumin 2.7 (L) 3.4 - 5.0 g/dL   CBC with platelets    Collection Time: 03/02/19 10:16 AM   Result Value Ref Range    WBC 7.1 4.0 - 11.0 10e9/L    RBC Count 5.05 4.4 - 5.9 10e12/L    Hemoglobin 14.0 13.3 - 17.7 g/dL    Hematocrit 43.4 40.0 - 53.0 %    MCV 86 78 - 100 fl    MCH 27.7 26.5 - 33.0 pg    MCHC 32.3 31.5 - 36.5 g/dL    RDW 12.8 10.0 - 15.0 %    Platelet Count 386 150 - 450 10e9/L   Renal panel    Collection Time: 03/02/19 10:16 AM   Result Value Ref Range    Sodium 143 133 - 144 mmol/L    Potassium 4.0 3.4 - 5.3 mmol/L    Chloride 109 94 - 109 mmol/L    Carbon Dioxide 28 20 - 32 mmol/L    Anion Gap 6 3 - 14 mmol/L    Glucose 86 70 - 99 mg/dL    Urea Nitrogen 12 7 - 30 mg/dL    Creatinine 0.99 0.66 - 1.25 mg/dL    GFR Estimate >90 >60 mL/min/[1.73_m2]    GFR Estimate If Black >90 >60 mL/min/[1.73_m2]    Calcium 8.0 (L) 8.5 - 10.1 mg/dL    Phosphorus 3.4 2.5 - 4.5 mg/dL    Albumin 2.4 (L) 3.4 - 5.0 g/dL   UA with Microscopic reflex to Culture    Collection Time: 03/02/19 10:23 AM   Result Value Ref Range    Color Urine Yellow     Appearance Urine Slightly Cloudy     Glucose Urine Negative NEG^Negative mg/dL    Bilirubin Urine Negative NEG^Negative    Ketones Urine Negative NEG^Negative mg/dL    Specific Gravity Urine 1.020 1.003 - 1.035    Blood Urine Small (A) NEG^Negative    pH Urine 5.0 5.0 - 7.0 pH    Protein Albumin Urine >499 (A) NEG^Negative mg/dL    Urobilinogen mg/dL 0.0 0.0 - 2.0 mg/dL     Nitrite Urine Negative NEG^Negative    Leukocyte Esterase Urine Negative NEG^Negative    Source Midstream Urine     WBC Urine 3 0 - 5 /HPF    RBC Urine 1 0 - 2 /HPF    Squamous Epithelial /HPF Urine <1 0 - 1 /HPF    Hyaline Casts 17 (H) 0 - 2 /LPF   Protein  random urine with Creat Ratio    Collection Time: 03/02/19 10:23 AM   Result Value Ref Range    Protein Random Urine 10.22 g/L    Protein Total Urine g/gr Creatinine 2.75 (H) 0 - 0.2 g/g Cr   Creatinine urine calculation only    Collection Time: 03/02/19 10:23 AM   Result Value Ref Range    Creatinine Urine 371 mg/dL     Albumin   Date Value Ref Range Status   03/02/2019 2.4 (L) 3.4 - 5.0 g/dL Final   02/14/2019 2.7 (L) 3.4 - 5.0 g/dL Final   02/09/2019 3.0 (L) 3.4 - 5.0 g/dL Final   01/12/2019 3.6 3.4 - 5.0 g/dL Final   11/29/2018 3.7 3.4 - 5.0 g/dL Final   11/01/2018 3.6 3.4 - 5.0 g/dL Final   09/12/2018 3.8 3.4 - 5.0 g/dL Final   07/26/2018 3.7 3.4 - 5.0 g/dL Final   06/02/2018 2.9 (L) 3.4 - 5.0 g/dL Final   04/25/2018 2.6 (L) 3.4 - 5.0 g/dL Final     Protein Total Urine g/gr Creatinine   Date Value Ref Range Status   03/02/2019 2.75 (H) 0 - 0.2 g/g Cr Final   02/09/2019 2.74 (H) 0 - 0.2 g/g Cr Final   01/12/2019 0.42 (H) 0 - 0.2 g/g Cr Final   11/29/2018 0.50 (H) 0 - 0.2 g/g Cr Final   11/01/2018 0.45 (H) 0 - 0.2 g/g Cr Final   09/12/2018 1.26 (H) 0 - 0.2 g/g Cr Final   07/26/2018 2.00 (H) 0 - 0.2 g/g Cr Final   06/02/2018 1.69 (H) 0 - 0.2 g/g Cr Final   04/25/2018 2.74 (H) 0 - 0.2 g/g Cr Final   03/31/2018 4.73 (H) 0 - 0.2 g/g Cr Final       Creatinine   Date Value Ref Range Status   03/02/2019 0.99 0.66 - 1.25 mg/dL Final   02/14/2019 0.86 0.66 - 1.25 mg/dL Final   02/09/2019 0.93 0.66 - 1.25 mg/dL Final   01/12/2019 0.94 0.66 - 1.25 mg/dL Final   11/29/2018 0.88 0.66 - 1.25 mg/dL Final   11/01/2018 0.93 0.66 - 1.25 mg/dL Final   09/12/2018 0.98 0.66 - 1.25 mg/dL Final   07/26/2018 0.77 0.66 - 1.25 mg/dL Final   06/02/2018 0.96 0.66 - 1.25 mg/dL Final  "  04/25/2018 0.74 0.66 - 1.25 mg/dL Final       FINAL DIAGNOSIS:   A:     Kidney, native, LEFT; percutaneous needle biopsy:   - Focal segmental glomerulosclerosis     This biopsy shows segmental sclerosis and hyalinosis affecting   approximately 30% of glomeruli examined in a pattern diagnostic for   focal segmental glomerulosclerosis.  Although sclerotic segments focally   exhibit increased cellularity, capillary distention by intra-luminal   inflammatory cells is not observed and this specimen does not meet   strict criteria for cellular variant of focal segmental   glomerulosclerosis.  However, in view of the proportion of glomeruli   exhibiting segmental sclerosis and the increased cellularity observed in   sclerotic segments it is expected that this condition may follow an   aggressive course.                 Assessment and Plan:   23 year old male with with nephrotic syndrome, biopsy in August 2016 showing FSGS. Steroids started 8/23/2016 and relapsed during weaning in January 2018, with proteinuria up from 0.28 g/g rocky to 4.9 grams on 1/31/18  1. FSGS- Mr Trevino has clear relapse of proteinuria and recurrent hypoalbuminemia after decreasing the dose of prednisone.  His last tacrolimus trough level was \"subtherapeutic\".  He has a tolerable amount of edema.  His BP and Cr are stable.  His UPCR is unchanged from the last visit, but the serum albumin is lower.  I have asked him to    A) continue on the same dose of prednisone - I.e. No further taper at this point   B) Increase tacrolimus to 1.5 mg po BID.  He is to come back for lab recheck and trough level tacrolimus on or aroundThrusday March 7th for repeat labs.  Based on the response to this dose increase, we may do further dose adjustments.  He may also be a good candidate for the clinical trial mentioned below.   C) restart Furosemide 20 mg twice daily for edema management, and better control of BP     2. Prophylaxis- He has NOT been taking  bactrim "   3. Hyperlipidemia.  He did not start the atorvastatin as prescribed at the last visit.  I instructed him to hold off on starting it, as I do not wish to start both a CNI and Atorvastatin at the same time.  Will plan to start Atorvastatin when he is on a stable target dose of tacrolimus.    Mr Trevino also met with Ms Qian Coleman, who introduced to him the CCX [id number]_[id number] trial.  He was provided with the consent form, which he wishes to review at home.      José Miguel Jean MD  Division of Renal Disease and Hypertension  Pager: [phone]  March 2, 2019

## 2019-03-02 NOTE — PATIENT INSTRUCTIONS
Please   1- restart FUROSEMIDE 20 mg TWICE daily (at breakfast and in early pm)  2- Increase TACROLIMUS to 1.5 mg (3 capsules) TWICE DAILY  3- Return for lab work in 6-7 days.

## 2019-03-02 NOTE — LETTER
"3/2/2019      RE: Ethan Trevino  [address]  [facility] [address]         Reason for Visit:  Ethan Trevino is a 23 year old male with FSGS, who presents for routine follow up.     HPI:    Initial presentation  Summer 2016: Presented with several months history of swelling, noted to be nephrotic, and renal biopsy in August 2016 showed FSGS. He was started on steroids 8/23/16.  Remainded on steroids 100 mg x several months.  He had more and more abdominal striae and his acne is significant  His proteinuria was down to 1-2 grams then subgram at 0.7g which is great, but unfortunately when he was down to 10mg prednisone, his proteinuria increased back to 5 grams In Jan 2018 Along with with low albumin and swelling.    He went back on Pred at 100 mg day  With a surprizing transient dip in protenuria to 1.5 in March, but eventually proteinuria decreased to UPCR < 3 startuing in April 2018   Last UPCR  1.24 g/g on 11/1/18.  He was seen on 11/29/18 at which time we initiated a tapering schedule of prednisone, as he was in partial remission on therapy (UPCR < 1).    1/12/19  Currently feeling well; \"a lot more normal\"\"not buzzing as much\"  He is down to Prednisone 20 mg daily  Still has edema in L Ext and periorbital in the am.  Swelling is stable compared to last visit  Has lost 2.5 kg since Nov.  Has not noted NEW striae.  Skin feels more dry (winter?)  Sleep ok.  Energy is good.  Appetite a bit decreased  Hot flashes and sweating are almost gone!       2/9/19  Feels generally well  Reports abd pain at night every few days, lasts 2-3 hours.  Started before we started Tacrol.  No N, V, Diarrhea.  Otherwise tolerating tacrolimus well.  Tapering down on prednisone.  Had some LExt swelling at end of the day.  Relatively mild.    3/2/19  Reports some wooziness/dizziness on occasions.  No HA\  No tremors  Still having L Ext swelling.  Feels \"tighter\" compared to last month.      ROS:  A comprehensive " review of systems was obtained and negative, except as noted in the HPI or PMH.  Reports some Heartburn at night, but less frequent.  No vomiting.  No diarrhea, melena or hematochezia.  No CP, SOB.      Active Medical Problems:  Patient Active Problem List   Diagnosis     FSGS (focal segmental glomerulosclerosis)     Nephrotic syndrome       Personal Hx:   Social History     Socioeconomic History     Marital status: Single     Spouse name: Not on file     Number of children: Not on file     Years of education: Not on file     Highest education level: Not on file   Occupational History     Not on file   Social Needs     Financial resource strain: Not on file     Food insecurity:     Worry: Not on file     Inability: Not on file     Transportation needs:     Medical: Not on file     Non-medical: Not on file   Tobacco Use     Smoking status: Never Smoker     Smokeless tobacco: Never Used   Substance and Sexual Activity     Alcohol use: Yes     Comment: social     Drug use: No     Sexual activity: Yes   Lifestyle     Physical activity:     Days per week: Not on file     Minutes per session: Not on file     Stress: Not on file   Relationships     Social connections:     Talks on phone: Not on file     Gets together: Not on file     Attends Rest[employer] service: Not on file     Active member of club or organization: Not on file     Attends meetings of clubs or organizations: Not on file     Relationship status: Not on file     Intimate partner violence:     Fear of current or ex partner: Not on file     Emotionally abused: Not on file     Physically abused: Not on file     Forced sexual activity: Not on file   Other Topics Concern     Parent/sibling w/ CABG, MI or angioplasty before 65F 55M? Not Asked   Social History Narrative     Not on file       Allergies:  Allergies   Allergen Reactions     Amoxicillin      Other reaction(s): EENT - other (explain in comments), SKIN - rash  Rash and lip swelling after 8 days of  Amoxicillin     Erythromycin      Penicillins        Medications:  Current Outpatient Medications   Medication Sig Dispense Refill     Calcium Carb-Cholecalciferol (CALCIUM 500 +D) 500-400 MG-UNIT TABS Take 500 mg by mouth daily 30 tablet 0     lisinopril (PRINIVIL/ZESTRIL) 20 MG tablet Take two tablets in AM and one tablet in PM 90 tablet 11     omeprazole (PRILOSEC) 20 MG CR capsule TAKE 1 CAPSULE (20 MG) BY MOUTH DAILY 30 capsule 11     PROGRAF (BRAND) 0.5 MG capsule Take 2 capsules (1mg mg) by mouth 2 times daily 180 capsule 3     atorvastatin (LIPITOR) 10 MG tablet Take 1 tablet (10 mg) by mouth daily (Patient not taking: Reported on 1/12/2019) 30 tablet Has not started.     furosemide (LASIX) 20 MG tablet Take 1 tablet (20 mg) by mouth daily As needed (Patient not taking: Reported on 1/12/2019) 30 tablet NOT TAKING     predniSONE (DELTASONE) 10 MG tablet Take 1 tablet (10 mg) by mouth daily (Patient not taking: Reported on 2/9/2019.) 60 tablet Currently taking 10 mg alt 5 mg     sulfamethoxazole-trimethoprim (BACTRIM DS/SEPTRA DS) 800-160 MG per tablet Take 1 tablet by mouth three times a week (Patient not taking: Reported on 1/12/2019) 15 tablet Not taking...   Stopped a couple of months.           Vitals:  /88   Pulse 73   Resp 16   Wt 131.1 kg (289 lb)   BMI 36.12 kg/m       Exam:  GENERAL APPEARANCE: alert and no distress.  Cushingoid facies.  HENT: mouth without ulcers or lesions  LYMPHATICS: no cervical or supraclavicular nodes  RESP: lungs clear to auscultation - no rales, rhonchi or wheezes  CV: regular rhythm, normal rate, no rub, no murmur  EDEMA: trace-1+ LE edema bilaterally  ABDOMEN: soft, nondistended, nontender, bowel sounds normal  MS: extremities normal - no gross deformities noted, no evidence of inflammation in joints, no muscle tenderness  SKIN: no rash.  Marked diffuse striae    Results:  Recent Results (from the past 504 hour(s))   Tacrolimus level    Collection Time: 02/14/19  10:51 AM   Result Value Ref Range    Tacrolimus Last Dose 1030p     Tacrolimus Level 3.4 (L) 5.0 - 15.0 ug/L   Renal panel    Collection Time: 02/14/19 10:56 AM   Result Value Ref Range    Sodium 144 133 - 144 mmol/L    Potassium 4.2 3.4 - 5.3 mmol/L    Chloride 110 (H) 94 - 109 mmol/L    Carbon Dioxide 28 20 - 32 mmol/L    Anion Gap 6 3 - 14 mmol/L    Glucose 83 70 - 99 mg/dL    Urea Nitrogen 16 7 - 30 mg/dL    Creatinine 0.86 0.66 - 1.25 mg/dL    GFR Estimate >90 >60 mL/min/[1.73_m2]    GFR Estimate If Black >90 >60 mL/min/[1.73_m2]    Calcium 8.2 (L) 8.5 - 10.1 mg/dL    Phosphorus 3.5 2.5 - 4.5 mg/dL    Albumin 2.7 (L) 3.4 - 5.0 g/dL   CBC with platelets    Collection Time: 03/02/19 10:16 AM   Result Value Ref Range    WBC 7.1 4.0 - 11.0 10e9/L    RBC Count 5.05 4.4 - 5.9 10e12/L    Hemoglobin 14.0 13.3 - 17.7 g/dL    Hematocrit 43.4 40.0 - 53.0 %    MCV 86 78 - 100 fl    MCH 27.7 26.5 - 33.0 pg    MCHC 32.3 31.5 - 36.5 g/dL    RDW 12.8 10.0 - 15.0 %    Platelet Count 386 150 - 450 10e9/L   Renal panel    Collection Time: 03/02/19 10:16 AM   Result Value Ref Range    Sodium 143 133 - 144 mmol/L    Potassium 4.0 3.4 - 5.3 mmol/L    Chloride 109 94 - 109 mmol/L    Carbon Dioxide 28 20 - 32 mmol/L    Anion Gap 6 3 - 14 mmol/L    Glucose 86 70 - 99 mg/dL    Urea Nitrogen 12 7 - 30 mg/dL    Creatinine 0.99 0.66 - 1.25 mg/dL    GFR Estimate >90 >60 mL/min/[1.73_m2]    GFR Estimate If Black >90 >60 mL/min/[1.73_m2]    Calcium 8.0 (L) 8.5 - 10.1 mg/dL    Phosphorus 3.4 2.5 - 4.5 mg/dL    Albumin 2.4 (L) 3.4 - 5.0 g/dL   UA with Microscopic reflex to Culture    Collection Time: 03/02/19 10:23 AM   Result Value Ref Range    Color Urine Yellow     Appearance Urine Slightly Cloudy     Glucose Urine Negative NEG^Negative mg/dL    Bilirubin Urine Negative NEG^Negative    Ketones Urine Negative NEG^Negative mg/dL    Specific Gravity Urine 1.020 1.003 - 1.035    Blood Urine Small (A) NEG^Negative    pH Urine 5.0 5.0 - 7.0 pH     Protein Albumin Urine >499 (A) NEG^Negative mg/dL    Urobilinogen mg/dL 0.0 0.0 - 2.0 mg/dL    Nitrite Urine Negative NEG^Negative    Leukocyte Esterase Urine Negative NEG^Negative    Source Midstream Urine     WBC Urine 3 0 - 5 /HPF    RBC Urine 1 0 - 2 /HPF    Squamous Epithelial /HPF Urine <1 0 - 1 /HPF    Hyaline Casts 17 (H) 0 - 2 /LPF   Protein  random urine with Creat Ratio    Collection Time: 03/02/19 10:23 AM   Result Value Ref Range    Protein Random Urine 10.22 g/L    Protein Total Urine g/gr Creatinine 2.75 (H) 0 - 0.2 g/g Cr   Creatinine urine calculation only    Collection Time: 03/02/19 10:23 AM   Result Value Ref Range    Creatinine Urine 371 mg/dL     Albumin   Date Value Ref Range Status   03/02/2019 2.4 (L) 3.4 - 5.0 g/dL Final   02/14/2019 2.7 (L) 3.4 - 5.0 g/dL Final   02/09/2019 3.0 (L) 3.4 - 5.0 g/dL Final   01/12/2019 3.6 3.4 - 5.0 g/dL Final   11/29/2018 3.7 3.4 - 5.0 g/dL Final   11/01/2018 3.6 3.4 - 5.0 g/dL Final   09/12/2018 3.8 3.4 - 5.0 g/dL Final   07/26/2018 3.7 3.4 - 5.0 g/dL Final   06/02/2018 2.9 (L) 3.4 - 5.0 g/dL Final   04/25/2018 2.6 (L) 3.4 - 5.0 g/dL Final     Protein Total Urine g/gr Creatinine   Date Value Ref Range Status   03/02/2019 2.75 (H) 0 - 0.2 g/g Cr Final   02/09/2019 2.74 (H) 0 - 0.2 g/g Cr Final   01/12/2019 0.42 (H) 0 - 0.2 g/g Cr Final   11/29/2018 0.50 (H) 0 - 0.2 g/g Cr Final   11/01/2018 0.45 (H) 0 - 0.2 g/g Cr Final   09/12/2018 1.26 (H) 0 - 0.2 g/g Cr Final   07/26/2018 2.00 (H) 0 - 0.2 g/g Cr Final   06/02/2018 1.69 (H) 0 - 0.2 g/g Cr Final   04/25/2018 2.74 (H) 0 - 0.2 g/g Cr Final   03/31/2018 4.73 (H) 0 - 0.2 g/g Cr Final       Creatinine   Date Value Ref Range Status   03/02/2019 0.99 0.66 - 1.25 mg/dL Final   02/14/2019 0.86 0.66 - 1.25 mg/dL Final   02/09/2019 0.93 0.66 - 1.25 mg/dL Final   01/12/2019 0.94 0.66 - 1.25 mg/dL Final   11/29/2018 0.88 0.66 - 1.25 mg/dL Final   11/01/2018 0.93 0.66 - 1.25 mg/dL Final   09/12/2018 0.98 0.66 - 1.25 mg/dL  "Final   07/26/2018 0.77 0.66 - 1.25 mg/dL Final   06/02/2018 0.96 0.66 - 1.25 mg/dL Final   04/25/2018 0.74 0.66 - 1.25 mg/dL Final       FINAL DIAGNOSIS:   A:     Kidney, native, LEFT; percutaneous needle biopsy:   - Focal segmental glomerulosclerosis     This biopsy shows segmental sclerosis and hyalinosis affecting   approximately 30% of glomeruli examined in a pattern diagnostic for   focal segmental glomerulosclerosis.  Although sclerotic segments focally   exhibit increased cellularity, capillary distention by intra-luminal   inflammatory cells is not observed and this specimen does not meet   strict criteria for cellular variant of focal segmental   glomerulosclerosis.  However, in view of the proportion of glomeruli   exhibiting segmental sclerosis and the increased cellularity observed in   sclerotic segments it is expected that this condition may follow an   aggressive course.                 Assessment and Plan:   23 year old male with with nephrotic syndrome, biopsy in August 2016 showing FSGS. Steroids started 8/23/2016 and relapsed during weaning in January 2018, with proteinuria up from 0.28 g/g rocky to 4.9 grams on 1/31/18  1. FSGS- Mr Trevino has clear relapse of proteinuria and recurrent hypoalbuminemia after decreasing the dose of prednisone.  His last tacrolimus trough level was \"subtherapeutic\".  He has a tolerable amount of edema.  His BP and Cr are stable.  His UPCR is unchanged from the last visit, but the serum albumin is lower.  I have asked him to    A) continue on the same dose of prednisone - I.e. No further taper at this point   B) Increase tacrolimus to 1.5 mg po BID.  He is to come back for lab recheck and trough level tacrolimus on or aroundThrusday March 7th for repeat labs.  Based on the response to this dose increase, we may do further dose adjustments.  He may also be a good candidate for the clinical trial mentioned below.   C) restart Furosemide 20 mg twice daily for edema " management, and better control of BP     2. Prophylaxis- He has NOT been taking  bactrim   3. Hyperlipidemia.  He did not start the atorvastatin as prescribed at the last visit.  I instructed him to hold off on starting it, as I do not wish to start both a CNI and Atorvastatin at the same time.  Will plan to start Atorvastatin when he is on a stable target dose of tacrolimus.    Mr Trevino also met with Ms Qian Coleman, who introduced to him the CCX 011_140 trial.  He was provided with the consent form, which he wishes to review at home.      José Miguel Jean MD  Division of Renal Disease and Hypertension  Pager: [phone]  March 2, 2019

## 2019-03-08 DIAGNOSIS — N05.1 FSGS (FOCAL SEGMENTAL GLOMERULOSCLEROSIS): ICD-10-CM

## 2019-03-08 DIAGNOSIS — N04.9 NEPHROTIC SYNDROME: ICD-10-CM

## 2019-03-08 LAB
ALBUMIN SERPL-MCNC: 2.5 G/DL (ref 3.4–5)
ANION GAP SERPL CALCULATED.3IONS-SCNC: 4 MMOL/L (ref 3–14)
BUN SERPL-MCNC: 13 MG/DL (ref 7–30)
CALCIUM SERPL-MCNC: 8.3 MG/DL (ref 8.5–10.1)
CHLORIDE SERPL-SCNC: 109 MMOL/L (ref 94–109)
CO2 SERPL-SCNC: 30 MMOL/L (ref 20–32)
CREAT SERPL-MCNC: 0.95 MG/DL (ref 0.66–1.25)
GFR SERPL CREATININE-BSD FRML MDRD: >90 ML/MIN/{1.73_M2}
GLUCOSE SERPL-MCNC: 82 MG/DL (ref 70–99)
PHOSPHATE SERPL-MCNC: 3.8 MG/DL (ref 2.5–4.5)
POTASSIUM SERPL-SCNC: 4.2 MMOL/L (ref 3.4–5.3)
SODIUM SERPL-SCNC: 143 MMOL/L (ref 133–144)
TACROLIMUS BLD-MCNC: 4.4 UG/L (ref 5–15)
TME LAST DOSE: ABNORMAL H

## 2019-04-17 DIAGNOSIS — N05.1 FSGS (FOCAL SEGMENTAL GLOMERULOSCLEROSIS): Primary | ICD-10-CM

## 2019-04-18 ENCOUNTER — OFFICE VISIT (OUTPATIENT)
Dept: NEPHROLOGY | Facility: CLINIC | Age: 24
End: 2019-04-18
Attending: INTERNAL MEDICINE
Payer: COMMERCIAL

## 2019-04-18 VITALS
HEIGHT: 75 IN | OXYGEN SATURATION: 96 % | SYSTOLIC BLOOD PRESSURE: 138 MMHG | HEART RATE: 72 BPM | BODY MASS INDEX: 34.49 KG/M2 | WEIGHT: 277.4 LBS | DIASTOLIC BLOOD PRESSURE: 84 MMHG | RESPIRATION RATE: 16 BRPM | TEMPERATURE: 97.5 F

## 2019-04-18 DIAGNOSIS — N05.1 FSGS (FOCAL SEGMENTAL GLOMERULOSCLEROSIS): ICD-10-CM

## 2019-04-18 DIAGNOSIS — N05.1 FSGS (FOCAL SEGMENTAL GLOMERULOSCLEROSIS): Primary | ICD-10-CM

## 2019-04-18 LAB
ALBUMIN SERPL-MCNC: 2.2 G/DL (ref 3.4–5)
ALBUMIN UR-MCNC: >499 MG/DL
ANION GAP SERPL CALCULATED.3IONS-SCNC: 7 MMOL/L (ref 3–14)
APPEARANCE UR: ABNORMAL
BASOPHILS # BLD AUTO: 0.1 10E9/L (ref 0–0.2)
BASOPHILS NFR BLD AUTO: 1 %
BILIRUB UR QL STRIP: NEGATIVE
BUN SERPL-MCNC: 20 MG/DL (ref 7–30)
CALCIUM SERPL-MCNC: 8.4 MG/DL (ref 8.5–10.1)
CHLORIDE SERPL-SCNC: 110 MMOL/L (ref 94–109)
CO2 SERPL-SCNC: 24 MMOL/L (ref 20–32)
COLOR UR AUTO: YELLOW
CREAT SERPL-MCNC: 0.91 MG/DL (ref 0.66–1.25)
CREAT UR-MCNC: 189 MG/DL
DIFFERENTIAL METHOD BLD: NORMAL
EOSINOPHIL # BLD AUTO: 0.2 10E9/L (ref 0–0.7)
EOSINOPHIL NFR BLD AUTO: 2.7 %
ERYTHROCYTE [DISTWIDTH] IN BLOOD BY AUTOMATED COUNT: 12.5 % (ref 10–15)
GFR SERPL CREATININE-BSD FRML MDRD: >90 ML/MIN/{1.73_M2}
GLUCOSE SERPL-MCNC: 91 MG/DL (ref 70–99)
GLUCOSE UR STRIP-MCNC: NEGATIVE MG/DL
HCT VFR BLD AUTO: 41.6 % (ref 40–53)
HGB BLD-MCNC: 13.9 G/DL (ref 13.3–17.7)
HGB UR QL STRIP: NEGATIVE
HYALINE CASTS #/AREA URNS LPF: 27 /LPF (ref 0–2)
IMM GRANULOCYTES # BLD: 0.1 10E9/L (ref 0–0.4)
IMM GRANULOCYTES NFR BLD: 0.7 %
KETONES UR STRIP-MCNC: NEGATIVE MG/DL
LEUKOCYTE ESTERASE UR QL STRIP: NEGATIVE
LYMPHOCYTES # BLD AUTO: 3.1 10E9/L (ref 0.8–5.3)
LYMPHOCYTES NFR BLD AUTO: 39.9 %
MCH RBC QN AUTO: 28.1 PG (ref 26.5–33)
MCHC RBC AUTO-ENTMCNC: 33.4 G/DL (ref 31.5–36.5)
MCV RBC AUTO: 84 FL (ref 78–100)
MONOCYTES # BLD AUTO: 0.7 10E9/L (ref 0–1.3)
MONOCYTES NFR BLD AUTO: 9.2 %
MUCOUS THREADS #/AREA URNS LPF: PRESENT /LPF
NEUTROPHILS # BLD AUTO: 3.6 10E9/L (ref 1.6–8.3)
NEUTROPHILS NFR BLD AUTO: 46.5 %
NITRATE UR QL: NEGATIVE
NRBC # BLD AUTO: 0 10*3/UL
NRBC BLD AUTO-RTO: 0 /100
PH UR STRIP: 5 PH (ref 5–7)
PHOSPHATE SERPL-MCNC: 4 MG/DL (ref 2.5–4.5)
PLATELET # BLD AUTO: 326 10E9/L (ref 150–450)
POTASSIUM SERPL-SCNC: 3.8 MMOL/L (ref 3.4–5.3)
PROT UR-MCNC: 6.05 G/L
PROT/CREAT 24H UR: 3.2 G/G CR (ref 0–0.2)
RBC # BLD AUTO: 4.94 10E12/L (ref 4.4–5.9)
RBC #/AREA URNS AUTO: 1 /HPF (ref 0–2)
SODIUM SERPL-SCNC: 141 MMOL/L (ref 133–144)
SOURCE: ABNORMAL
SP GR UR STRIP: 1.02 (ref 1–1.03)
UROBILINOGEN UR STRIP-MCNC: 0 MG/DL (ref 0–2)
WBC # BLD AUTO: 7.7 10E9/L (ref 4–11)
WBC #/AREA URNS AUTO: 1 /HPF (ref 0–5)

## 2019-04-18 PROCEDURE — 81001 URINALYSIS AUTO W/SCOPE: CPT | Performed by: INTERNAL MEDICINE

## 2019-04-18 PROCEDURE — 36415 COLL VENOUS BLD VENIPUNCTURE: CPT | Performed by: INTERNAL MEDICINE

## 2019-04-18 PROCEDURE — G0463 HOSPITAL OUTPT CLINIC VISIT: HCPCS | Mod: ZF

## 2019-04-18 PROCEDURE — 85025 COMPLETE CBC W/AUTO DIFF WBC: CPT | Performed by: INTERNAL MEDICINE

## 2019-04-18 PROCEDURE — 84156 ASSAY OF PROTEIN URINE: CPT | Performed by: INTERNAL MEDICINE

## 2019-04-18 PROCEDURE — 80069 RENAL FUNCTION PANEL: CPT | Performed by: INTERNAL MEDICINE

## 2019-04-18 ASSESSMENT — MIFFLIN-ST. JEOR: SCORE: 2328.91

## 2019-04-18 ASSESSMENT — PAIN SCALES - GENERAL: PAINLEVEL: NO PAIN (0)

## 2019-04-18 NOTE — LETTER
"4/18/2019      RE: Ethan Trevino  [address]  [facility] [address]         Reason for Visit:  Ethan Trevino is a 23 year old male with FSGS, who presents for routine follow up.     HPI:    Initial presentation  Summer 2016: Presented with several months history of swelling, noted to be nephrotic, and renal biopsy in August 2016 showed FSGS. He was started on steroids 8/23/16.  Remainded on steroids 100 mg x several months.  He had more and more abdominal striae and his acne is significant  His proteinuria was down to 1-2 grams then subgram at 0.7g which is great, but unfortunately when he was down to 10mg prednisone, his proteinuria increased back to 5 grams In Jan 2018 Along with with low albumin and swelling.    He went back on Pred at 100 mg day  With a surprizing transient dip in protenuria to 1.5 in March, but eventually proteinuria decreased to UPCR < 3 startuing in April 2018   Last UPCR  1.24 g/g on 11/1/18.  He was seen on 11/29/18 at which time we initiated a tapering schedule of prednisone, as he was in partial remission on therapy (UPCR < 1).    1/12/19  Currently feeling well; \"a lot more normal\"\"not buzzing as much\"  He is down to Prednisone 20 mg daily  Still has edema in L Ext and periorbital in the am.  Swelling is stable compared to last visit  Has lost 2.5 kg since Nov.  Has not noted NEW striae.  Skin feels more dry (winter?)  Sleep ok.  Energy is good.  Appetite a bit decreased  Hot flashes and sweating are almost gone!       2/9/19  Feels generally well  Reports abd pain at night every few days, lasts 2-3 hours.  Started before we started Tacrol.  No N, V, Diarrhea.  Otherwise tolerating tacrolimus well.  Tapering down on prednisone.    3/2/19  Reports some wooziness/dizziness on occasions.  No HA\No tremors. Still having L Ext swelling.  Feels \"tighter\" compared to last month.    4/19/19  Mr Trevino is here for follow up after increasing the dose of Tacrolimus to " 1.5 mg BID.  A follow up trough level 10 days later was at 4.4.  He tolerated the increased dose without adverse effects reported.  He denies GI, or  symptoms.  No HA or tremor.  He continues to have L Ext swelling, but it is mild to moderate, and admits to frequently forgetting the pm dose of furosemide.  He has no other complaints     ROS:  A comprehensive review of systems was obtained and negative, except as noted in the HPI or PMH.  Reports some Heartburn at night, but less frequent.  No vomiting.  No diarrhea, melena or hematochezia.  No CP, SOB.      Active Medical Problems:  Patient Active Problem List   Diagnosis     FSGS (focal segmental glomerulosclerosis)     Nephrotic syndrome       Personal Hx:   Social History     Socioeconomic History     Marital status: Single     Spouse name: Not on file     Number of children: Not on file     Years of education: Not on file     Highest education level: Not on file   Occupational History     Not on file   Social Needs     Financial resource strain: Not on file     Food insecurity:     Worry: Not on file     Inability: Not on file     Transportation needs:     Medical: Not on file     Non-medical: Not on file   Tobacco Use     Smoking status: Never Smoker     Smokeless tobacco: Never Used   Substance and Sexual Activity     Alcohol use: Yes     Comment: social     Drug use: No     Sexual activity: Yes   Lifestyle     Physical activity:     Days per week: Not on file     Minutes per session: Not on file     Stress: Not on file   Relationships     Social connections:     Talks on phone: Not on file     Gets together: Not on file     Attends Muslim service: Not on file     Active member of club or organization: Not on file     Attends meetings of clubs or organizations: Not on file     Relationship status: Not on file     Intimate partner violence:     Fear of current or ex partner: Not on file     Emotionally abused: Not on file     Physically abused: Not on file  "    Forced sexual activity: Not on file   Other Topics Concern     Parent/sibling w/ CABG, MI or angioplasty before 65F 55M? Not Asked   Social History Narrative     Not on file       Allergies:  Allergies   Allergen Reactions     Amoxicillin      Other reaction(s): EENT - other (explain in comments), SKIN - rash  Rash and lip swelling after 8 days of Amoxicillin     Erythromycin      Penicillins        Medications:  Current Outpatient Medications   Medication Sig Dispense Refill     Calcium Carb-Cholecalciferol (CALCIUM 500 +D) 500-400 MG-UNIT TABS Take 500 mg by mouth daily 30 tablet 0     lisinopril (PRINIVIL/ZESTRIL) 20 MG tablet Take two tablets in AM and one tablet in PM 90 tablet 11     omeprazole (PRILOSEC) 20 MG CR capsule TAKE 1 CAPSULE (20 MG) BY MOUTH DAILY 30 capsule 11     PROGRAF (BRAND) 0.5 MG capsule Take 3 capsules (1.5 mg mg) by mouth 2 times daily 180 capsule 3     atorvastatin (LIPITOR) 10 MG tablet Take 1 tablet (10 mg) by mouth daily (Patient not taking: Reported on 1/12/2019) 30 tablet Has not started.     furosemide (LASIX) 20 MG tablet Take 1 tablet (20 mg) by mouth daily As needed (Patient not taking: Reported on 1/12/2019) 30 tablet      predniSONE (DELTASONE) 10 MG tablet Take 1 tablet (10 mg) by mouth daily (Patient not taking: Reported on 2/9/2019.) 60 tablet Currently taking 10 mg alt 5 mg           Vitals:  /84 (BP Location: Left arm, Patient Position: Sitting, Cuff Size: Adult Large)   Pulse 72   Temp 97.5  F (36.4  C) (Oral)   Resp 16   Ht 1.905 m (6' 3\")   Wt 125.8 kg (277 lb 6.4 oz)   SpO2 96%   BMI 34.67 kg/m       Exam:  GENERAL APPEARANCE: alert and no distress.  Cushingoid facies is improved.  HENT: mouth without ulcers or lesions  LYMPHATICS: no cervical or supraclavicular nodes  RESP: lungs clear to auscultation - no rales, rhonchi or wheezes  CV: regular rhythm, normal rate, no rub, no murmur  EDEMA: trace-1+ LE edema bilaterally  ABDOMEN: soft, nondistended, " nontender, bowel sounds normal  MS: extremities normal - no gross deformities noted, no evidence of inflammation in joints, no muscle tenderness  SKIN: no rash.  Marked diffuse striae    Results:  Recent Results (from the past 504 hour(s))   Routine UA with micro reflex to culture    Collection Time: 04/18/19  9:50 AM   Result Value Ref Range    Color Urine Yellow     Appearance Urine Slightly Cloudy     Glucose Urine Negative NEG^Negative mg/dL    Bilirubin Urine Negative NEG^Negative    Ketones Urine Negative NEG^Negative mg/dL    Specific Gravity Urine 1.017 1.003 - 1.035    Blood Urine Negative NEG^Negative    pH Urine 5.0 5.0 - 7.0 pH    Protein Albumin Urine >499 (A) NEG^Negative mg/dL    Urobilinogen mg/dL 0.0 0.0 - 2.0 mg/dL    Nitrite Urine Negative NEG^Negative    Leukocyte Esterase Urine Negative NEG^Negative    Source Midstream Urine     WBC Urine 1 0 - 5 /HPF    RBC Urine 1 0 - 2 /HPF    Mucous Urine Present (A) NEG^Negative /LPF    Hyaline Casts 27 (H) 0 - 2 /LPF   Protein  random urine with Creat Ratio    Collection Time: 04/18/19  9:50 AM   Result Value Ref Range    Protein Random Urine 6.05 g/L    Protein Total Urine g/gr Creatinine 3.20 (H) 0 - 0.2 g/g Cr   Creatinine urine calculation only    Collection Time: 04/18/19  9:50 AM   Result Value Ref Range    Creatinine Urine 189 mg/dL   CBC with platelets differential    Collection Time: 04/18/19 10:40 AM   Result Value Ref Range    WBC 7.7 4.0 - 11.0 10e9/L    RBC Count 4.94 4.4 - 5.9 10e12/L    Hemoglobin 13.9 13.3 - 17.7 g/dL    Hematocrit 41.6 40.0 - 53.0 %    MCV 84 78 - 100 fl    MCH 28.1 26.5 - 33.0 pg    MCHC 33.4 31.5 - 36.5 g/dL    RDW 12.5 10.0 - 15.0 %    Platelet Count 326 150 - 450 10e9/L    Diff Method Automated Method     % Neutrophils 46.5 %    % Lymphocytes 39.9 %    % Monocytes 9.2 %    % Eosinophils 2.7 %    % Basophils 1.0 %    % Immature Granulocytes 0.7 %    Nucleated RBCs 0 0 /100    Absolute Neutrophil 3.6 1.6 - 8.3 10e9/L     Absolute Lymphocytes 3.1 0.8 - 5.3 10e9/L    Absolute Monocytes 0.7 0.0 - 1.3 10e9/L    Absolute Eosinophils 0.2 0.0 - 0.7 10e9/L    Absolute Basophils 0.1 0.0 - 0.2 10e9/L    Abs Immature Granulocytes 0.1 0 - 0.4 10e9/L    Absolute Nucleated RBC 0.0    Renal panel    Collection Time: 04/18/19 10:40 AM   Result Value Ref Range    Sodium 141 133 - 144 mmol/L    Potassium 3.8 3.4 - 5.3 mmol/L    Chloride 110 (H) 94 - 109 mmol/L    Carbon Dioxide 24 20 - 32 mmol/L    Anion Gap 7 3 - 14 mmol/L    Glucose 91 70 - 99 mg/dL    Urea Nitrogen 20 7 - 30 mg/dL    Creatinine 0.91 0.66 - 1.25 mg/dL    GFR Estimate >90 >60 mL/min/[1.73_m2]    GFR Estimate If Black >90 >60 mL/min/[1.73_m2]    Calcium 8.4 (L) 8.5 - 10.1 mg/dL    Phosphorus 4.0 2.5 - 4.5 mg/dL    Albumin 2.2 (L) 3.4 - 5.0 g/dL     Albumin   Date Value Ref Range Status   04/18/2019 2.2 (L) 3.4 - 5.0 g/dL Final   03/08/2019 2.5 (L) 3.4 - 5.0 g/dL Final   03/02/2019 2.4 (L) 3.4 - 5.0 g/dL Final   02/14/2019 2.7 (L) 3.4 - 5.0 g/dL Final   02/09/2019 3.0 (L) 3.4 - 5.0 g/dL Final   01/12/2019 3.6 3.4 - 5.0 g/dL Final   11/29/2018 3.7 3.4 - 5.0 g/dL Final   11/01/2018 3.6 3.4 - 5.0 g/dL Final   09/12/2018 3.8 3.4 - 5.0 g/dL Final   07/26/2018 3.7 3.4 - 5.0 g/dL Final     Protein Total Urine g/gr Creatinine   Date Value Ref Range Status   04/18/2019 3.20 (H) 0 - 0.2 g/g Cr Final   03/02/2019 2.75 (H) 0 - 0.2 g/g Cr Final   02/09/2019 2.74 (H) 0 - 0.2 g/g Cr Final   01/12/2019 0.42 (H) 0 - 0.2 g/g Cr Final   11/29/2018 0.50 (H) 0 - 0.2 g/g Cr Final   11/01/2018 0.45 (H) 0 - 0.2 g/g Cr Final   09/12/2018 1.26 (H) 0 - 0.2 g/g Cr Final   07/26/2018 2.00 (H) 0 - 0.2 g/g Cr Final   06/02/2018 1.69 (H) 0 - 0.2 g/g Cr Final   04/25/2018 2.74 (H) 0 - 0.2 g/g Cr Final       Creatinine   Date Value Ref Range Status   04/18/2019 0.91 0.66 - 1.25 mg/dL Final   03/08/2019 0.95 0.66 - 1.25 mg/dL Final   03/02/2019 0.99 0.66 - 1.25 mg/dL Final   02/14/2019 0.86 0.66 - 1.25 mg/dL Final    02/09/2019 0.93 0.66 - 1.25 mg/dL Final   01/12/2019 0.94 0.66 - 1.25 mg/dL Final   11/29/2018 0.88 0.66 - 1.25 mg/dL Final   11/01/2018 0.93 0.66 - 1.25 mg/dL Final   09/12/2018 0.98 0.66 - 1.25 mg/dL Final   07/26/2018 0.77 0.66 - 1.25 mg/dL Final       FINAL DIAGNOSIS:   A:     Kidney, native, LEFT; percutaneous needle biopsy:   - Focal segmental glomerulosclerosis     This biopsy shows segmental sclerosis and hyalinosis affecting   approximately 30% of glomeruli examined in a pattern diagnostic for   focal segmental glomerulosclerosis.  Although sclerotic segments focally   exhibit increased cellularity, capillary distention by intra-luminal   inflammatory cells is not observed and this specimen does not meet   strict criteria for cellular variant of focal segmental   glomerulosclerosis.  However, in view of the proportion of glomeruli   exhibiting segmental sclerosis and the increased cellularity observed in   sclerotic segments it is expected that this condition may follow an   aggressive course.                 Assessment and Plan:   23 year old male with with nephrotic syndrome, biopsy in August 2016 showing FSGS. Steroids started 8/23/2016 and relapsed during weaning in January 2018, with proteinuria up from 0.28 g/g rocky to 4.9 grams on 1/31/18  1. FSGS- Mr Trevino has clear relapse of proteinuria and recurrent hypoalbuminemia after decreasing the dose of prednisone.  He has not had a significant improvement in proteinuria or hypoalbuminemia despite the increased dose of tacrolimus.    He had already taken his dose of Tacrolimus today (trough not obtainable)  He is to INCREASE Tacrolimus to 2 mg BID, and have repeat labs in 5-7 days.  We will also follow up in 4 weeks to see if the increased dose provides some decrease in proteinuria.  After that, the next step would be to possibly participate in a clinical trial for FSGS.  We discussed the trials for CCR2 antagonist which would allow him to stay on  the same current meds.  THis will be re-evaluated in 4 weeks    2. Prophylaxis- He has NOT been taking  bactrim   3. Hyperlipidemia.  He did not start the atorvastatin as prescribed at the last visit.  I instructed him to hold off on starting it, as I do not wish to start both a CNI and Atorvastatin at the same time.  Will plan to start Atorvastatin when he is on a stable target dose of tacrolimus.    Mr Trevino also met again with Ms Qian Coleman, who introduced to him the CCX 011_140 or CCX 012_140 trial.      José Miguel Jean MD  Division of Renal Disease and Hypertension  Pager: [phone]  April 18, 2019

## 2019-04-18 NOTE — NURSING NOTE
"Chief Complaint   Patient presents with     RECHECK     FSGS     Vital signs:  Temp: 97.5  F (36.4  C) Temp src: Oral BP: 138/84 Pulse: 72   Resp: 16 SpO2: 96 %     Height: 190.5 cm (6' 3\") Weight: 125.8 kg (277 lb 6.4 oz)  Estimated body mass index is 34.67 kg/m  as calculated from the following:    Height as of this encounter: 1.905 m (6' 3\").    Weight as of this encounter: 125.8 kg (277 lb 6.4 oz).        Yvonne Bull [facility]  4/18/2019 10:57 AM      "

## 2019-04-18 NOTE — PROGRESS NOTES
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     HPI:    Initial presentation  Summer 2016: Presented with several months history of swelling, noted to be nephrotic, and renal biopsy in August 2016 showed FSGS. He was started on steroids 8/23/16.  Remainded on steroids 100 mg x several months.  He had more and more abdominal striae and his acne is significant  His proteinuria was down to 1-2 grams then subgram at 0.7g which is great, but unfortunately when he was down to 10mg prednisone, his proteinuria increased back to 5 grams In Jan 2018 Along with with low albumin and swelling.    He went back on Pred at 100 mg day  With a surprizing transient dip in protenuria to 1.5 in March, but eventually proteinuria decreased to UPCR < 3 startuing in April 2018   Last UPCR  1.24 g/g on 11/1/18.  He was seen on 11/29/18 at which time we initiated a tapering schedule of prednisone, as he was in partial remission on therapy (UPCR < 1).    1/12/19  Currently feeling well; \"a lot more normal\"\"not buzzing as much\"  He is down to Prednisone 20 mg daily  Still has edema in L Ext and periorbital in the am.  Swelling is stable compared to last visit  Has lost 2.5 kg since Nov.  Has not noted NEW striae.  Skin feels more dry (winter?)  Sleep ok.  Energy is good.  Appetite a bit decreased  Hot flashes and sweating are almost gone!       2/9/19  Feels generally well  Reports abd pain at night every few days, lasts 2-3 hours.  Started before we started Tacrol.  No N, V, Diarrhea.  Otherwise tolerating tacrolimus well.  Tapering down on prednisone.    3/2/19  Reports some wooziness/dizziness on occasions.  No HA\No tremors. Still having L Ext swelling.  Feels \"tighter\" compared to last month.    4/19/19  Mr Trevino is here for follow up after increasing the dose of Tacrolimus to 1.5 mg BID.  A follow up trough level 10 days later was at 4.4.  He tolerated the increased " dose without adverse effects reported.  He denies GI, or  symptoms.  No HA or tremor.  He continues to have L Ext swelling, but it is mild to moderate, and admits to frequently forgetting the pm dose of furosemide.  He has no other complaints     ROS:  A comprehensive review of systems was obtained and negative, except as noted in the HPI or PMH.  Reports some Heartburn at night, but less frequent.  No vomiting.  No diarrhea, melena or hematochezia.  No CP, SOB.      Active Medical Problems:  Patient Active Problem List   Diagnosis     FSGS (focal segmental glomerulosclerosis)     Nephrotic syndrome       Personal Hx:   Social History     Socioeconomic History     Marital status: Single     Spouse name: Not on file     Number of children: Not on file     Years of education: Not on file     Highest education level: Not on file   Occupational History     Not on file   Social Needs     Financial resource strain: Not on file     Food insecurity:     Worry: Not on file     Inability: Not on file     Transportation needs:     Medical: Not on file     Non-medical: Not on file   Tobacco Use     Smoking status: Never Smoker     Smokeless tobacco: Never Used   Substance and Sexual Activity     Alcohol use: Yes     Comment: social     Drug use: No     Sexual activity: Yes   Lifestyle     Physical activity:     Days per week: Not on file     Minutes per session: Not on file     Stress: Not on file   Relationships     Social connections:     Talks on phone: Not on file     Gets together: Not on file     Attends Anabaptist service: Not on file     Active member of club or organization: Not on file     Attends meetings of clubs or organizations: Not on file     Relationship status: Not on file     Intimate partner violence:     Fear of current or ex partner: Not on file     Emotionally abused: Not on file     Physically abused: Not on file     Forced sexual activity: Not on file   Other Topics Concern     Parent/sibling w/ CABG,  "MI or angioplasty before 65F 55M? Not Asked   Social History Narrative     Not on file       Allergies:  Allergies   Allergen Reactions     Amoxicillin      Other reaction(s): EENT - other (explain in comments), SKIN - rash  Rash and lip swelling after 8 days of Amoxicillin     Erythromycin      Penicillins        Medications:  Current Outpatient Medications   Medication Sig Dispense Refill     Calcium Carb-Cholecalciferol (CALCIUM 500 +D) 500-400 MG-UNIT TABS Take 500 mg by mouth daily 30 tablet 0     lisinopril (PRINIVIL/ZESTRIL) 20 MG tablet Take two tablets in AM and one tablet in PM 90 tablet 11     omeprazole (PRILOSEC) 20 MG CR capsule TAKE 1 CAPSULE (20 MG) BY MOUTH DAILY 30 capsule 11     PROGRAF (BRAND) 0.5 MG capsule Take 3 capsules (1.5 mg mg) by mouth 2 times daily 180 capsule 3     atorvastatin (LIPITOR) 10 MG tablet Take 1 tablet (10 mg) by mouth daily (Patient not taking: Reported on 1/12/2019) 30 tablet Has not started.     furosemide (LASIX) 20 MG tablet Take 1 tablet (20 mg) by mouth daily As needed (Patient not taking: Reported on 1/12/2019) 30 tablet      predniSONE (DELTASONE) 10 MG tablet Take 1 tablet (10 mg) by mouth daily (Patient not taking: Reported on 2/9/2019.) 60 tablet Currently taking 10 mg alt 5 mg           Vitals:  /84 (BP Location: Left arm, Patient Position: Sitting, Cuff Size: Adult Large)   Pulse 72   Temp 97.5  F (36.4  C) (Oral)   Resp 16   Ht 1.905 m (6' 3\")   Wt 125.8 kg (277 lb 6.4 oz)   SpO2 96%   BMI 34.67 kg/m      Exam:  GENERAL APPEARANCE: alert and no distress.  Cushingoid facies is improved.  HENT: mouth without ulcers or lesions  LYMPHATICS: no cervical or supraclavicular nodes  RESP: lungs clear to auscultation - no rales, rhonchi or wheezes  CV: regular rhythm, normal rate, no rub, no murmur  EDEMA: trace-1+ LE edema bilaterally  ABDOMEN: soft, nondistended, nontender, bowel sounds normal  MS: extremities normal - no gross deformities noted, no " evidence of inflammation in joints, no muscle tenderness  SKIN: no rash.  Marked diffuse striae    Results:  Recent Results (from the past 504 hour(s))   Routine UA with micro reflex to culture    Collection Time: 04/18/19  9:50 AM   Result Value Ref Range    Color Urine Yellow     Appearance Urine Slightly Cloudy     Glucose Urine Negative NEG^Negative mg/dL    Bilirubin Urine Negative NEG^Negative    Ketones Urine Negative NEG^Negative mg/dL    Specific Gravity Urine 1.017 1.003 - 1.035    Blood Urine Negative NEG^Negative    pH Urine 5.0 5.0 - 7.0 pH    Protein Albumin Urine >499 (A) NEG^Negative mg/dL    Urobilinogen mg/dL 0.0 0.0 - 2.0 mg/dL    Nitrite Urine Negative NEG^Negative    Leukocyte Esterase Urine Negative NEG^Negative    Source Midstream Urine     WBC Urine 1 0 - 5 /HPF    RBC Urine 1 0 - 2 /HPF    Mucous Urine Present (A) NEG^Negative /LPF    Hyaline Casts 27 (H) 0 - 2 /LPF   Protein  random urine with Creat Ratio    Collection Time: 04/18/19  9:50 AM   Result Value Ref Range    Protein Random Urine 6.05 g/L    Protein Total Urine g/gr Creatinine 3.20 (H) 0 - 0.2 g/g Cr   Creatinine urine calculation only    Collection Time: 04/18/19  9:50 AM   Result Value Ref Range    Creatinine Urine 189 mg/dL   CBC with platelets differential    Collection Time: 04/18/19 10:40 AM   Result Value Ref Range    WBC 7.7 4.0 - 11.0 10e9/L    RBC Count 4.94 4.4 - 5.9 10e12/L    Hemoglobin 13.9 13.3 - 17.7 g/dL    Hematocrit 41.6 40.0 - 53.0 %    MCV 84 78 - 100 fl    MCH 28.1 26.5 - 33.0 pg    MCHC 33.4 31.5 - 36.5 g/dL    RDW 12.5 10.0 - 15.0 %    Platelet Count 326 150 - 450 10e9/L    Diff Method Automated Method     % Neutrophils 46.5 %    % Lymphocytes 39.9 %    % Monocytes 9.2 %    % Eosinophils 2.7 %    % Basophils 1.0 %    % Immature Granulocytes 0.7 %    Nucleated RBCs 0 0 /100    Absolute Neutrophil 3.6 1.6 - 8.3 10e9/L    Absolute Lymphocytes 3.1 0.8 - 5.3 10e9/L    Absolute Monocytes 0.7 0.0 - 1.3 10e9/L     Absolute Eosinophils 0.2 0.0 - 0.7 10e9/L    Absolute Basophils 0.1 0.0 - 0.2 10e9/L    Abs Immature Granulocytes 0.1 0 - 0.4 10e9/L    Absolute Nucleated RBC 0.0    Renal panel    Collection Time: 04/18/19 10:40 AM   Result Value Ref Range    Sodium 141 133 - 144 mmol/L    Potassium 3.8 3.4 - 5.3 mmol/L    Chloride 110 (H) 94 - 109 mmol/L    Carbon Dioxide 24 20 - 32 mmol/L    Anion Gap 7 3 - 14 mmol/L    Glucose 91 70 - 99 mg/dL    Urea Nitrogen 20 7 - 30 mg/dL    Creatinine 0.91 0.66 - 1.25 mg/dL    GFR Estimate >90 >60 mL/min/[1.73_m2]    GFR Estimate If Black >90 >60 mL/min/[1.73_m2]    Calcium 8.4 (L) 8.5 - 10.1 mg/dL    Phosphorus 4.0 2.5 - 4.5 mg/dL    Albumin 2.2 (L) 3.4 - 5.0 g/dL     Albumin   Date Value Ref Range Status   04/18/2019 2.2 (L) 3.4 - 5.0 g/dL Final   03/08/2019 2.5 (L) 3.4 - 5.0 g/dL Final   03/02/2019 2.4 (L) 3.4 - 5.0 g/dL Final   02/14/2019 2.7 (L) 3.4 - 5.0 g/dL Final   02/09/2019 3.0 (L) 3.4 - 5.0 g/dL Final   01/12/2019 3.6 3.4 - 5.0 g/dL Final   11/29/2018 3.7 3.4 - 5.0 g/dL Final   11/01/2018 3.6 3.4 - 5.0 g/dL Final   09/12/2018 3.8 3.4 - 5.0 g/dL Final   07/26/2018 3.7 3.4 - 5.0 g/dL Final     Protein Total Urine g/gr Creatinine   Date Value Ref Range Status   04/18/2019 3.20 (H) 0 - 0.2 g/g Cr Final   03/02/2019 2.75 (H) 0 - 0.2 g/g Cr Final   02/09/2019 2.74 (H) 0 - 0.2 g/g Cr Final   01/12/2019 0.42 (H) 0 - 0.2 g/g Cr Final   11/29/2018 0.50 (H) 0 - 0.2 g/g Cr Final   11/01/2018 0.45 (H) 0 - 0.2 g/g Cr Final   09/12/2018 1.26 (H) 0 - 0.2 g/g Cr Final   07/26/2018 2.00 (H) 0 - 0.2 g/g Cr Final   06/02/2018 1.69 (H) 0 - 0.2 g/g Cr Final   04/25/2018 2.74 (H) 0 - 0.2 g/g Cr Final       Creatinine   Date Value Ref Range Status   04/18/2019 0.91 0.66 - 1.25 mg/dL Final   03/08/2019 0.95 0.66 - 1.25 mg/dL Final   03/02/2019 0.99 0.66 - 1.25 mg/dL Final   02/14/2019 0.86 0.66 - 1.25 mg/dL Final   02/09/2019 0.93 0.66 - 1.25 mg/dL Final   01/12/2019 0.94 0.66 - 1.25 mg/dL Final    11/29/2018 0.88 0.66 - 1.25 mg/dL Final   11/01/2018 0.93 0.66 - 1.25 mg/dL Final   09/12/2018 0.98 0.66 - 1.25 mg/dL Final   07/26/2018 0.77 0.66 - 1.25 mg/dL Final       FINAL DIAGNOSIS:   A:     Kidney, native, LEFT; percutaneous needle biopsy:   - Focal segmental glomerulosclerosis     This biopsy shows segmental sclerosis and hyalinosis affecting   approximately 30% of glomeruli examined in a pattern diagnostic for   focal segmental glomerulosclerosis.  Although sclerotic segments focally   exhibit increased cellularity, capillary distention by intra-luminal   inflammatory cells is not observed and this specimen does not meet   strict criteria for cellular variant of focal segmental   glomerulosclerosis.  However, in view of the proportion of glomeruli   exhibiting segmental sclerosis and the increased cellularity observed in   sclerotic segments it is expected that this condition may follow an   aggressive course.                 Assessment and Plan:   23 year old male with with nephrotic syndrome, biopsy in August 2016 showing FSGS. Steroids started 8/23/2016 and relapsed during weaning in January 2018, with proteinuria up from 0.28 g/g rocky to 4.9 grams on 1/31/18  1. FSGS- Mr Trevino has clear relapse of proteinuria and recurrent hypoalbuminemia after decreasing the dose of prednisone.  He has not had a significant improvement in proteinuria or hypoalbuminemia despite the increased dose of tacrolimus.    He had already taken his dose of Tacrolimus today (trough not obtainable)  He is to INCREASE Tacrolimus to 2 mg BID, and have repeat labs in 5-7 days.  We will also follow up in 4 weeks to see if the increased dose provides some decrease in proteinuria.  After that, the next step would be to possibly participate in a clinical trial for FSGS.  We discussed the trials for CCR2 antagonist which would allow him to stay on the same current meds.  THis will be re-evaluated in 4 weeks    2. Prophylaxis- He  has NOT been taking  bactrim   3. Hyperlipidemia.  He did not start the atorvastatin as prescribed at the last visit.  I instructed him to hold off on starting it, as I do not wish to start both a CNI and Atorvastatin at the same time.  Will plan to start Atorvastatin when he is on a stable target dose of tacrolimus.    Mr Trevino also met again with Ms Qian Coleman, who introduced to him the CCX 011_140 or CCX 012_140 trial.      José Miguel Jean MD  Division of Renal Disease and Hypertension  Pager: [phone]  April 18, 2019

## 2019-04-18 NOTE — PATIENT INSTRUCTIONS
Please INCREASE the TACROLIMUS to 2 mg (4 tablets) TWICE daily.  Please remember to take FUROSEMIDE TWICE daily.  Come to have lab drawn in 7-10 BEFORE taking the TACROLIMUS

## 2019-04-18 NOTE — LETTER
"4/18/2019       RE: Ethan Trevino  [address]  [facility] [address]     Dear Colleague,    Thank you for referring your patient, Ethan Trevino, to the [facility] NEPHROLOGY at [facility]. Please see a copy of my visit note below.      Reason for Visit:  Ethan Trevino is a 23 year old male with FSGS, who presents for routine follow up.     HPI:    Initial presentation  Summer 2016: Presented with several months history of swelling, noted to be nephrotic, and renal biopsy in August 2016 showed FSGS. He was started on steroids 8/23/16.  Remainded on steroids 100 mg x several months.  He had more and more abdominal striae and his acne is significant  His proteinuria was down to 1-2 grams then subgram at 0.7g which is great, but unfortunately when he was down to 10mg prednisone, his proteinuria increased back to 5 grams In Jan 2018 Along with with low albumin and swelling.    He went back on Pred at 100 mg day  With a surprizing transient dip in protenuria to 1.5 in March, but eventually proteinuria decreased to UPCR < 3 startuing in April 2018   Last UPCR  1.24 g/g on 11/1/18.  He was seen on 11/29/18 at which time we initiated a tapering schedule of prednisone, as he was in partial remission on therapy (UPCR < 1).    1/12/19  Currently feeling well; \"a lot more normal\"\"not buzzing as much\"  He is down to Prednisone 20 mg daily  Still has edema in L Ext and periorbital in the am.  Swelling is stable compared to last visit  Has lost 2.5 kg since Nov.  Has not noted NEW striae.  Skin feels more dry (winter?)  Sleep ok.  Energy is good.  Appetite a bit decreased  Hot flashes and sweating are almost gone!       2/9/19  Feels generally well  Reports abd pain at night every few days, lasts 2-3 hours.  Started before we started Tacrol.  No N, V, Diarrhea.  Otherwise tolerating tacrolimus well.  Tapering down on prednisone.    3/2/19  Reports some " "wooziness/dizziness on occasions.  No HA\No tremors. Still having L Ext swelling.  Feels \"tighter\" compared to last month.    4/19/19  Mr Trevino is here for follow up after increasing the dose of Tacrolimus to 1.5 mg BID.  A follow up trough level 10 days later was at 4.4.  He tolerated the increased dose without adverse effects reported.  He denies GI, or  symptoms.  No HA or tremor.  He continues to have L Ext swelling, but it is mild to moderate, and admits to frequently forgetting the pm dose of furosemide.  He has no other complaints     ROS:  A comprehensive review of systems was obtained and negative, except as noted in the HPI or PMH.  Reports some Heartburn at night, but less frequent.  No vomiting.  No diarrhea, melena or hematochezia.  No CP, SOB.      Active Medical Problems:  Patient Active Problem List   Diagnosis     FSGS (focal segmental glomerulosclerosis)     Nephrotic syndrome       Personal Hx:   Social History     Socioeconomic History     Marital status: Single     Spouse name: Not on file     Number of children: Not on file     Years of education: Not on file     Highest education level: Not on file   Occupational History     Not on file   Social Needs     Financial resource strain: Not on file     Food insecurity:     Worry: Not on file     Inability: Not on file     Transportation needs:     Medical: Not on file     Non-medical: Not on file   Tobacco Use     Smoking status: Never Smoker     Smokeless tobacco: Never Used   Substance and Sexual Activity     Alcohol use: Yes     Comment: social     Drug use: No     Sexual activity: Yes   Lifestyle     Physical activity:     Days per week: Not on file     Minutes per session: Not on file     Stress: Not on file   Relationships     Social connections:     Talks on phone: Not on file     Gets together: Not on file     Attends Spiritism service: Not on file     Active member of club or organization: Not on file     Attends meetings of clubs " "or organizations: Not on file     Relationship status: Not on file     Intimate partner violence:     Fear of current or ex partner: Not on file     Emotionally abused: Not on file     Physically abused: Not on file     Forced sexual activity: Not on file   Other Topics Concern     Parent/sibling w/ CABG, MI or angioplasty before 65F 55M? Not Asked   Social History Narrative     Not on file       Allergies:  Allergies   Allergen Reactions     Amoxicillin      Other reaction(s): EENT - other (explain in comments), SKIN - rash  Rash and lip swelling after 8 days of Amoxicillin     Erythromycin      Penicillins        Medications:  Current Outpatient Medications   Medication Sig Dispense Refill     Calcium Carb-Cholecalciferol (CALCIUM 500 +D) 500-400 MG-UNIT TABS Take 500 mg by mouth daily 30 tablet 0     lisinopril (PRINIVIL/ZESTRIL) 20 MG tablet Take two tablets in AM and one tablet in PM 90 tablet 11     omeprazole (PRILOSEC) 20 MG CR capsule TAKE 1 CAPSULE (20 MG) BY MOUTH DAILY 30 capsule 11     PROGRAF (BRAND) 0.5 MG capsule Take 3 capsules (1.5 mg mg) by mouth 2 times daily 180 capsule 3     atorvastatin (LIPITOR) 10 MG tablet Take 1 tablet (10 mg) by mouth daily (Patient not taking: Reported on 1/12/2019) 30 tablet Has not started.     furosemide (LASIX) 20 MG tablet Take 1 tablet (20 mg) by mouth daily As needed (Patient not taking: Reported on 1/12/2019) 30 tablet      predniSONE (DELTASONE) 10 MG tablet Take 1 tablet (10 mg) by mouth daily (Patient not taking: Reported on 2/9/2019.) 60 tablet Currently taking 10 mg alt 5 mg           Vitals:  /84 (BP Location: Left arm, Patient Position: Sitting, Cuff Size: Adult Large)   Pulse 72   Temp 97.5  F (36.4  C) (Oral)   Resp 16   Ht 1.905 m (6' 3\")   Wt 125.8 kg (277 lb 6.4 oz)   SpO2 96%   BMI 34.67 kg/m       Exam:  GENERAL APPEARANCE: alert and no distress.  Cushingoid facies is improved.  HENT: mouth without ulcers or lesions  LYMPHATICS: no " cervical or supraclavicular nodes  RESP: lungs clear to auscultation - no rales, rhonchi or wheezes  CV: regular rhythm, normal rate, no rub, no murmur  EDEMA: trace-1+ LE edema bilaterally  ABDOMEN: soft, nondistended, nontender, bowel sounds normal  MS: extremities normal - no gross deformities noted, no evidence of inflammation in joints, no muscle tenderness  SKIN: no rash.  Marked diffuse striae    Results:  Recent Results (from the past 504 hour(s))   Routine UA with micro reflex to culture    Collection Time: 04/18/19  9:50 AM   Result Value Ref Range    Color Urine Yellow     Appearance Urine Slightly Cloudy     Glucose Urine Negative NEG^Negative mg/dL    Bilirubin Urine Negative NEG^Negative    Ketones Urine Negative NEG^Negative mg/dL    Specific Gravity Urine 1.017 1.003 - 1.035    Blood Urine Negative NEG^Negative    pH Urine 5.0 5.0 - 7.0 pH    Protein Albumin Urine >499 (A) NEG^Negative mg/dL    Urobilinogen mg/dL 0.0 0.0 - 2.0 mg/dL    Nitrite Urine Negative NEG^Negative    Leukocyte Esterase Urine Negative NEG^Negative    Source Midstream Urine     WBC Urine 1 0 - 5 /HPF    RBC Urine 1 0 - 2 /HPF    Mucous Urine Present (A) NEG^Negative /LPF    Hyaline Casts 27 (H) 0 - 2 /LPF   Protein  random urine with Creat Ratio    Collection Time: 04/18/19  9:50 AM   Result Value Ref Range    Protein Random Urine 6.05 g/L    Protein Total Urine g/gr Creatinine 3.20 (H) 0 - 0.2 g/g Cr   Creatinine urine calculation only    Collection Time: 04/18/19  9:50 AM   Result Value Ref Range    Creatinine Urine 189 mg/dL   CBC with platelets differential    Collection Time: 04/18/19 10:40 AM   Result Value Ref Range    WBC 7.7 4.0 - 11.0 10e9/L    RBC Count 4.94 4.4 - 5.9 10e12/L    Hemoglobin 13.9 13.3 - 17.7 g/dL    Hematocrit 41.6 40.0 - 53.0 %    MCV 84 78 - 100 fl    MCH 28.1 26.5 - 33.0 pg    MCHC 33.4 31.5 - 36.5 g/dL    RDW 12.5 10.0 - 15.0 %    Platelet Count 326 150 - 450 10e9/L    Diff Method Automated Method      % Neutrophils 46.5 %    % Lymphocytes 39.9 %    % Monocytes 9.2 %    % Eosinophils 2.7 %    % Basophils 1.0 %    % Immature Granulocytes 0.7 %    Nucleated RBCs 0 0 /100    Absolute Neutrophil 3.6 1.6 - 8.3 10e9/L    Absolute Lymphocytes 3.1 0.8 - 5.3 10e9/L    Absolute Monocytes 0.7 0.0 - 1.3 10e9/L    Absolute Eosinophils 0.2 0.0 - 0.7 10e9/L    Absolute Basophils 0.1 0.0 - 0.2 10e9/L    Abs Immature Granulocytes 0.1 0 - 0.4 10e9/L    Absolute Nucleated RBC 0.0    Renal panel    Collection Time: 04/18/19 10:40 AM   Result Value Ref Range    Sodium 141 133 - 144 mmol/L    Potassium 3.8 3.4 - 5.3 mmol/L    Chloride 110 (H) 94 - 109 mmol/L    Carbon Dioxide 24 20 - 32 mmol/L    Anion Gap 7 3 - 14 mmol/L    Glucose 91 70 - 99 mg/dL    Urea Nitrogen 20 7 - 30 mg/dL    Creatinine 0.91 0.66 - 1.25 mg/dL    GFR Estimate >90 >60 mL/min/[1.73_m2]    GFR Estimate If Black >90 >60 mL/min/[1.73_m2]    Calcium 8.4 (L) 8.5 - 10.1 mg/dL    Phosphorus 4.0 2.5 - 4.5 mg/dL    Albumin 2.2 (L) 3.4 - 5.0 g/dL     Albumin   Date Value Ref Range Status   04/18/2019 2.2 (L) 3.4 - 5.0 g/dL Final   03/08/2019 2.5 (L) 3.4 - 5.0 g/dL Final   03/02/2019 2.4 (L) 3.4 - 5.0 g/dL Final   02/14/2019 2.7 (L) 3.4 - 5.0 g/dL Final   02/09/2019 3.0 (L) 3.4 - 5.0 g/dL Final   01/12/2019 3.6 3.4 - 5.0 g/dL Final   11/29/2018 3.7 3.4 - 5.0 g/dL Final   11/01/2018 3.6 3.4 - 5.0 g/dL Final   09/12/2018 3.8 3.4 - 5.0 g/dL Final   07/26/2018 3.7 3.4 - 5.0 g/dL Final     Protein Total Urine g/gr Creatinine   Date Value Ref Range Status   04/18/2019 3.20 (H) 0 - 0.2 g/g Cr Final   03/02/2019 2.75 (H) 0 - 0.2 g/g Cr Final   02/09/2019 2.74 (H) 0 - 0.2 g/g Cr Final   01/12/2019 0.42 (H) 0 - 0.2 g/g Cr Final   11/29/2018 0.50 (H) 0 - 0.2 g/g Cr Final   11/01/2018 0.45 (H) 0 - 0.2 g/g Cr Final   09/12/2018 1.26 (H) 0 - 0.2 g/g Cr Final   07/26/2018 2.00 (H) 0 - 0.2 g/g Cr Final   06/02/2018 1.69 (H) 0 - 0.2 g/g Cr Final   04/25/2018 2.74 (H) 0 - 0.2 g/g Cr Final        Creatinine   Date Value Ref Range Status   04/18/2019 0.91 0.66 - 1.25 mg/dL Final   03/08/2019 0.95 0.66 - 1.25 mg/dL Final   03/02/2019 0.99 0.66 - 1.25 mg/dL Final   02/14/2019 0.86 0.66 - 1.25 mg/dL Final   02/09/2019 0.93 0.66 - 1.25 mg/dL Final   01/12/2019 0.94 0.66 - 1.25 mg/dL Final   11/29/2018 0.88 0.66 - 1.25 mg/dL Final   11/01/2018 0.93 0.66 - 1.25 mg/dL Final   09/12/2018 0.98 0.66 - 1.25 mg/dL Final   07/26/2018 0.77 0.66 - 1.25 mg/dL Final       FINAL DIAGNOSIS:   A:     Kidney, native, LEFT; percutaneous needle biopsy:   - Focal segmental glomerulosclerosis     This biopsy shows segmental sclerosis and hyalinosis affecting   approximately 30% of glomeruli examined in a pattern diagnostic for   focal segmental glomerulosclerosis.  Although sclerotic segments focally   exhibit increased cellularity, capillary distention by intra-luminal   inflammatory cells is not observed and this specimen does not meet   strict criteria for cellular variant of focal segmental   glomerulosclerosis.  However, in view of the proportion of glomeruli   exhibiting segmental sclerosis and the increased cellularity observed in   sclerotic segments it is expected that this condition may follow an   aggressive course.                 Assessment and Plan:   23 year old male with with nephrotic syndrome, biopsy in August 2016 showing FSGS. Steroids started 8/23/2016 and relapsed during weaning in January 2018, with proteinuria up from 0.28 g/g rocky to 4.9 grams on 1/31/18  1. FSGS- Mr Trevino has clear relapse of proteinuria and recurrent hypoalbuminemia after decreasing the dose of prednisone.  He has not had a significant improvement in proteinuria or hypoalbuminemia despite the increased dose of tacrolimus.    He had already taken his dose of Tacrolimus today (trough not obtainable)  He is to INCREASE Tacrolimus to 2 mg BID, and have repeat labs in 5-7 days.  We will also follow up in 4 weeks to see if the  increased dose provides some decrease in proteinuria.  After that, the next step would be to possibly participate in a clinical trial for FSGS.  We discussed the trials for CCR2 antagonist which would allow him to stay on the same current meds.  THis will be re-evaluated in 4 weeks    2. Prophylaxis- He has NOT been taking  bactrim   3. Hyperlipidemia.  He did not start the atorvastatin as prescribed at the last visit.  I instructed him to hold off on starting it, as I do not wish to start both a CNI and Atorvastatin at the same time.  Will plan to start Atorvastatin when he is on a stable target dose of tacrolimus.    Mr Trevino also met again with Ms Qian Coleman, who introduced to him the CCX 011_140 or CCX 012_140 trial.      José Miguel Jean MD  Division of Renal Disease and Hypertension  Pager: [phone]  April 18, 2019

## 2019-04-25 DIAGNOSIS — N05.1 FSGS (FOCAL SEGMENTAL GLOMERULOSCLEROSIS): ICD-10-CM

## 2019-04-25 LAB
ALBUMIN SERPL-MCNC: 2.3 G/DL (ref 3.4–5)
ANION GAP SERPL CALCULATED.3IONS-SCNC: 4 MMOL/L (ref 3–14)
BUN SERPL-MCNC: 21 MG/DL (ref 7–30)
CALCIUM SERPL-MCNC: 8.6 MG/DL (ref 8.5–10.1)
CHLORIDE SERPL-SCNC: 111 MMOL/L (ref 94–109)
CO2 SERPL-SCNC: 28 MMOL/L (ref 20–32)
CREAT SERPL-MCNC: 1.09 MG/DL (ref 0.66–1.25)
GFR SERPL CREATININE-BSD FRML MDRD: >90 ML/MIN/{1.73_M2}
GLUCOSE SERPL-MCNC: 91 MG/DL (ref 70–99)
PHOSPHATE SERPL-MCNC: 3.7 MG/DL (ref 2.5–4.5)
POTASSIUM SERPL-SCNC: 4.4 MMOL/L (ref 3.4–5.3)
SODIUM SERPL-SCNC: 143 MMOL/L (ref 133–144)
TACROLIMUS BLD-MCNC: 7.3 UG/L (ref 5–15)
TME LAST DOSE: 2115 H

## 2019-05-16 ENCOUNTER — OFFICE VISIT (OUTPATIENT)
Dept: NEPHROLOGY | Facility: CLINIC | Age: 24
End: 2019-05-16
Attending: INTERNAL MEDICINE
Payer: COMMERCIAL

## 2019-05-16 VITALS
HEIGHT: 75 IN | SYSTOLIC BLOOD PRESSURE: 135 MMHG | DIASTOLIC BLOOD PRESSURE: 80 MMHG | WEIGHT: 271.2 LBS | BODY MASS INDEX: 33.72 KG/M2 | OXYGEN SATURATION: 96 % | HEART RATE: 74 BPM

## 2019-05-16 DIAGNOSIS — N05.1 FSGS (FOCAL SEGMENTAL GLOMERULOSCLEROSIS): ICD-10-CM

## 2019-05-16 DIAGNOSIS — N05.1 FSGS (FOCAL SEGMENTAL GLOMERULOSCLEROSIS): Primary | ICD-10-CM

## 2019-05-16 LAB
ALBUMIN UR-MCNC: >499 MG/DL
APPEARANCE UR: ABNORMAL
BILIRUB UR QL STRIP: NEGATIVE
COLOR UR AUTO: YELLOW
CREAT UR-MCNC: 140 MG/DL
GLUCOSE UR STRIP-MCNC: NEGATIVE MG/DL
GRAN CASTS #/AREA URNS LPF: 8 /LPF
HGB UR QL STRIP: ABNORMAL
KETONES UR STRIP-MCNC: NEGATIVE MG/DL
LEUKOCYTE ESTERASE UR QL STRIP: NEGATIVE
MUCOUS THREADS #/AREA URNS LPF: PRESENT /LPF
NITRATE UR QL: NEGATIVE
PH UR STRIP: 5 PH (ref 5–7)
PROT UR-MCNC: 4.9 G/L
PROT/CREAT 24H UR: 3.5 G/G CR (ref 0–0.2)
RBC #/AREA URNS AUTO: 1 /HPF (ref 0–2)
SOURCE: ABNORMAL
SP GR UR STRIP: 1.01 (ref 1–1.03)
SQUAMOUS #/AREA URNS AUTO: <1 /HPF (ref 0–1)
TACROLIMUS BLD-MCNC: 6.9 UG/L (ref 5–15)
TME LAST DOSE: NORMAL H
UROBILINOGEN UR STRIP-MCNC: 0 MG/DL (ref 0–2)
WBC #/AREA URNS AUTO: 1 /HPF (ref 0–5)

## 2019-05-16 PROCEDURE — 81001 URINALYSIS AUTO W/SCOPE: CPT | Performed by: INTERNAL MEDICINE

## 2019-05-16 PROCEDURE — G0463 HOSPITAL OUTPT CLINIC VISIT: HCPCS | Mod: ZF

## 2019-05-16 PROCEDURE — 84156 ASSAY OF PROTEIN URINE: CPT | Performed by: INTERNAL MEDICINE

## 2019-05-16 PROCEDURE — 36415 COLL VENOUS BLD VENIPUNCTURE: CPT | Performed by: INTERNAL MEDICINE

## 2019-05-16 PROCEDURE — 80197 ASSAY OF TACROLIMUS: CPT | Performed by: INTERNAL MEDICINE

## 2019-05-16 ASSESSMENT — PAIN SCALES - GENERAL: PAINLEVEL: NO PAIN (0)

## 2019-05-16 ASSESSMENT — MIFFLIN-ST. JEOR: SCORE: 2300.79

## 2019-05-16 NOTE — LETTER
"5/16/2019       RE: Ethan Trevino  [address]  [facility] [address]     Dear Colleague,    Thank you for referring your patient, Ethan Trevino, to the [facility] NEPHROLOGY at [facility]. Please see a copy of my visit note below.      Reason for Visit:  Ethan Trevino is a 23 year old male with FSGS, who presents for routine follow up.     HPI:    Initial presentation  Summer 2016: Presented with several months history of swelling, noted to be nephrotic, and renal biopsy in August 2016 showed FSGS. He was started on steroids 8/23/16.  Remainded on steroids 100 mg x several months.  He had more and more abdominal striae and his acne is significant  His proteinuria was down to 1-2 grams then subgram at 0.7g which is great, but unfortunately when he was down to 10mg prednisone, his proteinuria increased back to 5 grams In Jan 2018 Along with with low albumin and swelling.    He went back on Pred at 100 mg day  With a surprizing transient dip in protenuria to 1.5 in March, but eventually proteinuria decreased to UPCR < 3 startuing in April 2018   Last UPCR  1.24 g/g on 11/1/18.  He was seen on 11/29/18 at which time we initiated a tapering schedule of prednisone, as he was in partial remission on therapy (UPCR < 1).    1/12/19  Currently feeling well; \"a lot more normal\"\"not buzzing as much\"  He is down to Prednisone 20 mg daily  Still has edema in L Ext and periorbital in the am.  Swelling is stable compared to last visit  Has lost 2.5 kg since Nov.  Has not noted NEW striae.  Skin feels more dry (winter?)  Sleep ok.  Energy is good.  Appetite a bit decreased  Hot flashes and sweating are almost gone!       2/9/19  Feels generally well  Reports abd pain at night every few days, lasts 2-3 hours.  Started before we started Tacrol.  No N, V, Diarrhea.  Otherwise tolerating tacrolimus well.  Tapering down on prednisone.    3/2/19  Reports some " "wooziness/dizziness on occasions.  No HA\No tremors. Still having L Ext swelling.  Feels \"tighter\" compared to last month.    4/19/19  Mr Trevino is here for follow up after increasing the dose of Tacrolimus to 1.5 mg BID.  A follow up trough level 10 days later was at 4.4.  He tolerated the increased dose without adverse effects reported.  He denies GI, or  symptoms.  No HA or tremor.  He continues to have L Ext swelling, but it is mild to moderate, and admits to frequently forgetting the pm dose of furosemide.  He has no other complaints   INCREASED TACROLIMUS to 2 mg (4 tablets) TWICE daily  Follow up labs on Tacrolimus trough = 7.3  On 4/25/19      May 16, 2019  L Ext swelling is improved.  Occasional watery stool x 1 q 3-4 days, but no multiple BM a day  No HA, tremor  No N,V, Abd pain.  No F, Chills,  Tolerated Increased tac well    ROS:  A comprehensive review of systems was obtained and negative, except as noted in the HPI or PMH.        Active Medical Problems:  Patient Active Problem List   Diagnosis     FSGS (focal segmental glomerulosclerosis)     Nephrotic syndrome       Personal Hx:   Social History     Socioeconomic History     Marital status: Single     Spouse name: Not on file     Number of children: Not on file     Years of education: Not on file     Highest education level: Not on file   Occupational History     Not on file   Social Needs     Financial resource strain: Not on file     Food insecurity:     Worry: Not on file     Inability: Not on file     Transportation needs:     Medical: Not on file     Non-medical: Not on file   Tobacco Use     Smoking status: Never Smoker     Smokeless tobacco: Never Used   Substance and Sexual Activity     Alcohol use: Yes     Comment: social     Drug use: No     Sexual activity: Yes   Lifestyle     Physical activity:     Days per week: Not on file     Minutes per session: Not on file     Stress: Not on file   Relationships     Social connections:     " "Talks on phone: Not on file     Gets together: Not on file     Attends [employer]ist service: Not on file     Active member of club or organization: Not on file     Attends meetings of clubs or organizations: Not on file     Relationship status: Not on file     Intimate partner violence:     Fear of current or ex partner: Not on file     Emotionally abused: Not on file     Physically abused: Not on file     Forced sexual activity: Not on file   Other Topics Concern     Parent/sibling w/ CABG, MI or angioplasty before 65F 55M? Not Asked   Social History Narrative     Not on file       Allergies:  Allergies   Allergen Reactions     Amoxicillin      Other reaction(s): EENT - other (explain in comments), SKIN - rash  Rash and lip swelling after 8 days of Amoxicillin     Erythromycin      Penicillins        Medications:  Current Outpatient Medications   Medication Sig Dispense Refill     Calcium Carb-Cholecalciferol (CALCIUM 500 +D) 500-400 MG-UNIT TABS Take 500 mg by mouth daily 30 tablet 0     furosemide (LASIX) 20 MG tablet Take 1 tablet (20 mg) by mouth 2 times daily As needed 180 tablet 3     lisinopril (PRINIVIL/ZESTRIL) 20 MG tablet Take two tablets in AM and one tablet in PM 90 tablet 11     omeprazole (PRILOSEC) 20 MG CR capsule TAKE 1 CAPSULE (20 MG) BY MOUTH DAILY 30 capsule 11     predniSONE (DELTASONE) 5 MG tablet Takes 5 alt with 10 mg by mouth every other day . 90 tablet 3     PROGRAF (BRAND) 0.5 MG capsule Take 4 capsules (2 mg) by mouth 2 times daily 360 capsule 3     atorvastatin (LIPITOR) 10 MG tablet Take 1 tablet (10 mg) by mouth daily (Patient not taking: Reported on 1/12/2019) 30 tablet NEVER started                Vitals:  /80   Pulse 74   Ht 1.905 m (6' 3\")   Wt 123 kg (271 lb 3.2 oz)   SpO2 96%   BMI 33.90 kg/m       Exam:  GENERAL APPEARANCE: alert and no distress.  Cushingoid facies is improved.  HENT: mouth without ulcers or lesions  LYMPHATICS: no cervical or supraclavicular " nodes  RESP: lungs clear to auscultation - no rales, rhonchi or wheezes  CV: regular rhythm, normal rate, no rub, no murmur  EDEMA: trace-1+ LE edema bilaterally  ABDOMEN: soft, nondistended, nontender, bowel sounds normal  MS: extremities normal - no gross deformities noted, no evidence of inflammation in joints, no muscle tenderness  SKIN: no rash.  Marked diffuse striae    Results:  Recent Results (from the past 504 hour(s))   Tacrolimus level    Collection Time: 05/16/19 12:52 PM   Result Value Ref Range    Tacrolimus Last Dose 2330 05/15/19     Tacrolimus Level 6.9 5.0 - 15.0 ug/L   Protein  random urine with Creat Ratio    Collection Time: 05/16/19  2:45 PM   Result Value Ref Range    Protein Random Urine 4.90 g/L    Protein Total Urine g/gr Creatinine 3.50 (H) 0 - 0.2 g/g Cr   *UA reflex to Microscopic and Culture ([place]; [facility]; [facility]; [facility]; [facility]; [facility]; [place]; [place])    Collection Time: 05/16/19  2:45 PM   Result Value Ref Range    Color Urine Yellow     Appearance Urine Slightly Cloudy     Glucose Urine Negative NEG^Negative mg/dL    Bilirubin Urine Negative NEG^Negative    Ketones Urine Negative NEG^Negative mg/dL    Specific Gravity Urine 1.015 1.003 - 1.035    Blood Urine Small (A) NEG^Negative    pH Urine 5.0 5.0 - 7.0 pH    Protein Albumin Urine >499 (A) NEG^Negative mg/dL    Urobilinogen mg/dL 0.0 0.0 - 2.0 mg/dL    Nitrite Urine Negative NEG^Negative    Leukocyte Esterase Urine Negative NEG^Negative    Source Midstream Urine     RBC Urine 1 0 - 2 /HPF    WBC Urine 1 0 - 5 /HPF    Squamous Epithelial /HPF Urine <1 0 - 1 /HPF    Mucous Urine Present (A) NEG^Negative /LPF    Granular Casts 8 (A) NEG^Negative /LPF   Creatinine urine calculation only    Collection Time: 05/16/19  2:45 PM   Result Value Ref Range    Creatinine Urine 140 mg/dL     Albumin   Date Value Ref Range Status   04/25/2019 2.3 (L) 3.4 - 5.0 g/dL Final   04/18/2019 2.2 (L) 3.4 -  5.0 g/dL Final   03/08/2019 2.5 (L) 3.4 - 5.0 g/dL Final   03/02/2019 2.4 (L) 3.4 - 5.0 g/dL Final   02/14/2019 2.7 (L) 3.4 - 5.0 g/dL Final   02/09/2019 3.0 (L) 3.4 - 5.0 g/dL Final   01/12/2019 3.6 3.4 - 5.0 g/dL Final   11/29/2018 3.7 3.4 - 5.0 g/dL Final   11/01/2018 3.6 3.4 - 5.0 g/dL Final   09/12/2018 3.8 3.4 - 5.0 g/dL Final     Protein Total Urine g/gr Creatinine   Date Value Ref Range Status   05/16/2019 3.50 (H) 0 - 0.2 g/g Cr Final   04/18/2019 3.20 (H) 0 - 0.2 g/g Cr Final   03/02/2019 2.75 (H) 0 - 0.2 g/g Cr Final   02/09/2019 2.74 (H) 0 - 0.2 g/g Cr Final   01/12/2019 0.42 (H) 0 - 0.2 g/g Cr Final   11/29/2018 0.50 (H) 0 - 0.2 g/g Cr Final   11/01/2018 0.45 (H) 0 - 0.2 g/g Cr Final   09/12/2018 1.26 (H) 0 - 0.2 g/g Cr Final   07/26/2018 2.00 (H) 0 - 0.2 g/g Cr Final   06/02/2018 1.69 (H) 0 - 0.2 g/g Cr Final       Creatinine   Date Value Ref Range Status   04/25/2019 1.09 0.66 - 1.25 mg/dL Final   04/18/2019 0.91 0.66 - 1.25 mg/dL Final   03/08/2019 0.95 0.66 - 1.25 mg/dL Final   03/02/2019 0.99 0.66 - 1.25 mg/dL Final   02/14/2019 0.86 0.66 - 1.25 mg/dL Final   02/09/2019 0.93 0.66 - 1.25 mg/dL Final   01/12/2019 0.94 0.66 - 1.25 mg/dL Final   11/29/2018 0.88 0.66 - 1.25 mg/dL Final   11/01/2018 0.93 0.66 - 1.25 mg/dL Final   09/12/2018 0.98 0.66 - 1.25 mg/dL Final       FINAL DIAGNOSIS:   A:     Kidney, native, LEFT; percutaneous needle biopsy:   - Focal segmental glomerulosclerosis     This biopsy shows segmental sclerosis and hyalinosis affecting   approximately 30% of glomeruli examined in a pattern diagnostic for   focal segmental glomerulosclerosis.  Although sclerotic segments focally   exhibit increased cellularity, capillary distention by intra-luminal   inflammatory cells is not observed and this specimen does not meet   strict criteria for cellular variant of focal segmental   glomerulosclerosis.  However, in view of the proportion of glomeruli   exhibiting segmental sclerosis and the  "increased cellularity observed in   sclerotic segments it is expected that this condition may follow an   aggressive course.       Assessment and Plan:   23 year old male with with nephrotic syndrome, biopsy in August 2016 showing FSGS. Steroids started 8/23/2016 and relapsed during weaning in January 2018, with proteinuria up from 0.28 g/g rocky to 4.9 grams on 1/31/18  1. FSGS- Mr Trevino has clear relapse of proteinuria and recurrent hypoalbuminemia after decreasing the dose of prednisone.  He has not had a significant improvement in proteinuria  despite the increased dose of tacrolimus with trough levels documented in the \"therapeutic \" range.   For some reason, the ordered serum chemistry was not drawn.  We will call him and ask that he has them drawn.   I discussed with him again the option of participating in the clinical trial for FSGS of CCR2 antagonist which would allow him to stay on the same current meds.  Mr Trevino met again with Ms Qian Coleman about the CCX 011_140 trial.  (His UPCR is right at the cut off point for or CCX 012_140)       José Miguel Jean MD  Division of Renal Disease and Hypertension  Pager: [phone]  May 16, 2019                    "

## 2019-05-16 NOTE — NURSING NOTE
Chief Complaint   Patient presents with     RECHECK     FSGS (focal segmental glomerulosclerosis)     Trever Mccray MA

## 2019-05-16 NOTE — LETTER
"5/16/2019      RE: Ethan Trevino  [address]  [facility] [address]         Reason for Visit:  Ethan Trevino is a 23 year old male with FSGS, who presents for routine follow up.     HPI:    Initial presentation  Summer 2016: Presented with several months history of swelling, noted to be nephrotic, and renal biopsy in August 2016 showed FSGS. He was started on steroids 8/23/16.  Remainded on steroids 100 mg x several months.  He had more and more abdominal striae and his acne is significant  His proteinuria was down to 1-2 grams then subgram at 0.7g which is great, but unfortunately when he was down to 10mg prednisone, his proteinuria increased back to 5 grams In Jan 2018 Along with with low albumin and swelling.    He went back on Pred at 100 mg day  With a surprizing transient dip in protenuria to 1.5 in March, but eventually proteinuria decreased to UPCR < 3 startuing in April 2018   Last UPCR  1.24 g/g on 11/1/18.  He was seen on 11/29/18 at which time we initiated a tapering schedule of prednisone, as he was in partial remission on therapy (UPCR < 1).    1/12/19  Currently feeling well; \"a lot more normal\"\"not buzzing as much\"  He is down to Prednisone 20 mg daily  Still has edema in L Ext and periorbital in the am.  Swelling is stable compared to last visit  Has lost 2.5 kg since Nov.  Has not noted NEW striae.  Skin feels more dry (winter?)  Sleep ok.  Energy is good.  Appetite a bit decreased  Hot flashes and sweating are almost gone!       2/9/19  Feels generally well  Reports abd pain at night every few days, lasts 2-3 hours.  Started before we started Tacrol.  No N, V, Diarrhea.  Otherwise tolerating tacrolimus well.  Tapering down on prednisone.    3/2/19  Reports some wooziness/dizziness on occasions.  No HA\No tremors. Still having L Ext swelling.  Feels \"tighter\" compared to last month.    4/19/19  Mr Trevino is here for follow up after increasing the dose of Tacrolimus to " 1.5 mg BID.  A follow up trough level 10 days later was at 4.4.  He tolerated the increased dose without adverse effects reported.  He denies GI, or  symptoms.  No HA or tremor.  He continues to have L Ext swelling, but it is mild to moderate, and admits to frequently forgetting the pm dose of furosemide.  He has no other complaints   INCREASED TACROLIMUS to 2 mg (4 tablets) TWICE daily  Follow up labs on Tacrolimus trough = 7.3  On 4/25/19      May 16, 2019  L Ext swelling is improved.  Occasional watery stool x 1 q 3-4 days, but no multiple BM a day  No HA, tremor  No N,V, Abd pain.  No F, Chills,  Tolerated Increased tac well    ROS:  A comprehensive review of systems was obtained and negative, except as noted in the HPI or PMH.        Active Medical Problems:  Patient Active Problem List   Diagnosis     FSGS (focal segmental glomerulosclerosis)     Nephrotic syndrome       Personal Hx:   Social History     Socioeconomic History     Marital status: Single     Spouse name: Not on file     Number of children: Not on file     Years of education: Not on file     Highest education level: Not on file   Occupational History     Not on file   Social Needs     Financial resource strain: Not on file     Food insecurity:     Worry: Not on file     Inability: Not on file     Transportation needs:     Medical: Not on file     Non-medical: Not on file   Tobacco Use     Smoking status: Never Smoker     Smokeless tobacco: Never Used   Substance and Sexual Activity     Alcohol use: Yes     Comment: social     Drug use: No     Sexual activity: Yes   Lifestyle     Physical activity:     Days per week: Not on file     Minutes per session: Not on file     Stress: Not on file   Relationships     Social connections:     Talks on phone: Not on file     Gets together: Not on file     Attends Jainism service: Not on file     Active member of club or organization: Not on file     Attends meetings of clubs or organizations: Not on file  "    Relationship status: Not on file     Intimate partner violence:     Fear of current or ex partner: Not on file     Emotionally abused: Not on file     Physically abused: Not on file     Forced sexual activity: Not on file   Other Topics Concern     Parent/sibling w/ CABG, MI or angioplasty before 65F 55M? Not Asked   Social History Narrative     Not on file       Allergies:  Allergies   Allergen Reactions     Amoxicillin      Other reaction(s): EENT - other (explain in comments), SKIN - rash  Rash and lip swelling after 8 days of Amoxicillin     Erythromycin      Penicillins        Medications:  Current Outpatient Medications   Medication Sig Dispense Refill     Calcium Carb-Cholecalciferol (CALCIUM 500 +D) 500-400 MG-UNIT TABS Take 500 mg by mouth daily 30 tablet 0     furosemide (LASIX) 20 MG tablet Take 1 tablet (20 mg) by mouth 2 times daily As needed 180 tablet 3     lisinopril (PRINIVIL/ZESTRIL) 20 MG tablet Take two tablets in AM and one tablet in PM 90 tablet 11     omeprazole (PRILOSEC) 20 MG CR capsule TAKE 1 CAPSULE (20 MG) BY MOUTH DAILY 30 capsule 11     predniSONE (DELTASONE) 5 MG tablet Takes 5 alt with 10 mg by mouth every other day . 90 tablet 3     PROGRAF (BRAND) 0.5 MG capsule Take 4 capsules (2 mg) by mouth 2 times daily 360 capsule 3     atorvastatin (LIPITOR) 10 MG tablet Take 1 tablet (10 mg) by mouth daily (Patient not taking: Reported on 1/12/2019) 30 tablet NEVER started                Vitals:  /80   Pulse 74   Ht 1.905 m (6' 3\")   Wt 123 kg (271 lb 3.2 oz)   SpO2 96%   BMI 33.90 kg/m       Exam:  GENERAL APPEARANCE: alert and no distress.  Cushingoid facies is improved.  HENT: mouth without ulcers or lesions  LYMPHATICS: no cervical or supraclavicular nodes  RESP: lungs clear to auscultation - no rales, rhonchi or wheezes  CV: regular rhythm, normal rate, no rub, no murmur  EDEMA: trace-1+ LE edema bilaterally  ABDOMEN: soft, nondistended, nontender, bowel sounds " normal  MS: extremities normal - no gross deformities noted, no evidence of inflammation in joints, no muscle tenderness  SKIN: no rash.  Marked diffuse striae    Results:  Recent Results (from the past 504 hour(s))   Tacrolimus level    Collection Time: 05/16/19 12:52 PM   Result Value Ref Range    Tacrolimus Last Dose 2330 05/15/19     Tacrolimus Level 6.9 5.0 - 15.0 ug/L   Protein  random urine with Creat Ratio    Collection Time: 05/16/19  2:45 PM   Result Value Ref Range    Protein Random Urine 4.90 g/L    Protein Total Urine g/gr Creatinine 3.50 (H) 0 - 0.2 g/g Cr   *UA reflex to Microscopic and Culture ([place]; [facility]; [facility]; [facility]; [facility]; [facility]; [place]; [place])    Collection Time: 05/16/19  2:45 PM   Result Value Ref Range    Color Urine Yellow     Appearance Urine Slightly Cloudy     Glucose Urine Negative NEG^Negative mg/dL    Bilirubin Urine Negative NEG^Negative    Ketones Urine Negative NEG^Negative mg/dL    Specific Gravity Urine 1.015 1.003 - 1.035    Blood Urine Small (A) NEG^Negative    pH Urine 5.0 5.0 - 7.0 pH    Protein Albumin Urine >499 (A) NEG^Negative mg/dL    Urobilinogen mg/dL 0.0 0.0 - 2.0 mg/dL    Nitrite Urine Negative NEG^Negative    Leukocyte Esterase Urine Negative NEG^Negative    Source Midstream Urine     RBC Urine 1 0 - 2 /HPF    WBC Urine 1 0 - 5 /HPF    Squamous Epithelial /HPF Urine <1 0 - 1 /HPF    Mucous Urine Present (A) NEG^Negative /LPF    Granular Casts 8 (A) NEG^Negative /LPF   Creatinine urine calculation only    Collection Time: 05/16/19  2:45 PM   Result Value Ref Range    Creatinine Urine 140 mg/dL     Albumin   Date Value Ref Range Status   04/25/2019 2.3 (L) 3.4 - 5.0 g/dL Final   04/18/2019 2.2 (L) 3.4 - 5.0 g/dL Final   03/08/2019 2.5 (L) 3.4 - 5.0 g/dL Final   03/02/2019 2.4 (L) 3.4 - 5.0 g/dL Final   02/14/2019 2.7 (L) 3.4 - 5.0 g/dL Final   02/09/2019 3.0 (L) 3.4 - 5.0 g/dL Final   01/12/2019 3.6 3.4 - 5.0 g/dL  Final   11/29/2018 3.7 3.4 - 5.0 g/dL Final   11/01/2018 3.6 3.4 - 5.0 g/dL Final   09/12/2018 3.8 3.4 - 5.0 g/dL Final     Protein Total Urine g/gr Creatinine   Date Value Ref Range Status   05/16/2019 3.50 (H) 0 - 0.2 g/g Cr Final   04/18/2019 3.20 (H) 0 - 0.2 g/g Cr Final   03/02/2019 2.75 (H) 0 - 0.2 g/g Cr Final   02/09/2019 2.74 (H) 0 - 0.2 g/g Cr Final   01/12/2019 0.42 (H) 0 - 0.2 g/g Cr Final   11/29/2018 0.50 (H) 0 - 0.2 g/g Cr Final   11/01/2018 0.45 (H) 0 - 0.2 g/g Cr Final   09/12/2018 1.26 (H) 0 - 0.2 g/g Cr Final   07/26/2018 2.00 (H) 0 - 0.2 g/g Cr Final   06/02/2018 1.69 (H) 0 - 0.2 g/g Cr Final       Creatinine   Date Value Ref Range Status   04/25/2019 1.09 0.66 - 1.25 mg/dL Final   04/18/2019 0.91 0.66 - 1.25 mg/dL Final   03/08/2019 0.95 0.66 - 1.25 mg/dL Final   03/02/2019 0.99 0.66 - 1.25 mg/dL Final   02/14/2019 0.86 0.66 - 1.25 mg/dL Final   02/09/2019 0.93 0.66 - 1.25 mg/dL Final   01/12/2019 0.94 0.66 - 1.25 mg/dL Final   11/29/2018 0.88 0.66 - 1.25 mg/dL Final   11/01/2018 0.93 0.66 - 1.25 mg/dL Final   09/12/2018 0.98 0.66 - 1.25 mg/dL Final       FINAL DIAGNOSIS:   A:     Kidney, native, LEFT; percutaneous needle biopsy:   - Focal segmental glomerulosclerosis     This biopsy shows segmental sclerosis and hyalinosis affecting   approximately 30% of glomeruli examined in a pattern diagnostic for   focal segmental glomerulosclerosis.  Although sclerotic segments focally   exhibit increased cellularity, capillary distention by intra-luminal   inflammatory cells is not observed and this specimen does not meet   strict criteria for cellular variant of focal segmental   glomerulosclerosis.  However, in view of the proportion of glomeruli   exhibiting segmental sclerosis and the increased cellularity observed in   sclerotic segments it is expected that this condition may follow an   aggressive course.       Assessment and Plan:   23 year old male with with nephrotic syndrome, biopsy in August  "2016 showing FSGS. Steroids started 8/23/2016 and relapsed during weaning in January 2018, with proteinuria up from 0.28 g/g rocky to 4.9 grams on 1/31/18  1. FSGS- Mr Trevino has clear relapse of proteinuria and recurrent hypoalbuminemia after decreasing the dose of prednisone.  He has not had a significant improvement in proteinuria  despite the increased dose of tacrolimus with trough levels documented in the \"therapeutic \" range.   For some reason, the ordered serum chemistry was not drawn.  We will call him and ask that he has them drawn.   I discussed with him again the option of participating in the clinical trial for FSGS of CCR2 antagonist which would allow him to stay on the same current meds.  Mr Trevino met again with Ms Qian Coleman about the CCX 011_140 trial.  (His UPCR is right at the cut off point for or CCX 012_140)       José Miguel Jean MD  Division of Renal Disease and Hypertension  Pager: [phone]  May 16, 2019                    José Miguel Jean MD      "

## 2019-05-16 NOTE — PROGRESS NOTES
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     HPI:    Initial presentation  Summer 2016: Presented with several months history of swelling, noted to be nephrotic, and renal biopsy in August 2016 showed FSGS. He was started on steroids 8/23/16.  Remainded on steroids 100 mg x several months.  He had more and more abdominal striae and his acne is significant  His proteinuria was down to 1-2 grams then subgram at 0.7g which is great, but unfortunately when he was down to 10mg prednisone, his proteinuria increased back to 5 grams In Jan 2018 Along with with low albumin and swelling.    He went back on Pred at 100 mg day  With a surprizing transient dip in protenuria to 1.5 in March, but eventually proteinuria decreased to UPCR < 3 startuing in April 2018   Last UPCR  1.24 g/g on 11/1/18.  He was seen on 11/29/18 at which time we initiated a tapering schedule of prednisone, as he was in partial remission on therapy (UPCR < 1).    1/12/19  Currently feeling well; \"a lot more normal\"\"not buzzing as much\"  He is down to Prednisone 20 mg daily  Still has edema in L Ext and periorbital in the am.  Swelling is stable compared to last visit  Has lost 2.5 kg since Nov.  Has not noted NEW striae.  Skin feels more dry (winter?)  Sleep ok.  Energy is good.  Appetite a bit decreased  Hot flashes and sweating are almost gone!       2/9/19  Feels generally well  Reports abd pain at night every few days, lasts 2-3 hours.  Started before we started Tacrol.  No N, V, Diarrhea.  Otherwise tolerating tacrolimus well.  Tapering down on prednisone.    3/2/19  Reports some wooziness/dizziness on occasions.  No HA\No tremors. Still having L Ext swelling.  Feels \"tighter\" compared to last month.    4/19/19  Mr Trevino is here for follow up after increasing the dose of Tacrolimus to 1.5 mg BID.  A follow up trough level 10 days later was at 4.4.  He tolerated the increased " dose without adverse effects reported.  He denies GI, or  symptoms.  No HA or tremor.  He continues to have L Ext swelling, but it is mild to moderate, and admits to frequently forgetting the pm dose of furosemide.  He has no other complaints   INCREASED TACROLIMUS to 2 mg (4 tablets) TWICE daily  Follow up labs on Tacrolimus trough = 7.3  On 4/25/19      May 16, 2019  L Ext swelling is improved.  Occasional watery stool x 1 q 3-4 days, but no multiple BM a day  No HA, tremor  No N,V, Abd pain.  No F, Chills,  Tolerated Increased tac well    ROS:  A comprehensive review of systems was obtained and negative, except as noted in the HPI or PMH.        Active Medical Problems:  Patient Active Problem List   Diagnosis     FSGS (focal segmental glomerulosclerosis)     Nephrotic syndrome       Personal Hx:   Social History     Socioeconomic History     Marital status: Single     Spouse name: Not on file     Number of children: Not on file     Years of education: Not on file     Highest education level: Not on file   Occupational History     Not on file   Social Needs     Financial resource strain: Not on file     Food insecurity:     Worry: Not on file     Inability: Not on file     Transportation needs:     Medical: Not on file     Non-medical: Not on file   Tobacco Use     Smoking status: Never Smoker     Smokeless tobacco: Never Used   Substance and Sexual Activity     Alcohol use: Yes     Comment: social     Drug use: No     Sexual activity: Yes   Lifestyle     Physical activity:     Days per week: Not on file     Minutes per session: Not on file     Stress: Not on file   Relationships     Social connections:     Talks on phone: Not on file     Gets together: Not on file     Attends Hoahaoism service: Not on file     Active member of club or organization: Not on file     Attends meetings of clubs or organizations: Not on file     Relationship status: Not on file     Intimate partner violence:     Fear of current or  "ex partner: Not on file     Emotionally abused: Not on file     Physically abused: Not on file     Forced sexual activity: Not on file   Other Topics Concern     Parent/sibling w/ CABG, MI or angioplasty before 65F 55M? Not Asked   Social History Narrative     Not on file       Allergies:  Allergies   Allergen Reactions     Amoxicillin      Other reaction(s): EENT - other (explain in comments), SKIN - rash  Rash and lip swelling after 8 days of Amoxicillin     Erythromycin      Penicillins        Medications:  Current Outpatient Medications   Medication Sig Dispense Refill     Calcium Carb-Cholecalciferol (CALCIUM 500 +D) 500-400 MG-UNIT TABS Take 500 mg by mouth daily 30 tablet 0     furosemide (LASIX) 20 MG tablet Take 1 tablet (20 mg) by mouth 2 times daily As needed 180 tablet 3     lisinopril (PRINIVIL/ZESTRIL) 20 MG tablet Take two tablets in AM and one tablet in PM 90 tablet 11     omeprazole (PRILOSEC) 20 MG CR capsule TAKE 1 CAPSULE (20 MG) BY MOUTH DAILY 30 capsule 11     predniSONE (DELTASONE) 5 MG tablet Takes 5 alt with 10 mg by mouth every other day . 90 tablet 3     PROGRAF (BRAND) 0.5 MG capsule Take 4 capsules (2 mg) by mouth 2 times daily 360 capsule 3     atorvastatin (LIPITOR) 10 MG tablet Take 1 tablet (10 mg) by mouth daily (Patient not taking: Reported on 1/12/2019) 30 tablet NEVER started                Vitals:  /80   Pulse 74   Ht 1.905 m (6' 3\")   Wt 123 kg (271 lb 3.2 oz)   SpO2 96%   BMI 33.90 kg/m      Exam:  GENERAL APPEARANCE: alert and no distress.  Cushingoid facies is improved.  HENT: mouth without ulcers or lesions  LYMPHATICS: no cervical or supraclavicular nodes  RESP: lungs clear to auscultation - no rales, rhonchi or wheezes  CV: regular rhythm, normal rate, no rub, no murmur  EDEMA: trace-1+ LE edema bilaterally  ABDOMEN: soft, nondistended, nontender, bowel sounds normal  MS: extremities normal - no gross deformities noted, no evidence of inflammation in joints, no " muscle tenderness  SKIN: no rash.  Marked diffuse striae    Results:  Recent Results (from the past 504 hour(s))   Tacrolimus level    Collection Time: 05/16/19 12:52 PM   Result Value Ref Range    Tacrolimus Last Dose 2330 05/15/19     Tacrolimus Level 6.9 5.0 - 15.0 ug/L   Protein  random urine with Creat Ratio    Collection Time: 05/16/19  2:45 PM   Result Value Ref Range    Protein Random Urine 4.90 g/L    Protein Total Urine g/gr Creatinine 3.50 (H) 0 - 0.2 g/g Cr   *UA reflex to Microscopic and Culture ([place]; [facility]; [facility]; [facility]; [facility]; [facility]; [place]; [place])    Collection Time: 05/16/19  2:45 PM   Result Value Ref Range    Color Urine Yellow     Appearance Urine Slightly Cloudy     Glucose Urine Negative NEG^Negative mg/dL    Bilirubin Urine Negative NEG^Negative    Ketones Urine Negative NEG^Negative mg/dL    Specific Gravity Urine 1.015 1.003 - 1.035    Blood Urine Small (A) NEG^Negative    pH Urine 5.0 5.0 - 7.0 pH    Protein Albumin Urine >499 (A) NEG^Negative mg/dL    Urobilinogen mg/dL 0.0 0.0 - 2.0 mg/dL    Nitrite Urine Negative NEG^Negative    Leukocyte Esterase Urine Negative NEG^Negative    Source Midstream Urine     RBC Urine 1 0 - 2 /HPF    WBC Urine 1 0 - 5 /HPF    Squamous Epithelial /HPF Urine <1 0 - 1 /HPF    Mucous Urine Present (A) NEG^Negative /LPF    Granular Casts 8 (A) NEG^Negative /LPF   Creatinine urine calculation only    Collection Time: 05/16/19  2:45 PM   Result Value Ref Range    Creatinine Urine 140 mg/dL     Albumin   Date Value Ref Range Status   04/25/2019 2.3 (L) 3.4 - 5.0 g/dL Final   04/18/2019 2.2 (L) 3.4 - 5.0 g/dL Final   03/08/2019 2.5 (L) 3.4 - 5.0 g/dL Final   03/02/2019 2.4 (L) 3.4 - 5.0 g/dL Final   02/14/2019 2.7 (L) 3.4 - 5.0 g/dL Final   02/09/2019 3.0 (L) 3.4 - 5.0 g/dL Final   01/12/2019 3.6 3.4 - 5.0 g/dL Final   11/29/2018 3.7 3.4 - 5.0 g/dL Final   11/01/2018 3.6 3.4 - 5.0 g/dL Final   09/12/2018 3.8 3.4 -  5.0 g/dL Final     Protein Total Urine g/gr Creatinine   Date Value Ref Range Status   05/16/2019 3.50 (H) 0 - 0.2 g/g Cr Final   04/18/2019 3.20 (H) 0 - 0.2 g/g Cr Final   03/02/2019 2.75 (H) 0 - 0.2 g/g Cr Final   02/09/2019 2.74 (H) 0 - 0.2 g/g Cr Final   01/12/2019 0.42 (H) 0 - 0.2 g/g Cr Final   11/29/2018 0.50 (H) 0 - 0.2 g/g Cr Final   11/01/2018 0.45 (H) 0 - 0.2 g/g Cr Final   09/12/2018 1.26 (H) 0 - 0.2 g/g Cr Final   07/26/2018 2.00 (H) 0 - 0.2 g/g Cr Final   06/02/2018 1.69 (H) 0 - 0.2 g/g Cr Final       Creatinine   Date Value Ref Range Status   04/25/2019 1.09 0.66 - 1.25 mg/dL Final   04/18/2019 0.91 0.66 - 1.25 mg/dL Final   03/08/2019 0.95 0.66 - 1.25 mg/dL Final   03/02/2019 0.99 0.66 - 1.25 mg/dL Final   02/14/2019 0.86 0.66 - 1.25 mg/dL Final   02/09/2019 0.93 0.66 - 1.25 mg/dL Final   01/12/2019 0.94 0.66 - 1.25 mg/dL Final   11/29/2018 0.88 0.66 - 1.25 mg/dL Final   11/01/2018 0.93 0.66 - 1.25 mg/dL Final   09/12/2018 0.98 0.66 - 1.25 mg/dL Final       FINAL DIAGNOSIS:   A:     Kidney, native, LEFT; percutaneous needle biopsy:   - Focal segmental glomerulosclerosis     This biopsy shows segmental sclerosis and hyalinosis affecting   approximately 30% of glomeruli examined in a pattern diagnostic for   focal segmental glomerulosclerosis.  Although sclerotic segments focally   exhibit increased cellularity, capillary distention by intra-luminal   inflammatory cells is not observed and this specimen does not meet   strict criteria for cellular variant of focal segmental   glomerulosclerosis.  However, in view of the proportion of glomeruli   exhibiting segmental sclerosis and the increased cellularity observed in   sclerotic segments it is expected that this condition may follow an   aggressive course.       Assessment and Plan:   23 year old male with with nephrotic syndrome, biopsy in August 2016 showing FSGS. Steroids started 8/23/2016 and relapsed during weaning in January 2018, with  "proteinuria up from 0.28 g/g rocky to 4.9 grams on 1/31/18  1. FSGS- Mr Trevino has clear relapse of proteinuria and recurrent hypoalbuminemia after decreasing the dose of prednisone.  He has not had a significant improvement in proteinuria  despite the increased dose of tacrolimus with trough levels documented in the \"therapeutic \" range.   For some reason, the ordered serum chemistry was not drawn.  We will call him and ask that he has them drawn.   I discussed with him again the option of participating in the clinical trial for FSGS of CCR2 antagonist which would allow him to stay on the same current meds.  Mr Trevino met again with Ms Qian Coleman about the CCX 011_140 trial.  (His UPCR is right at the cut off point for or CCX 012_140)       José Miguel Jean MD  Division of Renal Disease and Hypertension  Pager: [phone]  May 16, 2019                  "

## 2019-06-04 DIAGNOSIS — N05.1 FSGS (FOCAL SEGMENTAL GLOMERULOSCLEROSIS): ICD-10-CM

## 2019-06-04 RX ORDER — TACROLIMUS 0.5 MG/1
1 CAPSULE, GELATIN COATED ORAL 2 TIMES DAILY
Qty: 360 CAPSULE | Refills: 3 | Status: SHIPPED | OUTPATIENT
Start: 2019-06-04 | End: 2019-06-20

## 2019-06-20 DIAGNOSIS — N05.1 FSGS (FOCAL SEGMENTAL GLOMERULOSCLEROSIS): ICD-10-CM

## 2019-06-20 RX ORDER — TACROLIMUS 0.5 MG/1
2 CAPSULE, GELATIN COATED ORAL 2 TIMES DAILY
Qty: 360 CAPSULE | Refills: 11 | Status: SHIPPED | OUTPATIENT
Start: 2019-06-20 | End: 2019-11-29

## 2019-06-24 ENCOUNTER — RESEARCH ENCOUNTER (OUTPATIENT)
Dept: NEPHROLOGY | Facility: CLINIC | Age: 24
End: 2019-06-24

## 2019-06-24 DIAGNOSIS — E78.5 HYPERLIPIDEMIA LDL GOAL <100: Primary | ICD-10-CM

## 2019-06-24 RX ORDER — ROSUVASTATIN CALCIUM 5 MG/1
5 TABLET, COATED ORAL DAILY
Qty: 30 TABLET | Refills: 11 | OUTPATIENT
Start: 2019-06-24 | End: 2019-07-01

## 2019-06-24 NOTE — PROGRESS NOTES
Reason for Visit:  Ethan Trevino is a 24 year old male with FSGS, who is seen in the [facility] for screening for study of MCP-1 receptor blocker [id number].     HPI:    Initial presentation  Summer 2016: Presented with several months history of swelling, noted to be nephrotic, and renal biopsy in August 2016 showed FSGS. He was started on steroids 8/23/16.  Remainded on steroids 100 mg x several months.  He had more and more abdominal striae and his acne is significant  His proteinuria was down to 1-2 grams then subgram at 0.7g which is great, but unfortunately when he was down to 10mg prednisone, his proteinuria increased back to 5 grams In Jan 2018 Along with with low albumin and swelling.    He went back on Pred at 100 mg day  With a surprizing transient dip in protenuria to 1.5 in March, but eventually proteinuria decreased to UPCR < 3 startuing in April 2018   Last UPCR  1.24 g/g on 11/1/18.  He was seen on 11/29/18 at which time we initiated a tapering schedule of prednisone, as he was in partial remission on therapy (UPCR < 1).  Prednisone taper was started in Dec 2018  He had a relapse of proteinuria in February 2019  Prompting a halting of the prednisone taper and initiation of Tacrolimus at 0.5 mg twice daily  Tacrolimus was progressively increased to 2 mg twice daily starting on April 19, 2019, with documentation of trough levels in the therapeutic range.      He was evaluated on 6/1419 for participation in the [id number] trial (open label study for patients with proteinuria >3.5 g/g), but he did not qualify as this UPCR on 24 hour collection was ~ 2.8 g/g/  He returns today of screening in the [facility]  placebo-controlled RCT for patients with proteinuria > 1.5 g/g by UPCR    ROS:  A comprehensive review of systems was obtained and negative, except as noted in the HPI or PMH.  No HA, tremor  No N,V, Abd pain.  No F, Chills, cough, SOB  L Ext swelling is tolerable, more noticeable in evenings.  No  dysuria or gross hematuria.        Active Medical Problems:  Patient Active Problem List   Diagnosis     FSGS (focal segmental glomerulosclerosis)     Nephrotic syndrome       Personal Hx:   Social History     Socioeconomic History     Marital status: Single     Spouse name: Not on file     Number of children: Not on file     Years of education: Not on file     Highest education level: Not on file   Occupational History     Not on file   Social Needs     Financial resource strain: Not on file     Food insecurity:     Worry: Not on file     Inability: Not on file     Transportation needs:     Medical: Not on file     Non-medical: Not on file   Tobacco Use     Smoking status: Never Smoker     Smokeless tobacco: Never Used   Substance and Sexual Activity     Alcohol use: Yes     Comment: social     Drug use: No     Sexual activity: Yes   Lifestyle     Physical activity:     Days per week: Not on file     Minutes per session: Not on file     Stress: Not on file   Relationships     Social connections:     Talks on phone: Not on file     Gets together: Not on file     Attends Shinto service: Not on file     Active member of club or organization: Not on file     Attends meetings of clubs or organizations: Not on file     Relationship status: Not on file     Intimate partner violence:     Fear of current or ex partner: Not on file     Emotionally abused: Not on file     Physically abused: Not on file     Forced sexual activity: Not on file   Other Topics Concern     Parent/sibling w/ CABG, MI or angioplasty before 65F 55M? Not Asked   Social History Narrative     Not on file       Allergies:  Allergies   Allergen Reactions     Amoxicillin      Other reaction(s): EENT - other (explain in comments), SKIN - rash  Rash and lip swelling after 8 days of Amoxicillin     Erythromycin      Penicillins        Medications:  Medication Sig          Calcium Carb-Cholecalciferol (CALCIUM 500 +D) 500-400 MG-UNIT TABS Take 500 mg by  mouth daily     furosemide (LASIX) 20 MG tablet Take 1 tablet (20 mg) by mouth 2 times daily As needed     lisinopril (PRINIVIL/ZESTRIL) 20 MG tablet Take two tablets in AM and one tablet in PM     omeprazole (PRILOSEC) 20 MG CR capsule TAKE 1 CAPSULE (20 MG) BY MOUTH DAILY     predniSONE (DELTASONE) 5 MG tablet Takes 10 mg alternating with 5 mg every other day      PROGRAF (BRAND) 0.5 MG capsule Take 4 capsules (2 mg) by mouth 2 times daily   .    Vitals:  See research notes.  Exam:  GENERAL APPEARANCE: alert and no distress.  Cushingoid facies is improved.  HENT: mouth without ulcers or lesions  LYMPHATICS: no cervical or supraclavicular nodes  RESP: lungs clear to auscultation - no rales, rhonchi or wheezes  CV: regular rhythm, normal rate, no rub, no murmur  EDEMA: trace-1+ LE edema bilaterally  ABDOMEN: soft, nondistended, nontender, bowel sounds normal  MS: extremities normal - no gross deformities noted, no evidence of inflammation in joints, no muscle tenderness  SKIN: no rash.  Marked diffuse striae  NEURO:  Reflexes 2+ throughout except 1+ at ankles.   Vibration at L Ext  first PIPs > 12 secs bilat       Results:  No results found for this or any previous visit (from the past 504 hour(s)).  Albumin   Date Value Ref Range Status   04/25/2019 2.3 (L) 3.4 - 5.0 g/dL Final   04/18/2019 2.2 (L) 3.4 - 5.0 g/dL Final   03/08/2019 2.5 (L) 3.4 - 5.0 g/dL Final   03/02/2019 2.4 (L) 3.4 - 5.0 g/dL Final   02/14/2019 2.7 (L) 3.4 - 5.0 g/dL Final   02/09/2019 3.0 (L) 3.4 - 5.0 g/dL Final   01/12/2019 3.6 3.4 - 5.0 g/dL Final   11/29/2018 3.7 3.4 - 5.0 g/dL Final   11/01/2018 3.6 3.4 - 5.0 g/dL Final   09/12/2018 3.8 3.4 - 5.0 g/dL Final     Protein Total Urine g/gr Creatinine   Date Value Ref Range Status   05/16/2019 3.50 (H) 0 - 0.2 g/g Cr Final   04/18/2019 3.20 (H) 0 - 0.2 g/g Cr Final   03/02/2019 2.75 (H) 0 - 0.2 g/g Cr Final   02/09/2019 2.74 (H) 0 - 0.2 g/g Cr Final   01/12/2019 0.42 (H) 0 - 0.2 g/g Cr Final    11/29/2018 0.50 (H) 0 - 0.2 g/g Cr Final   11/01/2018 0.45 (H) 0 - 0.2 g/g Cr Final   09/12/2018 1.26 (H) 0 - 0.2 g/g Cr Final   07/26/2018 2.00 (H) 0 - 0.2 g/g Cr Final   06/02/2018 1.69 (H) 0 - 0.2 g/g Cr Final       Creatinine   Date Value Ref Range Status   04/25/2019 1.09 0.66 - 1.25 mg/dL Final   04/18/2019 0.91 0.66 - 1.25 mg/dL Final   03/08/2019 0.95 0.66 - 1.25 mg/dL Final   03/02/2019 0.99 0.66 - 1.25 mg/dL Final   02/14/2019 0.86 0.66 - 1.25 mg/dL Final   02/09/2019 0.93 0.66 - 1.25 mg/dL Final   01/12/2019 0.94 0.66 - 1.25 mg/dL Final   11/29/2018 0.88 0.66 - 1.25 mg/dL Final   11/01/2018 0.93 0.66 - 1.25 mg/dL Final   09/12/2018 0.98 0.66 - 1.25 mg/dL Final       FINAL DIAGNOSIS:   A:     Kidney, native, LEFT; percutaneous needle biopsy:   - Focal segmental glomerulosclerosis     This biopsy shows segmental sclerosis and hyalinosis affecting   approximately 30% of glomeruli examined in a pattern diagnostic for   focal segmental glomerulosclerosis.  Although sclerotic segments focally   exhibit increased cellularity, capillary distention by intra-luminal   inflammatory cells is not observed and this specimen does not meet   strict criteria for cellular variant of focal segmental   glomerulosclerosis.  However, in view of the proportion of glomeruli   exhibiting segmental sclerosis and the increased cellularity observed in   sclerotic segments it is expected that this condition may follow an   aggressive course.       Assessment and Plan:   23 year old male with with nephrotic syndrome, biopsy in August 2016 showing FSGS. Steroids started 8/23/2016 and relapsed during weaning in January 2018, with proteinuria up from 0.28 g/g rocky to 4.9 grams on 1/31/18  1. FSGS- Mr Trevino had a relapse of proteinuria and recurrent hypoalbuminemia after decreasing the dose of prednisone.  He has not had a significant improvement in proteinuria  despite the increased dose of tacrolimus with trough levels  "documented in the \"therapeutic \" range.   He has not been on a stable dose of Tacrolimus, pred and EDGARDO blockers since mid April.    He meets criteria for screening for CCX .    2.Hyperlipidemia.  THis was confirmed 10 days ago.  I had delayed starting a statin as I was adjusting the dose of tacrolimus upwards. At this point, I will start with low dose rosuvastatin (5 mg once daily ) as its metabolism is NOT through CY but through CYP2C9.      José Miguel Jean MD  Division of Renal Disease and Hypertension  Pager: [phone]  2019  7:24 AM                    "

## 2019-07-01 ENCOUNTER — TELEPHONE (OUTPATIENT)
Dept: NEPHROLOGY | Facility: CLINIC | Age: 24
End: 2019-07-01

## 2019-07-01 DIAGNOSIS — E78.5 HYPERLIPIDEMIA LDL GOAL <100: ICD-10-CM

## 2019-07-01 RX ORDER — ROSUVASTATIN CALCIUM 5 MG/1
5 TABLET, COATED ORAL DAILY
Qty: 30 TABLET | Refills: 11 | Status: SHIPPED | OUTPATIENT
Start: 2019-07-01 | End: 2020-06-23

## 2019-07-01 NOTE — TELEPHONE ENCOUNTER
Rx re-sent. Spoke with Lesly at [facility], confirmed rx received.    Majo Gonzales RN

## 2019-08-13 ENCOUNTER — RESEARCH ENCOUNTER (OUTPATIENT)
Dept: NEPHROLOGY | Facility: CLINIC | Age: 24
End: 2019-08-13

## 2019-08-13 NOTE — PROGRESS NOTES
Reason for Visit:  Ethan Trevino is a 24 year old male with FSGS, who is seen in the [facility] in follow up for study of MCP-1 receptor blocker [id number].     HPI:    Initial presentation  Summer 2016: Presented with several months history of swelling, noted to be nephrotic, and renal biopsy in August 2016 showed FSGS. He was started on steroids 8/23/16.  Remainded on steroids 100 mg x several months.  He had more and more abdominal striae and his acne is significant  His proteinuria was down to 1-2 grams then subgram at 0.7g which is great, but unfortunately when he was down to 10mg prednisone, his proteinuria increased back to 5 grams In Jan 2018 Along with with low albumin and swelling.    He went back on Pred at 100 mg day  With a surprizing transient dip in protenuria to 1.5 in March, but eventually proteinuria decreased to UPCR < 3 startuing in April 2018   Last UPCR  1.24 g/g on 11/1/18.  He was seen on 11/29/18 at which time we initiated a tapering schedule of prednisone, as he was in partial remission on therapy (UPCR < 1).  Prednisone taper was started in Dec 2018  He had a relapse of proteinuria in February 2019  Prompting a halting of the prednisone taper and initiation of Tacrolimus at 0.5 mg twice daily  Tacrolimus was progressively increased to 2 mg twice daily starting on April 19, 2019, with documentation of trough levels in the therapeutic range.      He was enrolled in the study  On June 24, 2019.  Day #1 of study medication 7/16/19  He presents today with no acute complaints.  Specifically, he denies any further occurrences of transient numbness or tingling which he had mentioned at the last visit.  He feels some fatigue, but has been under quite a bit of stress at work.  No L Ext swelling.  No new striae  No GI symptoms, except for occasional loose stools.  Specifically no melena or hematochezia.    ROS:  A comprehensive review of systems was obtained and negative, except as noted in the HPI  or PMH.  No HA, tremor  No N,V, Abd pain.  No F, Chills, cough, SOB  No dysuria or gross hematuria.        Active Medical Problems:  Patient Active Problem List   Diagnosis     FSGS (focal segmental glomerulosclerosis)     Nephrotic syndrome       Personal Hx:   Social History     Socioeconomic History     Marital status: Single     Spouse name: Not on file     Number of children: Not on file     Years of education: Not on file     Highest education level: Not on file   Occupational History     Not on file   Social Needs     Financial resource strain: Not on file     Food insecurity:     Worry: Not on file     Inability: Not on file     Transportation needs:     Medical: Not on file     Non-medical: Not on file   Tobacco Use     Smoking status: Never Smoker     Smokeless tobacco: Never Used   Substance and Sexual Activity     Alcohol use: Yes     Comment: social     Drug use: No     Sexual activity: Yes   Lifestyle     Physical activity:     Days per week: Not on file     Minutes per session: Not on file     Stress: Not on file   Relationships     Social connections:     Talks on phone: Not on file     Gets together: Not on file     Attends Zoroastrian service: Not on file     Active member of club or organization: Not on file     Attends meetings of clubs or organizations: Not on file     Relationship status: Not on file     Intimate partner violence:     Fear of current or ex partner: Not on file     Emotionally abused: Not on file     Physically abused: Not on file     Forced sexual activity: Not on file   Other Topics Concern     Parent/sibling w/ CABG, MI or angioplasty before 65F 55M? Not Asked   Social History Narrative     Not on file       Allergies:  Allergies   Allergen Reactions     Amoxicillin      Other reaction(s): EENT - other (explain in comments), SKIN - rash  Rash and lip swelling after 8 days of Amoxicillin     Erythromycin      Penicillins        Medications:  Medication Sig          Calcium  Carb-Cholecalciferol (CALCIUM 500 +D) 500-400 MG-UNIT TABS Take 500 mg by mouth daily     furosemide (LASIX) 20 MG tablet Take 1 tablet (20 mg) by mouth 2 times daily As needed     lisinopril (PRINIVIL/ZESTRIL) 20 MG tablet Take two tablets in AM and one tablet in PM     omeprazole (PRILOSEC) 20 MG CR capsule TAKE 1 CAPSULE (20 MG) BY MOUTH DAILY     predniSONE (DELTASONE) 5 MG tablet Takes 10 mg alternating with 5 mg every other day      PROGRAF (BRAND) 0.5 MG capsule Take 4 capsules (2 mg) by mouth 2 times daily   .    Vitals:  See research notes.  144/84; HR 70. Weight 124 kg (up 3)  97.2   Exam:  GENERAL APPEARANCE: alert and no distress.  Cushingoid facies is improved.  HENT: mouth without ulcers or lesions  LYMPHATICS: no cervical or supraclavicular nodes  RESP: lungs clear to auscultation - no rales, rhonchi or wheezes  CV: regular rhythm, normal rate, no rub, no murmur  EDEMA:NoLE edema bilaterally  ABDOMEN: soft, nondistended, nontender, bowel sounds normal  MS: extremities normal - no gross deformities noted, no evidence of inflammation in joints, no muscle tenderness  SKIN: no rash.  Marked diffuse striae  NEURO:  Reflexes 2+ throughout    Vibration at L Ext  first PIPs 10.8 secs on R, 13.3 on L bilat       Results:  No results found for this or any previous visit (from the past 504 hour(s)).  FINAL DIAGNOSIS:   A:     Kidney, native, LEFT; percutaneous needle biopsy:   - Focal segmental glomerulosclerosis     This biopsy shows segmental sclerosis and hyalinosis affecting   approximately 30% of glomeruli examined in a pattern diagnostic for   focal segmental glomerulosclerosis.  Although sclerotic segments focally   exhibit increased cellularity, capillary distention by intra-luminal   inflammatory cells is not observed and this specimen does not meet   strict criteria for cellular variant of focal segmental   glomerulosclerosis.  However, in view of the proportion of glomeruli   exhibiting segmental  sclerosis and the increased cellularity observed in   sclerotic segments it is expected that this condition may follow an   aggressive course.       Assessment and Plan:   23 year old male with with nephrotic syndrome, biopsy in August 2016 showing FSGS. Steroids started 8/23/2016 and relapsed during weaning in January 2018, with proteinuria up from 0.28 g/g rocky to 4.9 grams on 1/31/18  1. FSGS- Mr Trevino is clinically stable from the point of view of FSGS.  He is essentially asymptomatic.  Labs were reviewed.    José Miguel Jean MD  Division of Renal Disease and Hypertension  Pager: [phone]  August 13, 2019

## 2019-09-18 DIAGNOSIS — N04.9 NEPHROTIC SYNDROME: ICD-10-CM

## 2019-09-18 DIAGNOSIS — N05.1 FSGS (FOCAL SEGMENTAL GLOMERULOSCLEROSIS): ICD-10-CM

## 2019-09-18 RX ORDER — LISINOPRIL 20 MG/1
TABLET ORAL
Qty: 90 TABLET | Refills: 1 | Status: SHIPPED | OUTPATIENT
Start: 2019-09-18 | End: 2019-11-21

## 2019-09-23 DIAGNOSIS — N05.1 FSGS (FOCAL SEGMENTAL GLOMERULOSCLEROSIS): ICD-10-CM

## 2019-10-25 DIAGNOSIS — N05.1 FSGS (FOCAL SEGMENTAL GLOMERULOSCLEROSIS): ICD-10-CM

## 2019-11-21 ENCOUNTER — MYC REFILL (OUTPATIENT)
Dept: NEPHROLOGY | Facility: CLINIC | Age: 24
End: 2019-11-21

## 2019-11-21 DIAGNOSIS — N04.9 NEPHROTIC SYNDROME: ICD-10-CM

## 2019-11-21 DIAGNOSIS — N05.1 FSGS (FOCAL SEGMENTAL GLOMERULOSCLEROSIS): ICD-10-CM

## 2019-11-21 RX ORDER — LISINOPRIL 20 MG/1
TABLET ORAL
Qty: 90 TABLET | Refills: 1 | Status: SHIPPED | OUTPATIENT
Start: 2019-11-21 | End: 2020-01-17

## 2019-11-29 ENCOUNTER — MYC REFILL (OUTPATIENT)
Dept: NEPHROLOGY | Facility: CLINIC | Age: 24
End: 2019-11-29

## 2019-11-29 DIAGNOSIS — N05.1 FSGS (FOCAL SEGMENTAL GLOMERULOSCLEROSIS): ICD-10-CM

## 2019-12-02 RX ORDER — TACROLIMUS 0.5 MG/1
2 CAPSULE, GELATIN COATED ORAL 2 TIMES DAILY
Qty: 360 CAPSULE | Refills: 11 | Status: SHIPPED | OUTPATIENT
Start: 2019-12-02 | End: 2019-12-06

## 2019-12-06 DIAGNOSIS — N05.1 FSGS (FOCAL SEGMENTAL GLOMERULOSCLEROSIS): Primary | ICD-10-CM

## 2019-12-06 RX ORDER — TACROLIMUS 0.5 MG/1
1 CAPSULE ORAL 2 TIMES DAILY
Qty: 120 CAPSULE | Refills: 3 | Status: SHIPPED | OUTPATIENT
Start: 2019-12-06 | End: 2019-12-24

## 2020-01-17 DIAGNOSIS — N05.1 FSGS (FOCAL SEGMENTAL GLOMERULOSCLEROSIS): ICD-10-CM

## 2020-01-17 DIAGNOSIS — N04.9 NEPHROTIC SYNDROME: ICD-10-CM

## 2020-01-17 RX ORDER — LISINOPRIL 20 MG/1
TABLET ORAL
Qty: 90 TABLET | Refills: 1 | Status: SHIPPED | OUTPATIENT
Start: 2020-01-17 | End: 2020-03-24

## 2020-03-10 ENCOUNTER — HEALTH MAINTENANCE LETTER (OUTPATIENT)
Age: 25
End: 2020-03-10

## 2020-03-24 DIAGNOSIS — N05.1 FSGS (FOCAL SEGMENTAL GLOMERULOSCLEROSIS): ICD-10-CM

## 2020-03-24 DIAGNOSIS — N04.9 NEPHROTIC SYNDROME: ICD-10-CM

## 2020-03-24 RX ORDER — LISINOPRIL 20 MG/1
TABLET ORAL
Qty: 90 TABLET | Refills: 3 | Status: SHIPPED | OUTPATIENT
Start: 2020-03-24 | End: 2020-07-22

## 2020-04-03 ENCOUNTER — MYC REFILL (OUTPATIENT)
Dept: NEPHROLOGY | Facility: CLINIC | Age: 25
End: 2020-04-03

## 2020-04-03 DIAGNOSIS — N05.1 FSGS (FOCAL SEGMENTAL GLOMERULOSCLEROSIS): ICD-10-CM

## 2020-04-03 DIAGNOSIS — N04.9 NEPHROTIC SYNDROME: ICD-10-CM

## 2020-04-03 RX ORDER — FUROSEMIDE 20 MG
20 TABLET ORAL 2 TIMES DAILY
Qty: 180 TABLET | Refills: 3 | Status: SHIPPED | OUTPATIENT
Start: 2020-04-03 | End: 2021-06-25

## 2020-06-04 ENCOUNTER — VIRTUAL VISIT (OUTPATIENT)
Dept: NEPHROLOGY | Facility: CLINIC | Age: 25
End: 2020-06-04
Attending: INTERNAL MEDICINE
Payer: COMMERCIAL

## 2020-06-04 DIAGNOSIS — N04.9 NEPHROTIC SYNDROME: ICD-10-CM

## 2020-06-04 DIAGNOSIS — N05.1 FSGS (FOCAL SEGMENTAL GLOMERULOSCLEROSIS): Primary | ICD-10-CM

## 2020-06-04 ASSESSMENT — PAIN SCALES - GENERAL: PAINLEVEL: NO PAIN (0)

## 2020-06-04 NOTE — PATIENT INSTRUCTIONS
Please remember to take the furosemide twice daily instead of once daily.  Increase Calcium + Vitamin D to 2 tablets daily  Decrease prednisone to 5mg daily x 2 weeks, then decrease again to 5 mg EVERY OTHER day.  Continue Tacrolimus as prescribed  Please go to [facility] for blood work BEFORE taking the tacrolimus in the morning.  I will see you in 3 months.

## 2020-06-04 NOTE — PROGRESS NOTES
Nephrology Clinic  June 4, 2020      HPI:    Initial presentation  Summer 2016: Presented with several months history of swelling, noted to be nephrotic, and renal biopsy in August 2016 showed FSGS. He was started on steroids 8/23/16.  Remainded on steroids 100 mg x several months.  He had more and more abdominal striae and his acne is significant  His proteinuria was down to 1-2 grams then subgram at 0.7g which is great, but unfortunately when he was down to 10mg prednisone, his proteinuria increased back to 5 grams In Jan 2018 Along with with low albumin and swelling.    He went back on Pred at 100 mg day  With a surprizing transient dip in protenuria to 1.5 in March, but eventually proteinuria decreased to UPCR < 3 startuing in April 2018   Last UPCR  1.24 g/g on 11/1/18.  He was seen on 11/29/18 at which time we initiated a tapering schedule of prednisone, as he was in partial remission on therapy (UPCR < 1).  Prednisone taper was started in Dec 2018  He had a relapse of proteinuria in February 2019  Prompting a halting of the prednisone taper and initiation of Tacrolimus at 0.5 mg twice daily  Tacrolimus was progressively increased to 2 mg twice daily starting on April 19, 2019, with documentation of trough levels in the therapeutic range.      He was enrolled in the study  On June 24, 2019.  Day #1 of study medication 7/16/19  Completed the study in Feb 2020:        June 4, 2020    He presents today with no acute complaints.  He denies any further occurrences of transient numbness or tingling.  Energy is OK.    Mild L Ext swelling.  No new striae  No GI symptoms, except for occasional loose stools.  Specifically no melena or hematochezia.  Has been working the entire pandemic.  ROS:  A comprehensive review of systems was obtained and negative, except as noted in the HPI or PMH.  No HA, tremor  No N,V, Abd pain.  No F, Chills, cough, SOB  No dysuria or gross hematuria.      Active Medical Problems:  Patient  Active Problem List   Diagnosis     FSGS (focal segmental glomerulosclerosis)     Nephrotic syndrome       Personal Hx:   Social History     Socioeconomic History     Marital status: Single     Spouse name: Not on file     Number of children: Not on file     Years of education: Not on file     Highest education level: Not on file   Occupational History     Not on file   Social Needs     Financial resource strain: Not on file     Food insecurity     Worry: Not on file     Inability: Not on file     Transportation needs     Medical: Not on file     Non-medical: Not on file   Tobacco Use     Smoking status: Never Smoker     Smokeless tobacco: Never Used   Substance and Sexual Activity     Alcohol use: Yes     Comment: social     Drug use: No     Sexual activity: Yes   Lifestyle     Physical activity     Days per week: Not on file     Minutes per session: Not on file     Stress: Not on file   Relationships     Social connections     Talks on phone: Not on file     Gets together: Not on file     Attends Muslim service: Not on file     Active member of club or organization: Not on file     Attends meetings of clubs or organizations: Not on file     Relationship status: Not on file     Intimate partner violence     Fear of current or ex partner: Not on file     Emotionally abused: Not on file     Physically abused: Not on file     Forced sexual activity: Not on file   Other Topics Concern     Parent/sibling w/ CABG, MI or angioplasty before 65F 55M? Not Asked   Social History Narrative     Not on file       Allergies:  Allergies   Allergen Reactions     Amoxicillin      Other reaction(s): EENT - other (explain in comments), SKIN - rash  Rash and lip swelling after 8 days of Amoxicillin     Erythromycin      Penicillins        Medications:  Medication Sig          Calcium Carb-Cholecalciferol (CALCIUM 500 +D) 500-400 MG-UNIT TABS Take 500 mg by mouth daily     furosemide (LASIX) 20 MG tablet Take 1 tablet (20 mg) by  mouth 2 times daily As needed     lisinopril (PRINIVIL/ZESTRIL) 20 MG tablet Take two tablets in AM and one tablet in PM     omeprazole (PRILOSEC) 20 MG CR capsule TAKE 1 CAPSULE (20 MG) BY MOUTH DAILY     predniSONE (DELTASONE) 5 MG tablet Takes 10 mg alternating with 5 mg every other day      PROGRAF (BRAND) 0.5 MG capsule Take 4 capsules (2 mg) by mouth 2 times daily   .  Current Outpatient Medications   Medication Sig Dispense Refill     furosemide (LASIX) 20 MG tablet Take 1 tablet (20 mg) by mouth 2 times daily As needed 180 tablet 3     lisinopril (ZESTRIL) 20 MG tablet TAKE 2 TABLETS BY MOUTH EVERY MORNING AND 1 IN THE EVENING 90 tablet 3     omeprazole (PRILOSEC) 20 MG DR capsule TAKE 1 CAPSULE (20 MG) BY MOUTH DAILY 30 capsule 3     predniSONE (DELTASONE) 5 MG tablet Alternate 5 mg daily and 10 mg daily every other day 120 tablet 3     rosuvastatin (CRESTOR) 5 MG tablet Take 1 tablet (5 mg) by mouth daily 30 tablet 11     tacrolimus (GENERIC EQUIVALENT) 1 MG capsule Take 2 capsules (2 mg) by mouth 2 times daily 120 capsule 11     Calcium Carb-Cholecalciferol (CALCIUM 500 +D) 500-400 MG-UNIT TABS Take 500 mg by mouth daily 30 tablet NOT TAKING         Vitals:       Exam:  GENERAL APPEARANCE: alert and no distress.    HENT: Sclerae anicteric  RESP: Breathing not labored  EDEMA: 1-2+ ankle edema  SKIN: no facial rash/acne.    NEURO:  Mentation and speech normal    Results:  No results found for this or any previous visit (from the past 504 hour(s)).  FINAL DIAGNOSIS:   A:     Kidney, native, LEFT; percutaneous needle biopsy:   - Focal segmental glomerulosclerosis     This biopsy shows segmental sclerosis and hyalinosis affecting   approximately 30% of glomeruli examined in a pattern diagnostic for   focal segmental glomerulosclerosis.  Although sclerotic segments focally   exhibit increased cellularity, capillary distention by intra-luminal   inflammatory cells is not observed and this specimen does not meet    strict criteria for cellular variant of focal segmental   glomerulosclerosis.  However, in view of the proportion of glomeruli   exhibiting segmental sclerosis and the increased cellularity observed in   sclerotic segments it is expected that this condition may follow an   aggressive course.       Assessment and Plan:   23 year old male with with nephrotic syndrome, biopsy in August 2016 showing FSGS. Steroids started 8/23/2016 and relapsed during weaning in January 2018, with proteinuria up from 0.28 g/g rocky to 4.9 grams on 1/31/18  1. FSGS- Mr Trevino is clinically stable from the point of view of FSGS.    He is s/p participation in the  trial of MCP1 receptor blocker.  He will obtain labs in the coming days to monitor his proteinuria.  I asked him to decrease the prednisone to 5 mg daily x 2 weeks then 5 mg everyother day   Increase furosemide to twice daily as previously prescribed  Increase Ca+ Vit D to 2 tablets dailys  F/U in 3 months    José Miguel Jean MD  Division of Renal Disease and Hypertension  Pager: [phone]  June 4, 2020  1:34 PM

## 2020-06-04 NOTE — LETTER
"6/4/2020       RE: Ethan Trevino  [address]  [facility] [address]     Dear Colleague,    Thank you for referring your patient, Ethan Trevino, to the [facility] NEPHROLOGY at [facility]. Please see a copy of my visit note below.    Ethan Trevino is a 25 year old male who is being evaluated via a billable video visit.      The patient has been notified of following:     \"This video visit will be conducted via a call between you and your physician/provider. We have found that certain health care needs can be provided without the need for an in-person physical exam.  This service lets us provide the care you need with a video conversation.  If a prescription is necessary we can send it directly to your pharmacy.  If lab work is needed we can place an order for that and you can then stop by our lab to have the test done at a later time.    Video visits are billed at different rates depending on your insurance coverage.  Please reach out to your insurance provider with any questions.    If during the course of the call the physician/provider feels a video visit is not appropriate, you will not be charged for this service.\"    Patient has given verbal consent for Video visit? Yes    How would you like to obtain your AVS? MyChart    Patient would like the video invitation sent by: Send to e-mail at: [email or web address]    Will anyone else be joining your video visit? No      Video-Visit Details    Type of service:  Video Visit    Video Start Time: 1:32 PM  Video End Time: 2:44 PM    Originating Location (pt. Location): Home    Distant Location (provider location):  [facility] NEPHROLOGY     Platform used for Video Visit: Martita Jean MD            Nephrology Clinic  June 4, 2020      HPI:    Initial presentation  Summer 2016: Presented with several months history of swelling, noted to be nephrotic, and renal biopsy in August 2016 " showed FSGS. He was started on steroids 8/23/16.  Remainded on steroids 100 mg x several months.  He had more and more abdominal striae and his acne is significant  His proteinuria was down to 1-2 grams then subgram at 0.7g which is great, but unfortunately when he was down to 10mg prednisone, his proteinuria increased back to 5 grams In Jan 2018 Along with with low albumin and swelling.    He went back on Pred at 100 mg day  With a surprizing transient dip in protenuria to 1.5 in March, but eventually proteinuria decreased to UPCR < 3 startuing in April 2018   Last UPCR  1.24 g/g on 11/1/18.  He was seen on 11/29/18 at which time we initiated a tapering schedule of prednisone, as he was in partial remission on therapy (UPCR < 1).  Prednisone taper was started in Dec 2018  He had a relapse of proteinuria in February 2019  Prompting a halting of the prednisone taper and initiation of Tacrolimus at 0.5 mg twice daily  Tacrolimus was progressively increased to 2 mg twice daily starting on April 19, 2019, with documentation of trough levels in the therapeutic range.      He was enrolled in the study  On June 24, 2019.  Day #1 of study medication 7/16/19  Completed the study in Feb 2020:        June 4, 2020    He presents today with no acute complaints.  He denies any further occurrences of transient numbness or tingling.  Energy is OK.    Mild L Ext swelling.  No new striae  No GI symptoms, except for occasional loose stools.  Specifically no melena or hematochezia.  Has been working the entire pandemic.  ROS:  A comprehensive review of systems was obtained and negative, except as noted in the HPI or PMH.  No HA, tremor  No N,V, Abd pain.  No F, Chills, cough, SOB  No dysuria or gross hematuria.      Active Medical Problems:  Patient Active Problem List   Diagnosis     FSGS (focal segmental glomerulosclerosis)     Nephrotic syndrome       Personal Hx:   Social History     Socioeconomic History     Marital status:  Single     Spouse name: Not on file     Number of children: Not on file     Years of education: Not on file     Highest education level: Not on file   Occupational History     Not on file   Social Needs     Financial resource strain: Not on file     Food insecurity     Worry: Not on file     Inability: Not on file     Transportation needs     Medical: Not on file     Non-medical: Not on file   Tobacco Use     Smoking status: Never Smoker     Smokeless tobacco: Never Used   Substance and Sexual Activity     Alcohol use: Yes     Comment: social     Drug use: No     Sexual activity: Yes   Lifestyle     Physical activity     Days per week: Not on file     Minutes per session: Not on file     Stress: Not on file   Relationships     Social connections     Talks on phone: Not on file     Gets together: Not on file     Attends [employer] service: Not on file     Active member of club or organization: Not on file     Attends meetings of clubs or organizations: Not on file     Relationship status: Not on file     Intimate partner violence     Fear of current or ex partner: Not on file     Emotionally abused: Not on file     Physically abused: Not on file     Forced sexual activity: Not on file   Other Topics Concern     Parent/sibling w/ CABG, MI or angioplasty before 65F 55M? Not Asked   Social History Narrative     Not on file       Allergies:  Allergies   Allergen Reactions     Amoxicillin      Other reaction(s): EENT - other (explain in comments), SKIN - rash  Rash and lip swelling after 8 days of Amoxicillin     Erythromycin      Penicillins        Medications:  Medication Sig          Calcium Carb-Cholecalciferol (CALCIUM 500 +D) 500-400 MG-UNIT TABS Take 500 mg by mouth daily     furosemide (LASIX) 20 MG tablet Take 1 tablet (20 mg) by mouth 2 times daily As needed     lisinopril (PRINIVIL/ZESTRIL) 20 MG tablet Take two tablets in AM and one tablet in PM     omeprazole (PRILOSEC) 20 MG CR capsule TAKE 1 CAPSULE (20 MG)  BY MOUTH DAILY     predniSONE (DELTASONE) 5 MG tablet Takes 10 mg alternating with 5 mg every other day      PROGRAF (BRAND) 0.5 MG capsule Take 4 capsules (2 mg) by mouth 2 times daily   .  Current Outpatient Medications   Medication Sig Dispense Refill     furosemide (LASIX) 20 MG tablet Take 1 tablet (20 mg) by mouth 2 times daily As needed 180 tablet 3     lisinopril (ZESTRIL) 20 MG tablet TAKE 2 TABLETS BY MOUTH EVERY MORNING AND 1 IN THE EVENING 90 tablet 3     omeprazole (PRILOSEC) 20 MG DR capsule TAKE 1 CAPSULE (20 MG) BY MOUTH DAILY 30 capsule 3     predniSONE (DELTASONE) 5 MG tablet Alternate 5 mg daily and 10 mg daily every other day 120 tablet 3     rosuvastatin (CRESTOR) 5 MG tablet Take 1 tablet (5 mg) by mouth daily 30 tablet 11     tacrolimus (GENERIC EQUIVALENT) 1 MG capsule Take 2 capsules (2 mg) by mouth 2 times daily 120 capsule 11     Calcium Carb-Cholecalciferol (CALCIUM 500 +D) 500-400 MG-UNIT TABS Take 500 mg by mouth daily 30 tablet NOT TAKING         Vitals:       Exam:  GENERAL APPEARANCE: alert and no distress.    HENT: Sclerae anicteric  RESP: Breathing not labored  EDEMA: 1-2+ ankle edema  SKIN: no facial rash/acne.    NEURO:  Mentation and speech normal    Results:  No results found for this or any previous visit (from the past 504 hour(s)).  FINAL DIAGNOSIS:   A:     Kidney, native, LEFT; percutaneous needle biopsy:   - Focal segmental glomerulosclerosis     This biopsy shows segmental sclerosis and hyalinosis affecting   approximately 30% of glomeruli examined in a pattern diagnostic for   focal segmental glomerulosclerosis.  Although sclerotic segments focally   exhibit increased cellularity, capillary distention by intra-luminal   inflammatory cells is not observed and this specimen does not meet   strict criteria for cellular variant of focal segmental   glomerulosclerosis.  However, in view of the proportion of glomeruli   exhibiting segmental sclerosis and the increased  cellularity observed in   sclerotic segments it is expected that this condition may follow an   aggressive course.       Assessment and Plan:   23 year old male with with nephrotic syndrome, biopsy in August 2016 showing FSGS. Steroids started 8/23/2016 and relapsed during weaning in January 2018, with proteinuria up from 0.28 g/g rocky to 4.9 grams on 1/31/18  1. FSGS- Mr Trevino is clinically stable from the point of view of FSGS.    He is s/p participation in the  trial of MCP1 receptor blocker.  He will obtain labs in the coming days to monitor his proteinuria.  I asked him to decrease the prednisone to 5 mg daily x 2 weeks then 5 mg everyother day   Increase furosemide to twice daily as previously prescribed  Increase Ca+ Vit D to 2 tablets dailys  F/U in 3 months    José Miguel Jean MD  Division of Renal Disease and Hypertension  Pager: [phone]  June 4, 2020  1:34 PM

## 2020-06-04 NOTE — PROGRESS NOTES
"Ethan Trevino is a 25 year old male who is being evaluated via a billable video visit.      The patient has been notified of following:     \"This video visit will be conducted via a call between you and your physician/provider. We have found that certain health care needs can be provided without the need for an in-person physical exam.  This service lets us provide the care you need with a video conversation.  If a prescription is necessary we can send it directly to your pharmacy.  If lab work is needed we can place an order for that and you can then stop by our lab to have the test done at a later time.    Video visits are billed at different rates depending on your insurance coverage.  Please reach out to your insurance provider with any questions.    If during the course of the call the physician/provider feels a video visit is not appropriate, you will not be charged for this service.\"    Patient has given verbal consent for Video visit? Yes    How would you like to obtain your AVS? Melvinhart    Patient would like the video invitation sent by: Send to e-mail at: [email or web address]    Will anyone else be joining your video visit? No      Video-Visit Details    Type of service:  Video Visit    Video Start Time: 1:32 PM  Video End Time: 2:44 PM    Originating Location (pt. Location): Home    Distant Location (provider location):  [facility] NEPHROLOGY     Platform used for Video Visit: Martita Jean MD        "

## 2020-06-05 ENCOUNTER — TELEPHONE (OUTPATIENT)
Dept: NEPHROLOGY | Facility: CLINIC | Age: 25
End: 2020-06-05

## 2020-07-01 DIAGNOSIS — N05.1 FSGS (FOCAL SEGMENTAL GLOMERULOSCLEROSIS): ICD-10-CM

## 2020-07-01 LAB
ALBUMIN SERPL-MCNC: 2.7 G/DL (ref 3.4–5)
ALBUMIN UR-MCNC: >499 MG/DL
ANION GAP SERPL CALCULATED.3IONS-SCNC: 4 MMOL/L (ref 3–14)
APPEARANCE UR: CLEAR
BASOPHILS # BLD AUTO: 0.1 10E9/L (ref 0–0.2)
BASOPHILS NFR BLD AUTO: 0.8 %
BILIRUB UR QL STRIP: NEGATIVE
BUN SERPL-MCNC: 19 MG/DL (ref 7–30)
CALCIUM SERPL-MCNC: 8.4 MG/DL (ref 8.5–10.1)
CHLORIDE SERPL-SCNC: 111 MMOL/L (ref 94–109)
CO2 SERPL-SCNC: 27 MMOL/L (ref 20–32)
COLOR UR AUTO: YELLOW
CREAT SERPL-MCNC: 1.05 MG/DL (ref 0.66–1.25)
CREAT UR-MCNC: 171 MG/DL
DIFFERENTIAL METHOD BLD: ABNORMAL
EOSINOPHIL # BLD AUTO: 0.4 10E9/L (ref 0–0.7)
EOSINOPHIL NFR BLD AUTO: 5 %
ERYTHROCYTE [DISTWIDTH] IN BLOOD BY AUTOMATED COUNT: 12.5 % (ref 10–15)
GFR SERPL CREATININE-BSD FRML MDRD: >90 ML/MIN/{1.73_M2}
GLUCOSE SERPL-MCNC: 96 MG/DL (ref 70–99)
GLUCOSE UR STRIP-MCNC: NEGATIVE MG/DL
HCT VFR BLD AUTO: 35.1 % (ref 40–53)
HGB BLD-MCNC: 11.7 G/DL (ref 13.3–17.7)
HGB UR QL STRIP: NEGATIVE
HYALINE CASTS #/AREA URNS LPF: 6 /LPF (ref 0–2)
IMM GRANULOCYTES # BLD: 0.1 10E9/L (ref 0–0.4)
IMM GRANULOCYTES NFR BLD: 0.6 %
KETONES UR STRIP-MCNC: NEGATIVE MG/DL
LEUKOCYTE ESTERASE UR QL STRIP: NEGATIVE
LYMPHOCYTES # BLD AUTO: 3 10E9/L (ref 0.8–5.3)
LYMPHOCYTES NFR BLD AUTO: 35.6 %
MCH RBC QN AUTO: 28.7 PG (ref 26.5–33)
MCHC RBC AUTO-ENTMCNC: 33.3 G/DL (ref 31.5–36.5)
MCV RBC AUTO: 86 FL (ref 78–100)
MONOCYTES # BLD AUTO: 0.8 10E9/L (ref 0–1.3)
MONOCYTES NFR BLD AUTO: 9.1 %
MUCOUS THREADS #/AREA URNS LPF: PRESENT /LPF
NEUTROPHILS # BLD AUTO: 4.1 10E9/L (ref 1.6–8.3)
NEUTROPHILS NFR BLD AUTO: 48.9 %
NITRATE UR QL: NEGATIVE
NRBC # BLD AUTO: 0 10*3/UL
NRBC BLD AUTO-RTO: 0 /100
PH UR STRIP: 5 PH (ref 5–7)
PHOSPHATE SERPL-MCNC: 5.2 MG/DL (ref 2.5–4.5)
PLATELET # BLD AUTO: 279 10E9/L (ref 150–450)
POTASSIUM SERPL-SCNC: 4.4 MMOL/L (ref 3.4–5.3)
PROT UR-MCNC: 4.05 G/L
PROT/CREAT 24H UR: 2.37 G/G CR (ref 0–0.2)
RBC # BLD AUTO: 4.08 10E12/L (ref 4.4–5.9)
RBC #/AREA URNS AUTO: 1 /HPF (ref 0–2)
SODIUM SERPL-SCNC: 142 MMOL/L (ref 133–144)
SOURCE: ABNORMAL
SP GR UR STRIP: 1.01 (ref 1–1.03)
SQUAMOUS #/AREA URNS AUTO: 1 /HPF (ref 0–1)
TACROLIMUS BLD-MCNC: 6.2 UG/L (ref 5–15)
TME LAST DOSE: NORMAL H
UROBILINOGEN UR STRIP-MCNC: 0 MG/DL (ref 0–2)
WBC # BLD AUTO: 8.4 10E9/L (ref 4–11)
WBC #/AREA URNS AUTO: <1 /HPF (ref 0–5)

## 2020-07-22 DIAGNOSIS — N05.1 FSGS (FOCAL SEGMENTAL GLOMERULOSCLEROSIS): ICD-10-CM

## 2020-07-22 DIAGNOSIS — N04.9 NEPHROTIC SYNDROME: ICD-10-CM

## 2020-07-22 RX ORDER — LISINOPRIL 20 MG/1
TABLET ORAL
Qty: 90 TABLET | Refills: 3 | Status: SHIPPED | OUTPATIENT
Start: 2020-07-22 | End: 2020-12-01

## 2020-12-18 DIAGNOSIS — N05.1 FSGS (FOCAL SEGMENTAL GLOMERULOSCLEROSIS): ICD-10-CM

## 2020-12-18 RX ORDER — TACROLIMUS 1 MG/1
2 CAPSULE ORAL 2 TIMES DAILY
Qty: 120 CAPSULE | Refills: 11 | Status: SHIPPED | OUTPATIENT
Start: 2020-12-18 | End: 2021-12-28

## 2020-12-27 ENCOUNTER — HEALTH MAINTENANCE LETTER (OUTPATIENT)
Age: 25
End: 2020-12-27

## 2021-02-04 ENCOUNTER — VIRTUAL VISIT (OUTPATIENT)
Dept: NEPHROLOGY | Facility: CLINIC | Age: 26
End: 2021-02-04
Attending: INTERNAL MEDICINE
Payer: COMMERCIAL

## 2021-02-04 DIAGNOSIS — N05.1 FSGS (FOCAL SEGMENTAL GLOMERULOSCLEROSIS): Primary | ICD-10-CM

## 2021-02-04 PROCEDURE — 99214 OFFICE O/P EST MOD 30 MIN: CPT | Mod: 95 | Performed by: INTERNAL MEDICINE

## 2021-02-04 ASSESSMENT — PAIN SCALES - GENERAL: PAINLEVEL: NO PAIN (0)

## 2021-02-04 NOTE — LETTER
2/4/2021       RE: Ethan Trevino  [address]  [facility] [address]     Dear Colleague,    Thank you for referring your patient, Ethan Trevino, to the [facility] [place] at [facility]. Please see a copy of my visit note below.    Ethan is a 26 year old who is being evaluated via a billable video visit.      How would you like to obtain your AVS? MyChart  If the video visit is dropped, the invitation should be resent by: Text to cell phone: [phone]  Will anyone else be joining your video visit? No    Video Start Time: 1:20  Video-Visit Details    Type of service:  Video Visit    Video End Time:1:45    Originating Location (pt. Location): Home    Distant Location (provider location):  [facility] NEPHROLOGY [facility]     Platform used for Video Visit: Deborah Long is a 26 year old who is being evaluated via a billable video visit.      How would you like to obtain your AVS? MyChart  If the video visit is dropped, the invitation should be resent by: Text to cell phone: [phone]  Will anyone else be joining your video visit? No     Video Start Time: 1:20  Video-Visit Details    Type of service:  Video Visit    Video End Time:1:45    Originating Location (pt. Location): Home    Distant Location (provider location):  [facility] NEPH[facility]     Platform used for Video Visit: Deborah    February 4, 2021    CC: FSGS follow up    Initial presentation  Summer 2016: Presented with several months history of swelling, noted to be nephrotic, and renal biopsy in August 2016 showed FSGS. He was started on steroids 8/23/16.  Remainded on steroids 100 mg x several months.  He had more and more abdominal striae and his acne is significant  His proteinuria was down to 1-2 grams then subgram at 0.7g which is great, but unfortunately when he was down to 10mg prednisone, his  proteinuria increased back to 5 grams In Jan 2018 Along with with low albumin and swelling.    He went back on Pred at 100 mg day  With a surprizing transient dip in protenuria to 1.5 in March, but eventually proteinuria decreased to UPCR < 3 startuing in April 2018   Last UPCR  1.24 g/g on 11/1/18.  He was seen on 11/29/18 at which time we initiated a tapering schedule of prednisone, as he was in partial remission on therapy (UPCR < 1).  Prednisone taper was started in Dec 2018  He had a relapse of proteinuria in February 2019  Prompting a halting of the prednisone taper and initiation of Tacrolimus at 0.5 mg twice daily  Tacrolimus was progressively increased to 2 mg twice daily starting on April 19, 2019, with documentation of trough levels in the therapeutic range.       He was enrolled in the study  On June 24, 2019.  Day #1 of study medication 7/16/19  Completed the study in Feb 2020:          June 4, 2020     He presents today with no acute complaints.  He denies any further occurrences of transient numbness or tingling.  Energy is OK.    Mild L Ext swelling.  No new striae  No GI symptoms, except for occasional loose stools.  Specifically no melena or hematochezia.  Has been working the entire pandemic.    ROS:  A comprehensive review of systems was obtained and negative, except as noted in the HPI or PMH.  No HA, tremor  No N,V, Abd pain.  No F, Chills, cough, SOB  No dysuria or gross hematuria.    2/4/2020  Pt endorsed doing fine. He had to reschedule his prior appointment because he had other commitments during his prior cristine. Time. Up on questioning, mentioned that he continue to have some swelling his legs, about the same like before, not worsening. Denied other systemic symptoms including fever/chils, nausea/vomting/abdominal pain, diarrhea, chest pain or SOB. Also denied any recurrent headaches, hematuria, renal stones, new rash or joint aches in recent past.    Currently on tacrolimus 2 mg BID, off  of prednisone for 4 months now.       Allergies   Allergen Reactions     Amoxicillin      Other reaction(s): EENT - other (explain in comments), SKIN - rash  Rash and lip swelling after 8 days of Amoxicillin     Erythromycin      Penicillins             Calcium Carb-Cholecalciferol (CALCIUM 500 +D) 500-400 MG-UNIT TABS, Take 1,000 mg by mouth daily       furosemide (LASIX) 20 MG tablet, Take 1 tablet (20 mg) by mouth 2 times daily As needed       lisinopril (ZESTRIL) 20 MG tablet, TAKE 2 TABLETS BY MOUTH EVERY MORNING AND 1 IN THE EVENING       omeprazole (PRILOSEC) 20 MG DR capsule, TAKE 1 CAPSULE (20 MG) BY MOUTH DAILY       rosuvastatin (CRESTOR) 5 MG tablet, Take 1 tablet (5 mg) by mouth daily       tacrolimus (GENERIC EQUIVALENT) 1 MG capsule, Take 2 capsules (2 mg) by mouth 2 times daily       predniSONE (DELTASONE) 10 MG tablet, Take 1 tablet (10 mg) by mouth daily (Patient not taking: Reported on 5/16/2019.)       predniSONE (DELTASONE) 20 MG tablet, Take 4 tablets (80 mg) by mouth daily (Patient not taking: Reported on 11/29/2018)       predniSONE (DELTASONE) 20 MG tablet, Take 1 tablet (20 mg) by mouth daily (Patient not taking: Reported on 2/9/2019.)       predniSONE (DELTASONE) 5 MG tablet, Alternate 5 mg daily and 10 mg daily every other day (Patient not taking: Reported on 2/4/2021)    No current facility-administered medications on file prior to visit.       Past Medical History:   Diagnosis Date     Chronic kidney disease        Past Surgical History:   Procedure Laterality Date     BIOPSY  2016    renal       Social History     Tobacco Use     Smoking status: Never Smoker     Smokeless tobacco: Never Used   Substance Use Topics     Alcohol use: Yes     Comment: social     Drug use: No       No family history on file.    ROS: A 12 system review of systems was negative other than noted here or above.     Exam:  There were no vitals taken for this visit.    GENERAL APPEARANCE: alert and no  distress  RESP: able to speak in full sentences, not in respiratory distress   NEURO: mentation intact and speech normal  PSYCH: affect normal/bright    Further physical exam is limited in setting of video visit    Results:    No visits with results within 1 Day(s) from this visit.   Latest known visit with results is:   Orders Only on 07/01/2020   Component Date Value Ref Range Status     Tacrolimus Last Dose 07/01/2020 2145 6/30/20   Final     Tacrolimus Level 07/01/2020 6.2  5.0 - 15.0 ug/L Final    Comment: Tacrolimus Reference Range  Kidney Transplant  Pediatric                      ug/L    0-3 months post transplant   10-12    3-6 months post transplant   8-10    6-12 months post transplant  6-8    >12 months post transplant   4-7  Adult    0-6 months post transplant   8-10    6-12 months post transplant  6-8    >12 months post transplant   4-6    >5 years post transplant     3-5  Heart Transplant  Pediatric    0-12 months post transplant  10-15    >12 months post transplant   5-10  Adult    0-3 months post transplant   10-15    3-6 months post transplant   8-12    6-12 months post transplant  6-12    >12 months post transplant   6-10  Lung Transplant    0-12 months post transplant  10-15    >12 months post transplant   8-12  Liver Transplant  Pediatric    0-3 months post transplant   10-15    3-6 months post transplant   8-10    >6 months post transplant    6-8  Adult    0-3 months post transplant   10-12    3-6 months post transplant   8-10    >6 months post transplant    6-8  Pancrea                           s Transplant    0-6 months post transplant   8-10    >6 months post transplant    5-8  This test was developed and its performance characteristics determined by the   [facility].   It has not been cleared or approved by the FDA. The laboratory is regulated   under CLIA as qualified to perform high-complexity testing. This test is used    for clinical purposes. It should not be regarded as investigational or for   research.       WBC 07/01/2020 8.4  4.0 - 11.0 10e9/L Final     RBC Count 07/01/2020 4.08* 4.4 - 5.9 10e12/L Final     Hemoglobin 07/01/2020 11.7* 13.3 - 17.7 g/dL Final     Hematocrit 07/01/2020 35.1* 40.0 - 53.0 % Final     MCV 07/01/2020 86  78 - 100 fl Final     MCH 07/01/2020 28.7  26.5 - 33.0 pg Final     MCHC 07/01/2020 33.3  31.5 - 36.5 g/dL Final     RDW 07/01/2020 12.5  10.0 - 15.0 % Final     Platelet Count 07/01/2020 279  150 - 450 10e9/L Final     Diff Method 07/01/2020 Automated Method   Final     % Neutrophils 07/01/2020 48.9  % Final     % Lymphocytes 07/01/2020 35.6  % Final     % Monocytes 07/01/2020 9.1  % Final     % Eosinophils 07/01/2020 5.0  % Final     % Basophils 07/01/2020 0.8  % Final     % Immature Granulocytes 07/01/2020 0.6  % Final     Nucleated RBCs 07/01/2020 0  0 /100 Final     Absolute Neutrophil 07/01/2020 4.1  1.6 - 8.3 10e9/L Final     Absolute Lymphocytes 07/01/2020 3.0  0.8 - 5.3 10e9/L Final     Absolute Monocytes 07/01/2020 0.8  0.0 - 1.3 10e9/L Final     Absolute Eosinophils 07/01/2020 0.4  0.0 - 0.7 10e9/L Final     Absolute Basophils 07/01/2020 0.1  0.0 - 0.2 10e9/L Final     Abs Immature Granulocytes 07/01/2020 0.1  0 - 0.4 10e9/L Final     Absolute Nucleated RBC 07/01/2020 0.0   Final     Sodium 07/01/2020 142  133 - 144 mmol/L Final     Potassium 07/01/2020 4.4  3.4 - 5.3 mmol/L Final     Chloride 07/01/2020 111* 94 - 109 mmol/L Final     Carbon Dioxide 07/01/2020 27  20 - 32 mmol/L Final     Anion Gap 07/01/2020 4  3 - 14 mmol/L Final     Glucose 07/01/2020 96  70 - 99 mg/dL Final     Urea Nitrogen 07/01/2020 19  7 - 30 mg/dL Final     Creatinine 07/01/2020 1.05  0.66 - 1.25 mg/dL Final     GFR Estimate 07/01/2020 >90  >60 mL/min/[1.73_m2] Final    Comment: Non  GFR Calc  Starting 12/18/2018, serum creatinine based estimated GFR (eGFR) will be   calculated using the Chronic  Kidney Disease Epidemiology Collaboration   (CKD-EPI) equation.       GFR Estimate If Black 07/01/2020 >90  >60 mL/min/[1.73_m2] Final    Comment:  GFR Calc  Starting 12/18/2018, serum creatinine based estimated GFR (eGFR) will be   calculated using the Chronic Kidney Disease Epidemiology Collaboration   (CKD-EPI) equation.       Calcium 07/01/2020 8.4* 8.5 - 10.1 mg/dL Final     Phosphorus 07/01/2020 5.2* 2.5 - 4.5 mg/dL Final     Albumin 07/01/2020 2.7* 3.4 - 5.0 g/dL Final     Protein Random Urine 07/01/2020 4.05  g/L Final     Protein Total Urine g/gr Creatinine 07/01/2020 2.37* 0 - 0.2 g/g Cr Final     Creatinine Urine 07/01/2020 171  mg/dL Final     Color Urine 07/01/2020 Yellow   Final     Appearance Urine 07/01/2020 Clear   Final     Glucose Urine 07/01/2020 Negative  NEG^Negative mg/dL Final     Bilirubin Urine 07/01/2020 Negative  NEG^Negative Final     Ketones Urine 07/01/2020 Negative  NEG^Negative mg/dL Final     Specific Gravity Urine 07/01/2020 1.014  1.003 - 1.035 Final     Blood Urine 07/01/2020 Negative  NEG^Negative Final     pH Urine 07/01/2020 5.0  5.0 - 7.0 pH Final     Protein Albumin Urine 07/01/2020 >499* NEG^Negative mg/dL Final     Urobilinogen mg/dL 07/01/2020 0.0  0.0 - 2.0 mg/dL Final     Nitrite Urine 07/01/2020 Negative  NEG^Negative Final     Leukocyte Esterase Urine 07/01/2020 Negative  NEG^Negative Final     Source 07/01/2020 Midstream Urine   Final     RBC Urine 07/01/2020 1  0 - 2 /HPF Final     WBC Urine 07/01/2020 <1  0 - 5 /HPF Final     Squamous Epithelial /HPF Urine 07/01/2020 1  0 - 1 /HPF Final     Mucous Urine 07/01/2020 Present* NEG^Negative /LPF Final     Hyaline Casts 07/01/2020 6* 0 - 2 /LPF Final       Assessment/Plan:   26 year old male with nephrotic syndrome, biopsy in August 2016 showing FSGS. Steroids started 8/23/2016 and relapsed during weaning in January 2018, with proteinuria up from 0.28 g/g rocky to 4.9 grams on 1/31/18  1. FSGS- Mr  Uriel is clinically stable from the point of view of FSGS.    He is s/p participation in the  trial of MCP1 receptor blocker.  He will obtain labs in the coming days to monitor his proteinuria.  Able to come of off prednisone, but unfortunately no labs to assess his proteinuria. Will continue on tacrolimus at 2 mg BID for now and dose adjust based on labs  Continue furosemide to twice daily as previously prescribed  Increase Ca+ Vit D to 2 tablets daily    Hypertension   Per pt his BP were mostly in 130's, some times reaching to 140's systolic   - continue lisinopril and lasix for now while awaiting his lab work    D/w Dr. Ted Barr  Nephrology fellow      Attestation:  This patient was evaluated by me, José Miguel Jean MD on February 5, 2021 jointly with Dr Barr.  Discussed with Dr Barr and agree with the findings and plan in this note.  I have reviewed  Medications, Vital Signs and Labs.  Mr Trevino appears clinically stable pending his repeat labs.  He will obtain those in the coming week.  Will plan to adjust his medications accordingly.  Labs ordered   CBC with platelets                                   Qty-1, Future, Expected: 2/4/2021 Approximate, Expires: 2/4/2022, Normal, Routine, Last Lab Result: Hemoglobin (g/dL) Date Value 07/01/2020 11.7 (L) ----------                                                 Protein  random urine with Creat Ratio (Canceled)                                  Qty-1, Normal, Routine                                         Protein  random urine with Creat Ratio                                  Qty-1, Future, Expected: 2/18/2021 Approximate, Expires: 2/4/2022, Normal, Routine                                 Renal panel                                  Qty-1, Future, Expected: 2/4/2021 Approximate, Expires: 2/4/2022, Normal, Routine                         Tacrolimus level                                  Qty-1, Future, Expected: 2/4/2021  Approximate, Expires: 2/4/2022, Lab Collect, Routine                 UA with Microscopic reflex to Culture                                  Qty-1, Future, Expected: 2/4/2021 Approximate, Expires: 2/4/2022, Normal, Routine         José Miguel Jean MD  [facility]  Department of Medicine  Division of Renal Disease and Hypertension  Pager

## 2021-02-04 NOTE — PROGRESS NOTES
Ethan is a 26 year old who is being evaluated via a billable video visit.      How would you like to obtain your AVS? MyChart  If the video visit is dropped, the invitation should be resent by: Text to cell phone: [phone]  Will anyone else be joining your video visit? No    Video Start Time: 1:20  Video-Visit Details    Type of service:  Video Visit    Video End Time:1:45    Originating Location (pt. Location): Home    Distant Location (provider location):  [facility] NEPHROLOG[facility]INIC [place]     Platform used for Video Visit: [employer] (formerly [employer])

## 2021-02-04 NOTE — PROGRESS NOTES
Ethan is a 26 year old who is being evaluated via a billable video visit.      How would you like to obtain your AVS? MyChart  If the video visit is dropped, the invitation should be resent by: Text to cell phone: [phone]  Will anyone else be joining your video visit? No     Video Start Time: 1:20  Video-Visit Details    Type of service:  Video Visit    Video End Time:1:45    Originating Location (pt. Location): Home    Distant Location (provider location):  [facility] [place]     Platform used for Video Visit: Miguelreal trends    February 4, 2021    CC: FSGS follow up    Initial presentation  Summer 2016: Presented with several months history of swelling, noted to be nephrotic, and renal biopsy in August 2016 showed FSGS. He was started on steroids 8/23/16.  Remainded on steroids 100 mg x several months.  He had more and more abdominal striae and his acne is significant  His proteinuria was down to 1-2 grams then subgram at 0.7g which is great, but unfortunately when he was down to 10mg prednisone, his proteinuria increased back to 5 grams In Jan 2018 Along with with low albumin and swelling.    He went back on Pred at 100 mg day  With a surprizing transient dip in protenuria to 1.5 in March, but eventually proteinuria decreased to UPCR < 3 startuing in April 2018   Last UPCR  1.24 g/g on 11/1/18.  He was seen on 11/29/18 at which time we initiated a tapering schedule of prednisone, as he was in partial remission on therapy (UPCR < 1).  Prednisone taper was started in Dec 2018  He had a relapse of proteinuria in February 2019  Prompting a halting of the prednisone taper and initiation of Tacrolimus at 0.5 mg twice daily  Tacrolimus was progressively increased to 2 mg twice daily starting on April 19, 2019, with documentation of trough levels in the therapeutic range.       He was enrolled in the study  On June 24, 2019.  Day #1 of study medication 7/16/19  Completed the study in Feb  2020:          June 4, 2020     He presents today with no acute complaints.  He denies any further occurrences of transient numbness or tingling.  Energy is OK.    Mild L Ext swelling.  No new striae  No GI symptoms, except for occasional loose stools.  Specifically no melena or hematochezia.  Has been working the entire pandemic.    ROS:  A comprehensive review of systems was obtained and negative, except as noted in the HPI or PMH.  No HA, tremor  No N,V, Abd pain.  No F, Chills, cough, SOB  No dysuria or gross hematuria.    2/4/2020  Pt endorsed doing fine. He had to reschedule his prior appointment because he had other commitments during his prior cristine. Time. Up on questioning, mentioned that he continue to have some swelling his legs, about the same like before, not worsening. Denied other systemic symptoms including fever/chils, nausea/vomting/abdominal pain, diarrhea, chest pain or SOB. Also denied any recurrent headaches, hematuria, renal stones, new rash or joint aches in recent past.    Currently on tacrolimus 2 mg BID, off of prednisone for 4 months now.       Allergies   Allergen Reactions     Amoxicillin      Other reaction(s): EENT - other (explain in comments), SKIN - rash  Rash and lip swelling after 8 days of Amoxicillin     Erythromycin      Penicillins             Calcium Carb-Cholecalciferol (CALCIUM 500 +D) 500-400 MG-UNIT TABS, Take 1,000 mg by mouth daily       furosemide (LASIX) 20 MG tablet, Take 1 tablet (20 mg) by mouth 2 times daily As needed       lisinopril (ZESTRIL) 20 MG tablet, TAKE 2 TABLETS BY MOUTH EVERY MORNING AND 1 IN THE EVENING       omeprazole (PRILOSEC) 20 MG DR capsule, TAKE 1 CAPSULE (20 MG) BY MOUTH DAILY       rosuvastatin (CRESTOR) 5 MG tablet, Take 1 tablet (5 mg) by mouth daily       tacrolimus (GENERIC EQUIVALENT) 1 MG capsule, Take 2 capsules (2 mg) by mouth 2 times daily       predniSONE (DELTASONE) 10 MG tablet, Take 1 tablet (10 mg) by mouth daily (Patient not  taking: Reported on 5/16/2019.)       predniSONE (DELTASONE) 20 MG tablet, Take 4 tablets (80 mg) by mouth daily (Patient not taking: Reported on 11/29/2018)       predniSONE (DELTASONE) 20 MG tablet, Take 1 tablet (20 mg) by mouth daily (Patient not taking: Reported on 2/9/2019.)       predniSONE (DELTASONE) 5 MG tablet, Alternate 5 mg daily and 10 mg daily every other day (Patient not taking: Reported on 2/4/2021)    No current facility-administered medications on file prior to visit.       Past Medical History:   Diagnosis Date     Chronic kidney disease        Past Surgical History:   Procedure Laterality Date     BIOPSY  2016    renal       Social History     Tobacco Use     Smoking status: Never Smoker     Smokeless tobacco: Never Used   Substance Use Topics     Alcohol use: Yes     Comment: social     Drug use: No       No family history on file.    ROS: A 12 system review of systems was negative other than noted here or above.     Exam:  There were no vitals taken for this visit.    GENERAL APPEARANCE: alert and no distress  RESP: able to speak in full sentences, not in respiratory distress   NEURO: mentation intact and speech normal  PSYCH: affect normal/bright    Further physical exam is limited in setting of video visit    Results:    No visits with results within 1 Day(s) from this visit.   Latest known visit with results is:   Orders Only on 07/01/2020   Component Date Value Ref Range Status     Tacrolimus Last Dose 07/01/2020 2145 6/30/20   Final     Tacrolimus Level 07/01/2020 6.2  5.0 - 15.0 ug/L Final    Comment: Tacrolimus Reference Range  Kidney Transplant  Pediatric                      ug/L    0-3 months post transplant   10-12    3-6 months post transplant   8-10    6-12 months post transplant  6-8    >12 months post transplant   4-7  Adult    0-6 months post transplant   8-10    6-12 months post transplant  6-8    >12 months post transplant   4-6    >5 years post transplant     3-5  Heart  Transplant  Pediatric    0-12 months post transplant  10-15    >12 months post transplant   5-10  Adult    0-3 months post transplant   10-15    3-6 months post transplant   8-12    6-12 months post transplant  6-12    >12 months post transplant   6-10  Lung Transplant    0-12 months post transplant  10-15    >12 months post transplant   8-12  Liver Transplant  Pediatric    0-3 months post transplant   10-15    3-6 months post transplant   8-10    >6 months post transplant    6-8  Adult    0-3 months post transplant   10-12    3-6 months post transplant   8-10    >6 months post transplant    6-8  Pancrea                           s Transplant    0-6 months post transplant   8-10    >6 months post transplant    5-8  This test was developed and its performance characteristics determined by the   [facility].   It has not been cleared or approved by the FDA. The laboratory is regulated   under CLIA as qualified to perform high-complexity testing. This test is used   for clinical purposes. It should not be regarded as investigational or for   research.       WBC 07/01/2020 8.4  4.0 - 11.0 10e9/L Final     RBC Count 07/01/2020 4.08* 4.4 - 5.9 10e12/L Final     Hemoglobin 07/01/2020 11.7* 13.3 - 17.7 g/dL Final     Hematocrit 07/01/2020 35.1* 40.0 - 53.0 % Final     MCV 07/01/2020 86  78 - 100 fl Final     MCH 07/01/2020 28.7  26.5 - 33.0 pg Final     MCHC 07/01/2020 33.3  31.5 - 36.5 g/dL Final     RDW 07/01/2020 12.5  10.0 - 15.0 % Final     Platelet Count 07/01/2020 279  150 - 450 10e9/L Final     Diff Method 07/01/2020 Automated Method   Final     % Neutrophils 07/01/2020 48.9  % Final     % Lymphocytes 07/01/2020 35.6  % Final     % Monocytes 07/01/2020 9.1  % Final     % Eosinophils 07/01/2020 5.0  % Final     % Basophils 07/01/2020 0.8  % Final     % Immature Granulocytes 07/01/2020 0.6  % Final     Nucleated RBCs 07/01/2020 0  0 /100 Final      Absolute Neutrophil 07/01/2020 4.1  1.6 - 8.3 10e9/L Final     Absolute Lymphocytes 07/01/2020 3.0  0.8 - 5.3 10e9/L Final     Absolute Monocytes 07/01/2020 0.8  0.0 - 1.3 10e9/L Final     Absolute Eosinophils 07/01/2020 0.4  0.0 - 0.7 10e9/L Final     Absolute Basophils 07/01/2020 0.1  0.0 - 0.2 10e9/L Final     Abs Immature Granulocytes 07/01/2020 0.1  0 - 0.4 10e9/L Final     Absolute Nucleated RBC 07/01/2020 0.0   Final     Sodium 07/01/2020 142  133 - 144 mmol/L Final     Potassium 07/01/2020 4.4  3.4 - 5.3 mmol/L Final     Chloride 07/01/2020 111* 94 - 109 mmol/L Final     Carbon Dioxide 07/01/2020 27  20 - 32 mmol/L Final     Anion Gap 07/01/2020 4  3 - 14 mmol/L Final     Glucose 07/01/2020 96  70 - 99 mg/dL Final     Urea Nitrogen 07/01/2020 19  7 - 30 mg/dL Final     Creatinine 07/01/2020 1.05  0.66 - 1.25 mg/dL Final     GFR Estimate 07/01/2020 >90  >60 mL/min/[1.73_m2] Final    Comment: Non  GFR Calc  Starting 12/18/2018, serum creatinine based estimated GFR (eGFR) will be   calculated using the Chronic Kidney Disease Epidemiology Collaboration   (CKD-EPI) equation.       GFR Estimate If Black 07/01/2020 >90  >60 mL/min/[1.73_m2] Final    Comment:  GFR Calc  Starting 12/18/2018, serum creatinine based estimated GFR (eGFR) will be   calculated using the Chronic Kidney Disease Epidemiology Collaboration   (CKD-EPI) equation.       Calcium 07/01/2020 8.4* 8.5 - 10.1 mg/dL Final     Phosphorus 07/01/2020 5.2* 2.5 - 4.5 mg/dL Final     Albumin 07/01/2020 2.7* 3.4 - 5.0 g/dL Final     Protein Random Urine 07/01/2020 4.05  g/L Final     Protein Total Urine g/gr Creatinine 07/01/2020 2.37* 0 - 0.2 g/g Cr Final     Creatinine Urine 07/01/2020 171  mg/dL Final     Color Urine 07/01/2020 Yellow   Final     Appearance Urine 07/01/2020 Clear   Final     Glucose Urine 07/01/2020 Negative  NEG^Negative mg/dL Final     Bilirubin Urine 07/01/2020 Negative  NEG^Negative Final     Ketones  Urine 07/01/2020 Negative  NEG^Negative mg/dL Final     Specific Gravity Urine 07/01/2020 1.014  1.003 - 1.035 Final     Blood Urine 07/01/2020 Negative  NEG^Negative Final     pH Urine 07/01/2020 5.0  5.0 - 7.0 pH Final     Protein Albumin Urine 07/01/2020 >499* NEG^Negative mg/dL Final     Urobilinogen mg/dL 07/01/2020 0.0  0.0 - 2.0 mg/dL Final     Nitrite Urine 07/01/2020 Negative  NEG^Negative Final     Leukocyte Esterase Urine 07/01/2020 Negative  NEG^Negative Final     Source 07/01/2020 Midstream Urine   Final     RBC Urine 07/01/2020 1  0 - 2 /HPF Final     WBC Urine 07/01/2020 <1  0 - 5 /HPF Final     Squamous Epithelial /HPF Urine 07/01/2020 1  0 - 1 /HPF Final     Mucous Urine 07/01/2020 Present* NEG^Negative /LPF Final     Hyaline Casts 07/01/2020 6* 0 - 2 /LPF Final       Assessment/Plan:   26 year old male with nephrotic syndrome, biopsy in August 2016 showing FSGS. Steroids started 8/23/2016 and relapsed during weaning in January 2018, with proteinuria up from 0.28 g/g rocky to 4.9 grams on 1/31/18  1. FSGS- Mr Trevino is clinically stable from the point of view of FSGS.    He is s/p participation in the  trial of MCP1 receptor blocker.  He will obtain labs in the coming days to monitor his proteinuria.  Able to come of off prednisone, but unfortunately no labs to assess his proteinuria. Will continue on tacrolimus at 2 mg BID for now and dose adjust based on labs  Continue furosemide to twice daily as previously prescribed  Increase Ca+ Vit D to 2 tablets daily    Hypertension   Per pt his BP were mostly in 130's, some times reaching to 140's systolic   - continue lisinopril and lasix for now while awaiting his lab work    D/w Dr. Ted Barr  Nephrology fellow

## 2021-02-05 DIAGNOSIS — N05.1 FSGS (FOCAL SEGMENTAL GLOMERULOSCLEROSIS): ICD-10-CM

## 2021-02-05 LAB
ALBUMIN SERPL-MCNC: 3.2 G/DL (ref 3.4–5)
ALBUMIN UR-MCNC: 100 MG/DL
ANION GAP SERPL CALCULATED.3IONS-SCNC: 5 MMOL/L (ref 3–14)
APPEARANCE UR: CLEAR
BILIRUB UR QL STRIP: NEGATIVE
BUN SERPL-MCNC: 25 MG/DL (ref 7–30)
CALCIUM SERPL-MCNC: 8.8 MG/DL (ref 8.5–10.1)
CHLORIDE SERPL-SCNC: 110 MMOL/L (ref 94–109)
CO2 SERPL-SCNC: 27 MMOL/L (ref 20–32)
COLOR UR AUTO: YELLOW
CREAT SERPL-MCNC: 1.2 MG/DL (ref 0.66–1.25)
CREAT UR-MCNC: 124 MG/DL
ERYTHROCYTE [DISTWIDTH] IN BLOOD BY AUTOMATED COUNT: 12.3 % (ref 10–15)
GFR SERPL CREATININE-BSD FRML MDRD: 82 ML/MIN/{1.73_M2}
GLUCOSE SERPL-MCNC: 97 MG/DL (ref 70–99)
GLUCOSE UR STRIP-MCNC: NEGATIVE MG/DL
GRAN CASTS #/AREA URNS LPF: 5 /LPF
HCT VFR BLD AUTO: 34 % (ref 40–53)
HGB BLD-MCNC: 11.4 G/DL (ref 13.3–17.7)
HGB UR QL STRIP: NEGATIVE
HYALINE CASTS #/AREA URNS LPF: 9 /LPF (ref 0–2)
KETONES UR STRIP-MCNC: NEGATIVE MG/DL
LEUKOCYTE ESTERASE UR QL STRIP: NEGATIVE
MCH RBC QN AUTO: 27.9 PG (ref 26.5–33)
MCHC RBC AUTO-ENTMCNC: 33.5 G/DL (ref 31.5–36.5)
MCV RBC AUTO: 83 FL (ref 78–100)
MUCOUS THREADS #/AREA URNS LPF: PRESENT /LPF
NITRATE UR QL: NEGATIVE
PH UR STRIP: 5 PH (ref 5–7)
PHOSPHATE SERPL-MCNC: 3.8 MG/DL (ref 2.5–4.5)
PLATELET # BLD AUTO: 285 10E9/L (ref 150–450)
POTASSIUM SERPL-SCNC: 4.8 MMOL/L (ref 3.4–5.3)
PROT UR-MCNC: 1.59 G/L
PROT/CREAT 24H UR: 1.28 G/G CR (ref 0–0.2)
RBC # BLD AUTO: 4.09 10E12/L (ref 4.4–5.9)
RBC #/AREA URNS AUTO: <1 /HPF (ref 0–2)
SODIUM SERPL-SCNC: 141 MMOL/L (ref 133–144)
SOURCE: ABNORMAL
SP GR UR STRIP: 1.01 (ref 1–1.03)
SQUAMOUS #/AREA URNS AUTO: <1 /HPF (ref 0–1)
TACROLIMUS BLD-MCNC: 7.6 UG/L (ref 5–15)
TME LAST DOSE: 2130 H
UROBILINOGEN UR STRIP-MCNC: 0 MG/DL (ref 0–2)
WBC # BLD AUTO: 6.5 10E9/L (ref 4–11)
WBC #/AREA URNS AUTO: 1 /HPF (ref 0–5)

## 2021-02-05 PROCEDURE — 99000 SPECIMEN HANDLING OFFICE-LAB: CPT | Performed by: PATHOLOGY

## 2021-02-05 PROCEDURE — 81001 URINALYSIS AUTO W/SCOPE: CPT | Performed by: PATHOLOGY

## 2021-02-05 PROCEDURE — 80069 RENAL FUNCTION PANEL: CPT | Performed by: PATHOLOGY

## 2021-02-05 PROCEDURE — 85027 COMPLETE CBC AUTOMATED: CPT | Performed by: PATHOLOGY

## 2021-02-05 PROCEDURE — 36415 COLL VENOUS BLD VENIPUNCTURE: CPT | Performed by: PATHOLOGY

## 2021-02-05 PROCEDURE — 84156 ASSAY OF PROTEIN URINE: CPT | Performed by: PATHOLOGY

## 2021-02-05 PROCEDURE — 80197 ASSAY OF TACROLIMUS: CPT | Mod: 90 | Performed by: PATHOLOGY

## 2021-02-07 NOTE — PROGRESS NOTES
Attestation:  This patient was evaluated by me, José Miguel Jean MD on February 5, 2021 jointly with Dr Barr.  Discussed with Dr Barr and agree with the findings and plan in this note.  I have reviewed  Medications, Vital Signs and Labs.  Mr Trevino appears clinically stable pending his repeat labs.  He will obtain those in the coming week.  Will plan to adjust his medications accordingly.  Labs ordered   CBC with platelets                                   Qty-1, Future, Expected: 2/4/2021 Approximate, Expires: 2/4/2022, Normal, Routine, Last Lab Result: Hemoglobin (g/dL) Date Value 07/01/2020 11.7 (L) ----------                                                 Protein  random urine with Creat Ratio (Canceled)                                  Qty-1, Normal, Routine                                         Protein  random urine with Creat Ratio                                  Qty-1, Future, Expected: 2/18/2021 Approximate, Expires: 2/4/2022, Normal, Routine                                 Renal panel                                  Qty-1, Future, Expected: 2/4/2021 Approximate, Expires: 2/4/2022, Normal, Routine                         Tacrolimus level                                  Qty-1, Future, Expected: 2/4/2021 Approximate, Expires: 2/4/2022, Lab Collect, Routine                 UA with Microscopic reflex to Culture                                  Qty-1, Future, Expected: 2/4/2021 Approximate, Expires: 2/4/2022, Normal, Routine         José Miguel Jean MD  [facility]  Department of Medicine  Division of Renal Disease and Hypertension  Pager

## 2021-04-24 ENCOUNTER — HEALTH MAINTENANCE LETTER (OUTPATIENT)
Age: 26
End: 2021-04-24

## 2021-05-06 ENCOUNTER — VIRTUAL VISIT (OUTPATIENT)
Dept: NEPHROLOGY | Facility: CLINIC | Age: 26
End: 2021-05-06
Attending: INTERNAL MEDICINE
Payer: COMMERCIAL

## 2021-05-06 DIAGNOSIS — N05.1 FSGS (FOCAL SEGMENTAL GLOMERULOSCLEROSIS): Primary | ICD-10-CM

## 2021-05-06 PROCEDURE — 99213 OFFICE O/P EST LOW 20 MIN: CPT | Mod: 95 | Performed by: INTERNAL MEDICINE

## 2021-05-06 NOTE — LETTER
Date:June 17, 2021      Patient was self referred, no letter generated. Do not send.        [facility] Health Information

## 2021-05-06 NOTE — Clinical Note
5/6/2021       RE: Ethan Trevino  [address]  [facility] [place] [address]     Dear Colleague,    Thank you for referring your patient, Ethan Trevino, to the [facility] [place] at [facility]. Please see a copy of my visit note below.    NEPHROLOGY CLINIC FOLLOW UP  5/6/2021    CC: FSGS follow up    Initial presentation  Summer 2016: Presented with several months history of swelling, noted to be nephrotic, and renal biopsy in August 2016 showed FSGS. He was started on steroids 8/23/16.  Remainded on steroids 100 mg x several months.  He had more and more abdominal striae and his acne is significant  His proteinuria was down to 1-2 grams then subgram at 0.7g which is great, but unfortunately when he was down to 10mg prednisone, his proteinuria increased back to 5 grams In Jan 2018 Along with with low albumin and swelling.    He went back on Pred at 100 mg day  With a surprizing transient dip in protenuria to 1.5 in March, but eventually proteinuria decreased to UPCR < 3 startuing in April 2018   Last UPCR  1.24 g/g on 11/1/18.  He was seen on 11/29/18 at which time we initiated a tapering schedule of prednisone, as he was in partial remission on therapy (UPCR < 1).  Prednisone taper was started in Dec 2018  He had a relapse of proteinuria in February 2019  Prompting a halting of the prednisone taper and initiation of Tacrolimus at 0.5 mg twice daily  Tacrolimus was progressively increased to 2 mg twice daily starting on April 19, 2019, with documentation of trough levels in the therapeutic range.       He was enrolled in the study  On June 24, 2019.  Day #1 of study medication 7/16/19  Completed the study in Feb 2020:          June 4, 2020     He presents today with no acute complaints.  He denies any further occurrences of transient numbness or tingling.  Energy is OK.    Mild L Ext swelling.  No new striae  No GI symptoms,  except for occasional loose stools.  Specifically no melena or hematochezia.  Has been working the entire pandemic.    ROS:  A comprehensive review of systems was obtained and negative, except as noted in the HPI or PMH.  No HA, tremor  No N,V, Abd pain.  No F, Chills, cough, SOB  No dysuria or gross hematuria.    2/4/2020  Pt endorsed doing fine. He had to reschedule his prior appointment because he had other commitments during his prior cristine. Time. Up on questioning, mentioned that he continue to have some swelling his legs, about the same like before, not worsening. Denied other systemic symptoms including fever/chils, nausea/vomting/abdominal pain, diarrhea, chest pain or SOB. Also denied any recurrent headaches, hematuria, renal stones, new rash or joint aches in recent past.    Currently on tacrolimus 2 mg BID, off of prednisone for 4 months now.    5/6/2020  Since last visit, he reports feeling well overall. No acute illnesses or hospitalizations.   COVID vaccination complete (pfizer). 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 LE swelling is minimal, describes no pitting when pressing his fingers in his leg.   Currently on 2mg Tac BID as well as Lasix 20mg BID. Lisinopril, 60mg Daily. /80 in the morning. No light-headedness/dizziness. No orthopnea. Also on statin therapy with rosuvastatin.   Appetite is normal, no nausea, no fevers. No GI issues. He does note new onset of hives 2 months ago. They occur in the morning, and resolve usually in two hours. No scarring, or bites noted. Occurring on the torso and arms at times. Not terribly bothersome, but still annoying. No obvious new insults. No travels out of [place].     Allergies   Allergen Reactions     Amoxicillin      Other reaction(s): EENT - other (explain in comments), SKIN - rash  Rash and lip swelling after 8 days of Amoxicillin     Erythromycin      Penicillins        Calcium Carb-Cholecalciferol (CALCIUM 500 +D)  500-400 MG-UNIT TABS, Take 1,000 mg by mouth daily  furosemide (LASIX) 20 MG tablet, Take 1 tablet (20 mg) by mouth 2 times daily As needed  lisinopril (ZESTRIL) 20 MG tablet, TAKE 2 TABLETS BY MOUTH EVERY MORNING AND 1 IN THE EVENING  omeprazole (PRILOSEC) 20 MG DR capsule, TAKE 1 CAPSULE (20 MG) BY MOUTH DAILY  predniSONE (DELTASONE) 10 MG tablet, Take 1 tablet (10 mg) by mouth daily (Patient not taking: Reported on 5/16/2019.)  predniSONE (DELTASONE) 20 MG tablet, Take 4 tablets (80 mg) by mouth daily (Patient not taking: Reported on 11/29/2018)  predniSONE (DELTASONE) 20 MG tablet, Take 1 tablet (20 mg) by mouth daily (Patient not taking: Reported on 2/9/2019.)  predniSONE (DELTASONE) 5 MG tablet, Alternate 5 mg daily and 10 mg daily every other day (Patient not taking: Reported on 2/4/2021)  rosuvastatin (CRESTOR) 5 MG tablet, Take 1 tablet (5 mg) by mouth daily  tacrolimus (GENERIC EQUIVALENT) 1 MG capsule, Take 2 capsules (2 mg) by mouth 2 times daily    No current facility-administered medications on file prior to visit.       Past Medical History:   Diagnosis Date     Chronic kidney disease        Past Surgical History:   Procedure Laterality Date     BIOPSY  2016    renal       Social History     Tobacco Use     Smoking status: Never Smoker     Smokeless tobacco: Never Used   Substance Use Topics     Alcohol use: Yes     Comment: social     Drug use: No       No family history on file.    ROS: A 12 system review of systems was negative other than noted here or above.     Exam:  There were no vitals taken for this visit.    GENERAL APPEARANCE: alert and no distress  RESP: able to speak in full sentences, not in respiratory distress   NEURO: mentation intact and speech normal  PSYCH: affect normal/bright    Further physical exam is limited in setting of video visit    Results:    Results for HAM SWAIN (MRN [id number]) as of 5/6/2021 13:29   Ref. Range 2/5/2021 07:09 2/5/2021 07:10   Creatinine  Urine Latest Units: mg/dL  124   Protein Random Urine Latest Units: g/L  1.59   Protein Total Urine g/gr Creatinine Latest Ref Range: 0 - 0.2 g/g Cr  1.28 (H)   Color Urine Unknown  Yellow   Appearance Urine Unknown  Clear   Glucose Urine Latest Ref Range: NEG^Negative mg/dL  Negative   Bilirubin Urine Latest Ref Range: NEG^Negative   Negative   Ketones Urine Latest Ref Range: NEG^Negative mg/dL  Negative   Specific Gravity Urine Latest Ref Range: 1.003 - 1.035   1.012   pH Urine Latest Ref Range: 5.0 - 7.0 pH  5.0   Protein Albumin Urine Latest Ref Range: NEG^Negative mg/dL  100 (A)   Urobilinogen mg/dL Latest Ref Range: 0.0 - 2.0 mg/dL  0.0   Nitrite Urine Latest Ref Range: NEG^Negative   Negative   Blood Urine Latest Ref Range: NEG^Negative   Negative   Leukocyte Esterase Urine Latest Ref Range: NEG^Negative   Negative   Source Unknown  Midstream Urine   WBC Urine Latest Ref Range: 0 - 5 /HPF  1   RBC Urine Latest Ref Range: 0 - 2 /HPF  <1   Squamous Epithelial /HPF Urine Latest Ref Range: 0 - 1 /HPF  <1   Mucous Urine Latest Ref Range: NEG^Negative /LPF  Present (A)   Hyaline Casts Latest Ref Range: 0 - 2 /LPF  9 (H)   Granular Casts Latest Ref Range: NEG^Negative /LPF  5 (A)   Tacrolimus Last Dose Unknown 2,130    Tacrolimus Level Latest Ref Range: 5.0 - 15.0 ug/L 7.6      Crt 1.2 mg/dL. Albumin 3.2.    Assessment/Plan:   26 year old male with nephrotic syndrome, biopsy in August 2016 showing FSGS. Steroids started 8/23/2016 and relapsed during weaning in January 2018, with proteinuria up from 0.28 g/g rocky to 4.9 grams on 1/31/18.   He is s/p participation in the  trial of MCP1 receptor blocker.    At this time, since last visit, he has had improvement in proteinuria to 1.2 gm/gm, Albumin improved from 2.7 to 3.2. BP is at goal. UA without hematuria.   We will update his labs this week to determine if any medication adjustments are warranted, otherwise, no change at this time.     Will check CBC with  diff and TSH given urticaria. Recommended Derm/Allergy consultation.     Hypertension   BP at goal. No changes to lisinopril and lasix for now while awaiting his lab work    D/w Dr. Ted Em MD  Nephrology fellow    ---  Ethan is a 26 year old who is being evaluated via a billable video visit.      How would you like to obtain your AVS? MyChart  If the video visit is dropped, the invitation should be resent by: Text to cell phone: [phone]  Will anyone else be joining your video visit? No     Video Start Time: 1:30  Video-Visit Details    Type of service:  Video Visit    Video End Time:1:50    Originating Location (pt. Location): Home    Distant Location (provider location):  [facility] [place]     Platform used for Video Visit: [employer]      Again, thank you for allowing me to participate in the care of your patient.      Sincerely,    José Miguel Jean MD

## 2021-05-06 NOTE — PROGRESS NOTES
NEPHROLOGY CLINIC FOLLOW UP  5/6/2021    CC: FSGS follow up    Initial presentation  Summer 2016: Presented with several months history of swelling, noted to be nephrotic, and renal biopsy in August 2016 showed FSGS. He was started on steroids 8/23/16.  Remainded on steroids 100 mg x several months.  He had more and more abdominal striae and his acne is significant  His proteinuria was down to 1-2 grams then subgram at 0.7g which is great, but unfortunately when he was down to 10mg prednisone, his proteinuria increased back to 5 grams In Jan 2018 Along with with low albumin and swelling.    He went back on Pred at 100 mg day  With a surprizing transient dip in protenuria to 1.5 in March, but eventually proteinuria decreased to UPCR < 3 startuing in April 2018   Last UPCR  1.24 g/g on 11/1/18.  He was seen on 11/29/18 at which time we initiated a tapering schedule of prednisone, as he was in partial remission on therapy (UPCR < 1).  Prednisone taper was started in Dec 2018  He had a relapse of proteinuria in February 2019  Prompting a halting of the prednisone taper and initiation of Tacrolimus at 0.5 mg twice daily  Tacrolimus was progressively increased to 2 mg twice daily starting on April 19, 2019, with documentation of trough levels in the therapeutic range.       He was enrolled in the study  On June 24, 2019.  Day #1 of study medication 7/16/19  Completed the study in Feb 2020:          June 4, 2020     He presents today with no acute complaints.  He denies any further occurrences of transient numbness or tingling.  Energy is OK.    Mild L Ext swelling.  No new striae  No GI symptoms, except for occasional loose stools.  Specifically no melena or hematochezia.  Has been working the entire pandemic.    ROS:  A comprehensive review of systems was obtained and negative, except as noted in the HPI or PMH.  No HA, tremor  No N,V, Abd pain.  No F, Chills, cough, SOB  No dysuria or gross hematuria.    2/4/2020  Pt  endorsed doing fine. He had to reschedule his prior appointment because he had other commitments during his prior cristine. Time. Up on questioning, mentioned that he continue to have some swelling his legs, about the same like before, not worsening. Denied other systemic symptoms including fever/chils, nausea/vomting/abdominal pain, diarrhea, chest pain or SOB. Also denied any recurrent headaches, hematuria, renal stones, new rash or joint aches in recent past.    Currently on tacrolimus 2 mg BID, off of prednisone for 4 months now.    5/6/2020  Since last visit, he reports feeling well overall. No acute illnesses or hospitalizations.   COVID vaccination complete (pfizer). 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 LE swelling is minimal, describes no pitting when pressing his fingers in his leg.   Currently on 2mg Tac BID as well as Lasix 20mg BID. Lisinopril, 60mg Daily. /80 in the morning. No light-headedness/dizziness. No orthopnea. Also on statin therapy with rosuvastatin.   Appetite is normal, no nausea, no fevers. No GI issues. He does note new onset of hives 2 months ago. They occur in the morning, and resolve usually in two hours. No scarring, or bites noted. Occurring on the torso and arms at times. Not terribly bothersome, but still annoying. No obvious new insults. No travels out of [place].     Allergies   Allergen Reactions     Amoxicillin      Other reaction(s): EENT - other (explain in comments), SKIN - rash  Rash and lip swelling after 8 days of Amoxicillin     Erythromycin      Penicillins        Calcium Carb-Cholecalciferol (CALCIUM 500 +D) 500-400 MG-UNIT TABS, Take 1,000 mg by mouth daily  furosemide (LASIX) 20 MG tablet, Take 1 tablet (20 mg) by mouth 2 times daily As needed  lisinopril (ZESTRIL) 20 MG tablet, TAKE 2 TABLETS BY MOUTH EVERY MORNING AND 1 IN THE EVENING  omeprazole (PRILOSEC) 20 MG DR capsule, TAKE 1 CAPSULE (20 MG) BY MOUTH DAILY  predniSONE  (DELTASONE) 10 MG tablet, Take 1 tablet (10 mg) by mouth daily (Patient not taking: Reported on 5/16/2019.)  predniSONE (DELTASONE) 20 MG tablet, Take 4 tablets (80 mg) by mouth daily (Patient not taking: Reported on 11/29/2018)  predniSONE (DELTASONE) 20 MG tablet, Take 1 tablet (20 mg) by mouth daily (Patient not taking: Reported on 2/9/2019.)  predniSONE (DELTASONE) 5 MG tablet, Alternate 5 mg daily and 10 mg daily every other day (Patient not taking: Reported on 2/4/2021)  rosuvastatin (CRESTOR) 5 MG tablet, Take 1 tablet (5 mg) by mouth daily  tacrolimus (GENERIC EQUIVALENT) 1 MG capsule, Take 2 capsules (2 mg) by mouth 2 times daily    No current facility-administered medications on file prior to visit.       Past Medical History:   Diagnosis Date     Chronic kidney disease        Past Surgical History:   Procedure Laterality Date     BIOPSY  2016    renal       Social History     Tobacco Use     Smoking status: Never Smoker     Smokeless tobacco: Never Used   Substance Use Topics     Alcohol use: Yes     Comment: social     Drug use: No       No family history on file.    ROS: A 12 system review of systems was negative other than noted here or above.     Exam:  There were no vitals taken for this visit.    GENERAL APPEARANCE: alert and no distress  RESP: able to speak in full sentences, not in respiratory distress   NEURO: mentation intact and speech normal  PSYCH: affect normal/bright    Further physical exam is limited in setting of video visit    Results:    Results for HAM SWAIN (MRN [id number]) as of 5/6/2021 13:29   Ref. Range 2/5/2021 07:09 2/5/2021 07:10   Creatinine Urine Latest Units: mg/dL  124   Protein Random Urine Latest Units: g/L  1.59   Protein Total Urine g/gr Creatinine Latest Ref Range: 0 - 0.2 g/g Cr  1.28 (H)   Color Urine Unknown  Yellow   Appearance Urine Unknown  Clear   Glucose Urine Latest Ref Range: NEG^Negative mg/dL  Negative   Bilirubin Urine Latest Ref Range:  NEG^Negative   Negative   Ketones Urine Latest Ref Range: NEG^Negative mg/dL  Negative   Specific Gravity Urine Latest Ref Range: 1.003 - 1.035   1.012   pH Urine Latest Ref Range: 5.0 - 7.0 pH  5.0   Protein Albumin Urine Latest Ref Range: NEG^Negative mg/dL  100 (A)   Urobilinogen mg/dL Latest Ref Range: 0.0 - 2.0 mg/dL  0.0   Nitrite Urine Latest Ref Range: NEG^Negative   Negative   Blood Urine Latest Ref Range: NEG^Negative   Negative   Leukocyte Esterase Urine Latest Ref Range: NEG^Negative   Negative   Source Unknown  Midstream Urine   WBC Urine Latest Ref Range: 0 - 5 /HPF  1   RBC Urine Latest Ref Range: 0 - 2 /HPF  <1   Squamous Epithelial /HPF Urine Latest Ref Range: 0 - 1 /HPF  <1   Mucous Urine Latest Ref Range: NEG^Negative /LPF  Present (A)   Hyaline Casts Latest Ref Range: 0 - 2 /LPF  9 (H)   Granular Casts Latest Ref Range: NEG^Negative /LPF  5 (A)   Tacrolimus Last Dose Unknown 2,130    Tacrolimus Level Latest Ref Range: 5.0 - 15.0 ug/L 7.6      Crt 1.2 mg/dL. Albumin 3.2.    Assessment/Plan:   26 year old male with nephrotic syndrome, biopsy in August 2016 showing FSGS. Steroids started 8/23/2016 and relapsed during weaning in January 2018, with proteinuria up from 0.28 g/g rocky to 4.9 grams on 1/31/18.   He is s/p participation in the  trial of MCP1 receptor blocker.    At this time, since last visit, he has had improvement in proteinuria to 1.2 gm/gm, Albumin improved from 2.7 to 3.2. BP is at goal. UA without hematuria.   We will update his labs this week to determine if any medication adjustments are warranted, otherwise, no change at this time.     Will check CBC with diff and TSH given urticaria. Recommended Derm/Allergy consultation.     Hypertension   BP at goal. No changes to lisinopril and lasix for now while awaiting his lab work    D/w Dr. Ted Em MD  Nephrology fellow    ---  Ethan is a 26 year old who is being evaluated via a billable video visit.      How would  you like to obtain your AVS? [employer]  If the video visit is dropped, the invitation should be resent by: Text to cell phone: [phone]  Will anyone else be joining your video visit? No     Video Start Time: 1:30  Video-Visit Details    Type of service:  Video Visit    Video End Time:1:50    Originating Location (pt. Location): Home    Distant Location (provider location):  [facility] NEPHROLOG[facility]     Platform used for Video Visit: JustSpotted

## 2021-05-07 DIAGNOSIS — N05.1 FSGS (FOCAL SEGMENTAL GLOMERULOSCLEROSIS): ICD-10-CM

## 2021-05-07 LAB
ALBUMIN SERPL-MCNC: 3.3 G/DL (ref 3.4–5)
ALBUMIN UR-MCNC: 100 MG/DL
AMORPH CRY #/AREA URNS HPF: ABNORMAL /HPF
ANION GAP SERPL CALCULATED.3IONS-SCNC: 2 MMOL/L (ref 3–14)
APPEARANCE UR: CLEAR
BACTERIA #/AREA URNS HPF: ABNORMAL /HPF
BASOPHILS # BLD AUTO: 0.1 10E9/L (ref 0–0.2)
BASOPHILS NFR BLD AUTO: 1.2 %
BILIRUB UR QL STRIP: NEGATIVE
BUN SERPL-MCNC: 22 MG/DL (ref 7–30)
CALCIUM SERPL-MCNC: 8.6 MG/DL (ref 8.5–10.1)
CHLORIDE SERPL-SCNC: 114 MMOL/L (ref 94–109)
CHOLEST SERPL-MCNC: 129 MG/DL
CO2 SERPL-SCNC: 27 MMOL/L (ref 20–32)
COLOR UR AUTO: ABNORMAL
CREAT SERPL-MCNC: 1.15 MG/DL (ref 0.66–1.25)
CREAT UR-MCNC: 144 MG/DL
DIFFERENTIAL METHOD BLD: ABNORMAL
EOSINOPHIL # BLD AUTO: 0.4 10E9/L (ref 0–0.7)
EOSINOPHIL NFR BLD AUTO: 6.3 %
ERYTHROCYTE [DISTWIDTH] IN BLOOD BY AUTOMATED COUNT: 12.4 % (ref 10–15)
GFR SERPL CREATININE-BSD FRML MDRD: 87 ML/MIN/{1.73_M2}
GLUCOSE SERPL-MCNC: 104 MG/DL (ref 70–99)
GLUCOSE UR STRIP-MCNC: NEGATIVE MG/DL
HCT VFR BLD AUTO: 34.1 % (ref 40–53)
HDLC SERPL-MCNC: 35 MG/DL
HGB BLD-MCNC: 11.6 G/DL (ref 13.3–17.7)
HGB UR QL STRIP: NEGATIVE
HYALINE CASTS #/AREA URNS LPF: ABNORMAL /LPF
IMM GRANULOCYTES # BLD: 0 10E9/L (ref 0–0.4)
IMM GRANULOCYTES NFR BLD: 0.4 %
KETONES UR STRIP-MCNC: NEGATIVE MG/DL
LDLC SERPL CALC-MCNC: 68 MG/DL
LEUKOCYTE ESTERASE UR QL STRIP: NEGATIVE
LYMPHOCYTES # BLD AUTO: 2.8 10E9/L (ref 0.8–5.3)
LYMPHOCYTES NFR BLD AUTO: 41.6 %
MCH RBC QN AUTO: 28.2 PG (ref 26.5–33)
MCHC RBC AUTO-ENTMCNC: 34 G/DL (ref 31.5–36.5)
MCV RBC AUTO: 83 FL (ref 78–100)
MONOCYTES # BLD AUTO: 0.5 10E9/L (ref 0–1.3)
MONOCYTES NFR BLD AUTO: 6.9 %
NEUTROPHILS # BLD AUTO: 2.9 10E9/L (ref 1.6–8.3)
NEUTROPHILS NFR BLD AUTO: 43.6 %
NITRATE UR QL: NEGATIVE
NON-SQ EPI CELLS #/AREA URNS LPF: ABNORMAL /LPF
NONHDLC SERPL-MCNC: 94 MG/DL
PH UR STRIP: 5.5 PH (ref 5–7)
PHOSPHATE SERPL-MCNC: 3.4 MG/DL (ref 2.5–4.5)
PLATELET # BLD AUTO: 299 10E9/L (ref 150–450)
POTASSIUM SERPL-SCNC: 5.1 MMOL/L (ref 3.4–5.3)
PROT UR-MCNC: 2.2 G/L
PROT/CREAT 24H UR: 1.53 G/G CR (ref 0–0.2)
RBC # BLD AUTO: 4.12 10E12/L (ref 4.4–5.9)
RBC #/AREA URNS AUTO: ABNORMAL /HPF
SODIUM SERPL-SCNC: 143 MMOL/L (ref 133–144)
SOURCE: ABNORMAL
SP GR UR STRIP: 1.01 (ref 1–1.03)
TRIGL SERPL-MCNC: 132 MG/DL
TSH SERPL DL<=0.005 MIU/L-ACNC: 0.64 MU/L (ref 0.4–4)
UROBILINOGEN UR STRIP-MCNC: NORMAL MG/DL (ref 0–2)
WBC # BLD AUTO: 6.7 10E9/L (ref 4–11)
WBC #/AREA URNS AUTO: ABNORMAL /HPF

## 2021-05-07 PROCEDURE — 84156 ASSAY OF PROTEIN URINE: CPT | Performed by: INTERNAL MEDICINE

## 2021-05-07 PROCEDURE — 36415 COLL VENOUS BLD VENIPUNCTURE: CPT | Performed by: INTERNAL MEDICINE

## 2021-05-07 PROCEDURE — 81001 URINALYSIS AUTO W/SCOPE: CPT | Performed by: INTERNAL MEDICINE

## 2021-05-07 PROCEDURE — 80069 RENAL FUNCTION PANEL: CPT | Performed by: INTERNAL MEDICINE

## 2021-05-07 PROCEDURE — 80061 LIPID PANEL: CPT | Performed by: INTERNAL MEDICINE

## 2021-05-07 PROCEDURE — 84443 ASSAY THYROID STIM HORMONE: CPT | Performed by: INTERNAL MEDICINE

## 2021-05-07 PROCEDURE — 85025 COMPLETE CBC W/AUTO DIFF WBC: CPT | Performed by: INTERNAL MEDICINE

## 2021-06-25 DIAGNOSIS — E78.5 HYPERLIPIDEMIA LDL GOAL <100: ICD-10-CM

## 2021-06-25 DIAGNOSIS — N04.9 NEPHROTIC SYNDROME: ICD-10-CM

## 2021-06-25 DIAGNOSIS — N05.1 FSGS (FOCAL SEGMENTAL GLOMERULOSCLEROSIS): ICD-10-CM

## 2021-06-25 RX ORDER — ROSUVASTATIN CALCIUM 5 MG/1
5 TABLET, COATED ORAL DAILY
Qty: 30 TABLET | Refills: 11 | Status: SHIPPED | OUTPATIENT
Start: 2021-06-25 | End: 2022-07-11

## 2021-06-25 RX ORDER — FUROSEMIDE 20 MG
20 TABLET ORAL 2 TIMES DAILY
Qty: 180 TABLET | Refills: 3 | Status: SHIPPED | OUTPATIENT
Start: 2021-06-25 | End: 2022-07-11

## 2021-08-26 ENCOUNTER — VIRTUAL VISIT (OUTPATIENT)
Dept: NEPHROLOGY | Facility: CLINIC | Age: 26
End: 2021-08-26
Attending: INTERNAL MEDICINE
Payer: COMMERCIAL

## 2021-08-26 DIAGNOSIS — N05.1 FSGS (FOCAL SEGMENTAL GLOMERULOSCLEROSIS): Primary | ICD-10-CM

## 2021-08-26 PROCEDURE — 99214 OFFICE O/P EST MOD 30 MIN: CPT | Mod: 95 | Performed by: INTERNAL MEDICINE

## 2021-08-26 ASSESSMENT — PAIN SCALES - GENERAL: PAINLEVEL: NO PAIN (0)

## 2021-08-26 NOTE — LETTER
8/26/2021       RE: Ethan Trevino  [address]  [facility] [address]     Dear Colleague,    Thank you for referring your patient, Ethan Trevino, to the [facility] [place] at [facility]. Please see a copy of my visit note below.    Ethan is a 26 year old who is being evaluated via a billable video visit.      How would you like to obtain your AVS? MyChart  If the video visit is dropped, the invitation should be resent by: Text to cell phone: [phone]  Will anyone else be joining your video visit? No    Video Start Time: 13:47  Video-Visit Details    Type of service:  Video Visit    Video End Time:2:10 PM    Originating Location (pt. Location): Home    Distant Location (provider location):  [facility] [place]     Platform used for Video Visit: [facility]      NEPHROLOGY CLINIC FOLLOW UP  August 26, 2021      CC: FSGS follow up    Initial presentation  Summer 2016: Presented with several months history of swelling, noted to be nephrotic, and renal biopsy in August 2016 showed FSGS. He was started on steroids 8/23/16.  Remainded on steroids 100 mg x several months.  He had more and more abdominal striae and his acne is significant  His proteinuria was down to 1-2 grams then subgram at 0.7g which is great, but unfortunately when he was down to 10mg prednisone, his proteinuria increased back to 5 grams In Jan 2018 Along with with low albumin and swelling.    He went back on Pred at 100 mg day  With a surprizing transient dip in protenuria to 1.5 in March, but eventually proteinuria decreased to UPCR < 3 startuing in April 2018   Last UPCR  1.24 g/g on 11/1/18.  He was seen on 11/29/18 at which time we initiated a tapering schedule of prednisone, as he was in partial remission on therapy (UPCR < 1).  Prednisone taper was started in Dec 2018  He had a relapse of proteinuria in February 2019   Prompting a halting of the prednisone taper and initiation of Tacrolimus at 0.5 mg twice daily  Tacrolimus was progressively increased to 2 mg twice daily starting on April 19, 2019, with documentation of trough levels in the therapeutic range.       He was enrolled in the study  On June 24, 2019.  Day #1 of study medication 7/16/19  Completed the study in Feb 2020:          June 4, 2020     He presents today with no acute complaints.  He denies any further occurrences of transient numbness or tingling.  Energy is OK.    Mild L Ext swelling.  No new striae  No GI symptoms, except for occasional loose stools.  Specifically no melena or hematochezia.  Has been working the entire pandemic.    August 26, 2021      ROS:  A comprehensive review of systems was obtained and negative, except as noted in the HPI or PMH.  No HA, tremor  No N,V, Abd pain.  No F, Chills, cough, SOB  No dysuria or gross hematuria.    2/4/2020  Pt endorsed doing fine. He had to reschedule his prior appointment because he had other commitments during his prior cristine. Time. Up on questioning, mentioned that he continue to have some swelling his legs, about the same like before, not worsening. Denied other systemic symptoms including fever/chils, nausea/vomting/abdominal pain, diarrhea, chest pain or SOB. Also denied any recurrent headaches, hematuria, renal stones, new rash or joint aches in recent past.    Currently on tacrolimus 2 mg BID, off of prednisone for 4 months now.    5/6/2021  Since last visit, he reports feeling well overall. No acute illnesses or hospitalizations.   COVID vaccination complete (pfizer). 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 LE swelling is minimal, describes no pitting when pressing his fingers in his leg.   Currently on 2mg Tac BID as well as Lasix 20mg BID. Lisinopril, 60mg Daily. /80 in the morning. No light-headedness/dizziness. No orthopnea. Also on statin therapy with  "rosuvastatin.   Appetite is normal, no nausea, no fevers. No GI issues. He does note new onset of hives 2 months ago. They occur in the morning, and resolve usually in two hours. No scarring, or bites noted. Occurring on the torso and arms at times. Not terribly bothersome, but still annoying. No obvious new insults. No travels out of [place].    August 26, 2021  Reports feeling fine.  Energy is fine.   Sleeping well  Minimal L Ext swelling noticeable at end of the day.  No GI other than mild occasional diarrhea.  No rash  No HA.  No SOB/CP Cough  Mild with post nasal drip.  No  spms.     BP low 130\"s /75-80     Allergies   Allergen Reactions     Amoxicillin      Other reaction(s): EENT - other (explain in comments), SKIN - rash  Rash and lip swelling after 8 days of Amoxicillin     Erythromycin      Penicillins      Reviewed with patient on August 26, 2021    Calcium Carb-Cholecalciferol (CALCIUM 500 +D) 500-400 MG-UNIT TABS, Take 1,000 mg by mouth daily  furosemide (LASIX) 20 MG tablet, Take 1 tablet (20 mg) by mouth 2 times daily As needed  lisinopril (ZESTRIL) 20 MG tablet, TAKE 2 TABLETS BY MOUTH EVERY MORNING AND 1 IN THE EVENING  omeprazole (PRILOSEC) 20 MG DR capsule, TAKE 1 CAPSULE (20 MG) BY MOUTH DAILY  rosuvastatin (CRESTOR) 5 MG tablet, Take 1 tablet (5 mg) by mouth daily  tacrolimus (GENERIC EQUIVALENT) 1 MG capsule, Take 2 capsules (2 mg) by mouth 2 times daily    No current facility-administered medications on file prior to visit.      Past Medical History:   Diagnosis Date     Chronic kidney disease        Past Surgical History:   Procedure Laterality Date     BIOPSY  2016    renal       Social History     Tobacco Use     Smoking status: Never Smoker     Smokeless tobacco: Never Used   Substance Use Topics     Alcohol use: Yes     Comment: social     Drug use: No       No family history on file.    ROS: A 12 system review of systems was negative other than noted here or above.     Exam:  There were no " vitals taken for this visit.   Weight 270  Lbs (10 days ago)    GENERAL APPEARANCE: alert and no distress  RESP: able to speak in full sentences, not in respiratory distress   NEURO: mentation intact and speech normal  PSYCH: affect normal/bright    Further physical exam is limited in setting of video visit    Results:    Results for HAM SWAIN (MRN [id number]) as of 5/6/2021 13:29   Ref. Range 2/5/2021 07:09 2/5/2021 07:10   Creatinine Urine Latest Units: mg/dL  124   Protein Random Urine Latest Units: g/L  1.59   Protein Total Urine g/gr Creatinine Latest Ref Range: 0 - 0.2 g/g Cr  1.28 (H)   Color Urine Unknown  Yellow   Appearance Urine Unknown  Clear   Glucose Urine Latest Ref Range: NEG^Negative mg/dL  Negative   Bilirubin Urine Latest Ref Range: NEG^Negative   Negative   Ketones Urine Latest Ref Range: NEG^Negative mg/dL  Negative   Specific Gravity Urine Latest Ref Range: 1.003 - 1.035   1.012   pH Urine Latest Ref Range: 5.0 - 7.0 pH  5.0   Protein Albumin Urine Latest Ref Range: NEG^Negative mg/dL  100 (A)   Urobilinogen mg/dL Latest Ref Range: 0.0 - 2.0 mg/dL  0.0   Nitrite Urine Latest Ref Range: NEG^Negative   Negative   Blood Urine Latest Ref Range: NEG^Negative   Negative   Leukocyte Esterase Urine Latest Ref Range: NEG^Negative   Negative   Source Unknown  Midstream Urine   WBC Urine Latest Ref Range: 0 - 5 /HPF  1   RBC Urine Latest Ref Range: 0 - 2 /HPF  <1   Squamous Epithelial /HPF Urine Latest Ref Range: 0 - 1 /HPF  <1   Mucous Urine Latest Ref Range: NEG^Negative /LPF  Present (A)   Hyaline Casts Latest Ref Range: 0 - 2 /LPF  9 (H)   Granular Casts Latest Ref Range: NEG^Negative /LPF  5 (A)   Tacrolimus Last Dose Unknown 2,130    Tacrolimus Level Latest Ref Range: 5.0 - 15.0 ug/L 7.6      Crt 1.2 mg/dL. Albumin 3.2.    Assessment/Plan:   26 year old male with nephrotic syndrome, biopsy in August 2016 showing FSGS. Steroids started 8/23/2016 and relapsed during weaning in January 2018,  with proteinuria up from 0.28 g/g rocky to 4.9 grams on 1/31/18.   He is s/p participation in the  trial of MCP1 receptor blocker.  Mr Trveino appears clinically stable without symptoms suggestive of worsening nephrosis.    He seems to be tolerating the tacrolimus well without AE or HTN.  He will obtain morning labs including trough tacrolimus levels in 4 days.  Barring any progressive loss of GFR or increased proteinuria, we will continue with the current regimen.        Hypertension   BP at goal. No changes to lisinopril and lasix for now while awaiting his lab work    Labs ordered: Renal panel,CBC, UA, UPCR, Tacrolimus trough    José Miguel Jean MD  Division of Nephrology and Hypertension  Pager: [phone]  August 26, 2021  1:45 PM        Again, thank you for allowing me to participate in the care of your patient.      Sincerely,    José Miguel Jean MD

## 2021-08-26 NOTE — PROGRESS NOTES
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 He was started on steroids 8/23/16.  Remainded on steroids 100 mg x several months.  He had more and more abdominal striae and his acne is significant  His proteinuria was down to 1-2 grams then subgram at 0.7g which is great, but unfortunately when he was down to 10mg prednisone, his proteinuria increased back to 5 grams In Jan 2018 Along with with low albumin and swelling.    He went back on Pred at 100 mg day  With a surprizing transient dip in protenuria to 1.5 in March, but eventually proteinuria decreased to UPCR < 3 startuing in April 2018   Last UPCR  1.24 g/g on 11/1/18.  He was seen on 11/29/18 at which time we initiated a tapering schedule of prednisone, as he was in partial remission on therapy (UPCR < 1).  Prednisone taper was started in Dec 2018  He had a relapse of proteinuria in February 2019  Prompting a halting of the prednisone taper and initiation of Tacrolimus at 0.5 mg twice daily  Tacrolimus was progressively increased to 2 mg twice daily starting on April 19, 2019, with documentation of trough levels in the therapeutic range.       He was enrolled in the study  On June 24, 2019.  Day #1 of study medication 7/16/19  Completed the study in Feb 2020:          June 4, 2020     He presents today with no acute complaints.  He denies any further occurrences of transient numbness or tingling.  Energy is OK.    Mild L Ext swelling.  No new striae  No GI symptoms, except for occasional loose stools.  Specifically no melena or hematochezia.  Has been working the entire pandemic.    August 26, 2021      ROS:  A comprehensive review of systems was obtained and negative, except as noted in the HPI or PMH.  No HA, tremor  No N,V, Abd pain.  No F, Chills, cough, SOB  No dysuria or  "gross hematuria.    2/4/2020  Pt endorsed doing fine. He had to reschedule his prior appointment because he had other commitments during his prior cristine. Time. Up on questioning, mentioned that he continue to have some swelling his legs, about the same like before, not worsening. Denied other systemic symptoms including fever/chils, nausea/vomting/abdominal pain, diarrhea, chest pain or SOB. Also denied any recurrent headaches, hematuria, renal stones, new rash or joint aches in recent past.    Currently on tacrolimus 2 mg BID, off of prednisone for 4 months now.    5/6/2021  Since last visit, he reports feeling well overall. No acute illnesses or hospitalizations.   COVID vaccination complete (pfizer). 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 LE swelling is minimal, describes no pitting when pressing his fingers in his leg.   Currently on 2mg Tac BID as well as Lasix 20mg BID. Lisinopril, 60mg Daily. /80 in the morning. No light-headedness/dizziness. No orthopnea. Also on statin therapy with rosuvastatin.   Appetite is normal, no nausea, no fevers. No GI issues. He does note new onset of hives 2 months ago. They occur in the morning, and resolve usually in two hours. No scarring, or bites noted. Occurring on the torso and arms at times. Not terribly bothersome, but still annoying. No obvious new insults. No travels out of [place].    August 26, 2021  Reports feeling fine.  Energy is fine.   Sleeping well  Minimal L Ext swelling noticeable at end of the day.  No GI other than mild occasional diarrhea.  No rash  No HA.  No SOB/CP Cough  Mild with post nasal drip.  No  spms.     BP low 130\"s /75-80     Allergies   Allergen Reactions     Amoxicillin      Other reaction(s): EENT - other (explain in comments), SKIN - rash  Rash and lip swelling after 8 days of Amoxicillin     Erythromycin      Penicillins      Reviewed with patient on August 26, 2021    Calcium Carb-Cholecalciferol " (CALCIUM 500 +D) 500-400 MG-UNIT TABS, Take 1,000 mg by mouth daily  furosemide (LASIX) 20 MG tablet, Take 1 tablet (20 mg) by mouth 2 times daily As needed  lisinopril (ZESTRIL) 20 MG tablet, TAKE 2 TABLETS BY MOUTH EVERY MORNING AND 1 IN THE EVENING  omeprazole (PRILOSEC) 20 MG DR capsule, TAKE 1 CAPSULE (20 MG) BY MOUTH DAILY  rosuvastatin (CRESTOR) 5 MG tablet, Take 1 tablet (5 mg) by mouth daily  tacrolimus (GENERIC EQUIVALENT) 1 MG capsule, Take 2 capsules (2 mg) by mouth 2 times daily    No current facility-administered medications on file prior to visit.      Past Medical History:   Diagnosis Date     Chronic kidney disease        Past Surgical History:   Procedure Laterality Date     BIOPSY  2016    renal       Social History     Tobacco Use     Smoking status: Never Smoker     Smokeless tobacco: Never Used   Substance Use Topics     Alcohol use: Yes     Comment: social     Drug use: No       No family history on file.    ROS: A 12 system review of systems was negative other than noted here or above.     Exam:  There were no vitals taken for this visit.   Weight 270  Lbs (10 days ago)    GENERAL APPEARANCE: alert and no distress  RESP: able to speak in full sentences, not in respiratory distress   NEURO: mentation intact and speech normal  PSYCH: affect normal/bright    Further physical exam is limited in setting of video visit    Results:    Results for HAM SWAIN (MRN [id number]) as of 5/6/2021 13:29   Ref. Range 2/5/2021 07:09 2/5/2021 07:10   Creatinine Urine Latest Units: mg/dL  124   Protein Random Urine Latest Units: g/L  1.59   Protein Total Urine g/gr Creatinine Latest Ref Range: 0 - 0.2 g/g Cr  1.28 (H)   Color Urine Unknown  Yellow   Appearance Urine Unknown  Clear   Glucose Urine Latest Ref Range: NEG^Negative mg/dL  Negative   Bilirubin Urine Latest Ref Range: NEG^Negative   Negative   Ketones Urine Latest Ref Range: NEG^Negative mg/dL  Negative   Specific Gravity Urine Latest Ref  Range: 1.003 - 1.035   1.012   pH Urine Latest Ref Range: 5.0 - 7.0 pH  5.0   Protein Albumin Urine Latest Ref Range: NEG^Negative mg/dL  100 (A)   Urobilinogen mg/dL Latest Ref Range: 0.0 - 2.0 mg/dL  0.0   Nitrite Urine Latest Ref Range: NEG^Negative   Negative   Blood Urine Latest Ref Range: NEG^Negative   Negative   Leukocyte Esterase Urine Latest Ref Range: NEG^Negative   Negative   Source Unknown  Midstream Urine   WBC Urine Latest Ref Range: 0 - 5 /HPF  1   RBC Urine Latest Ref Range: 0 - 2 /HPF  <1   Squamous Epithelial /HPF Urine Latest Ref Range: 0 - 1 /HPF  <1   Mucous Urine Latest Ref Range: NEG^Negative /LPF  Present (A)   Hyaline Casts Latest Ref Range: 0 - 2 /LPF  9 (H)   Granular Casts Latest Ref Range: NEG^Negative /LPF  5 (A)   Tacrolimus Last Dose Unknown 2,130    Tacrolimus Level Latest Ref Range: 5.0 - 15.0 ug/L 7.6      Crt 1.2 mg/dL. Albumin 3.2.    Assessment/Plan:   26 year old male with nephrotic syndrome, biopsy in August 2016 showing FSGS. Steroids started 8/23/2016 and relapsed during weaning in January 2018, with proteinuria up from 0.28 g/g rocky to 4.9 grams on 1/31/18.   He is s/p participation in the  trial of MCP1 receptor blocker.  Mr Trevino appears clinically stable without symptoms suggestive of worsening nephrosis.    He seems to be tolerating the tacrolimus well without AE or HTN.  He will obtain morning labs including trough tacrolimus levels in 4 days.  Barring any progressive loss of GFR or increased proteinuria, we will continue with the current regimen.        Hypertension   BP at goal. No changes to lisinopril and lasix for now while awaiting his lab work    Labs ordered: Renal panel,CBC, UA, UPCR, Tacrolimus trough    José Miguel Jean MD  Division of Nephrology and Hypertension  Pager: [phone]  August 26, 2021  1:45 PM

## 2021-08-26 NOTE — PATIENT INSTRUCTIONS
Please report to the lab in the morning for blood and urine test.  Please do the labs before taking your morning dose of tacrolimus.   Please email me to let me know when you had your blood tests so I know to look for the results.   If your kidney function and urine protein are stable, I will see you again in 4 months.  If there is a progression of either, we will connect to consider options.   Thanks

## 2021-08-26 NOTE — PROGRESS NOTES
Ethan is a 26 year old who is being evaluated via a billable video visit.      How would you like to obtain your AVS? [employer]  If the video visit is dropped, the invitation should be resent by: Text to cell phone: [phone]  Will anyone else be joining your video visit? No    Video Start Time: 13:47  Video-Visit Details    Type of service:  Video Visit    Video End Time:2:10 PM    Originating Location (pt. Location): Home    Distant Location (provider location):  [facility] [place]     Platform used for Video Visit: [employer]

## 2021-08-30 ENCOUNTER — LAB (OUTPATIENT)
Dept: LAB | Facility: CLINIC | Age: 26
End: 2021-08-30
Payer: COMMERCIAL

## 2021-08-30 DIAGNOSIS — N05.1 FSGS (FOCAL SEGMENTAL GLOMERULOSCLEROSIS): ICD-10-CM

## 2021-08-30 LAB
ALBUMIN SERPL-MCNC: 3.5 G/DL (ref 3.4–5)
ANION GAP SERPL CALCULATED.3IONS-SCNC: 5 MMOL/L (ref 3–14)
BASOPHILS # BLD AUTO: 0.1 10E3/UL (ref 0–0.2)
BASOPHILS NFR BLD AUTO: 1 %
BUN SERPL-MCNC: 26 MG/DL (ref 7–30)
CALCIUM SERPL-MCNC: 8.9 MG/DL (ref 8.5–10.1)
CHLORIDE BLD-SCNC: 113 MMOL/L (ref 94–109)
CO2 SERPL-SCNC: 22 MMOL/L (ref 20–32)
CREAT SERPL-MCNC: 1.16 MG/DL (ref 0.66–1.25)
CREAT UR-MCNC: 125 MG/DL
EOSINOPHIL # BLD AUTO: 0.4 10E3/UL (ref 0–0.7)
EOSINOPHIL NFR BLD AUTO: 6 %
ERYTHROCYTE [DISTWIDTH] IN BLOOD BY AUTOMATED COUNT: 13.4 % (ref 10–15)
GFR SERPL CREATININE-BSD FRML MDRD: 86 ML/MIN/1.73M2
GLUCOSE BLD-MCNC: 106 MG/DL (ref 70–99)
HCT VFR BLD AUTO: 32.1 % (ref 40–53)
HGB BLD-MCNC: 11.1 G/DL (ref 13.3–17.7)
LYMPHOCYTES # BLD AUTO: 2.7 10E3/UL (ref 0.8–5.3)
LYMPHOCYTES NFR BLD AUTO: 35 %
MCH RBC QN AUTO: 28.3 PG (ref 26.5–33)
MCHC RBC AUTO-ENTMCNC: 34.6 G/DL (ref 31.5–36.5)
MCV RBC AUTO: 82 FL (ref 78–100)
MONOCYTES # BLD AUTO: 0.6 10E3/UL (ref 0–1.3)
MONOCYTES NFR BLD AUTO: 8 %
NEUTROPHILS # BLD AUTO: 4 10E3/UL (ref 1.6–8.3)
NEUTROPHILS NFR BLD AUTO: 51 %
PHOSPHATE SERPL-MCNC: 3.5 MG/DL (ref 2.5–4.5)
PLATELET # BLD AUTO: 313 10E3/UL (ref 150–450)
POTASSIUM BLD-SCNC: 4.9 MMOL/L (ref 3.4–5.3)
PROT UR-MCNC: 1.09 G/L
PROT/CREAT 24H UR: 0.87 G/G CR (ref 0–0.2)
RBC # BLD AUTO: 3.92 10E6/UL (ref 4.4–5.9)
SODIUM SERPL-SCNC: 140 MMOL/L (ref 133–144)
TACROLIMUS BLD-MCNC: 7.6 UG/L (ref 5–15)
TME LAST DOSE: NORMAL H
TME LAST DOSE: NORMAL H
WBC # BLD AUTO: 7.9 10E3/UL (ref 4–11)

## 2021-08-30 PROCEDURE — 36415 COLL VENOUS BLD VENIPUNCTURE: CPT

## 2021-08-30 PROCEDURE — 80069 RENAL FUNCTION PANEL: CPT

## 2021-08-30 PROCEDURE — 85025 COMPLETE CBC W/AUTO DIFF WBC: CPT

## 2021-08-30 PROCEDURE — 80197 ASSAY OF TACROLIMUS: CPT

## 2021-08-30 PROCEDURE — 84156 ASSAY OF PROTEIN URINE: CPT

## 2021-10-06 DIAGNOSIS — N04.9 NEPHROTIC SYNDROME: ICD-10-CM

## 2021-10-06 DIAGNOSIS — N05.1 FSGS (FOCAL SEGMENTAL GLOMERULOSCLEROSIS): ICD-10-CM

## 2021-10-06 RX ORDER — LISINOPRIL 20 MG/1
TABLET ORAL
Qty: 90 TABLET | Refills: 11 | Status: SHIPPED | OUTPATIENT
Start: 2021-10-06 | End: 2022-10-11

## 2021-10-09 ENCOUNTER — HEALTH MAINTENANCE LETTER (OUTPATIENT)
Age: 26
End: 2021-10-09

## 2021-10-16 NOTE — PROGRESS NOTES
Ethan is a 26 year old who is being evaluated via a billable video visit.      How would you like to obtain your AVS? MyChart  Will anyone else be joining your video visit? No      Video Start Time: 3:34 PM    Assessment & Plan     Other iron deficiency anemia  Will start patient on iron replacement; discussed constipation issues with the medication.   - ferrous sulfate (FEROSUL) 325 (65 Fe) MG tablet; Take 1 tablet (325 mg) by mouth daily (with breakfast)    Pain of toe of left foot  Fungal infection of toenail  Deformed big toe from trauma and also possible fungal infection- difficult to assess with virtual appt. Needs evaluation by podiatry.   - Orthopedic  Referral; Future    FSGS (focal segmental glomerulosclerosis)  Stable under nephrology management.       I spent a total of 22 minutes on the day of the visit.   Time spent doing chart review, history and exam, documentation and further activities per the note       Patient Instructions   - Call clinic with any questions or concerns.   - Podiatry referral placed.         No follow-ups on file.    RENAE Owens CNP  [facility]    Subjective   Ethan is a 26 year old who presents for the following health issues:     HPI     Looking to establish care with a provider.     FSGS (focal segmental glomerulosclerosis): Diagnosed in August 2016. Had severe edema at the time and had work up done for diagnosis. Currently being closely managed by nephrology. Taking furosemide BID as needed: takes morning daily.     Hypertension:Checking BP's at home and ranging 130/80's. BLE decreased with medication management. Denies any symptoms. Some night time urination.     Left big toe: Jammed his toe on a stair and has had a deformed left toe and nail and would like to have this looked at. Not very clear visualization with his camera. Podiatry referral placed for management.       Review of Systems   Constitutional, HEENT,  cardiovascular, pulmonary, gi and gu systems are negative, except as otherwise noted.      Objective           Vitals:  No vitals were obtained today due to virtual visit.    Physical Exam   GENERAL: Healthy, alert and no distress  EYES: Eyes grossly normal to inspection.  No discharge or erythema, or obvious scleral/conjunctival abnormalities.  RESP: No audible wheeze, cough, or visible cyanosis.  No visible retractions or increased work of breathing.    MS: No gross musculoskeletal defects noted.  Normal range of motion.  No visible edema.  SKIN: Visible skin clear. No significant rash, abnormal pigmentation or lesions.  NEURO: Cranial nerves grossly intact.  Mentation and speech appropriate for age.  PSYCH: Mentation appears normal, affect normal/bright, judgement and insight intact, normal speech and appearance well-groomed.    Labs reviewed.         Video-Visit Details    Type of service:  Video Visit    Video End Time:3:53 PM    Originating Location (pt. Location): Home    Distant Location (provider location):  [facility]     Platform used for Video Visit: [employer]

## 2021-10-18 ENCOUNTER — VIRTUAL VISIT (OUTPATIENT)
Dept: FAMILY MEDICINE | Facility: CLINIC | Age: 26
End: 2021-10-18
Payer: COMMERCIAL

## 2021-10-18 DIAGNOSIS — B35.1 FUNGAL INFECTION OF TOENAIL: ICD-10-CM

## 2021-10-18 DIAGNOSIS — D50.8 OTHER IRON DEFICIENCY ANEMIA: Primary | ICD-10-CM

## 2021-10-18 DIAGNOSIS — M79.675 PAIN OF TOE OF LEFT FOOT: ICD-10-CM

## 2021-10-18 DIAGNOSIS — N05.1 FSGS (FOCAL SEGMENTAL GLOMERULOSCLEROSIS): ICD-10-CM

## 2021-10-18 PROCEDURE — 99203 OFFICE O/P NEW LOW 30 MIN: CPT | Mod: 95 | Performed by: NURSE PRACTITIONER

## 2021-10-18 RX ORDER — FERROUS SULFATE 325(65) MG
325 TABLET ORAL
Qty: 60 TABLET | Refills: 1 | Status: SHIPPED | OUTPATIENT
Start: 2021-10-18 | End: 2021-11-09

## 2021-11-09 ENCOUNTER — OFFICE VISIT (OUTPATIENT)
Dept: FAMILY MEDICINE | Facility: CLINIC | Age: 26
End: 2021-11-09
Payer: COMMERCIAL

## 2021-11-09 VITALS
SYSTOLIC BLOOD PRESSURE: 122 MMHG | DIASTOLIC BLOOD PRESSURE: 75 MMHG | TEMPERATURE: 97.5 F | HEART RATE: 82 BPM | HEIGHT: 75 IN | WEIGHT: 268 LBS | BODY MASS INDEX: 33.32 KG/M2 | OXYGEN SATURATION: 97 %

## 2021-11-09 DIAGNOSIS — Z00.00 ENCOUNTER FOR PREVENTIVE CARE: ICD-10-CM

## 2021-11-09 DIAGNOSIS — N05.1 FSGS (FOCAL SEGMENTAL GLOMERULOSCLEROSIS): ICD-10-CM

## 2021-11-09 DIAGNOSIS — N04.9 NEPHROTIC SYNDROME: ICD-10-CM

## 2021-11-09 DIAGNOSIS — Z23 NEED FOR PROPHYLACTIC VACCINATION AND INOCULATION AGAINST INFLUENZA: Primary | ICD-10-CM

## 2021-11-09 DIAGNOSIS — Z23 HIGH PRIORITY FOR 2019-NCOV VACCINE: ICD-10-CM

## 2021-11-09 DIAGNOSIS — Z23 NEED FOR VACCINATION: ICD-10-CM

## 2021-11-09 DIAGNOSIS — D50.8 OTHER IRON DEFICIENCY ANEMIA: ICD-10-CM

## 2021-11-09 PROCEDURE — 99213 OFFICE O/P EST LOW 20 MIN: CPT | Mod: 25 | Performed by: NURSE PRACTITIONER

## 2021-11-09 PROCEDURE — 90472 IMMUNIZATION ADMIN EACH ADD: CPT | Performed by: NURSE PRACTITIONER

## 2021-11-09 PROCEDURE — 90471 IMMUNIZATION ADMIN: CPT | Performed by: NURSE PRACTITIONER

## 2021-11-09 PROCEDURE — 90715 TDAP VACCINE 7 YRS/> IM: CPT | Performed by: NURSE PRACTITIONER

## 2021-11-09 PROCEDURE — 0003A COVID-19,PF,PFIZER (12+ YRS): CPT | Performed by: NURSE PRACTITIONER

## 2021-11-09 PROCEDURE — 90686 IIV4 VACC NO PRSV 0.5 ML IM: CPT | Performed by: NURSE PRACTITIONER

## 2021-11-09 PROCEDURE — 91300 COVID-19,PF,PFIZER (12+ YRS): CPT | Performed by: NURSE PRACTITIONER

## 2021-11-09 RX ORDER — FERROUS SULFATE 325(65) MG
325 TABLET ORAL
Qty: 60 TABLET | Refills: 11 | Status: SHIPPED | OUTPATIENT
Start: 2021-11-09 | End: 2023-09-08

## 2021-11-09 ASSESSMENT — MIFFLIN-ST. JEOR: SCORE: 2276.27

## 2021-11-09 NOTE — PROGRESS NOTES
"  Assessment & Plan     1. Need for prophylactic vaccination and inoculation against influenza  - INFLUENZA VACCINE IM > 6 MONTHS VALENT IIV4 (AFLURIA/FLUZONE)    2. Need for vaccination  - TDAP VACCINE (Adacel, Boostrix)  [[id number]]    3. Encounter for preventive care  4. Other iron deficiency anemia  - Hemoglobin A1c; Future  - ferrous sulfate (FEROSUL) 325 (65 Fe) MG tablet; Take 1 tablet (325 mg) by mouth daily (with breakfast)  Dispense: 60 tablet; Refill: 11    5. FSGS (focal segmental glomerulosclerosis)  6. Nephrotic syndrome  Stable with nephrology management.   - Calcium Carb-Cholecalciferol (CALCIUM 500 +D) 500-400 MG-UNIT TABS; Take 1,000 mg by mouth daily  Dispense: 180 tablet; Refill: 4    7. High priority for 2019-nCoV vaccine  - COVID-19,PF,PFIZER (12+ Yrs PURPLE LABEL)    Ordering of each unique test  Prescription drug management  I spent a total of 16 minutes on the day of the visit.   Time spent doing chart review, history and exam, documentation and further activities per the note       BMI:   Estimated body mass index is 33.5 kg/m  as calculated from the following:    Height as of this encounter: 1.905 m (6' 3\").    Weight as of this encounter: 121.6 kg (268 lb).   Weight management plan: Discussed healthy diet and exercise guidelines    Patient Instructions   - Blood ordered for diabetes check.   - Updated immunizations.   - Call clinic with any questions or concerns.         Return in about 1 year (around 11/9/2022) for Routine Visit.    RENAE Owens CNP  [facility] HospitalCHRIS Long is a 26 year old who presents for the following health issues:      HPI     Patient is here today to follow-up on establishing care and to update hie care gaps.     Hypertension: checking BP's at home. No concerns and denies any symptoms.     FSGS: Mild edema on exam. Taking a dose of furosemide daily.    Review of Systems   Constitutional, HEENT, cardiovascular, " "pulmonary, gi and gu systems are negative, except as otherwise noted.      Objective    /75   Pulse 82   Temp 97.5  F (36.4  C) (Temporal)   Ht 1.905 m (6' 3\")   Wt 121.6 kg (268 lb)   SpO2 97%   BMI 33.50 kg/m    Body mass index is 33.5 kg/m .  Physical Exam   GENERAL: healthy, alert and no distress  EYES: Eyes grossly normal to inspection, PERRL and conjunctivae and sclerae normal  HENT: ear canals and TM's normal, nose and mouth without ulcers or lesions  NECK: no adenopathy, no asymmetry, masses, or scars and thyroid normal to palpation  RESP: lungs clear to auscultation - no rales, rhonchi or wheezes  CV: regular rate and rhythm, normal S1 S2, no S3 or S4, no murmur, click or rub, no peripheral edema and peripheral pulses strong  ABDOMEN: soft, nontender, no hepatosplenomegaly, no masses and bowel sounds normal  MS: no gross musculoskeletal defects noted, no edema  SKIN: no suspicious lesions or rashes  NEURO: Normal strength and tone, mentation intact and speech normal  PSYCH: mentation appears normal, affect normal/bright    Lab on 08/30/2021   Component Date Value Ref Range Status     Total Protein Random Urine g/L 08/30/2021 1.09  g/L Final    The reference range has not been established for total protein in random urine samples.  The result should be integrated into the clinical context for interpretation.     Total Protein Urine g/gr Creatinine 08/30/2021 0.87* 0.00 - 0.20 g/g Cr Final     Creatinine Urine mg/dL 08/30/2021 125  mg/dL Final     Sodium 08/30/2021 140  133 - 144 mmol/L Final     Potassium 08/30/2021 4.9  3.4 - 5.3 mmol/L Final     Chloride 08/30/2021 113* 94 - 109 mmol/L Final     Carbon Dioxide (CO2) 08/30/2021 22  20 - 32 mmol/L Final     Anion Gap 08/30/2021 5  3 - 14 mmol/L Final     Urea Nitrogen 08/30/2021 26  7 - 30 mg/dL Final     Creatinine 08/30/2021 1.16  0.66 - 1.25 mg/dL Final     Calcium 08/30/2021 8.9  8.5 - 10.1 mg/dL Final     Glucose 08/30/2021 106* 70 - 99 " mg/dL Final     Albumin 08/30/2021 3.5  3.4 - 5.0 g/dL Final     Phosphorus 08/30/2021 3.5  2.5 - 4.5 mg/dL Final     GFR Estimate 08/30/2021 86  >60 mL/min/1.73m2 Final    As of July 11, 2021, eGFR is calculated by the CKD-EPI creatinine equation, without race adjustment. eGFR can be influenced by muscle mass, exercise, and diet. The reported eGFR is an estimation only and is only applicable if the renal function is stable.     Tacrolimus by [employer] Mass Spectrom* 08/30/2021 7.6  5.0 - 15.0 ug/L Final    Comment: Tacrolimus Reference Range (ug/L):    Kidney Transplant:  Pediatric  0-3 months post transplant: 10-12  3-6 months post transplant: 8-10  6-12 months post transplant: 6-8  >12 months post transplant: 4-7    Adult  0-6 months post transplant: 8-10  6-12 months post transplant: 6-8  >12 months post transplant: 4-6  >5 years post transplant: 3-5    Heart Transplant:  Pediatric  0-12 months post transplant: 10-15  >12 months post transplant: 5-10    Adult  0-3 months post transplant: 10-15  3-6 months post transplant: 8-12  6-12 months post transplant: 6-12  >12 months post transplant: 6-10    Lung Transplant:  0-12 months post transplant: 10-15  >12 months post transplant: 8-12    Liver Transplant:  Pediatric  0-3 months post transplant: 10-15  3-6 months post transplant: 8-10  >6 months post transplant: 6-8    Adult  0-3 months post transplant: 10-12  3-6 months post transplant: 8-10  >6 months post transplant: 6-8    Pancreas Transplant:  0-6 months post transplant: 8-10  >6 months post                            transplant: 5-8     Tacrolimus Last Dose Date 08/30/2021 8/29/2021   Final     Tacrolimus Last Dose Time 08/30/2021 10:40 PM   Final     WBC Count 08/30/2021 7.9  4.0 - 11.0 10e3/uL Final     RBC Count 08/30/2021 3.92* 4.40 - 5.90 10e6/uL Final     Hemoglobin 08/30/2021 11.1* 13.3 - 17.7 g/dL Final     Hematocrit 08/30/2021 32.1* 40.0 - 53.0 % Final     MCV 08/30/2021 82  78 - 100 fL Final     MCH  08/30/2021 28.3  26.5 - 33.0 pg Final     MCHC 08/30/2021 34.6  31.5 - 36.5 g/dL Final     RDW 08/30/2021 13.4  10.0 - 15.0 % Final     Platelet Count 08/30/2021 313  150 - 450 10e3/uL Final     % Neutrophils 08/30/2021 51  % Final     % Lymphocytes 08/30/2021 35  % Final     % Monocytes 08/30/2021 8  % Final     % Eosinophils 08/30/2021 6  % Final     % Basophils 08/30/2021 1  % Final     Absolute Neutrophils 08/30/2021 4.0  1.6 - 8.3 10e3/uL Final     Absolute Lymphocytes 08/30/2021 2.7  0.8 - 5.3 10e3/uL Final     Absolute Monocytes 08/30/2021 0.6  0.0 - 1.3 10e3/uL Final     Absolute Eosinophils 08/30/2021 0.4  0.0 - 0.7 10e3/uL Final     Absolute Basophils 08/30/2021 0.1  0.0 - 0.2 10e3/uL Final

## 2021-11-09 NOTE — PATIENT INSTRUCTIONS
- Blood ordered for diabetes check.   - Updated immunizations.   - Call clinic with any questions or concerns.

## 2021-11-24 ENCOUNTER — OFFICE VISIT (OUTPATIENT)
Dept: PODIATRY | Facility: CLINIC | Age: 26
End: 2021-11-24
Attending: NURSE PRACTITIONER
Payer: COMMERCIAL

## 2021-11-24 VITALS
SYSTOLIC BLOOD PRESSURE: 140 MMHG | WEIGHT: 268 LBS | BODY MASS INDEX: 33.5 KG/M2 | DIASTOLIC BLOOD PRESSURE: 80 MMHG | HEART RATE: 76 BPM

## 2021-11-24 DIAGNOSIS — M79.675 PAIN OF TOE OF LEFT FOOT: ICD-10-CM

## 2021-11-24 DIAGNOSIS — L60.3 ONYCHODYSTROPHY: Primary | ICD-10-CM

## 2021-11-24 PROCEDURE — 99203 OFFICE O/P NEW LOW 30 MIN: CPT | Performed by: PODIATRIST

## 2021-11-24 NOTE — LETTER
11/24/2021         RE: Ethan Trevino  [address]  [place] [place] [facility] [address]        Dear Colleague,    Thank you for referring your patient, Ethan Trevino, to the [facility]. Please see a copy of my visit note below.    Patient seen today in consult from Lucy Carlton and complains of thick nail on right first toe.  Has had this for several years.  Started after he had blunt trauma to this toe.  The nail was up in the air and had never became attached.  It is quite thick.  It goes very slow.  He has some pain.  Aggravated by activity and relieved by rest.  He is having a hard time trimming this.  He denies erythema edema purulence odor or drainage    ROS: See above         Allergies   Allergen Reactions     Amoxicillin      Other reaction(s): EENT - other (explain in comments), SKIN - rash  Rash and lip swelling after 8 days of Amoxicillin     Erythromycin      Penicillins        Current Outpatient Medications   Medication Sig Dispense Refill     Calcium Carb-Cholecalciferol (CALCIUM 500 +D) 500-400 MG-UNIT TABS Take 1,000 mg by mouth daily 180 tablet 4     ferrous sulfate (FEROSUL) 325 (65 Fe) MG tablet Take 1 tablet (325 mg) by mouth daily (with breakfast) 60 tablet 11     furosemide (LASIX) 20 MG tablet Take 1 tablet (20 mg) by mouth 2 times daily As needed 180 tablet 3     lisinopril (ZESTRIL) 20 MG tablet TAKE 2 TABLETS BY MOUTH EVERY MORNING AND 1 IN THE EVENING 90 tablet 11     omeprazole (PRILOSEC) 20 MG DR capsule TAKE 1 CAPSULE (20 MG) BY MOUTH DAILY 30 capsule 11     rosuvastatin (CRESTOR) 5 MG tablet Take 1 tablet (5 mg) by mouth daily 30 tablet 11     tacrolimus (GENERIC EQUIVALENT) 1 MG capsule Take 2 capsules (2 mg) by mouth 2 times daily 120 capsule 11       Patient Active Problem List   Diagnosis     FSGS (focal segmental glomerulosclerosis)     Nephrotic syndrome       Past Medical History:   Diagnosis Date     Chronic kidney disease        Past  Surgical History:   Procedure Laterality Date     BIOPSY  2016    renal       No family history on file.    Social History     Tobacco Use     Smoking status: Never Smoker     Smokeless tobacco: Never Used   Substance Use Topics     Alcohol use: Yes     Comment: social         Exam:    Vitals: BP (!) 140/80   Pulse 76   Wt 121.6 kg (268 lb)   BMI 33.50 kg/m    BMI: Body mass index is 33.5 kg/m .  Height: Data Unavailable    Constitutional/ general:  Pt is in no apparent distress, appears well-nourished.  Cooperative with history and physical exam.     Psych:  The patient answered questions appropriately.  Normal affect.  Seems to have reasonable expectations, in terms of treatment.     Lungs:  Non labored breathing, non labored speech. No cough.  No audible wheezing. Even, quiet breathing.       Vascular:  positive pedal pulses bilaterally for both the DP and PT arteries.  CFT < 3 sec.  negative ankle edema.  positive pedal hair growth.    Neuro:  Alert and oriented x 3. Coordinated gait.  Light touch sensation is intact     Derm: Normal texture and turgor.  No erythema, ecchymosis, or cyanosis.      Musculoskeletal:    Lower extremity muscle strength is normal.  Patient is ambulatory without an assistive device or brace.  No gross deformities.  Normal arch.        left first nail thickened, elongated, discolored, with subungual debris.  The distal two thirds of the nail is not attached and it is laterally deviated.  No erythema, edema, drainage, pain on palpation.  No signs of subungual masses or exostosis and nailbed healthy.    A/P  Onychodystrophy          pain    Discussed all options with patient.  Mycotic nail manually debrided with a .   Discussed unfortunately there is nothing we can do to make this nail normal again.  Discussed strategies for keeping the short and comfortable.  Return to clinic prn.  Thank you for allowing me participate in the care of this patient.      Gabriele Navas DPM,  [facility]          Again, thank you for allowing me to participate in the care of your patient.        Sincerely,        Gabriele Navas DPM

## 2021-11-24 NOTE — PROGRESS NOTES
Patient seen today in consult from Lucy Carlton and complains of thick nail on left first toe.  Has had this for several years.  Started after he had blunt trauma to this toe.  The nail was up in the air and had never became attached.  It is quite thick.  It goes very slow.  He has some pain.  Aggravated by activity and relieved by rest.  He is having a hard time trimming this.  He denies erythema edema purulence odor or drainage    ROS: See above         Allergies   Allergen Reactions     Amoxicillin      Other reaction(s): EENT - other (explain in comments), SKIN - rash  Rash and lip swelling after 8 days of Amoxicillin     Erythromycin      Penicillins        Current Outpatient Medications   Medication Sig Dispense Refill     Calcium Carb-Cholecalciferol (CALCIUM 500 +D) 500-400 MG-UNIT TABS Take 1,000 mg by mouth daily 180 tablet 4     ferrous sulfate (FEROSUL) 325 (65 Fe) MG tablet Take 1 tablet (325 mg) by mouth daily (with breakfast) 60 tablet 11     furosemide (LASIX) 20 MG tablet Take 1 tablet (20 mg) by mouth 2 times daily As needed 180 tablet 3     lisinopril (ZESTRIL) 20 MG tablet TAKE 2 TABLETS BY MOUTH EVERY MORNING AND 1 IN THE EVENING 90 tablet 11     omeprazole (PRILOSEC) 20 MG DR capsule TAKE 1 CAPSULE (20 MG) BY MOUTH DAILY 30 capsule 11     rosuvastatin (CRESTOR) 5 MG tablet Take 1 tablet (5 mg) by mouth daily 30 tablet 11     tacrolimus (GENERIC EQUIVALENT) 1 MG capsule Take 2 capsules (2 mg) by mouth 2 times daily 120 capsule 11       Patient Active Problem List   Diagnosis     FSGS (focal segmental glomerulosclerosis)     Nephrotic syndrome       Past Medical History:   Diagnosis Date     Chronic kidney disease        Past Surgical History:   Procedure Laterality Date     BIOPSY  2016    renal       No family history on file.    Social History     Tobacco Use     Smoking status: Never Smoker     Smokeless tobacco: Never Used   Substance Use Topics     Alcohol use: Yes     Comment: social          Exam:    Vitals: BP (!) 140/80   Pulse 76   Wt 121.6 kg (268 lb)   BMI 33.50 kg/m    BMI: Body mass index is 33.5 kg/m .  Height: Data Unavailable    Constitutional/ general:  Pt is in no apparent distress, appears well-nourished.  Cooperative with history and physical exam.     Psych:  The patient answered questions appropriately.  Normal affect.  Seems to have reasonable expectations, in terms of treatment.     Lungs:  Non labored breathing, non labored speech. No cough.  No audible wheezing. Even, quiet breathing.       Vascular:  positive pedal pulses bilaterally for both the DP and PT arteries.  CFT < 3 sec.  negative ankle edema.  positive pedal hair growth.    Neuro:  Alert and oriented x 3. Coordinated gait.  Light touch sensation is intact     Derm: Normal texture and turgor.  No erythema, ecchymosis, or cyanosis.      Musculoskeletal:    Lower extremity muscle strength is normal.  Patient is ambulatory without an assistive device or brace.  No gross deformities.  Normal arch.        left first nail thickened, elongated, discolored, with subungual debris.  The distal two thirds of the nail is not attached and it is laterally deviated.  No erythema, edema, drainage, pain on palpation.  No signs of subungual masses or exostosis and nailbed healthy.    A/P  Onychodystrophy          pain    Discussed all options with patient.  Mycotic nail manually debrided with a .   Discussed unfortunately there is nothing we can do to make this nail normal again.  Discussed strategies for keeping the short and comfortable.  Return to clinic prn.  Thank you for allowing me participate in the care of this patient.      Gabriele Navas, KENN, FACFAS

## 2021-11-27 ENCOUNTER — TELEPHONE (OUTPATIENT)
Dept: BEHAVIORAL HEALTH | Facility: CLINIC | Age: 26
End: 2021-11-27
Payer: COMMERCIAL

## 2021-11-30 ENCOUNTER — HOSPITAL ENCOUNTER (OUTPATIENT)
Dept: BEHAVIORAL HEALTH | Facility: CLINIC | Age: 26
Discharge: HOME | End: 2021-11-30
Attending: FAMILY MEDICINE | Admitting: FAMILY MEDICINE
Payer: COMMERCIAL

## 2021-11-30 PROCEDURE — 90791 PSYCH DIAGNOSTIC EVALUATION: CPT | Mod: GT,95 | Performed by: PSYCHOLOGIST

## 2021-11-30 ASSESSMENT — ANXIETY QUESTIONNAIRES
4. TROUBLE RELAXING: SEVERAL DAYS
7. FEELING AFRAID AS IF SOMETHING AWFUL MIGHT HAPPEN: SEVERAL DAYS
5. BEING SO RESTLESS THAT IT IS HARD TO SIT STILL: SEVERAL DAYS
8. IF YOU CHECKED OFF ANY PROBLEMS, HOW DIFFICULT HAVE THESE MADE IT FOR YOU TO DO YOUR WORK, TAKE CARE OF THINGS AT HOME, OR GET ALONG WITH OTHER PEOPLE?: SOMEWHAT DIFFICULT
3. WORRYING TOO MUCH ABOUT DIFFERENT THINGS: MORE THAN HALF THE DAYS
GAD7 TOTAL SCORE: 10
2. NOT BEING ABLE TO STOP OR CONTROL WORRYING: SEVERAL DAYS
GAD7 TOTAL SCORE: 10
1. FEELING NERVOUS, ANXIOUS, OR ON EDGE: MORE THAN HALF THE DAYS
7. FEELING AFRAID AS IF SOMETHING AWFUL MIGHT HAPPEN: SEVERAL DAYS
6. BECOMING EASILY ANNOYED OR IRRITABLE: MORE THAN HALF THE DAYS
GAD7 TOTAL SCORE: 10

## 2021-11-30 ASSESSMENT — PATIENT HEALTH QUESTIONNAIRE - PHQ9
10. IF YOU CHECKED OFF ANY PROBLEMS, HOW DIFFICULT HAVE THESE PROBLEMS MADE IT FOR YOU TO DO YOUR WORK, TAKE CARE OF THINGS AT HOME, OR GET ALONG WITH OTHER PEOPLE: VERY DIFFICULT
SUM OF ALL RESPONSES TO PHQ QUESTIONS 1-9: 24
SUM OF ALL RESPONSES TO PHQ QUESTIONS 1-9: 24

## 2021-11-30 NOTE — PROGRESS NOTES
Reviewed chart.  DA completed  Placed call to [place] [facility] for request for call back.  Pt needs IOP but he is only available in the evenings.     Referral information given to pt:   [facility]  Intake Appointment is scheduled for December 4, 2021 @ 12 (noon)  Please call to confirm appointment.  They will email  paperwork.   [address] [place] [address]?? Therapist is Gautam Ramos MA      [phone]  Your therapist can refer you to psychiatry after meeting you.    Platform: All virtual      IOP Info:  Contact: Nicki Moreira (Supervisor). Phone:? [phone].? Fax: [phone]?    M, Th 5-8 (Evenings)   Pt will call to schedule intake for this. He was informed there is a wait list but most likely starting end of December.

## 2021-11-30 NOTE — PROGRESS NOTES
"    [facility]  Provider Name: Unique Webb MA LP    PATIENT'S NAME: Ethan Trevino  PREFERRED NAME: Ethan  PRONOUNS:  he him     MRN: [id number]  : 1995  ADDRESS: [address]  [facility] [address]  ACCT. NUMBER:  [id number]  DATE OF SERVICE: 21  START TIME: 1330  END TIME: 1400  PREFERRED PHONE: [phone]  May we leave a program related message: Yes  SERVICE MODALITY:  Phone Visit:      Provider verified identity through the following two step process.  Patient provided:  Patient photo, Patient  and Patient address    The patient has been notified of the following:      \"We have found that certain health care needs can be provided without the need for a face to face visit.  This service lets us provide the care you need with a phone conversation.       I will have full access to your [facility] medical record during this entire phone call.   I will be taking notes for your medical record.      Since this is like an office visit, we will bill your insurance company for this service.       There are potential benefits and risks of telephone visits (e.g. limits to patient confidentiality) that differ from in-person visits.?Confidentiality still applies for telephone services, and nobody will record the visit.  It is important to be in a quiet, private space that is free of distractions (including cell phone or other devices) during the visit.??      If during the course of the call I believe a telephone visit is not appropriate, you will not be charged for this service\"     Consent has been obtained for this service by care team member: Yes     UNIVERSAL ADULT Mental Health DIAGNOSTIC ASSESSMENT    Identifying Information:  Patient is a 26 year old,  .  The pronoun use throughout this assessment reflects the patient's chosen pronoun.  Patient was referred for an assessment by self.  Patient attended the " "session alone.    Chief Complaint:   The reason for seeking services at this time is: \"Depression, dealing with trauma, keeping myself in a good place\".  The problem(s) began 01/10/10.    Patient has not attempted to resolve these concerns in the past.    Social/Family History:  Patient reported they grew up in other [facility].  They were raised by biological parents  .  Parents were always together.  Patient reported that their childhood was pt experienced many losses of family members and friends due to cancer, and other conditions.      The patient describes their cultural background as   Cultural influences and impact on patient's life structure, values, norms, and healthcare: In terms of culture and growing up, im about the closest to basic white [facility] [place] [facility] [place] [place] you can get. My family was Jehovah's witness, I am no longer..  Contextual influences on patient's health include: Individual Factors chronic health issues.    These factors will be addressed in the Preliminary Treatment plan. Patient identified their preferred language to be English. Patient reported they does not need the assistance of an  or other support involved in therapy.     Patient reported had no significant delays in developmental tasks.   Patient's highest education level was college graduate  .  Patient identified the following learning problems: none reported.  Modifications will not be used to assist communication in therapy.  Patient reports they are  able to understand written materials.    Patient's current relationship status is  .   Patient identified their sexual orientation as heterosexual.  Patient reported having  0 child(chloe). Patient identified partner; friends as part of their support system.  Patient identified the quality of these relationships as fair.      Patient's current living/housing situation involves staying in own home/apartment.  The immediate members of family and household include Milagro " [place], 26,Wife and they report that housing is stable.    Patient is currently employed fulltime.  Patient reports their finances are obtained through employment. Patient does identify finances as a current stressor.      Patient reported that they have not been involved with the legal system . Patient does not report being under probation/ parole/ jurisdiction.     Patient's Strengths and Limitations:  Patient identified the following strengths or resources that will help them succeed in treatment: commitment to health and well being, insight, intelligence and work ethic. Things that may interfere with the patient's success in treatment include: none identified.     Personal and Family Medical History:  Patient does not report a family history of mental health concerns.  Patient reports family history is not on file..     Patient does not report Mental Health Diagnosis or Treatment.      Patient has had a physical exam to rule out medical causes for current symptoms.  Date of last physical exam was within the past year. Client was encouraged to follow up with PCP if symptoms were to develop. The patient has a non-[facility] Primary Care Provider. Their PCP is RENAE Owens CNP..  Patient reports the following current medical concerns: chronic kidney disease.  Patient reports pain concerns including chronic knee pain.  Patient does not want help addressing pain concerns..   There are not significant appetite / nutritional concerns / weight changes.   Patient does not report a history of head injury / trauma / cognitive impairment.     Patient reports current meds as:   Outpatient Medications Marked as Taking for the 11/30/21 encounter (Hospital Encounter) with Unique Webb LIC   Medication Sig     Calcium Carb-Cholecalciferol (CALCIUM 500 +D) 500-400 MG-UNIT TABS Take 1,000 mg by mouth daily     ferrous sulfate (FEROSUL) 325 (65 Fe) MG tablet Take 1 tablet (325 mg) by mouth daily (with  breakfast)     furosemide (LASIX) 20 MG tablet Take 1 tablet (20 mg) by mouth 2 times daily As needed     lisinopril (ZESTRIL) 20 MG tablet TAKE 2 TABLETS BY MOUTH EVERY MORNING AND 1 IN THE EVENING     omeprazole (PRILOSEC) 20 MG DR capsule TAKE 1 CAPSULE (20 MG) BY MOUTH DAILY     rosuvastatin (CRESTOR) 5 MG tablet Take 1 tablet (5 mg) by mouth daily     tacrolimus (GENERIC EQUIVALENT) 1 MG capsule Take 2 capsules (2 mg) by mouth 2 times daily       Medication Adherence:  Patient reports taking.  taking prescribed medications as prescribed.    Patient Allergies:    Allergies   Allergen Reactions     Amoxicillin      Other reaction(s): EENT - other (explain in comments), SKIN - rash  Rash and lip swelling after 8 days of Amoxicillin     Erythromycin      Penicillins        Medical History:    Past Medical History:   Diagnosis Date     Chronic kidney disease          Current Mental Status Exam:   Appearance:  Appropriate    Eye Contact:  Good   Psychomotor:  Normal       Gait / station:  Unable to assess  Attitude / Demeanor: Cooperative  Interested  Speech      Rate / Production: Normal/ Responsive      Volume:  Normal  volume      Language:  intact  Mood:   Normal  Affect:   Appropriate    Thought Content: Clear   Thought Process: Goal Directed  Logical       Associations: No loosening of associations  Insight:   Good   Judgment:  Intact   Orientation:  All  Attention/concentration: Good    Rating Scales:    PHQ9:    PHQ-9 SCORE 11/30/2021   PHQ-9 Total Score MyChart 24 (Severe depression)   PHQ-9 Total Score 24       GAD7:    FELIPE-7 SCORE 11/30/2021   Total Score 10 (moderate anxiety)   Total Score 10     CGI:     First:No data recorded    Most recentNo data recorded    Substance Use:  Patient did not report a family history of substance use concerns; see medical history section for details.  Patient has not received chemical dependency treatment in the past.  Patient has not ever been to detox.      Patient is  not currently receiving any chemical dependency treatment.     Pt reports he is unable to consume much alcohol given chronic kidney condition.      Substance History of use Age of first use Date of last use     Pattern and duration of use (include amounts and frequency)   Alcohol currently use   17 11/28/21 REPORTS SUBSTANCE USE: occasional use.  Pt is unable to consume much alcohol given hx of chronic kidney disease   Cannabis   never used     REPORTS SUBSTANCE USE: N/A     Amphetamines   never used     REPORTS SUBSTANCE USE: N/A   Cocaine/crack    never used       REPORTS SUBSTANCE USE: N/A   Hallucinogens never used         REPORTS SUBSTANCE USE: N/A   Inhalants never used         REPORTS SUBSTANCE USE: N/A   Heroin never used         REPORTS SUBSTANCE USE: N/A   Other Opiates never used     REPORTS SUBSTANCE USE: N/A   Benzodiazepine   never used     REPORTS SUBSTANCE USE: N/A   Barbiturates never used     REPORTS SUBSTANCE USE: N/A   Over the counter meds never used     REPORTS SUBSTANCE USE: N/A   Caffeine currently use 16   REPORTS SUBSTANCE USE: N/A   Nicotine  never used     REPORTS SUBSTANCE USE: N/A   Other substances not listed above:  Identify:  never used     REPORTS SUBSTANCE USE: N/A     Patient reported the following problems as a result of their substance use: no problems, not applicable.     CAGE- AID:    CAGE-AID Total Score 11/30/2021   Total Score 0   Total Score MyChart 0 (A total score of 2 or greater is considered clinically significant)       Substance Use: No symptoms    Based on the negative CAGE score and clinical interview there  are not indications of drug or alcohol abuse.      Significant Losses / Trauma / Abuse / Neglect Issues:   Patient did not  serve in the .  There are indications or report of significant loss, trauma, abuse or neglect issues related to: death of several family members and friends about ten years ago.  Concerns for possible neglect are not present.      Safety Assessment:   Current Safety Concerns:  [place] Suicide Severity Rating Scale (Short Version)  [place] Suicide Severity Rating (Short Version) 11/30/2021   Over the past 2 weeks have you felt down, depressed, or hopeless? yes   Over the past 2 weeks have you had thoughts of killing yourself? no   Have you ever attempted to kill yourself? no     Patient denies current homicidal ideation and behaviors.  Patient denies current self-injurious ideation and behaviors.    Patient denied risk behaviors associated with substance use.  Patient denies any high risk behaviors associated with mental health symptoms.  Patient reports the following current concerns for their personal safety: None.  Patient reports there are not firearms in the house.        History of Safety Concerns:  Patient denied a history of homicidal ideation.     Patient denied a history of personal safety concerns.    Patient denied a history of assaultive behaviors.    Patient denied a history of sexual assault behaviors.     Patient denied a history of risk behaviors associated with substance use.  Patient denies any history of high risk behaviors associated with mental health symptoms.  Patient reports the following protective factors: dedication to family or friends; safe and stable environment; effectively controls impulses; secure attachment; living with other people; structured day; sense of personal control or determination    Risk Plan:  See Recommendations for Safety and Risk Management Plan    Review of Symptoms per patient report:  Depression: Change in sleep, Lack of interest, Excessive or inappropriate guilt, Change in energy level, Difficulties concentrating, Psychomotor slowing or agitation and Feeling sad, down, or depressed  Vianca:  No Symptoms  Psychosis: No Symptoms  Anxiety: Excessive worry, Nervousness, Sleep disturbance, Poor concentration and Irritability  Panic:  No symptoms  Post Traumatic Stress Disorder:   Experienced traumatic event see above   Eating Disorder: No Symptoms  ADD / ADHD:  No symptoms  Conduct Disorder: No symptoms  Autism Spectrum Disorder: No symptoms  Obsessive Compulsive Disorder: No Symptoms    Patient reports the following compulsive behaviors and treatment history: none.      Diagnostic Criteria:   Major Depressive Disorder  CRITERIA (A-C) REPRESENT A MAJOR DEPRESSIVE EPISODE - SELECT THESE CRITERIA  A) Single episode - symptoms have been present during the same 2-week period and represent a change from previous functioning 5 or more symptoms (required for diagnosis)   - Depressed mood. Note: In children and adolescents, can be irritable mood.     - Diminished interest or pleasure in all, or almost all, activities.    - Psychomotor activity retardation.    - Fatigue or loss of energy.    - Feelings of worthlessness or inappropriate guilt.    - Diminished ability to think or concentrate, or indecisiveness.    - Recurrent thoughts of death (not just fear of dying), recurrent suicidal ideation without a specific plan, or a suicide attempt or a specific plan for committing suicide.   B) The symptoms cause clinically significant distress or impairment in social, occupational, or other important areas of functioning  C) The episode is not attributable to the physiological effects of a substance or to another medical condition  D) The occurence of major depressive episode is not better explained by other thought / psychotic disorders  E) There has never been a manic episode or hypomanic episode    Functional Status:  Patient reports the following functional impairments: chronic disease management, management of the household and or completion of tasks, relationship(s), social interactions and work / vocational responsibilities.     WHODAS:   WHODAS 2.0 Total Score 11/30/2021   Total Score 33   Total Score MyChart 33     Programmatic care:  Current LOCUS was assessed and patient needs the following level  of care based on score 15  .    Clinical Summary:  1. Reason for assessment: pt is self referred.  He reports that he has been struggling with his mental health for over a decade and would like to address it now.  He has had a hx of a couple of therapy sessions with no follow up.  Pt reports robust sxs of depression.    2. Psychosocial, Cultural and Contextual Factors: none identified  .  3. Principal DSM5 Diagnoses  (Sustained by DSM5 Criteria Listed Above):   296.22 (F32.1)  Major Depressive Disorder, Single Episode, Moderate _ and With anxious distress.  4. Other Diagnoses that is relevant to services:   none  5. Provisional Diagnosis: Rule out PTSD based on pt reporting hx of significant loss of friends and family due to illness.    6. Prognosis: Expect Improvement.  7. Likely consequences of symptoms if not treated: decreased functioning.  8. Client strengths include:  educated, employed, good listener, intelligent, motivated, open to learning and work history .     Recommendations:     1. Plan for Safety and Risk Management:   Recommended that patient call 911 or go to the local ED should there be a change in any of these risk factors..          Report to child / adult protection services was NA.     2. Patient's identified none.     3. Initial Treatment will focus on:    Depressed Mood - pt has a high PHQ9 score  Functional Impairment at: home.     4. Resources/Service Plan:    services are not indicated.   Modifications to assist communication are not indicated.   Additional disability accommodations are not indicated.      5. Collaboration:   Collaboration / coordination of treatment will be initiated with the following  support professionals: outpatient therapist and psychiatry.      6.  Referrals:   The following referral(s) will be initiated: will look for therapy and psychiatry for pt.  He is not able to take time off from work for day treatment or PHP LOC but there may be an evening group he  can attend.  . Next Scheduled Appointment:     [facility] [place]  Appointment is scheduled for 2021 @ 12 (noon)  Please call to confirm appointment.  They will email  paperwork.   [address] [address]?? Therapist is Gautam Ramos MA      [phone]  Your therapist can refer you to psychiatry after meeting you.    Platform: All virtual      Contact: Nicki Moreira (Supervisor). Phone:? [phone].? Fax: [phone]?      IOP Info:   M,  5-8 (Evenings) Next opening early November?   Pt will call to schedule intake for this.        7. KARUNA:    KARUNA:  Discussed the general effects of drugs and alcohol on health and well-being. Provider gave patient printed information about the effects of chemical use on their health and well being. Recommendations:  Pt has set his own goals for this and is cautious about his alcohol use due to chronic health issues .     8. Records:   These were reviewed at time of assessment.   Information in this assessment was obtained from the medical record and provided by patient who is a good historian.    Patient will have open access to their mental health medical record.        Provider Name/ Credentials:  Unique Webb MA LP  2021                                    LOCUS Worksheet     Name: Ethan Trevino MRN: [id number]    : 1995      Gender:  male    PMI:  NA Provider Name: Unique Webb MA, LP Provider NPI:  [id number]    Actual level of Care Provided:  Assessment and Referral    Service(s) receiving or referred to:  Will attempt to refer to IOP, pt is not able to attend any day treatment or PHP due to his work schedule    Reason for Variance: NA      Rating completed by: Unique Webb MA, LP      I. Risk of Harm:   2      Low Risk of Harm    II. Functional Status:   3      Moderate Impairment    III. Co-Morbidity:   3      Significant Co-Morbidity    IV - A. Recovery Environment  - Level of Stress:   2      Mildly Stressful Environment    IV - B. Recovery Environment - Level of Support:   2      Supportive Environment    V. Treatment and Recovery History:   1      Fully Responsive to Treatment and Recovery Management    VI. Engagement and Recovery Project:   2      Positive Engagement and Recovery       15 Composite Score    Level of Care Recommendation:   14 to 16       Low Intensity Community Based Services          Suicide Prevention Lifeline: 3-686-141-TALK (8105)    Crisis Text Line Service (available 24 hours a day, 7 days a week): Text [place] to 989154    Call  **CRISIS (302661) from a cell phone to talk to a team of professionals who can help you.    Crisis Services By [facility]: Phone Number:   [place]     [phone]   [place]    [phone]   [place]    [phone]   [place]    [phone]   [place]    [phone]   [place] [phone]   [place]     [phone]       Call 911 or go to my nearest emergency department.     I helped develop this safety plan and agree to use it when needed.  I have been given a copy of this plan.          Answers for HPI/ROS submitted by the patient on 11/30/2021  If you checked off any problems, how difficult have these problems made it for you to do your work, take care of things at home, or get along with other people?: Very difficult  PHQ9 TOTAL SCORE: 24  FELIPE 7 TOTAL SCORE: 10

## 2021-12-01 ASSESSMENT — PATIENT HEALTH QUESTIONNAIRE - PHQ9: SUM OF ALL RESPONSES TO PHQ QUESTIONS 1-9: 24

## 2021-12-01 ASSESSMENT — ANXIETY QUESTIONNAIRES: GAD7 TOTAL SCORE: 10

## 2021-12-11 DIAGNOSIS — N05.1 FSGS (FOCAL SEGMENTAL GLOMERULOSCLEROSIS): ICD-10-CM

## 2021-12-28 DIAGNOSIS — N05.1 FSGS (FOCAL SEGMENTAL GLOMERULOSCLEROSIS): ICD-10-CM

## 2021-12-28 RX ORDER — TACROLIMUS 1 MG/1
2 CAPSULE ORAL 2 TIMES DAILY
Qty: 120 CAPSULE | Refills: 11 | Status: SHIPPED | OUTPATIENT
Start: 2021-12-28 | End: 2022-02-15

## 2021-12-28 NOTE — TELEPHONE ENCOUNTER
Received call from [facility] about needing refill for his Tacrolimus. Chart reviewed. Refill sent to [facility].

## 2021-12-30 DIAGNOSIS — N05.1 FSGS (FOCAL SEGMENTAL GLOMERULOSCLEROSIS): Primary | ICD-10-CM

## 2022-01-06 ENCOUNTER — LAB (OUTPATIENT)
Dept: LAB | Facility: CLINIC | Age: 27
End: 2022-01-06
Attending: INTERNAL MEDICINE
Payer: COMMERCIAL

## 2022-01-06 ENCOUNTER — VIRTUAL VISIT (OUTPATIENT)
Dept: NEPHROLOGY | Facility: CLINIC | Age: 27
End: 2022-01-06
Attending: INTERNAL MEDICINE
Payer: COMMERCIAL

## 2022-01-06 DIAGNOSIS — N05.1 FSGS (FOCAL SEGMENTAL GLOMERULOSCLEROSIS): Primary | ICD-10-CM

## 2022-01-06 DIAGNOSIS — N05.1 FSGS (FOCAL SEGMENTAL GLOMERULOSCLEROSIS): ICD-10-CM

## 2022-01-06 LAB
ALBUMIN SERPL-MCNC: 3.6 G/DL (ref 3.4–5)
ALBUMIN UR-MCNC: 100 MG/DL
ANION GAP SERPL CALCULATED.3IONS-SCNC: 7 MMOL/L (ref 3–14)
APPEARANCE UR: CLEAR
BILIRUB UR QL STRIP: NEGATIVE
BUN SERPL-MCNC: 24 MG/DL (ref 7–30)
CALCIUM SERPL-MCNC: 8.8 MG/DL (ref 8.5–10.1)
CHLORIDE BLD-SCNC: 109 MMOL/L (ref 94–109)
CO2 SERPL-SCNC: 25 MMOL/L (ref 20–32)
COLOR UR AUTO: YELLOW
CREAT SERPL-MCNC: 1.25 MG/DL (ref 0.66–1.25)
CREAT UR-MCNC: 109 MG/DL
ERYTHROCYTE [DISTWIDTH] IN BLOOD BY AUTOMATED COUNT: 12.7 % (ref 10–15)
GFR SERPL CREATININE-BSD FRML MDRD: 81 ML/MIN/1.73M2
GLUCOSE BLD-MCNC: 96 MG/DL (ref 70–99)
GLUCOSE UR STRIP-MCNC: NEGATIVE MG/DL
HCT VFR BLD AUTO: 37.5 % (ref 40–53)
HGB BLD-MCNC: 12.7 G/DL (ref 13.3–17.7)
HGB UR QL STRIP: NEGATIVE
KETONES UR STRIP-MCNC: NEGATIVE MG/DL
LEUKOCYTE ESTERASE UR QL STRIP: NEGATIVE
MCH RBC QN AUTO: 28.2 PG (ref 26.5–33)
MCHC RBC AUTO-ENTMCNC: 33.9 G/DL (ref 31.5–36.5)
MCV RBC AUTO: 83 FL (ref 78–100)
MUCOUS THREADS #/AREA URNS LPF: PRESENT /LPF
NITRATE UR QL: NEGATIVE
PH UR STRIP: 5 [PH] (ref 5–7)
PHOSPHATE SERPL-MCNC: 3.8 MG/DL (ref 2.5–4.5)
PLATELET # BLD AUTO: 331 10E3/UL (ref 150–450)
POTASSIUM BLD-SCNC: 5 MMOL/L (ref 3.4–5.3)
PROT UR-MCNC: 0.8 G/L
PROT/CREAT 24H UR: 0.73 G/G CR (ref 0–0.2)
RBC # BLD AUTO: 4.51 10E6/UL (ref 4.4–5.9)
RBC URINE: 0 /HPF
SODIUM SERPL-SCNC: 141 MMOL/L (ref 133–144)
SP GR UR STRIP: 1.01 (ref 1–1.03)
UROBILINOGEN UR STRIP-MCNC: NORMAL MG/DL
WBC # BLD AUTO: 9.4 10E3/UL (ref 4–11)
WBC URINE: 0 /HPF

## 2022-01-06 PROCEDURE — 85027 COMPLETE CBC AUTOMATED: CPT | Performed by: PATHOLOGY

## 2022-01-06 PROCEDURE — 81001 URINALYSIS AUTO W/SCOPE: CPT | Performed by: PATHOLOGY

## 2022-01-06 PROCEDURE — 99213 OFFICE O/P EST LOW 20 MIN: CPT | Mod: 95 | Performed by: INTERNAL MEDICINE

## 2022-01-06 PROCEDURE — 84156 ASSAY OF PROTEIN URINE: CPT | Performed by: PATHOLOGY

## 2022-01-06 PROCEDURE — 80069 RENAL FUNCTION PANEL: CPT | Performed by: PATHOLOGY

## 2022-01-06 PROCEDURE — 36415 COLL VENOUS BLD VENIPUNCTURE: CPT | Performed by: PATHOLOGY

## 2022-01-06 ASSESSMENT — PAIN SCALES - GENERAL: PAINLEVEL: NO PAIN (0)

## 2022-01-06 NOTE — LETTER
1/6/2022     RE: Ethan Trevino  [address]  [facility] [address]     Dear Colleague,    Thank you for referring your patient, Ethan Trevino, to the [facility] [place] at [facility]. Please see a copy of my visit note below.    Ethan is a 26 year old who is being evaluated via a billable video visit.      How would you like to obtain your AVS? MyChart  If the video visit is dropped, the invitation should be resent by: Text to cell phone: [phone]  Will anyone else be joining your video visit? No    Video Start Time: 3:46 PM  Video-Visit Details    Type of service:  Video Visit    Video End Time:4:00    Originating Location (pt. Location): Home    Distant Location (provider location):  [facility] [place]     Platform used for Video Visit: [facility]      NEPHROLOGY CLINIC FOLLOW UP  August 26, 2021      CC: FSGS follow up    Initial presentation  Summer 2016: Presented with several months history of swelling, noted to be nephrotic, and renal biopsy in August 2016 showed FSGS. He was started on steroids 8/23/16.  Remainded on steroids 100 mg x several months.  He had more and more abdominal striae and his acne is significant  His proteinuria was down to 1-2 grams then subgram at 0.7g which is great, but unfortunately when he was down to 10mg prednisone, his proteinuria increased back to 5 grams In Jan 2018 Along with with low albumin and swelling.    He went back on Pred at 100 mg day  With a surprizing transient dip in protenuria to 1.5 in March, but eventually proteinuria decreased to UPCR < 3 startuing in April 2018   Last UPCR  1.24 g/g on 11/1/18.  He was seen on 11/29/18 at which time we initiated a tapering schedule of prednisone, as he was in partial remission on therapy (UPCR < 1).  Prednisone taper was started in Dec 2018  He had a relapse of proteinuria in February 2019  Prompting  a halting of the prednisone taper and initiation of Tacrolimus at 0.5 mg twice daily  Tacrolimus was progressively increased to 2 mg twice daily starting on April 19, 2019, with documentation of trough levels in the therapeutic range.       He was enrolled in the study  On June 24, 2019.  Day #1 of study medication 7/16/19  Completed the study in Feb 2020:          June 4, 2020     He presents today with no acute complaints.  He denies any further occurrences of transient numbness or tingling.  Energy is OK.    Mild L Ext swelling.  No new striae  No GI symptoms, except for occasional loose stools.  Specifically no melena or hematochezia.  Has been working the entire pandemic.    August 26, 2021        ROS:  A comprehensive review of systems was obtained and negative, except as noted in the HPI or PMH.  No HA, tremor  No N,V, Abd pain.  No F, Chills, cough, SOB  No dysuria or gross hematuria.    2/4/2020  Pt endorsed doing fine. He had to reschedule his prior appointment because he had other commitments during his prior cristine. Time. Up on questioning, mentioned that he continue to have some swelling his legs, about the same like before, not worsening. Denied other systemic symptoms including fever/chils, nausea/vomting/abdominal pain, diarrhea, chest pain or SOB. Also denied any recurrent headaches, hematuria, renal stones, new rash or joint aches in recent past.    Currently on tacrolimus 2 mg BID, off of prednisone for 4 months now.    5/6/2021  Since last visit, he reports feeling well overall. No acute illnesses or hospitalizations.   COVID vaccination complete (pfizer). 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 LE swelling is minimal, describes no pitting when pressing his fingers in his leg.   Currently on 2mg Tac BID as well as Lasix 20mg BID. Lisinopril, 60mg Daily. /80 in the morning. No light-headedness/dizziness. No orthopnea. Also on statin therapy with  "rosuvastatin.   Appetite is normal, no nausea, no fevers. No GI issues. He does note new onset of hives 2 months ago. They occur in the morning, and resolve usually in two hours. No scarring, or bites noted. Occurring on the torso and arms at times. Not terribly bothersome, but still annoying. No obvious new insults. No travels out of [place].    August 26, 2021  Reports feeling fine.  Energy is fine.   Sleeping well  Minimal L Ext swelling noticeable at end of the day.  No GI other than mild occasional diarrhea.  No rash  No HA.  No SOB/CP Cough  Mild with post nasal drip.  No  spms.     BP low 130\"s /75-80      January 6, 2022  Feels well.   Saw PCP, started on oral Fe,    No LExt swelling.  BP at home 126/72 this am.  Stays in that range.  Checks every few days.    No GI spms other than mild loose stool  No CP, SOB.  No  spm  No rash.         Allergies   Allergen Reactions     Amoxicillin      Other reaction(s): EENT - other (explain in comments), SKIN - rash  Rash and lip swelling after 8 days of Amoxicillin     Erythromycin      Penicillins      Reviewed with patient on January 6, 2022      Calcium Carb-Cholecalciferol (CALCIUM 500 +D) 500-400 MG-UNIT TABS, Take 1,000 mg by mouth daily  furosemide (LASIX) 20 MG tablet, Take 1 tablet (20 mg) by mouth 2 times daily As needed  lisinopril (ZESTRIL) 20 MG tablet, TAKE 2 TABLETS BY MOUTH EVERY MORNING AND 1 IN THE EVENING  omeprazole (PRILOSEC) 20 MG DR capsule, TAKE 1 CAPSULE (20 MG) BY MOUTH DAILY  rosuvastatin (CRESTOR) 5 MG tablet, Take 1 tablet (5 mg) by mouth daily  tacrolimus (GENERIC EQUIVALENT) 1 MG capsule, Take 2 capsules (2 mg) by mouth 2 times daily    No current facility-administered medications on file prior to visit.      Past Medical History:   Diagnosis Date     Chronic kidney disease        Past Surgical History:   Procedure Laterality Date     BIOPSY  2016    renal       Social History     Tobacco Use     Smoking status: Never Smoker     Smokeless " tobacco: Never Used   Substance Use Topics     Alcohol use: Yes     Comment: social     Drug use: No       No family history on file.    ROS: A 12 system review of systems was negative other than noted here or above.     Exam:  There were no vitals taken for this visit.       GENERAL APPEARANCE: alert and no distress  RESP: able to speak in full sentences, not in respiratory distress   NEURO: mentation intact and speech normal  PSYCH: affect normal/bright    Further physical exam is limited in setting of video visit    Results:      Lab on 01/06/2022   Component Date Value Ref Range Status     Sodium 01/06/2022 141  133 - 144 mmol/L Final     Potassium 01/06/2022 5.0  3.4 - 5.3 mmol/L Final     Chloride 01/06/2022 109  94 - 109 mmol/L Final     Carbon Dioxide (CO2) 01/06/2022 25  20 - 32 mmol/L Final     Anion Gap 01/06/2022 7  3 - 14 mmol/L Final     Urea Nitrogen 01/06/2022 24  7 - 30 mg/dL Final     Creatinine 01/06/2022 1.25  0.66 - 1.25 mg/dL Final     Calcium 01/06/2022 8.8  8.5 - 10.1 mg/dL Final     Glucose 01/06/2022 96  70 - 99 mg/dL Final     Albumin 01/06/2022 3.6  3.4 - 5.0 g/dL Final     Phosphorus 01/06/2022 3.8  2.5 - 4.5 mg/dL Final     GFR Estimate 01/06/2022 81  >60 mL/min/1.73m2 Final    Effective December 21, 2021 eGFRcr in adults is calculated using the 2021 CKD-EPI creatinine equation which includes age and gender (Sharon et al., NEJ, DOI: 10.1056/EOHPnq7554809)     WBC Count 01/06/2022 9.4  4.0 - 11.0 10e3/uL Final     RBC Count 01/06/2022 4.51  4.40 - 5.90 10e6/uL Final     Hemoglobin 01/06/2022 12.7* 13.3 - 17.7 g/dL Final     Hematocrit 01/06/2022 37.5* 40.0 - 53.0 % Final     MCV 01/06/2022 83  78 - 100 fL Final     MCH 01/06/2022 28.2  26.5 - 33.0 pg Final     MCHC 01/06/2022 33.9  31.5 - 36.5 g/dL Final     RDW 01/06/2022 12.7  10.0 - 15.0 % Final     Platelet Count 01/06/2022 331  150 - 450 10e3/uL Final     Total Protein Random Urine g/L 01/06/2022 0.80  g/L Final    The reference  range has not been established for total protein in random urine samples.  The result should be integrated into the clinical context for interpretation.     Total Protein Urine g/gr Creatinine 01/06/2022 0.73* 0.00 - 0.20 g/g Cr Final     Creatinine Urine mg/dL 01/06/2022 109  mg/dL Final     Color Urine 01/06/2022 Yellow  Colorless, Straw, Light Yellow, Yellow Final     Appearance Urine 01/06/2022 Clear  Clear Final     Glucose Urine 01/06/2022 Negative  Negative mg/dL Final     Bilirubin Urine 01/06/2022 Negative  Negative Final     Ketones Urine 01/06/2022 Negative  Negative mg/dL Final     Specific Gravity Urine 01/06/2022 1.013  1.003 - 1.035 Final     Blood Urine 01/06/2022 Negative  Negative Final     pH Urine 01/06/2022 5.0  5.0 - 7.0 Final     Protein Albumin Urine 01/06/2022 100 * Negative mg/dL Final     Urobilinogen Urine 01/06/2022 Normal  Normal, 2.0 mg/dL Final     Nitrite Urine 01/06/2022 Negative  Negative Final     Leukocyte Esterase Urine 01/06/2022 Negative  Negative Final     Mucus Urine 01/06/2022 Present* None Seen /LPF Final     RBC Urine 01/06/2022 0  <=2 /HPF Final     WBC Urine 01/06/2022 0  <=5 /HPF Final       Creatinine   Date Value Ref Range Status   01/06/2022 1.25 0.66 - 1.25 mg/dL Final   05/07/2021 1.15 0.66 - 1.25 mg/dL Final     Results for HAM SWAIN (MRN [id number]) as of 1/6/2022 15:51   Ref. Range 5/16/2019 14:45 7/1/2020 06:35 7/1/2020 06:43 2/5/2021 07:08 2/5/2021 07:10 5/7/2021 09:01 5/7/2021 09:06 8/30/2021 09:25 1/6/2022 14:27 1/6/2022 14:34   Protein Total Urine g/gr Creatinine Latest Ref Range: 0.00 - 0.20 g/g Cr 3.50 (H) 2.37 (H)   1.28 (H)  1.53 (H) 0.87 (H)  0.73 (H)             Assessment/Plan:   26 year old male with nephrotic syndrome, biopsy in August 2016 showing FSGS. Steroids started 8/23/2016 and relapsed during weaning in January 2018, with proteinuria up from 0.28 g/g rocky to 4.9 grams on 1/31/18.   He is s/p participation in the  trial  of MCP1 receptor blocker, and has been on tacrolimus since.  Mr Trevino appears clinically stable without symptoms suggestive of worsening nephrosis.    He seems to be tolerating the tacrolimus well without AE or HTN.  His most recent labs (reviewed with him) show a stable kidney function and proteinuria well in the subnephrotic range.   I have therefore instructed him to decrease the dose of tacrolimus to 2 mg in am and 1 mg in evening.    Hypertension   BP at goal. No changes to lisinopril and lasix for now while awaiting his lab work    Labs reviewed: renal panel, CBC, UPCR  ordered: Renal panel,CBC, UA, UPCR, Tacrolimus trough  Immunosuppression changed:   RTC in 3-4 months    José Miguel Jean MD  Division of Nephrology and Hypertension  Pager: [phone]  January 6, 2022

## 2022-01-06 NOTE — PROGRESS NOTES
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 He was started on steroids 8/23/16.  Remainded on steroids 100 mg x several months.  He had more and more abdominal striae and his acne is significant  His proteinuria was down to 1-2 grams then subgram at 0.7g which is great, but unfortunately when he was down to 10mg prednisone, his proteinuria increased back to 5 grams In Jan 2018 Along with with low albumin and swelling.    He went back on Pred at 100 mg day  With a surprizing transient dip in protenuria to 1.5 in March, but eventually proteinuria decreased to UPCR < 3 startuing in April 2018   Last UPCR  1.24 g/g on 11/1/18.  He was seen on 11/29/18 at which time we initiated a tapering schedule of prednisone, as he was in partial remission on therapy (UPCR < 1).  Prednisone taper was started in Dec 2018  He had a relapse of proteinuria in February 2019  Prompting a halting of the prednisone taper and initiation of Tacrolimus at 0.5 mg twice daily  Tacrolimus was progressively increased to 2 mg twice daily starting on April 19, 2019, with documentation of trough levels in the therapeutic range.       He was enrolled in the study  On June 24, 2019.  Day #1 of study medication 7/16/19  Completed the study in Feb 2020:          June 4, 2020     He presents today with no acute complaints.  He denies any further occurrences of transient numbness or tingling.  Energy is OK.    Mild L Ext swelling.  No new striae  No GI symptoms, except for occasional loose stools.  Specifically no melena or hematochezia.  Has been working the entire pandemic.    August 26, 2021        ROS:  A comprehensive review of systems was obtained and negative, except as noted in the HPI or PMH.  No HA, tremor  No N,V, Abd pain.  No F, Chills, cough, SOB  No dysuria or  "gross hematuria.    2/4/2020  Pt endorsed doing fine. He had to reschedule his prior appointment because he had other commitments during his prior cristine. Time. Up on questioning, mentioned that he continue to have some swelling his legs, about the same like before, not worsening. Denied other systemic symptoms including fever/chils, nausea/vomting/abdominal pain, diarrhea, chest pain or SOB. Also denied any recurrent headaches, hematuria, renal stones, new rash or joint aches in recent past.    Currently on tacrolimus 2 mg BID, off of prednisone for 4 months now.    5/6/2021  Since last visit, he reports feeling well overall. No acute illnesses or hospitalizations.   COVID vaccination complete (pfizer). 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 LE swelling is minimal, describes no pitting when pressing his fingers in his leg.   Currently on 2mg Tac BID as well as Lasix 20mg BID. Lisinopril, 60mg Daily. /80 in the morning. No light-headedness/dizziness. No orthopnea. Also on statin therapy with rosuvastatin.   Appetite is normal, no nausea, no fevers. No GI issues. He does note new onset of hives 2 months ago. They occur in the morning, and resolve usually in two hours. No scarring, or bites noted. Occurring on the torso and arms at times. Not terribly bothersome, but still annoying. No obvious new insults. No travels out of [place].    August 26, 2021  Reports feeling fine.  Energy is fine.   Sleeping well  Minimal L Ext swelling noticeable at end of the day.  No GI other than mild occasional diarrhea.  No rash  No HA.  No SOB/CP Cough  Mild with post nasal drip.  No  spms.     BP low 130\"s /75-80      January 6, 2022  Feels well.   Saw PCP, started on oral Fe,    No LExt swelling.  BP at home 126/72 this am.  Stays in that range.  Checks every few days.    No GI spms other than mild loose stool  No CP, SOB.  No  spm  No rash.         Allergies   Allergen Reactions     " Amoxicillin      Other reaction(s): EENT - other (explain in comments), SKIN - rash  Rash and lip swelling after 8 days of Amoxicillin     Erythromycin      Penicillins      Reviewed with patient on January 6, 2022      Calcium Carb-Cholecalciferol (CALCIUM 500 +D) 500-400 MG-UNIT TABS, Take 1,000 mg by mouth daily  furosemide (LASIX) 20 MG tablet, Take 1 tablet (20 mg) by mouth 2 times daily As needed  lisinopril (ZESTRIL) 20 MG tablet, TAKE 2 TABLETS BY MOUTH EVERY MORNING AND 1 IN THE EVENING  omeprazole (PRILOSEC) 20 MG DR capsule, TAKE 1 CAPSULE (20 MG) BY MOUTH DAILY  rosuvastatin (CRESTOR) 5 MG tablet, Take 1 tablet (5 mg) by mouth daily  tacrolimus (GENERIC EQUIVALENT) 1 MG capsule, Take 2 capsules (2 mg) by mouth 2 times daily    No current facility-administered medications on file prior to visit.      Past Medical History:   Diagnosis Date     Chronic kidney disease        Past Surgical History:   Procedure Laterality Date     BIOPSY  2016    renal       Social History     Tobacco Use     Smoking status: Never Smoker     Smokeless tobacco: Never Used   Substance Use Topics     Alcohol use: Yes     Comment: social     Drug use: No       No family history on file.    ROS: A 12 system review of systems was negative other than noted here or above.     Exam:  There were no vitals taken for this visit.       GENERAL APPEARANCE: alert and no distress  RESP: able to speak in full sentences, not in respiratory distress   NEURO: mentation intact and speech normal  PSYCH: affect normal/bright    Further physical exam is limited in setting of video visit    Results:      Lab on 01/06/2022   Component Date Value Ref Range Status     Sodium 01/06/2022 141  133 - 144 mmol/L Final     Potassium 01/06/2022 5.0  3.4 - 5.3 mmol/L Final     Chloride 01/06/2022 109  94 - 109 mmol/L Final     Carbon Dioxide (CO2) 01/06/2022 25  20 - 32 mmol/L Final     Anion Gap 01/06/2022 7  3 - 14 mmol/L Final     Urea Nitrogen 01/06/2022 24   7 - 30 mg/dL Final     Creatinine 01/06/2022 1.25  0.66 - 1.25 mg/dL Final     Calcium 01/06/2022 8.8  8.5 - 10.1 mg/dL Final     Glucose 01/06/2022 96  70 - 99 mg/dL Final     Albumin 01/06/2022 3.6  3.4 - 5.0 g/dL Final     Phosphorus 01/06/2022 3.8  2.5 - 4.5 mg/dL Final     GFR Estimate 01/06/2022 81  >60 mL/min/1.73m2 Final    Effective December 21, 2021 eGFRcr in adults is calculated using the 2021 CKD-EPI creatinine equation which includes age and gender (Sharon et al., NE, DOI: 10.1056/OADIay8637476)     WBC Count 01/06/2022 9.4  4.0 - 11.0 10e3/uL Final     RBC Count 01/06/2022 4.51  4.40 - 5.90 10e6/uL Final     Hemoglobin 01/06/2022 12.7* 13.3 - 17.7 g/dL Final     Hematocrit 01/06/2022 37.5* 40.0 - 53.0 % Final     MCV 01/06/2022 83  78 - 100 fL Final     MCH 01/06/2022 28.2  26.5 - 33.0 pg Final     MCHC 01/06/2022 33.9  31.5 - 36.5 g/dL Final     RDW 01/06/2022 12.7  10.0 - 15.0 % Final     Platelet Count 01/06/2022 331  150 - 450 10e3/uL Final     Total Protein Random Urine g/L 01/06/2022 0.80  g/L Final    The reference range has not been established for total protein in random urine samples.  The result should be integrated into the clinical context for interpretation.     Total Protein Urine g/gr Creatinine 01/06/2022 0.73* 0.00 - 0.20 g/g Cr Final     Creatinine Urine mg/dL 01/06/2022 109  mg/dL Final     Color Urine 01/06/2022 Yellow  Colorless, Straw, Light Yellow, Yellow Final     Appearance Urine 01/06/2022 Clear  Clear Final     Glucose Urine 01/06/2022 Negative  Negative mg/dL Final     Bilirubin Urine 01/06/2022 Negative  Negative Final     Ketones Urine 01/06/2022 Negative  Negative mg/dL Final     Specific Gravity Urine 01/06/2022 1.013  1.003 - 1.035 Final     Blood Urine 01/06/2022 Negative  Negative Final     pH Urine 01/06/2022 5.0  5.0 - 7.0 Final     Protein Albumin Urine 01/06/2022 100 * Negative mg/dL Final     Urobilinogen Urine 01/06/2022 Normal  Normal, 2.0 mg/dL Final      Nitrite Urine 01/06/2022 Negative  Negative Final     Leukocyte Esterase Urine 01/06/2022 Negative  Negative Final     Mucus Urine 01/06/2022 Present* None Seen /LPF Final     RBC Urine 01/06/2022 0  <=2 /HPF Final     WBC Urine 01/06/2022 0  <=5 /HPF Final       Creatinine   Date Value Ref Range Status   01/06/2022 1.25 0.66 - 1.25 mg/dL Final   05/07/2021 1.15 0.66 - 1.25 mg/dL Final     Results for HAM TREVINO (MRN [id number]) as of 1/6/2022 15:51   Ref. Range 5/16/2019 14:45 7/1/2020 06:35 7/1/2020 06:43 2/5/2021 07:08 2/5/2021 07:10 5/7/2021 09:01 5/7/2021 09:06 8/30/2021 09:25 1/6/2022 14:27 1/6/2022 14:34   Protein Total Urine g/gr Creatinine Latest Ref Range: 0.00 - 0.20 g/g Cr 3.50 (H) 2.37 (H)   1.28 (H)  1.53 (H) 0.87 (H)  0.73 (H)             Assessment/Plan:   26 year old male with nephrotic syndrome, biopsy in August 2016 showing FSGS. Steroids started 8/23/2016 and relapsed during weaning in January 2018, with proteinuria up from 0.28 g/g rocky to 4.9 grams on 1/31/18.   He is s/p participation in the  trial of MCP1 receptor blocker, and has been on tacrolimus since.  Mr Trevion appears clinically stable without symptoms suggestive of worsening nephrosis.    He seems to be tolerating the tacrolimus well without AE or HTN.  His most recent labs (reviewed with him) show a stable kidney function and proteinuria well in the subnephrotic range.   I have therefore instructed him to decrease the dose of tacrolimus to 2 mg in am and 1 mg in evening.      Hypertension   BP at goal. No changes to lisinopril and lasix for now while awaiting his lab work    Labs reviewed: renal panel, CBC, UPCR  ordered: Renal panel,CBC, UA, UPCR, Tacrolimus trough  Immunosuppression changed:   RTC in 3-4 months    José Miguel Jean MD  Division of Nephrology and Hypertension  Pager: [phone]  January 6, 2022

## 2022-01-06 NOTE — PROGRESS NOTES
Ethan is a 26 year old who is being evaluated via a billable video visit.      How would you like to obtain your AVS? MyChart  If the video visit is dropped, the invitation should be resent by: Text to cell phone: [phone]  Will anyone else be joining your video visit? No    Video Start Time: 3:46 PM  Video-Visit Details    Type of service:  Video Visit    Video End Time:4:00    Originating Location (pt. Location): Home    Distant Location (provider location):  [facility] [place]     Platform used for Video Visit: [employer]

## 2022-01-07 NOTE — PATIENT INSTRUCTIONS
Please decrease the tacrolimus to 2 mg in the morning and 1 mg in the evening.  No other changes to your medications   (you can get over the counter TUMS:  Take 2 daily)  For your next lab draw in 3 months (around March 28), please remember to go early in the morning BEFORE taking the morning dose of tacrolimus.

## 2022-01-22 ENCOUNTER — TELEPHONE (OUTPATIENT)
Dept: NEPHROLOGY | Facility: CLINIC | Age: 27
End: 2022-01-22
Payer: COMMERCIAL

## 2022-02-09 ENCOUNTER — TELEPHONE (OUTPATIENT)
Dept: NEPHROLOGY | Facility: CLINIC | Age: 27
End: 2022-02-09
Payer: COMMERCIAL

## 2022-02-09 NOTE — TELEPHONE ENCOUNTER
Received call from [facility] pharmacy regarding tacrolimus dose. Pharmacy reports that patient states he decreased the dose to 3 mg daily. Will send to Dr Jean for review  Seema Peralta LPN  Nephrology  [phone]

## 2022-02-15 ENCOUNTER — TELEPHONE (OUTPATIENT)
Dept: NEPHROLOGY | Facility: CLINIC | Age: 27
End: 2022-02-15
Payer: COMMERCIAL

## 2022-02-15 DIAGNOSIS — N05.1 FSGS (FOCAL SEGMENTAL GLOMERULOSCLEROSIS): ICD-10-CM

## 2022-02-15 RX ORDER — TACROLIMUS 1 MG/1
CAPSULE ORAL
Qty: 360 CAPSULE | Refills: 3 | Status: SHIPPED | OUTPATIENT
Start: 2022-02-15 | End: 2023-03-28

## 2022-02-15 NOTE — TELEPHONE ENCOUNTER
[facility] Call Center    Phone Message    May a detailed message be left on voicemail: yes     Reason for Call: Medication Question or concern regarding medication   Prescription Clarification  Name of Medication: tacrolimus (GENERIC EQUIVALENT) 1 MG capsule  Prescribing Provider: Ted   [facility] [place], [place] - [address] AT PORTAL TO REGISTERED [facility] SITES   What on the order needs clarification? Pharmacy called stating that pt is looking to received 3mg/day, as the scrip has changed. Pharmacy has not received an updated scrip. Please send update to pharmacy to refill the scrip. Thanks      Action Taken: Message routed to:  [facility] ([facility]): nephro    Travel Screening: Not Applicable

## 2022-03-12 ENCOUNTER — TRANSFERRED RECORDS (OUTPATIENT)
Dept: HEALTH INFORMATION MANAGEMENT | Facility: CLINIC | Age: 27
End: 2022-03-12
Payer: COMMERCIAL

## 2022-05-07 ENCOUNTER — APPOINTMENT (OUTPATIENT)
Dept: URGENT CARE | Facility: CLINIC | Age: 27
End: 2022-05-07
Payer: COMMERCIAL

## 2022-05-24 NOTE — TELEPHONE ENCOUNTER
DIAGNOSIS: Infected toenail/bed from being stepped on. Prior toe nail injury. Worried about future infections happening. Looking for options   APPOINTMENT DATE: 06/01/2022   NOTES STATUS DETAILS   OFFICE NOTE from referring provider N/A    OFFICE NOTE from other specialist N/A    DISCHARGE SUMMARY from hospital N/A    DISCHARGE REPORT from the ER N/A    OPERATIVE REPORT N/A    EMG report N/A    MEDICATION LIST N/A    MRI N/A    DEXA (osteoporosis/bone health) N/A    CT SCAN N/A    XRAYS (IMAGES & REPORTS) N/A

## 2022-06-01 ENCOUNTER — TELEPHONE (OUTPATIENT)
Dept: FAMILY MEDICINE | Facility: CLINIC | Age: 27
End: 2022-06-01

## 2022-06-01 ENCOUNTER — LAB (OUTPATIENT)
Dept: LAB | Facility: CLINIC | Age: 27
End: 2022-06-01
Payer: COMMERCIAL

## 2022-06-01 ENCOUNTER — PRE VISIT (OUTPATIENT)
Dept: PODIATRY | Facility: CLINIC | Age: 27
End: 2022-06-01
Payer: COMMERCIAL

## 2022-06-01 ENCOUNTER — OFFICE VISIT (OUTPATIENT)
Dept: PODIATRY | Facility: CLINIC | Age: 27
End: 2022-06-01
Payer: COMMERCIAL

## 2022-06-01 VITALS — SYSTOLIC BLOOD PRESSURE: 130 MMHG | HEART RATE: 68 BPM | DIASTOLIC BLOOD PRESSURE: 80 MMHG

## 2022-06-01 DIAGNOSIS — Z11.59 NEED FOR HEPATITIS C SCREENING TEST: Primary | ICD-10-CM

## 2022-06-01 DIAGNOSIS — M79.675 PAIN OF TOE OF LEFT FOOT: Primary | ICD-10-CM

## 2022-06-01 DIAGNOSIS — D50.8 OTHER IRON DEFICIENCY ANEMIA: ICD-10-CM

## 2022-06-01 DIAGNOSIS — Z00.00 ENCOUNTER FOR PREVENTIVE CARE: ICD-10-CM

## 2022-06-01 DIAGNOSIS — N05.1 FSGS (FOCAL SEGMENTAL GLOMERULOSCLEROSIS): ICD-10-CM

## 2022-06-01 DIAGNOSIS — L60.3 ONYCHODYSTROPHY: ICD-10-CM

## 2022-06-01 LAB
ALBUMIN SERPL-MCNC: 3.2 G/DL (ref 3.4–5)
ANION GAP SERPL CALCULATED.3IONS-SCNC: 3 MMOL/L (ref 3–14)
BASOPHILS # BLD AUTO: 0.1 10E3/UL (ref 0–0.2)
BASOPHILS NFR BLD AUTO: 1 %
BUN SERPL-MCNC: 20 MG/DL (ref 7–30)
CALCIUM SERPL-MCNC: 8.6 MG/DL (ref 8.5–10.1)
CHLORIDE BLD-SCNC: 110 MMOL/L (ref 94–109)
CO2 SERPL-SCNC: 28 MMOL/L (ref 20–32)
CREAT SERPL-MCNC: 1.07 MG/DL (ref 0.66–1.25)
CREAT UR-MCNC: 119 MG/DL
EOSINOPHIL # BLD AUTO: 0.5 10E3/UL (ref 0–0.7)
EOSINOPHIL NFR BLD AUTO: 7 %
ERYTHROCYTE [DISTWIDTH] IN BLOOD BY AUTOMATED COUNT: 12.6 % (ref 10–15)
GFR SERPL CREATININE-BSD FRML MDRD: >90 ML/MIN/1.73M2
GLUCOSE BLD-MCNC: 100 MG/DL (ref 70–99)
HBA1C MFR BLD: 5.2 % (ref 0–5.6)
HCT VFR BLD AUTO: 35.7 % (ref 40–53)
HCV AB SERPL QL IA: NONREACTIVE
HGB BLD-MCNC: 12.3 G/DL (ref 13.3–17.7)
IMM GRANULOCYTES # BLD: 0 10E3/UL
IMM GRANULOCYTES NFR BLD: 0 %
IRON SATN MFR SERPL: 37 % (ref 15–46)
IRON SERPL-MCNC: 89 UG/DL (ref 35–180)
LYMPHOCYTES # BLD AUTO: 2.9 10E3/UL (ref 0.8–5.3)
LYMPHOCYTES NFR BLD AUTO: 40 %
MCH RBC QN AUTO: 29.1 PG (ref 26.5–33)
MCHC RBC AUTO-ENTMCNC: 34.5 G/DL (ref 31.5–36.5)
MCV RBC AUTO: 84 FL (ref 78–100)
MONOCYTES # BLD AUTO: 0.5 10E3/UL (ref 0–1.3)
MONOCYTES NFR BLD AUTO: 7 %
NEUTROPHILS # BLD AUTO: 3.3 10E3/UL (ref 1.6–8.3)
NEUTROPHILS NFR BLD AUTO: 45 %
NRBC # BLD AUTO: 0 10E3/UL
NRBC BLD AUTO-RTO: 0 /100
PHOSPHATE SERPL-MCNC: 4.3 MG/DL (ref 2.5–4.5)
PLATELET # BLD AUTO: 286 10E3/UL (ref 150–450)
POTASSIUM BLD-SCNC: 4.4 MMOL/L (ref 3.4–5.3)
PROT UR-MCNC: 1.69 G/L
PROT/CREAT 24H UR: 1.42 G/G CR (ref 0–0.2)
RBC # BLD AUTO: 4.23 10E6/UL (ref 4.4–5.9)
SODIUM SERPL-SCNC: 141 MMOL/L (ref 133–144)
TACROLIMUS BLD-MCNC: 8.2 UG/L (ref 5–15)
TIBC SERPL-MCNC: 240 UG/DL (ref 240–430)
TME LAST DOSE: NORMAL H
TME LAST DOSE: NORMAL H
WBC # BLD AUTO: 7.3 10E3/UL (ref 4–11)

## 2022-06-01 PROCEDURE — 99213 OFFICE O/P EST LOW 20 MIN: CPT | Performed by: PODIATRIST

## 2022-06-01 PROCEDURE — 36415 COLL VENOUS BLD VENIPUNCTURE: CPT

## 2022-06-01 PROCEDURE — 80069 RENAL FUNCTION PANEL: CPT

## 2022-06-01 PROCEDURE — 84156 ASSAY OF PROTEIN URINE: CPT

## 2022-06-01 PROCEDURE — 83036 HEMOGLOBIN GLYCOSYLATED A1C: CPT

## 2022-06-01 PROCEDURE — 86803 HEPATITIS C AB TEST: CPT

## 2022-06-01 PROCEDURE — 80197 ASSAY OF TACROLIMUS: CPT

## 2022-06-01 PROCEDURE — 85025 COMPLETE CBC W/AUTO DIFF WBC: CPT

## 2022-06-01 PROCEDURE — 83550 IRON BINDING TEST: CPT

## 2022-06-01 NOTE — LETTER
6/1/2022         RE: Ethan Trevino  [address]  [facility] [address]        Dear Colleague,    Thank you for referring your patient, Ethan Trveino, to the [facility]. Please see a copy of my visit note below.    Patient seen today for pain in left hallux.  Was recently stepped on.  Initially had pain.  No purulence or odor.  Now feeling much better.  He is wondering what can be done in future to prevent reoccurrence.    ROS: See above         Allergies   Allergen Reactions     Amoxicillin      Other reaction(s): EENT - other (explain in comments), SKIN - rash  Rash and lip swelling after 8 days of Amoxicillin     Erythromycin      Penicillins        Current Outpatient Medications   Medication Sig Dispense Refill     Calcium Carb-Cholecalciferol (CALCIUM 500 +D) 500-400 MG-UNIT TABS Take 1,000 mg by mouth daily 180 tablet 4     ferrous sulfate (FEROSUL) 325 (65 Fe) MG tablet Take 1 tablet (325 mg) by mouth daily (with breakfast) 60 tablet 11     furosemide (LASIX) 20 MG tablet Take 1 tablet (20 mg) by mouth 2 times daily As needed 180 tablet 3     lisinopril (ZESTRIL) 20 MG tablet TAKE 2 TABLETS BY MOUTH EVERY MORNING AND 1 IN THE EVENING 90 tablet 11     omeprazole (PRILOSEC) 20 MG DR capsule TAKE 1 CAPSULE (20 MG) BY MOUTH DAILY 30 capsule 11     rosuvastatin (CRESTOR) 5 MG tablet Take 1 tablet (5 mg) by mouth daily 30 tablet 11     tacrolimus (GENERIC EQUIVALENT) 1 MG capsule Take 2 tablets (2mg) in the moring and 1 tablet (1 mg) in the evening 360 capsule 3       Patient Active Problem List   Diagnosis     FSGS (focal segmental glomerulosclerosis)     Nephrotic syndrome       Past Medical History:   Diagnosis Date     Chronic kidney disease        Past Surgical History:   Procedure Laterality Date     BIOPSY  2016    renal       No family history on file.    Social History     Tobacco Use     Smoking status: Never Smoker     Smokeless tobacco: Never Used    Substance Use Topics     Alcohol use: Yes     Comment: social         Exam:    Vitals: /80   Pulse 68   BMI: There is no height or weight on file to calculate BMI.  Height: Data Unavailable    Constitutional/ general:  Pt is in no apparent distress, appears well-nourished.  Cooperative with history and physical exam.     Psych:  The patient answered questions appropriately.  Normal affect.  Seems to have reasonable expectations, in terms of treatment.     Lungs:  Non labored breathing, non labored speech. No cough.  No audible wheezing. Even, quiet breathing.       Vascular:  positive pedal pulses bilaterally for both the DP and PT arteries.  CFT < 3 sec.  negative ankle edema.  positive pedal hair growth.    Neuro:  Alert and oriented x 3. Coordinated gait.  Light touch sensation is intact     Derm: Normal texture and turgor.  No erythema, ecchymosis, or cyanosis.      Musculoskeletal:    Lower extremity muscle strength is normal.  Patient is ambulatory without an assistive device or brace.  No gross deformities.  Normal arch.        left first nail thickened, elongated, discolored, with subungual debris.  The distal two thirds of the nail is not attached and it is laterally deviated.  No erythema, edema, drainage, pain on palpation.  No signs of subungual masses or exostosis and nailbed healthy.    A/P  Onychodystrophy          pain    Discussed all options with patient.  Mycotic nail manually debrided with a .   Discussed that if he wanted permanent removal of nail could be done in clinic.  Discussed recovery.  He will consider doing this sometime this winter.  RTC as needed    Gabriele Navas DPM, FACFAS          Again, thank you for allowing me to participate in the care of your patient.        Sincerely,        Gabriele Navas DPM

## 2022-06-01 NOTE — PATIENT INSTRUCTIONS
We wish you continued good healing. If you have any questions or concerns, please do not hesitate to contact us at  [phone]    Screen[employer]apt (secure e-mail communication and access to your chart) to send a message or to make an appointment.    Please remember to call and schedule a follow up appointment if one was recommended at your earliest convenience.     PODIATRY CLINIC HOURS  TELEPHONE NUMBER    Dr. Gabriele HARVEYPLANCE [facility]        Clinics:  Zachariah Chan CMA   Tuesday 1PM-6PM  [place]  Wednesday 745AM-330PM  [place]  Thursday/Friday 745AM-230PM  [place] GOLD/[place] APPOINTMENTS  [phone]    [place] APPOINTMENTS  [phone]        If you need a medication refill, please contact us you may need lab work and/or a follow up visit prior to your refill (i.e. Antifungal medications).  If MRI needed please call Imaging at [phone] or [phone]  HOW DO I GET MY KNEE SCOOTER? Knee scooters can be picked up at ANY Medical Supply stores with your knee scooter Prescription.  OR  Bring your signed prescription to an [facility] Medical Equipment showroom.

## 2022-06-01 NOTE — PROGRESS NOTES
Patient seen today for pain in left hallux.  Was recently stepped on.  Initially had pain.  No purulence or odor.  Now feeling much better.  He is wondering what can be done in future to prevent reoccurrence.    ROS: See above         Allergies   Allergen Reactions     Amoxicillin      Other reaction(s): EENT - other (explain in comments), SKIN - rash  Rash and lip swelling after 8 days of Amoxicillin     Erythromycin      Penicillins        Current Outpatient Medications   Medication Sig Dispense Refill     Calcium Carb-Cholecalciferol (CALCIUM 500 +D) 500-400 MG-UNIT TABS Take 1,000 mg by mouth daily 180 tablet 4     ferrous sulfate (FEROSUL) 325 (65 Fe) MG tablet Take 1 tablet (325 mg) by mouth daily (with breakfast) 60 tablet 11     furosemide (LASIX) 20 MG tablet Take 1 tablet (20 mg) by mouth 2 times daily As needed 180 tablet 3     lisinopril (ZESTRIL) 20 MG tablet TAKE 2 TABLETS BY MOUTH EVERY MORNING AND 1 IN THE EVENING 90 tablet 11     omeprazole (PRILOSEC) 20 MG DR capsule TAKE 1 CAPSULE (20 MG) BY MOUTH DAILY 30 capsule 11     rosuvastatin (CRESTOR) 5 MG tablet Take 1 tablet (5 mg) by mouth daily 30 tablet 11     tacrolimus (GENERIC EQUIVALENT) 1 MG capsule Take 2 tablets (2mg) in the moring and 1 tablet (1 mg) in the evening 360 capsule 3       Patient Active Problem List   Diagnosis     FSGS (focal segmental glomerulosclerosis)     Nephrotic syndrome       Past Medical History:   Diagnosis Date     Chronic kidney disease        Past Surgical History:   Procedure Laterality Date     BIOPSY  2016    renal       No family history on file.    Social History     Tobacco Use     Smoking status: Never Smoker     Smokeless tobacco: Never Used   Substance Use Topics     Alcohol use: Yes     Comment: social         Exam:    Vitals: /80   Pulse 68   BMI: There is no height or weight on file to calculate BMI.  Height: Data Unavailable    Constitutional/ general:  Pt is in no apparent distress, appears  well-nourished.  Cooperative with history and physical exam.     Psych:  The patient answered questions appropriately.  Normal affect.  Seems to have reasonable expectations, in terms of treatment.     Lungs:  Non labored breathing, non labored speech. No cough.  No audible wheezing. Even, quiet breathing.       Vascular:  positive pedal pulses bilaterally for both the DP and PT arteries.  CFT < 3 sec.  negative ankle edema.  positive pedal hair growth.    Neuro:  Alert and oriented x 3. Coordinated gait.  Light touch sensation is intact     Derm: Normal texture and turgor.  No erythema, ecchymosis, or cyanosis.      Musculoskeletal:    Lower extremity muscle strength is normal.  Patient is ambulatory without an assistive device or brace.  No gross deformities.  Normal arch.        left first nail thickened, elongated, discolored, with subungual debris.  The distal two thirds of the nail is not attached and it is laterally deviated.  No erythema, edema, drainage, pain on palpation.  No signs of subungual masses or exostosis and nailbed healthy.    A/P  Onychodystrophy          pain    Discussed all options with patient.  Mycotic nail manually debrided with a .   Discussed that if he wanted permanent removal of nail could be done in clinic.  Discussed recovery.  He will consider doing this sometime this winter.  RTC as needed    Gabriele Navas DPM, STEPHON

## 2022-06-01 NOTE — TELEPHONE ENCOUNTER
TC/reception pool    Assist pt to set up an establish care appointment with a provider    Nena Brewer RN   [facility]

## 2022-07-11 DIAGNOSIS — E78.5 HYPERLIPIDEMIA LDL GOAL <100: ICD-10-CM

## 2022-07-11 DIAGNOSIS — N05.1 FSGS (FOCAL SEGMENTAL GLOMERULOSCLEROSIS): ICD-10-CM

## 2022-07-11 DIAGNOSIS — N04.9 NEPHROTIC SYNDROME: ICD-10-CM

## 2022-07-11 RX ORDER — FUROSEMIDE 20 MG
20 TABLET ORAL 2 TIMES DAILY
Qty: 180 TABLET | Refills: 3 | Status: SHIPPED | OUTPATIENT
Start: 2022-07-11 | End: 2022-07-12

## 2022-07-11 RX ORDER — ROSUVASTATIN CALCIUM 5 MG/1
5 TABLET, COATED ORAL DAILY
Qty: 30 TABLET | Refills: 11 | Status: SHIPPED | OUTPATIENT
Start: 2022-07-11 | End: 2022-07-12

## 2022-07-12 RX ORDER — FUROSEMIDE 20 MG
20 TABLET ORAL 2 TIMES DAILY
Qty: 180 TABLET | Refills: 3 | Status: SHIPPED | OUTPATIENT
Start: 2022-07-12 | End: 2023-09-21

## 2022-07-12 RX ORDER — ROSUVASTATIN CALCIUM 5 MG/1
5 TABLET, COATED ORAL DAILY
Qty: 30 TABLET | Refills: 11 | Status: SHIPPED | OUTPATIENT
Start: 2022-07-12 | End: 2023-09-20

## 2022-07-23 ENCOUNTER — TRANSFERRED RECORDS (OUTPATIENT)
Dept: HEALTH INFORMATION MANAGEMENT | Facility: CLINIC | Age: 27
End: 2022-07-23

## 2022-08-04 ENCOUNTER — OFFICE VISIT (OUTPATIENT)
Dept: NEPHROLOGY | Facility: CLINIC | Age: 27
End: 2022-08-04
Attending: INTERNAL MEDICINE
Payer: COMMERCIAL

## 2022-08-04 VITALS
TEMPERATURE: 97.8 F | WEIGHT: 274.4 LBS | OXYGEN SATURATION: 99 % | DIASTOLIC BLOOD PRESSURE: 83 MMHG | BODY MASS INDEX: 34.3 KG/M2 | SYSTOLIC BLOOD PRESSURE: 138 MMHG | HEART RATE: 73 BPM

## 2022-08-04 DIAGNOSIS — N05.1 FSGS (FOCAL SEGMENTAL GLOMERULOSCLEROSIS): Primary | ICD-10-CM

## 2022-08-04 LAB
CREAT UR-MCNC: 98 MG/DL
PROT UR-MCNC: 1.19 G/L
PROT/CREAT 24H UR: 1.21 G/G CR (ref 0–0.2)

## 2022-08-04 PROCEDURE — 84156 ASSAY OF PROTEIN URINE: CPT | Performed by: INTERNAL MEDICINE

## 2022-08-04 PROCEDURE — G0463 HOSPITAL OUTPT CLINIC VISIT: HCPCS

## 2022-08-04 PROCEDURE — 99213 OFFICE O/P EST LOW 20 MIN: CPT | Performed by: INTERNAL MEDICINE

## 2022-08-04 ASSESSMENT — PAIN SCALES - GENERAL: PAINLEVEL: NO PAIN (0)

## 2022-08-04 NOTE — LETTER
8/4/2022       RE: Ethan Trevino  [address]  [facility] [address]     Dear Colleague,    Thank you for referring your patient, Ethan Trevino, to the [facility] [place] at [facility]. Please see a copy of my visit note below.    Nephrology Clinic Note      August 4, 2022      CC: FSGS follow up     Initial presentation  Summer 2016: Presented with several months history of swelling, noted to be nephrotic, and renal biopsy in August 2016 showed FSGS. He was started on steroids 8/23/16.  Remainded on steroids 100 mg x several months.  He had more and more abdominal striae and his acne is significant  His proteinuria was down to 1-2 grams then subgram at 0.7g which is great, but unfortunately when he was down to 10mg prednisone, his proteinuria increased back to 5 grams In Jan 2018 Along with with low albumin and swelling.    He went back on Pred at 100 mg day  With a surprizing transient dip in protenuria to 1.5 in March, but eventually proteinuria decreased to UPCR < 3 startuing in April 2018   Last UPCR  1.24 g/g on 11/1/18.  He was seen on 11/29/18 at which time we initiated a tapering schedule of prednisone, as he was in partial remission on therapy (UPCR < 1).  Prednisone taper was started in Dec 2018  He had a relapse of proteinuria in February 2019  Prompting a halting of the prednisone taper and initiation of Tacrolimus at 0.5 mg twice daily  Tacrolimus was progressively increased to 2 mg twice daily starting on April 19, 2019, with documentation of trough levels in the therapeutic range.       He was enrolled in the study  On June 24, 2019.  Day #1 of study medication 7/16/19  Completed the study in Feb 2020:          June 4, 2020     He presents today with no acute complaints.  He denies any further occurrences of transient numbness or tingling.  Energy is OK.    Mild L Ext swelling.  No new striae  No GI  symptoms, except for occasional loose stools.  Specifically no melena or hematochezia.  Has been working the entire pandemic.     Aug 4, 2022  No new symptoms or events since last clinic visit  Denies weight gain or swelling in legs  No change in energy.   No change in bladder or bowel habits  No new medications. No longer taking Calcium carbonate - Vit D supplement.     Allergies   Allergen Reactions     Amoxicillin      Other reaction(s): EENT - other (explain in comments), SKIN - rash  Rash and lip swelling after 8 days of Amoxicillin     Erythromycin      Penicillins        ferrous sulfate (FEROSUL) 325 (65 Fe) MG tablet, Take 1 tablet (325 mg) by mouth daily (with breakfast)  furosemide (LASIX) 20 MG tablet, Take 1 tablet (20 mg) by mouth 2 times daily As needed  lisinopril (ZESTRIL) 20 MG tablet, TAKE 2 TABLETS BY MOUTH EVERY MORNING AND 1 IN THE EVENING  omeprazole (PRILOSEC) 20 MG DR capsule, TAKE 1 CAPSULE (20 MG) BY MOUTH DAILY  rosuvastatin (CRESTOR) 5 MG tablet, Take 1 tablet (5 mg) by mouth daily  tacrolimus (GENERIC EQUIVALENT) 1 MG capsule, Take 2 tablets (2mg) in the moring and 1 tablet (1 mg) in the evening    No current facility-administered medications on file prior to visit.      Past Medical History:   Diagnosis Date     Chronic kidney disease        Past Surgical History:   Procedure Laterality Date     BIOPSY  2016    renal       Social History     Tobacco Use     Smoking status: Never Smoker     Smokeless tobacco: Never Used   Substance Use Topics     Alcohol use: Yes     Comment: social     Drug use: No       No family history on file.    ROS: A 12 system review of systems was negative other than noted here or above.     Exam:  /83   Pulse 73   Temp 97.8  F (36.6  C) (Oral)   Wt 124.5 kg (274 lb 6.4 oz)   SpO2 99%   BMI 34.30 kg/m      GENERAL APPEARANCE: alert and no distress  EYES: PERRL, no scleral icterus  HENT: mouth without ulcers or lesions  NECK: supple, no  adenopathy  RESP: lungs clear to auscultation   CV: regular rhythm, normal rate, no rub  Extremities: no clubbing, cyanosis, or edema  SKIN: no rash  NEURO: mentation intact and speech normal  PSYCH: affect normal/bright    Results:    No visits with results within 1 Day(s) from this visit.   Latest known visit with results is:   Lab on 06/01/2022   Component Date Value Ref Range Status     Hepatitis C Antibody 06/01/2022 Nonreactive  Nonreactive Final     Hemoglobin A1C 06/01/2022 5.2  0.0 - 5.6 % Final    Normal <5.7%   Prediabetes 5.7-6.4%    Diabetes 6.5% or higher     Note: Adopted from ADA consensus guidelines.     Iron 06/01/2022 89  35 - 180 ug/dL Final     Iron Binding Capacity 06/01/2022 240  240 - 430 ug/dL Final     Iron Sat Index 06/01/2022 37  15 - 46 % Final     Total Protein Random Urine g/L 06/01/2022 1.69  g/L Final    The reference range has not been established for total protein in random urine samples.  The result should be integrated into the clinical context for interpretation.     Total Protein Urine g/gr Creatinine 06/01/2022 1.42 (A) 0.00 - 0.20 g/g Cr Final     Creatinine Urine mg/dL 06/01/2022 119  mg/dL Final     Sodium 06/01/2022 141  133 - 144 mmol/L Final     Potassium 06/01/2022 4.4  3.4 - 5.3 mmol/L Final     Chloride 06/01/2022 110 (A) 94 - 109 mmol/L Final     Carbon Dioxide (CO2) 06/01/2022 28  20 - 32 mmol/L Final     Anion Gap 06/01/2022 3  3 - 14 mmol/L Final     Urea Nitrogen 06/01/2022 20  7 - 30 mg/dL Final     Creatinine 06/01/2022 1.07  0.66 - 1.25 mg/dL Final     Calcium 06/01/2022 8.6  8.5 - 10.1 mg/dL Final     Glucose 06/01/2022 100 (A) 70 - 99 mg/dL Final     Albumin 06/01/2022 3.2 (A) 3.4 - 5.0 g/dL Final     Phosphorus 06/01/2022 4.3  2.5 - 4.5 mg/dL Final     GFR Estimate 06/01/2022 >90  >60 mL/min/1.73m2 Final    Effective December 21, 2021 eGFRcr in adults is calculated using the 2021 CKD-EPI creatinine equation which includes age and gender (Sharon et al., NEJM,  DOI: 10.1056/GVURbo9156564)     WBC Count 06/01/2022 7.3  4.0 - 11.0 10e3/uL Final     RBC Count 06/01/2022 4.23 (A) 4.40 - 5.90 10e6/uL Final     Hemoglobin 06/01/2022 12.3 (A) 13.3 - 17.7 g/dL Final     Hematocrit 06/01/2022 35.7 (A) 40.0 - 53.0 % Final     MCV 06/01/2022 84  78 - 100 fL Final     MCH 06/01/2022 29.1  26.5 - 33.0 pg Final     MCHC 06/01/2022 34.5  31.5 - 36.5 g/dL Final     RDW 06/01/2022 12.6  10.0 - 15.0 % Final     Platelet Count 06/01/2022 286  150 - 450 10e3/uL Final     % Neutrophils 06/01/2022 45  % Final     % Lymphocytes 06/01/2022 40  % Final     % Monocytes 06/01/2022 7  % Final     % Eosinophils 06/01/2022 7  % Final     % Basophils 06/01/2022 1  % Final     % Immature Granulocytes 06/01/2022 0  % Final     NRBCs per 100 WBC 06/01/2022 0  <1 /100 Final     Absolute Neutrophils 06/01/2022 3.3  1.6 - 8.3 10e3/uL Final     Absolute Lymphocytes 06/01/2022 2.9  0.8 - 5.3 10e3/uL Final     Absolute Monocytes 06/01/2022 0.5  0.0 - 1.3 10e3/uL Final     Absolute Eosinophils 06/01/2022 0.5  0.0 - 0.7 10e3/uL Final     Absolute Basophils 06/01/2022 0.1  0.0 - 0.2 10e3/uL Final     Absolute Immature Granulocytes 06/01/2022 0.0  <=0.4 10e3/uL Final     Absolute NRBCs 06/01/2022 0.0  10e3/uL Final     Tacrolimus by Tandem Mass Spectrom* 06/01/2022 8.2  5.0 - 15.0 ug/L Final    Comment: Tacrolimus Reference Range (ug/L):    Kidney Transplant:  Pediatric  0-3 months post transplant: 10-12  3-6 months post transplant: 8-10  6-12 months post transplant: 6-8  >12 months post transplant: 4-7    Adult  0-6 months post transplant: 8-10  6-12 months post transplant: 6-8  >12 months post transplant: 4-6  >5 years post transplant: 3-5    Heart Transplant:  Pediatric  0-12 months post transplant: 10-15  >12 months post transplant: 5-10    Adult  0-3 months post transplant: 10-15  3-6 months post transplant: 8-12  6-12 months post transplant: 6-12  >12 months post transplant: 6-10    Lung Transplant:  0-12  months post transplant: 10-15  >12 months post transplant: 8-12    Liver Transplant:  Pediatric  0-3 months post transplant: 10-15  3-6 months post transplant: 8-10  6 months-5 years post transplant: 6-8   >5 years post transplant: 1-3    Adult  0-3 months post transplant: 10-12  3-6 months post transplant: 8-10  >6 months post transplant: 6-8    Pancreas Transplant:  0-6                            months post transplant: 8-10  >6 months post transplant: 5-8     Tacrolimus Last Dose Date 06/01/2022    Final    Last dose information not provided.     Tacrolimus Last Dose Time 06/01/2022    Final    Last dose information not provided.       Assessment/Plan:   26 year old male with nephrotic syndrome, biopsy in August 2016 showing FSGS. Steroids started 8/23/2016 and relapsed during weaning in January 2018, with proteinuria up from 0.28 g/g rocky to 4.9 grams on 1/31/18.   He is s/p participation in the  trial of MCP1 receptor blocker, and has been on tacrolimus since.  Mr Trevino appears clinically stable without symptoms suggestive of worsening nephrosis.    He seems to be tolerating the tacrolimus well without AE or HTN.  His most recent labs (reviewed with him) show a stable kidney function and proteinuria well in the subnephrotic range.   During last visit, his tacrolimus dose was decreased to 2 mg in am and 1 mg in evening.  Plans to repeat UPCR today and check Tacrolimus level and Renal panel in 2 weeks and decide on further reduction of tacrolimus dose.         Hypertension   BP at goal. No changes to lisinopril and lasix for now     Labs reviewed: renal panel, CBC, UPCR  ordered: Renal panel, UPCR, Tacrolimus trough  Immunosuppression changed: No  RTC in 3-4 months    Lionel Ritchie  Nephrology Fellow  [phone]    Attestation signed by José Miguel Jean MD at 8/5/2022  9:36 PM:  Attestation:  This patient was evaluated by me, José Miguel Jean MD on August 4, 2022 jointly with Dr Lora.  Discussed  with Dr Ritchie and agree with the findings and plan in this note.  I have reviewed  Medications, Vital Signs, and Labs.  /83   Pulse 73   Temp 97.8  F (36.6  C) (Oral)   Wt 274 lb 6.4 oz (124.5 kg)   SpO2 99%   BMI 34.30 kg/m    Lungs clear. No L Ext edema.   Old striae.  Office Visit on 08/04/2022   Component Date Value Ref Range Status     Total Protein Random Urine g/L 08/04/2022 1.19  g/L Final    The reference range has not been established for total protein in random urine samples.  The result should be integrated into the clinical context for interpretation.     Total Protein Urine g/gr Creatinine 08/04/2022 1.21 (A) 0.00 - 0.20 g/g Cr Final     Creatinine Urine mg/dL 08/04/2022 98  mg/dL Final     Protein Total Urine g/gr Creatinine   Date Value Ref Range Status   05/07/2021 1.53 (H) 0 - 0.2 g/g Cr Final   02/05/2021 1.28 (H) 0 - 0.2 g/g Cr Final   07/01/2020 2.37 (H) 0 - 0.2 g/g Cr Final   05/16/2019 3.50 (H) 0 - 0.2 g/g Cr Final   04/18/2019 3.20 (H) 0 - 0.2 g/g Cr Final     Total Protein Urine g/gr Creatinine   Date Value Ref Range Status   08/04/2022 1.21 (H) 0.00 - 0.20 g/g Cr Final   06/01/2022 1.42 (H) 0.00 - 0.20 g/g Cr Final   01/06/2022 0.73 (H) 0.00 - 0.20 g/g Cr Final   08/30/2021 0.87 (H) 0.00 - 0.20 g/g Cr Final     Mr Trevino remains clinically stable from the renal point of view with subnephrotic proteinuria on tacrolimus.   We will repeat a trough tacrolimus level in the coming weeks to assess if we can reduce the dose as needed.         Please avoid placing a PICC line in any patient with CKD III or above, ÓSCAR, or ESKD, as this will impact negatively the ability of the patient to have an AV fistula or AV Graft should they need it in the future.  A medline is the preferred type of access in patients with kidney disease.   José Miguel Jean MD  [facility]  Department of Medicine  Division of Renal Disease and Hypertension  Pager

## 2022-08-04 NOTE — NURSING NOTE
Chief Complaint   Patient presents with     RECHECK       /83   Pulse 73   Temp 97.8  F (36.6  C) (Oral)   Wt 124.5 kg (274 lb 6.4 oz)   SpO2 99%   BMI 34.30 kg/m      Yvon Abdi on 8/4/2022 at 1:40 PM

## 2022-08-04 NOTE — PROGRESS NOTES
Nephrology Clinic Note      August 4, 2022      CC: FSGS follow up     Initial presentation  Summer 2016: Presented with several months history of swelling, noted to be nephrotic, and renal biopsy in August 2016 showed FSGS. He was started on steroids 8/23/16.  Remainded on steroids 100 mg x several months.  He had more and more abdominal striae and his acne is significant  His proteinuria was down to 1-2 grams then subgram at 0.7g which is great, but unfortunately when he was down to 10mg prednisone, his proteinuria increased back to 5 grams In Jan 2018 Along with with low albumin and swelling.    He went back on Pred at 100 mg day  With a surprizing transient dip in protenuria to 1.5 in March, but eventually proteinuria decreased to UPCR < 3 startuing in April 2018   Last UPCR  1.24 g/g on 11/1/18.  He was seen on 11/29/18 at which time we initiated a tapering schedule of prednisone, as he was in partial remission on therapy (UPCR < 1).  Prednisone taper was started in Dec 2018  He had a relapse of proteinuria in February 2019  Prompting a halting of the prednisone taper and initiation of Tacrolimus at 0.5 mg twice daily  Tacrolimus was progressively increased to 2 mg twice daily starting on April 19, 2019, with documentation of trough levels in the therapeutic range.       He was enrolled in the study  On June 24, 2019.  Day #1 of study medication 7/16/19  Completed the study in Feb 2020:          June 4, 2020     He presents today with no acute complaints.  He denies any further occurrences of transient numbness or tingling.  Energy is OK.    Mild L Ext swelling.  No new striae  No GI symptoms, except for occasional loose stools.  Specifically no melena or hematochezia.  Has been working the entire pandemic.     Aug 4, 2022  No new symptoms or events since last clinic visit  Denies weight gain or swelling in legs  No change in energy.   No change in bladder or bowel habits  No new medications. No longer taking  Calcium carbonate - Vit D supplement.     Allergies   Allergen Reactions     Amoxicillin      Other reaction(s): EENT - other (explain in comments), SKIN - rash  Rash and lip swelling after 8 days of Amoxicillin     Erythromycin      Penicillins        ferrous sulfate (FEROSUL) 325 (65 Fe) MG tablet, Take 1 tablet (325 mg) by mouth daily (with breakfast)  furosemide (LASIX) 20 MG tablet, Take 1 tablet (20 mg) by mouth 2 times daily As needed  lisinopril (ZESTRIL) 20 MG tablet, TAKE 2 TABLETS BY MOUTH EVERY MORNING AND 1 IN THE EVENING  omeprazole (PRILOSEC) 20 MG DR capsule, TAKE 1 CAPSULE (20 MG) BY MOUTH DAILY  rosuvastatin (CRESTOR) 5 MG tablet, Take 1 tablet (5 mg) by mouth daily  tacrolimus (GENERIC EQUIVALENT) 1 MG capsule, Take 2 tablets (2mg) in the moring and 1 tablet (1 mg) in the evening    No current facility-administered medications on file prior to visit.      Past Medical History:   Diagnosis Date     Chronic kidney disease        Past Surgical History:   Procedure Laterality Date     BIOPSY  2016    renal       Social History     Tobacco Use     Smoking status: Never Smoker     Smokeless tobacco: Never Used   Substance Use Topics     Alcohol use: Yes     Comment: social     Drug use: No       No family history on file.    ROS: A 12 system review of systems was negative other than noted here or above.     Exam:  /83   Pulse 73   Temp 97.8  F (36.6  C) (Oral)   Wt 124.5 kg (274 lb 6.4 oz)   SpO2 99%   BMI 34.30 kg/m      GENERAL APPEARANCE: alert and no distress  EYES: PERRL, no scleral icterus  HENT: mouth without ulcers or lesions  NECK: supple, no adenopathy  RESP: lungs clear to auscultation   CV: regular rhythm, normal rate, no rub  Extremities: no clubbing, cyanosis, or edema  SKIN: no rash  NEURO: mentation intact and speech normal  PSYCH: affect normal/bright    Results:    No visits with results within 1 Day(s) from this visit.   Latest known visit with results is:   Lab on  06/01/2022   Component Date Value Ref Range Status     Hepatitis C Antibody 06/01/2022 Nonreactive  Nonreactive Final     Hemoglobin A1C 06/01/2022 5.2  0.0 - 5.6 % Final    Normal <5.7%   Prediabetes 5.7-6.4%    Diabetes 6.5% or higher     Note: Adopted from ADA consensus guidelines.     Iron 06/01/2022 89  35 - 180 ug/dL Final     Iron Binding Capacity 06/01/2022 240  240 - 430 ug/dL Final     Iron Sat Index 06/01/2022 37  15 - 46 % Final     Total Protein Random Urine g/L 06/01/2022 1.69  g/L Final    The reference range has not been established for total protein in random urine samples.  The result should be integrated into the clinical context for interpretation.     Total Protein Urine g/gr Creatinine 06/01/2022 1.42 (A) 0.00 - 0.20 g/g Cr Final     Creatinine Urine mg/dL 06/01/2022 119  mg/dL Final     Sodium 06/01/2022 141  133 - 144 mmol/L Final     Potassium 06/01/2022 4.4  3.4 - 5.3 mmol/L Final     Chloride 06/01/2022 110 (A) 94 - 109 mmol/L Final     Carbon Dioxide (CO2) 06/01/2022 28  20 - 32 mmol/L Final     Anion Gap 06/01/2022 3  3 - 14 mmol/L Final     Urea Nitrogen 06/01/2022 20  7 - 30 mg/dL Final     Creatinine 06/01/2022 1.07  0.66 - 1.25 mg/dL Final     Calcium 06/01/2022 8.6  8.5 - 10.1 mg/dL Final     Glucose 06/01/2022 100 (A) 70 - 99 mg/dL Final     Albumin 06/01/2022 3.2 (A) 3.4 - 5.0 g/dL Final     Phosphorus 06/01/2022 4.3  2.5 - 4.5 mg/dL Final     GFR Estimate 06/01/2022 >90  >60 mL/min/1.73m2 Final    Effective December 21, 2021 eGFRcr in adults is calculated using the 2021 CKD-EPI creatinine equation which includes age and gender (Sharon et al., NEJM, DOI: 10.1056/THJDpx8198688)     WBC Count 06/01/2022 7.3  4.0 - 11.0 10e3/uL Final     RBC Count 06/01/2022 4.23 (A) 4.40 - 5.90 10e6/uL Final     Hemoglobin 06/01/2022 12.3 (A) 13.3 - 17.7 g/dL Final     Hematocrit 06/01/2022 35.7 (A) 40.0 - 53.0 % Final     MCV 06/01/2022 84  78 - 100 fL Final     MCH 06/01/2022 29.1  26.5 - 33.0 pg  Final     MCHC 06/01/2022 34.5  31.5 - 36.5 g/dL Final     RDW 06/01/2022 12.6  10.0 - 15.0 % Final     Platelet Count 06/01/2022 286  150 - 450 10e3/uL Final     % Neutrophils 06/01/2022 45  % Final     % Lymphocytes 06/01/2022 40  % Final     % Monocytes 06/01/2022 7  % Final     % Eosinophils 06/01/2022 7  % Final     % Basophils 06/01/2022 1  % Final     % Immature Granulocytes 06/01/2022 0  % Final     NRBCs per 100 WBC 06/01/2022 0  <1 /100 Final     Absolute Neutrophils 06/01/2022 3.3  1.6 - 8.3 10e3/uL Final     Absolute Lymphocytes 06/01/2022 2.9  0.8 - 5.3 10e3/uL Final     Absolute Monocytes 06/01/2022 0.5  0.0 - 1.3 10e3/uL Final     Absolute Eosinophils 06/01/2022 0.5  0.0 - 0.7 10e3/uL Final     Absolute Basophils 06/01/2022 0.1  0.0 - 0.2 10e3/uL Final     Absolute Immature Granulocytes 06/01/2022 0.0  <=0.4 10e3/uL Final     Absolute NRBCs 06/01/2022 0.0  10e3/uL Final     Tacrolimus by Tandem Mass Spectrom* 06/01/2022 8.2  5.0 - 15.0 ug/L Final    Comment: Tacrolimus Reference Range (ug/L):    Kidney Transplant:  Pediatric  0-3 months post transplant: 10-12  3-6 months post transplant: 8-10  6-12 months post transplant: 6-8  >12 months post transplant: 4-7    Adult  0-6 months post transplant: 8-10  6-12 months post transplant: 6-8  >12 months post transplant: 4-6  >5 years post transplant: 3-5    Heart Transplant:  Pediatric  0-12 months post transplant: 10-15  >12 months post transplant: 5-10    Adult  0-3 months post transplant: 10-15  3-6 months post transplant: 8-12  6-12 months post transplant: 6-12  >12 months post transplant: 6-10    Lung Transplant:  0-12 months post transplant: 10-15  >12 months post transplant: 8-12    Liver Transplant:  Pediatric  0-3 months post transplant: 10-15  3-6 months post transplant: 8-10  6 months-5 years post transplant: 6-8   >5 years post transplant: 1-3    Adult  0-3 months post transplant: 10-12  3-6 months post transplant: 8-10  >6 months post transplant:  6-8    Pancreas Transplant:  0-6                            months post transplant: 8-10  >6 months post transplant: 5-8     Tacrolimus Last Dose Date 06/01/2022    Final    Last dose information not provided.     Tacrolimus Last Dose Time 06/01/2022    Final    Last dose information not provided.       Assessment/Plan:   26 year old male with nephrotic syndrome, biopsy in August 2016 showing FSGS. Steroids started 8/23/2016 and relapsed during weaning in January 2018, with proteinuria up from 0.28 g/g rocky to 4.9 grams on 1/31/18.   He is s/p participation in the  trial of MCP1 receptor blocker, and has been on tacrolimus since.  Mr Uriel appears clinically stable without symptoms suggestive of worsening nephrosis.    He seems to be tolerating the tacrolimus well without AE or HTN.  His most recent labs (reviewed with him) show a stable kidney function and proteinuria well in the subnephrotic range.   During last visit, his tacrolimus dose was decreased to 2 mg in am and 1 mg in evening.  Plans to repeat UPCR today and check Tacrolimus level and Renal panel in 2 weeks and decide on further reduction of tacrolimus dose.         Hypertension   BP at goal. No changes to lisinopril and lasix for now     Labs reviewed: renal panel, CBC, UPCR  ordered: Renal panel, UPCR, Tacrolimus trough  Immunosuppression changed: No  RTC in 3-4 months    Lionel Ritchie  Nephrology Fellow  [phone]

## 2022-08-04 NOTE — PATIENT INSTRUCTIONS
Take Prilosec every other day.   Take Famotidine (Pepcid) in between for acid reflux while tapering off Prilosec.

## 2022-08-19 ENCOUNTER — DOCUMENTATION ONLY (OUTPATIENT)
Dept: LAB | Facility: CLINIC | Age: 27
End: 2022-08-19

## 2022-08-22 ENCOUNTER — LAB (OUTPATIENT)
Dept: LAB | Facility: CLINIC | Age: 27
End: 2022-08-22
Payer: COMMERCIAL

## 2022-08-22 DIAGNOSIS — N05.1 FSGS (FOCAL SEGMENTAL GLOMERULOSCLEROSIS): ICD-10-CM

## 2022-08-22 LAB
ALBUMIN SERPL-MCNC: 3 G/DL (ref 3.4–5)
ANION GAP SERPL CALCULATED.3IONS-SCNC: 4 MMOL/L (ref 3–14)
BUN SERPL-MCNC: 25 MG/DL (ref 7–30)
CALCIUM SERPL-MCNC: 8.7 MG/DL (ref 8.5–10.1)
CHLORIDE BLD-SCNC: 114 MMOL/L (ref 94–109)
CO2 SERPL-SCNC: 24 MMOL/L (ref 20–32)
CREAT SERPL-MCNC: 0.95 MG/DL (ref 0.66–1.25)
GFR SERPL CREATININE-BSD FRML MDRD: >90 ML/MIN/1.73M2
GLUCOSE BLD-MCNC: 107 MG/DL (ref 70–99)
PHOSPHATE SERPL-MCNC: 4.2 MG/DL (ref 2.5–4.5)
POTASSIUM BLD-SCNC: 4.5 MMOL/L (ref 3.4–5.3)
SODIUM SERPL-SCNC: 142 MMOL/L (ref 133–144)
TACROLIMUS BLD-MCNC: 6 UG/L (ref 5–15)
TME LAST DOSE: NORMAL H
TME LAST DOSE: NORMAL H

## 2022-08-22 PROCEDURE — 80069 RENAL FUNCTION PANEL: CPT

## 2022-08-22 PROCEDURE — 36415 COLL VENOUS BLD VENIPUNCTURE: CPT

## 2022-08-22 PROCEDURE — 80197 ASSAY OF TACROLIMUS: CPT

## 2022-09-11 ENCOUNTER — HEALTH MAINTENANCE LETTER (OUTPATIENT)
Age: 27
End: 2022-09-11

## 2022-10-07 DIAGNOSIS — N04.9 NEPHROTIC SYNDROME: ICD-10-CM

## 2022-10-07 DIAGNOSIS — N05.1 FSGS (FOCAL SEGMENTAL GLOMERULOSCLEROSIS): ICD-10-CM

## 2022-10-11 RX ORDER — LISINOPRIL 20 MG/1
TABLET ORAL
Qty: 90 TABLET | Refills: 11 | Status: SHIPPED | OUTPATIENT
Start: 2022-10-11 | End: 2023-09-20

## 2022-12-16 DIAGNOSIS — N04.9 NEPHROTIC SYNDROME: ICD-10-CM

## 2022-12-16 DIAGNOSIS — N05.1 FSGS (FOCAL SEGMENTAL GLOMERULOSCLEROSIS): Primary | ICD-10-CM

## 2022-12-22 ENCOUNTER — OFFICE VISIT (OUTPATIENT)
Dept: NEPHROLOGY | Facility: CLINIC | Age: 27
End: 2022-12-22
Attending: INTERNAL MEDICINE
Payer: COMMERCIAL

## 2022-12-22 ENCOUNTER — LAB (OUTPATIENT)
Dept: LAB | Facility: CLINIC | Age: 27
End: 2022-12-22
Payer: COMMERCIAL

## 2022-12-22 VITALS
SYSTOLIC BLOOD PRESSURE: 118 MMHG | HEART RATE: 79 BPM | TEMPERATURE: 97.7 F | BODY MASS INDEX: 34.12 KG/M2 | DIASTOLIC BLOOD PRESSURE: 79 MMHG | OXYGEN SATURATION: 99 % | WEIGHT: 273 LBS

## 2022-12-22 DIAGNOSIS — I12.9 HYPERTENSION, RENAL: ICD-10-CM

## 2022-12-22 DIAGNOSIS — N05.1 FSGS (FOCAL SEGMENTAL GLOMERULOSCLEROSIS): ICD-10-CM

## 2022-12-22 DIAGNOSIS — N05.1 FSGS (FOCAL SEGMENTAL GLOMERULOSCLEROSIS): Primary | ICD-10-CM

## 2022-12-22 DIAGNOSIS — N04.9 NEPHROTIC SYNDROME: ICD-10-CM

## 2022-12-22 LAB
ALBUMIN MFR UR ELPH: 184 MG/DL
ALBUMIN SERPL BCG-MCNC: 3.8 G/DL (ref 3.5–5.2)
ALBUMIN UR-MCNC: 200 MG/DL
ANION GAP SERPL CALCULATED.3IONS-SCNC: 9 MMOL/L (ref 7–15)
APPEARANCE UR: CLEAR
BILIRUB UR QL STRIP: NEGATIVE
BUN SERPL-MCNC: 24.3 MG/DL (ref 6–20)
CALCIUM SERPL-MCNC: 9.3 MG/DL (ref 8.6–10)
CHLORIDE SERPL-SCNC: 106 MMOL/L (ref 98–107)
COLOR UR AUTO: ABNORMAL
CREAT SERPL-MCNC: 1.06 MG/DL (ref 0.67–1.17)
CREAT UR-MCNC: 117 MG/DL
DEPRECATED HCO3 PLAS-SCNC: 24 MMOL/L (ref 22–29)
ERYTHROCYTE [DISTWIDTH] IN BLOOD BY AUTOMATED COUNT: 12.3 % (ref 10–15)
GFR SERPL CREATININE-BSD FRML MDRD: >90 ML/MIN/1.73M2
GLUCOSE SERPL-MCNC: 104 MG/DL (ref 70–99)
GLUCOSE UR STRIP-MCNC: NEGATIVE MG/DL
HCT VFR BLD AUTO: 34.9 % (ref 40–53)
HGB BLD-MCNC: 11.9 G/DL (ref 13.3–17.7)
HGB UR QL STRIP: NEGATIVE
HYALINE CASTS: 4 /LPF
KETONES UR STRIP-MCNC: NEGATIVE MG/DL
LEUKOCYTE ESTERASE UR QL STRIP: NEGATIVE
MCH RBC QN AUTO: 28.5 PG (ref 26.5–33)
MCHC RBC AUTO-ENTMCNC: 34.1 G/DL (ref 31.5–36.5)
MCV RBC AUTO: 84 FL (ref 78–100)
MUCOUS THREADS #/AREA URNS LPF: PRESENT /LPF
NITRATE UR QL: NEGATIVE
PH UR STRIP: 5.5 [PH] (ref 5–7)
PHOSPHATE SERPL-MCNC: 4.2 MG/DL (ref 2.5–4.5)
PLATELET # BLD AUTO: 347 10E3/UL (ref 150–450)
POTASSIUM SERPL-SCNC: 4.9 MMOL/L (ref 3.4–5.3)
PROT/CREAT 24H UR: 1.57 MG/MG CR (ref 0–0.2)
RBC # BLD AUTO: 4.18 10E6/UL (ref 4.4–5.9)
RBC URINE: 0 /HPF
SODIUM SERPL-SCNC: 139 MMOL/L (ref 136–145)
SP GR UR STRIP: 1.01 (ref 1–1.03)
UROBILINOGEN UR STRIP-MCNC: NORMAL MG/DL
WBC # BLD AUTO: 8.7 10E3/UL (ref 4–11)
WBC URINE: <1 /HPF

## 2022-12-22 PROCEDURE — 80069 RENAL FUNCTION PANEL: CPT | Performed by: PATHOLOGY

## 2022-12-22 PROCEDURE — G0463 HOSPITAL OUTPT CLINIC VISIT: HCPCS

## 2022-12-22 PROCEDURE — 99214 OFFICE O/P EST MOD 30 MIN: CPT | Performed by: INTERNAL MEDICINE

## 2022-12-22 PROCEDURE — 36415 COLL VENOUS BLD VENIPUNCTURE: CPT | Performed by: PATHOLOGY

## 2022-12-22 PROCEDURE — G0463 HOSPITAL OUTPT CLINIC VISIT: HCPCS | Performed by: INTERNAL MEDICINE

## 2022-12-22 PROCEDURE — 81001 URINALYSIS AUTO W/SCOPE: CPT | Performed by: PATHOLOGY

## 2022-12-22 PROCEDURE — 84156 ASSAY OF PROTEIN URINE: CPT | Performed by: PATHOLOGY

## 2022-12-22 PROCEDURE — 85027 COMPLETE CBC AUTOMATED: CPT | Performed by: PATHOLOGY

## 2022-12-22 ASSESSMENT — PAIN SCALES - GENERAL: PAINLEVEL: NO PAIN (0)

## 2022-12-22 NOTE — PROGRESS NOTES
Nephrology Clinic Note      August 4, 2022      CC: FSGS follow up     Initial presentation  Summer 2016: Presented with several months history of swelling, noted to be nephrotic, and renal biopsy in August 2016 showed FSGS. He was started on steroids 8/23/16.  Remainded on steroids 100 mg x several months.  He had more and more abdominal striae and his acne is significant  His proteinuria was down to 1-2 grams then subgram at 0.7g which is great, but unfortunately when he was down to 10mg prednisone, his proteinuria increased back to 5 grams In Jan 2018 Along with with low albumin and swelling.    He went back on Pred at 100 mg day  With a surprizing transient dip in protenuria to 1.5 in March, but eventually proteinuria decreased to UPCR < 3 startuing in April 2018   Last UPCR  1.24 g/g on 11/1/18.  He was seen on 11/29/18 at which time we initiated a tapering schedule of prednisone, as he was in partial remission on therapy (UPCR < 1).  Prednisone taper was started in Dec 2018  He had a relapse of proteinuria in February 2019  Prompting a halting of the prednisone taper and initiation of Tacrolimus at 0.5 mg twice daily  Tacrolimus was progressively increased to 2 mg twice daily starting on April 19, 2019, with documentation of trough levels in the therapeutic range.       He was enrolled in the study  On June 24, 2019.  Day #1 of study medication 7/16/19  Completed the study in Feb 2020:          June 4, 2020     He presents today with no acute complaints.  He denies any further occurrences of transient numbness or tingling.  Energy is OK.    Mild L Ext swelling.  No new striae  No GI symptoms, except for occasional loose stools.  Specifically no melena or hematochezia.  Has been working the entire pandemic.     Aug 4, 2022  No new symptoms or events since last clinic visit  Denies weight gain or swelling in legs  No change in energy.   No change in bladder or bowel habits  No new medications. No longer taking  Calcium carbonate - Vit D supplement.\    December 22, 2022  Feels well.  Had bronchitis a couple of weeks ago while in [place].  Got Abx  5 day course.  Now better although some mild residual cough.  No L Ext swelling.  Feels a bit woozy when he stands up  - for a few seconds.  At home BP ~ 140/80.         Allergies   Allergen Reactions     Amoxicillin      Other reaction(s): EENT - other (explain in comments), SKIN - rash  Rash and lip swelling after 8 days of Amoxicillin     Erythromycin      Penicillins      Current Outpatient Medications   Medication Sig Dispense Refill     furosemide (LASIX) 20 MG tablet Take 1 tablet (20 mg) by mouth 2 times daily As needed 180 tablet 3     lisinopril (ZESTRIL) 20 MG tablet TAKE 2 TABLETS BY MOUTH EVERY MORNING AND 1 IN THE EVENING 90 tablet 11            rosuvastatin (CRESTOR) 5 MG tablet Take 1 tablet (5 mg) by mouth daily 30 tablet 11     tacrolimus (GENERIC EQUIVALENT) 1 MG capsule Take 2 tablets (2mg) in the moring and 1 tablet (1 mg) in the evening 360 capsule 3     Calcium Carb-Cholecalciferol (CALCIUM 500 +D) 500-400 MG-UNIT TABS Take 1,000 mg by mouth daily (Patient not taking: Reported on 8/4/2022) 180 tablet 4              Past Medical History:   Diagnosis Date     Chronic kidney disease        Past Surgical History:   Procedure Laterality Date     BIOPSY  2016    renal       Social History     Tobacco Use     Smoking status: Never     Smokeless tobacco: Never   Substance Use Topics     Alcohol use: Yes     Comment: social     Drug use: No       No family history on file.    ROS: A 12 system review of systems was negative other than noted here or above.     Exam:  /79   Pulse 79   Temp 97.7  F (36.5  C) (Oral)   Wt 123.8 kg (273 lb)   SpO2 99%   BMI 34.12 kg/m      GENERAL APPEARANCE: alert and no distress  EYES: PERRL, no scleral icterus  NECK: supple, no adenopathy  RESP: lungs clear to auscultation   Extremities: no clubbing, cyanosis, or edema  SKIN:  no rash  NEURO: mentation intact and speech normal  PSYCH: affect normal/bright    Results:    Lab on 12/22/2022   Component Date Value Ref Range Status     Color Urine 12/22/2022 Light Yellow  Colorless, Straw, Light Yellow, Yellow Final     Appearance Urine 12/22/2022 Clear  Clear Final     Glucose Urine 12/22/2022 Negative  Negative mg/dL Final     Bilirubin Urine 12/22/2022 Negative  Negative Final     Ketones Urine 12/22/2022 Negative  Negative mg/dL Final     Specific Gravity Urine 12/22/2022 1.014  1.003 - 1.035 Final     Blood Urine 12/22/2022 Negative  Negative Final     pH Urine 12/22/2022 5.5  5.0 - 7.0 Final     Protein Albumin Urine 12/22/2022 200 (A)  Negative mg/dL Final     Urobilinogen Urine 12/22/2022 Normal  Normal, 2.0 mg/dL Final     Nitrite Urine 12/22/2022 Negative  Negative Final     Leukocyte Esterase Urine 12/22/2022 Negative  Negative Final     Mucus Urine 12/22/2022 Present (A)  None Seen /LPF Final     RBC Urine 12/22/2022 0  <=2 /HPF Final     WBC Urine 12/22/2022 <1  <=5 /HPF Final     Hyaline Casts Urine 12/22/2022 4 (H)  <=2 /LPF Final     Total Protein Urine mg/dL 12/22/2022 184.0  mg/dL Final    The reference ranges have not been established in urine protein. The results should be integrated into the clinical context for interpretation.     Total Protein UR MG/MG CR 12/22/2022 1.57 (H)  0.00 - 0.20 mg/mg Cr Final     Creatinine Urine mg/dL 12/22/2022 117.0  mg/dL Final    The reference ranges have not been established in urine creatinine. The results should be integrated into the clinical context for interpretation.     WBC Count 12/22/2022 8.7  4.0 - 11.0 10e3/uL Final     RBC Count 12/22/2022 4.18 (L)  4.40 - 5.90 10e6/uL Final     Hemoglobin 12/22/2022 11.9 (L)  13.3 - 17.7 g/dL Final     Hematocrit 12/22/2022 34.9 (L)  40.0 - 53.0 % Final     MCV 12/22/2022 84  78 - 100 fL Final     MCH 12/22/2022 28.5  26.5 - 33.0 pg Final     MCHC 12/22/2022 34.1  31.5 - 36.5 g/dL Final      RDW 12/22/2022 12.3  10.0 - 15.0 % Final     Platelet Count 12/22/2022 347  150 - 450 10e3/uL Final     Sodium 12/22/2022 139  136 - 145 mmol/L Final     Potassium 12/22/2022 4.9  3.4 - 5.3 mmol/L Final     Chloride 12/22/2022 106  98 - 107 mmol/L Final     Carbon Dioxide (CO2) 12/22/2022 24  22 - 29 mmol/L Final     Anion Gap 12/22/2022 9  7 - 15 mmol/L Final     Glucose 12/22/2022 104 (H)  70 - 99 mg/dL Final     Urea Nitrogen 12/22/2022 24.3 (H)  6.0 - 20.0 mg/dL Final     Creatinine 12/22/2022 1.06  0.67 - 1.17 mg/dL Final     GFR Estimate 12/22/2022 >90  >60 mL/min/1.73m2 Final    Effective December 21, 2021 eGFRcr in adults is calculated using the 2021 CKD-EPI creatinine equation which includes age and gender (Sharon et al., NEJ, DOI: 10.1056/QFFKwe4692169)     Calcium 12/22/2022 9.3  8.6 - 10.0 mg/dL Final     Albumin 12/22/2022 3.8  3.5 - 5.2 g/dL Final     Phosphorus 12/22/2022 4.2  2.5 - 4.5 mg/dL Final     Creatinine   Date Value Ref Range Status   12/22/2022 1.06 0.67 - 1.17 mg/dL Final   08/22/2022 0.95 0.66 - 1.25 mg/dL Final   06/01/2022 1.07 0.66 - 1.25 mg/dL Final   01/06/2022 1.25 0.66 - 1.25 mg/dL Final   08/30/2021 1.16 0.66 - 1.25 mg/dL Final   05/07/2021 1.15 0.66 - 1.25 mg/dL Final   02/05/2021 1.20 0.66 - 1.25 mg/dL Final   07/01/2020 1.05 0.66 - 1.25 mg/dL Final   04/25/2019 1.09 0.66 - 1.25 mg/dL Final   04/18/2019 0.91 0.66 - 1.25 mg/dL Final         Assessment/Plan:   26 year old male with nephrotic syndrome, biopsy in August 2016 showing FSGS. Steroids started 8/23/2016 and relapsed during weaning in January 2018, with proteinuria up from 0.28 g/g rocky to 4.9 grams on 1/31/18.   He is s/p participation in the  trial of MCP1 receptor blocker, and has been on tacrolimus since.  Mr Trevino appears clinically stable without symptoms suggestive of worsening nephrosis.    He seems to be tolerating the tacrolimus well without AE or HTN.  His most recent labs (reviewed with him) show  a stable kidney function and proteinuria  in the subnephrotic range, also stable.   We reviewed and discussed options to try to decrease his proteinuria further.  Considered adding spironolactone or eplerenone, but his K has been at the upper limit of normal, and the addition of dual EDGARDO blockade would require close monitoring.   Mr Trevino would prefer not making changes at this point as he anticipates frequent traveling in the coming months.  We agreed to regroup in 6 months and explore options at that time as needed.       Hypertension   BP at goal. No changes to lisinopril and lasix for now  Although her reports what sounds like transient orthostatic hypotension, his BP at home is normal-high     Immunosuppression changed: No  RTC in 5 months    José Miguel Jean MD  Division of Nephrology and Hypertension    December 22, 2022  6:44 PM

## 2022-12-22 NOTE — NURSING NOTE
Chief Complaint   Patient presents with     RECHECK     3 mo f/u       /79   Pulse 79   Temp 97.7  F (36.5  C) (Oral)   Wt 123.8 kg (273 lb)   SpO2 99%   BMI 34.12 kg/m      Yvon Abdi on 12/22/2022 at 2:35 PM

## 2022-12-22 NOTE — LETTER
12/22/2022       RE: Ethan Trevino  [address]  [facility] [address]     Dear Colleague,    Thank you for referring your patient, Ethan Trevino, to the [facility] [place] at [facility]. Please see a copy of my visit note below.    Nephrology Clinic Note      August 4, 2022      CC: FSGS follow up     Initial presentation  Summer 2016: Presented with several months history of swelling, noted to be nephrotic, and renal biopsy in August 2016 showed FSGS. He was started on steroids 8/23/16.  Remainded on steroids 100 mg x several months.  He had more and more abdominal striae and his acne is significant  His proteinuria was down to 1-2 grams then subgram at 0.7g which is great, but unfortunately when he was down to 10mg prednisone, his proteinuria increased back to 5 grams In Jan 2018 Along with with low albumin and swelling.    He went back on Pred at 100 mg day  With a surprizing transient dip in protenuria to 1.5 in March, but eventually proteinuria decreased to UPCR < 3 startuing in April 2018   Last UPCR  1.24 g/g on 11/1/18.  He was seen on 11/29/18 at which time we initiated a tapering schedule of prednisone, as he was in partial remission on therapy (UPCR < 1).  Prednisone taper was started in Dec 2018  He had a relapse of proteinuria in February 2019  Prompting a halting of the prednisone taper and initiation of Tacrolimus at 0.5 mg twice daily  Tacrolimus was progressively increased to 2 mg twice daily starting on April 19, 2019, with documentation of trough levels in the therapeutic range.       He was enrolled in the study  On June 24, 2019.  Day #1 of study medication 7/16/19  Completed the study in Feb 2020:          June 4, 2020     He presents today with no acute complaints.  He denies any further occurrences of transient numbness or tingling.  Energy is OK.    Mild L Ext swelling.  No new striae  No GI  symptoms, except for occasional loose stools.  Specifically no melena or hematochezia.  Has been working the entire pandemic.     Aug 4, 2022  No new symptoms or events since last clinic visit  Denies weight gain or swelling in legs  No change in energy.   No change in bladder or bowel habits  No new medications. No longer taking Calcium carbonate - Vit D supplement.\    December 22, 2022  Feels well.  Had bronchitis a couple of weeks ago while in [place].  Got Abx  5 day course.  Now better although some mild residual cough.  No L Ext swelling.  Feels a bit woozy when he stands up  - for a few seconds.  At home BP ~ 140/80.         Allergies   Allergen Reactions     Amoxicillin      Other reaction(s): EENT - other (explain in comments), SKIN - rash  Rash and lip swelling after 8 days of Amoxicillin     Erythromycin      Penicillins      Current Outpatient Medications   Medication Sig Dispense Refill     furosemide (LASIX) 20 MG tablet Take 1 tablet (20 mg) by mouth 2 times daily As needed 180 tablet 3     lisinopril (ZESTRIL) 20 MG tablet TAKE 2 TABLETS BY MOUTH EVERY MORNING AND 1 IN THE EVENING 90 tablet 11            rosuvastatin (CRESTOR) 5 MG tablet Take 1 tablet (5 mg) by mouth daily 30 tablet 11     tacrolimus (GENERIC EQUIVALENT) 1 MG capsule Take 2 tablets (2mg) in the moring and 1 tablet (1 mg) in the evening 360 capsule 3     Calcium Carb-Cholecalciferol (CALCIUM 500 +D) 500-400 MG-UNIT TABS Take 1,000 mg by mouth daily (Patient not taking: Reported on 8/4/2022) 180 tablet 4              Past Medical History:   Diagnosis Date     Chronic kidney disease        Past Surgical History:   Procedure Laterality Date     BIOPSY  2016    renal       Social History     Tobacco Use     Smoking status: Never     Smokeless tobacco: Never   Substance Use Topics     Alcohol use: Yes     Comment: social     Drug use: No       No family history on file.    ROS: A 12 system review of systems was negative other than noted  here or above.     Exam:  /79   Pulse 79   Temp 97.7  F (36.5  C) (Oral)   Wt 123.8 kg (273 lb)   SpO2 99%   BMI 34.12 kg/m      GENERAL APPEARANCE: alert and no distress  EYES: PERRL, no scleral icterus  NECK: supple, no adenopathy  RESP: lungs clear to auscultation   Extremities: no clubbing, cyanosis, or edema  SKIN: no rash  NEURO: mentation intact and speech normal  PSYCH: affect normal/bright    Results:    Lab on 12/22/2022   Component Date Value Ref Range Status     Color Urine 12/22/2022 Light Yellow  Colorless, Straw, Light Yellow, Yellow Final     Appearance Urine 12/22/2022 Clear  Clear Final     Glucose Urine 12/22/2022 Negative  Negative mg/dL Final     Bilirubin Urine 12/22/2022 Negative  Negative Final     Ketones Urine 12/22/2022 Negative  Negative mg/dL Final     Specific Gravity Urine 12/22/2022 1.014  1.003 - 1.035 Final     Blood Urine 12/22/2022 Negative  Negative Final     pH Urine 12/22/2022 5.5  5.0 - 7.0 Final     Protein Albumin Urine 12/22/2022 200 (A)  Negative mg/dL Final     Urobilinogen Urine 12/22/2022 Normal  Normal, 2.0 mg/dL Final     Nitrite Urine 12/22/2022 Negative  Negative Final     Leukocyte Esterase Urine 12/22/2022 Negative  Negative Final     Mucus Urine 12/22/2022 Present (A)  None Seen /LPF Final     RBC Urine 12/22/2022 0  <=2 /HPF Final     WBC Urine 12/22/2022 <1  <=5 /HPF Final     Hyaline Casts Urine 12/22/2022 4 (H)  <=2 /LPF Final     Total Protein Urine mg/dL 12/22/2022 184.0  mg/dL Final    The reference ranges have not been established in urine protein. The results should be integrated into the clinical context for interpretation.     Total Protein UR MG/MG CR 12/22/2022 1.57 (H)  0.00 - 0.20 mg/mg Cr Final     Creatinine Urine mg/dL 12/22/2022 117.0  mg/dL Final    The reference ranges have not been established in urine creatinine. The results should be integrated into the clinical context for interpretation.     WBC Count 12/22/2022 8.7  4.0 -  11.0 10e3/uL Final     RBC Count 12/22/2022 4.18 (L)  4.40 - 5.90 10e6/uL Final     Hemoglobin 12/22/2022 11.9 (L)  13.3 - 17.7 g/dL Final     Hematocrit 12/22/2022 34.9 (L)  40.0 - 53.0 % Final     MCV 12/22/2022 84  78 - 100 fL Final     MCH 12/22/2022 28.5  26.5 - 33.0 pg Final     MCHC 12/22/2022 34.1  31.5 - 36.5 g/dL Final     RDW 12/22/2022 12.3  10.0 - 15.0 % Final     Platelet Count 12/22/2022 347  150 - 450 10e3/uL Final     Sodium 12/22/2022 139  136 - 145 mmol/L Final     Potassium 12/22/2022 4.9  3.4 - 5.3 mmol/L Final     Chloride 12/22/2022 106  98 - 107 mmol/L Final     Carbon Dioxide (CO2) 12/22/2022 24  22 - 29 mmol/L Final     Anion Gap 12/22/2022 9  7 - 15 mmol/L Final     Glucose 12/22/2022 104 (H)  70 - 99 mg/dL Final     Urea Nitrogen 12/22/2022 24.3 (H)  6.0 - 20.0 mg/dL Final     Creatinine 12/22/2022 1.06  0.67 - 1.17 mg/dL Final     GFR Estimate 12/22/2022 >90  >60 mL/min/1.73m2 Final    Effective December 21, 2021 eGFRcr in adults is calculated using the 2021 CKD-EPI creatinine equation which includes age and gender (Sharon et al., NEJ, DOI: 10.1056/YYOHau5819950)     Calcium 12/22/2022 9.3  8.6 - 10.0 mg/dL Final     Albumin 12/22/2022 3.8  3.5 - 5.2 g/dL Final     Phosphorus 12/22/2022 4.2  2.5 - 4.5 mg/dL Final     Creatinine   Date Value Ref Range Status   12/22/2022 1.06 0.67 - 1.17 mg/dL Final   08/22/2022 0.95 0.66 - 1.25 mg/dL Final   06/01/2022 1.07 0.66 - 1.25 mg/dL Final   01/06/2022 1.25 0.66 - 1.25 mg/dL Final   08/30/2021 1.16 0.66 - 1.25 mg/dL Final   05/07/2021 1.15 0.66 - 1.25 mg/dL Final   02/05/2021 1.20 0.66 - 1.25 mg/dL Final   07/01/2020 1.05 0.66 - 1.25 mg/dL Final   04/25/2019 1.09 0.66 - 1.25 mg/dL Final   04/18/2019 0.91 0.66 - 1.25 mg/dL Final         Assessment/Plan:   26 year old male with nephrotic syndrome, biopsy in August 2016 showing FSGS. Steroids started 8/23/2016 and relapsed during weaning in January 2018, with proteinuria up from 0.28 g/g rocky to  4.9 grams on 1/31/18.   He is s/p participation in the  trial of MCP1 receptor blocker, and has been on tacrolimus since.  Mr Trevino appears clinically stable without symptoms suggestive of worsening nephrosis.    He seems to be tolerating the tacrolimus well without AE or HTN.  His most recent labs (reviewed with him) show a stable kidney function and proteinuria  in the subnephrotic range, also stable.   We reviewed and discussed options to try to decrease his proteinuria further.  Considered adding spironolactone or eplerenone, but his K has been at the upper limit of normal, and the addition of dual EDGARDO blockade would require close monitoring.   Mr Trevino would prefer not making changes at this point as he anticipates frequent traveling in the coming months.  We agreed to regroup in 6 months and explore options at that time as needed.       Hypertension   BP at goal. No changes to lisinopril and lasix for now  Although her reports what sounds like transient orthostatic hypotension, his BP at home is normal-high     Immunosuppression changed: No  RTC in 5 months    José Miguel Jean MD  Division of Nephrology and Hypertension    December 22, 2022  6:44 PM

## 2022-12-26 DIAGNOSIS — N05.1 FSGS (FOCAL SEGMENTAL GLOMERULOSCLEROSIS): ICD-10-CM

## 2023-01-23 ENCOUNTER — HEALTH MAINTENANCE LETTER (OUTPATIENT)
Age: 28
End: 2023-01-23

## 2023-03-08 ENCOUNTER — TELEPHONE (OUTPATIENT)
Dept: MULTI SPECIALTY CLINIC | Facility: CLINIC | Age: 28
End: 2023-03-08
Payer: COMMERCIAL

## 2023-03-08 NOTE — TELEPHONE ENCOUNTER
[facility] Call Center    Phone Message    May a detailed message be left on voicemail: yes     Reason for Call: Medication Refill Request    Has the patient contacted the pharmacy for the refill? Yes   Name of medication being requested:tacrolimus (GENERIC EQUIVALENT) 1 MG capsule [[id number]] (Order [id number])    Provider who prescribed the medication: Ted  [facility]  Date medication is needed: ASAP       Action Taken: Other: neph    Travel Screening: Not Applicable

## 2023-03-09 DIAGNOSIS — N05.1 FSGS (FOCAL SEGMENTAL GLOMERULOSCLEROSIS): ICD-10-CM

## 2023-03-09 RX ORDER — TACROLIMUS 1 MG/1
CAPSULE ORAL
Qty: 360 CAPSULE | Refills: 3 | Status: CANCELLED | OUTPATIENT
Start: 2023-03-09

## 2023-03-21 ENCOUNTER — TELEPHONE (OUTPATIENT)
Dept: NEPHROLOGY | Facility: CLINIC | Age: 28
End: 2023-03-21
Payer: COMMERCIAL

## 2023-03-21 DIAGNOSIS — N05.1 FSGS (FOCAL SEGMENTAL GLOMERULOSCLEROSIS): ICD-10-CM

## 2023-03-21 RX ORDER — TACROLIMUS 1 MG/1
CAPSULE ORAL
Qty: 360 CAPSULE | Refills: 3 | Status: CANCELLED | OUTPATIENT
Start: 2023-03-21

## 2023-03-21 NOTE — TELEPHONE ENCOUNTER
[facility] Call Center    Phone Message    May a detailed message be left on voicemail: yes     Reason for Call: Medication Refill Request    Has the patient contacted the pharmacy for the refill? Yes   Name of medication being requested: tacrolimus (GENERIC EQUIVALENT) 1 MG capsule  Provider who prescribed the medication: Ted  Pharmacy:  [facility] :: [place] [address]; [phone]  Date medication is needed: ASAP - The pharmacy called stating the patients medication is scheduled to be shipped on 3/27/23, but they need a new prescription. Please      Action Taken: Message routed to:  [facility]): Nephrology    Travel Screening: Not Applicable

## 2023-03-27 ENCOUNTER — TELEPHONE (OUTPATIENT)
Dept: NEPHROLOGY | Facility: CLINIC | Age: 28
End: 2023-03-27
Payer: COMMERCIAL

## 2023-03-27 NOTE — TELEPHONE ENCOUNTER
[facility] Call Center    Phone Message    May a detailed message be left on voicemail: yes     Reason for Call: Other: .  Deb pharmacist  from [facility] calling requesting refill for pt medication Tacrolimus 1MG, writer notified Deb that request was sent to Ted. Deb states they have not received the okay. Please call Deb     Action Taken: Message routed to:  Other: Neph    Travel Screening: Not Applicable

## 2023-03-28 DIAGNOSIS — N05.1 FSGS (FOCAL SEGMENTAL GLOMERULOSCLEROSIS): ICD-10-CM

## 2023-03-28 RX ORDER — TACROLIMUS 1 MG/1
CAPSULE ORAL
Qty: 360 CAPSULE | Refills: 3 | Status: SHIPPED | OUTPATIENT
Start: 2023-03-28 | End: 2024-03-25

## 2023-06-02 ENCOUNTER — TELEPHONE (OUTPATIENT)
Dept: NEPHROLOGY | Facility: CLINIC | Age: 28
End: 2023-06-02
Payer: COMMERCIAL

## 2023-06-02 DIAGNOSIS — N04.9 NEPHROTIC SYNDROME: Primary | ICD-10-CM

## 2023-06-02 NOTE — TELEPHONE ENCOUNTER
Attempted to schedule follow up appt on 6/16 as requested by provider, unable to reach pt at this time, lvm with writer direct line for call back.    Jenn Rascon    Nephrology Clinic Navigator.      Incoming call from pt confirming appt with provider as requested by provider on 6/16 at 8 with labs at 7. Lab orders needed.    [facility]    Nephrology Clinic Navigator.

## 2023-06-16 ENCOUNTER — LAB REQUISITION (OUTPATIENT)
Dept: LAB | Facility: CLINIC | Age: 28
End: 2023-06-16

## 2023-06-16 ENCOUNTER — LAB (OUTPATIENT)
Dept: LAB | Facility: CLINIC | Age: 28
End: 2023-06-16
Payer: COMMERCIAL

## 2023-06-16 DIAGNOSIS — Z00.6 ENCOUNTER FOR EXAMINATION FOR NORMAL COMPARISON AND CONTROL IN CLINICAL RESEARCH PROGRAM: ICD-10-CM

## 2023-06-16 DIAGNOSIS — N04.9 NEPHROTIC SYNDROME: ICD-10-CM

## 2023-06-16 LAB
ALBUMIN MFR UR ELPH: 56.9 MG/DL
ALBUMIN SERPL BCG-MCNC: 3.9 G/DL (ref 3.5–5.2)
ALBUMIN UR-MCNC: 30 MG/DL
ANION GAP SERPL CALCULATED.3IONS-SCNC: 8 MMOL/L (ref 7–15)
APPEARANCE UR: CLEAR
BILIRUB UR QL STRIP: NEGATIVE
BUN SERPL-MCNC: 30.9 MG/DL (ref 6–20)
CALCIUM SERPL-MCNC: 9 MG/DL (ref 8.6–10)
CHLORIDE SERPL-SCNC: 107 MMOL/L (ref 98–107)
COLOR UR AUTO: ABNORMAL
CREAT SERPL-MCNC: 1.16 MG/DL (ref 0.67–1.17)
CREAT UR-MCNC: 130 MG/DL
DEPRECATED HCO3 PLAS-SCNC: 24 MMOL/L (ref 22–29)
ERYTHROCYTE [DISTWIDTH] IN BLOOD BY AUTOMATED COUNT: 12.6 % (ref 10–15)
GFR SERPL CREATININE-BSD FRML MDRD: 87 ML/MIN/1.73M2
GLUCOSE SERPL-MCNC: 106 MG/DL (ref 70–99)
GLUCOSE UR STRIP-MCNC: NEGATIVE MG/DL
HCT VFR BLD AUTO: 36.8 % (ref 40–53)
HGB BLD-MCNC: 12.6 G/DL (ref 13.3–17.7)
HGB UR QL STRIP: NEGATIVE
HYALINE CASTS: 20 /LPF
KETONES UR STRIP-MCNC: NEGATIVE MG/DL
LEUKOCYTE ESTERASE UR QL STRIP: NEGATIVE
MCH RBC QN AUTO: 28.5 PG (ref 26.5–33)
MCHC RBC AUTO-ENTMCNC: 34.2 G/DL (ref 31.5–36.5)
MCV RBC AUTO: 83 FL (ref 78–100)
MUCOUS THREADS #/AREA URNS LPF: PRESENT /LPF
NITRATE UR QL: NEGATIVE
PH UR STRIP: 5 [PH] (ref 5–7)
PHOSPHATE SERPL-MCNC: 4.2 MG/DL (ref 2.5–4.5)
PLATELET # BLD AUTO: 290 10E3/UL (ref 150–450)
POTASSIUM SERPL-SCNC: 4.8 MMOL/L (ref 3.4–5.3)
PROT/CREAT 24H UR: 0.44 MG/MG CR (ref 0–0.2)
RBC # BLD AUTO: 4.42 10E6/UL (ref 4.4–5.9)
RBC URINE: <1 /HPF
SODIUM SERPL-SCNC: 139 MMOL/L (ref 136–145)
SP GR UR STRIP: 1.01 (ref 1–1.03)
UROBILINOGEN UR STRIP-MCNC: NORMAL MG/DL
WBC # BLD AUTO: 8.5 10E3/UL (ref 4–11)
WBC URINE: <1 /HPF

## 2023-06-16 PROCEDURE — 85027 COMPLETE CBC AUTOMATED: CPT | Performed by: PATHOLOGY

## 2023-06-16 PROCEDURE — 81001 URINALYSIS AUTO W/SCOPE: CPT | Performed by: PATHOLOGY

## 2023-06-16 PROCEDURE — 80069 RENAL FUNCTION PANEL: CPT | Performed by: PATHOLOGY

## 2023-06-16 PROCEDURE — 36415 COLL VENOUS BLD VENIPUNCTURE: CPT | Performed by: PATHOLOGY

## 2023-06-16 PROCEDURE — 84156 ASSAY OF PROTEIN URINE: CPT | Performed by: PATHOLOGY

## 2023-06-20 ENCOUNTER — TELEPHONE (OUTPATIENT)
Dept: NEPHROLOGY | Facility: CLINIC | Age: 28
End: 2023-06-20
Payer: COMMERCIAL

## 2023-08-31 DIAGNOSIS — N04.9 NEPHROTIC SYNDROME: Primary | ICD-10-CM

## 2023-09-07 ENCOUNTER — LAB (OUTPATIENT)
Dept: LAB | Facility: CLINIC | Age: 28
End: 2023-09-07
Payer: COMMERCIAL

## 2023-09-07 DIAGNOSIS — N04.9 NEPHROTIC SYNDROME: ICD-10-CM

## 2023-09-07 LAB
ALBUMIN MFR UR ELPH: 107 MG/DL
ALBUMIN SERPL BCG-MCNC: 4 G/DL (ref 3.5–5.2)
ALBUMIN UR-MCNC: 100 MG/DL
AMORPH CRY #/AREA URNS HPF: ABNORMAL /HPF
ANION GAP SERPL CALCULATED.3IONS-SCNC: 10 MMOL/L (ref 7–15)
APPEARANCE UR: CLEAR
BACTERIA #/AREA URNS HPF: ABNORMAL /HPF
BILIRUB UR QL STRIP: NEGATIVE
BUN SERPL-MCNC: 20.8 MG/DL (ref 6–20)
CALCIUM SERPL-MCNC: 8.6 MG/DL (ref 8.6–10)
CHLORIDE SERPL-SCNC: 107 MMOL/L (ref 98–107)
COLOR UR AUTO: ABNORMAL
CREAT SERPL-MCNC: 1.22 MG/DL (ref 0.67–1.17)
CREAT UR-MCNC: 266 MG/DL
DEPRECATED HCO3 PLAS-SCNC: 22 MMOL/L (ref 22–29)
EGFRCR SERPLBLD CKD-EPI 2021: 82 ML/MIN/1.73M2
ERYTHROCYTE [DISTWIDTH] IN BLOOD BY AUTOMATED COUNT: 12.5 % (ref 10–15)
GLUCOSE SERPL-MCNC: 112 MG/DL (ref 70–99)
GLUCOSE UR STRIP-MCNC: NEGATIVE MG/DL
GRAN CASTS #/AREA URNS LPF: ABNORMAL /LPF
HCT VFR BLD AUTO: 35.9 % (ref 40–53)
HGB BLD-MCNC: 12.8 G/DL (ref 13.3–17.7)
HGB UR QL STRIP: NEGATIVE
HYALINE CASTS #/AREA URNS LPF: ABNORMAL /LPF
KETONES UR STRIP-MCNC: NEGATIVE MG/DL
LEUKOCYTE ESTERASE UR QL STRIP: NEGATIVE
MCH RBC QN AUTO: 29.3 PG (ref 26.5–33)
MCHC RBC AUTO-ENTMCNC: 35.7 G/DL (ref 31.5–36.5)
MCV RBC AUTO: 82 FL (ref 78–100)
MUCOUS THREADS #/AREA URNS LPF: PRESENT /LPF
NITRATE UR QL: NEGATIVE
PH UR STRIP: 5.5 [PH] (ref 5–7)
PHOSPHATE SERPL-MCNC: 3.8 MG/DL (ref 2.5–4.5)
PLATELET # BLD AUTO: 276 10E3/UL (ref 150–450)
POTASSIUM SERPL-SCNC: 4.7 MMOL/L (ref 3.4–5.3)
PROT/CREAT 24H UR: 0.4 MG/MG CR (ref 0–0.2)
RBC # BLD AUTO: 4.37 10E6/UL (ref 4.4–5.9)
RBC #/AREA URNS AUTO: ABNORMAL /HPF
SKIP: ABNORMAL
SODIUM SERPL-SCNC: 139 MMOL/L (ref 136–145)
SP GR UR STRIP: 1.01 (ref 1–1.03)
SQUAMOUS #/AREA URNS AUTO: ABNORMAL /LPF
UROBILINOGEN UR STRIP-MCNC: NORMAL MG/DL
WBC # BLD AUTO: 7.6 10E3/UL (ref 4–11)
WBC #/AREA URNS AUTO: ABNORMAL /HPF

## 2023-09-07 PROCEDURE — 84156 ASSAY OF PROTEIN URINE: CPT

## 2023-09-07 PROCEDURE — 83036 HEMOGLOBIN GLYCOSYLATED A1C: CPT

## 2023-09-07 PROCEDURE — 85027 COMPLETE CBC AUTOMATED: CPT

## 2023-09-07 PROCEDURE — 80197 ASSAY OF TACROLIMUS: CPT

## 2023-09-07 PROCEDURE — 80069 RENAL FUNCTION PANEL: CPT

## 2023-09-07 PROCEDURE — 36415 COLL VENOUS BLD VENIPUNCTURE: CPT

## 2023-09-07 PROCEDURE — 81001 URINALYSIS AUTO W/SCOPE: CPT

## 2023-09-08 ENCOUNTER — OFFICE VISIT (OUTPATIENT)
Dept: NEPHROLOGY | Facility: CLINIC | Age: 28
End: 2023-09-08
Attending: INTERNAL MEDICINE
Payer: COMMERCIAL

## 2023-09-08 VITALS
HEART RATE: 72 BPM | HEIGHT: 72 IN | DIASTOLIC BLOOD PRESSURE: 72 MMHG | WEIGHT: 277 LBS | OXYGEN SATURATION: 97 % | SYSTOLIC BLOOD PRESSURE: 113 MMHG | BODY MASS INDEX: 37.52 KG/M2

## 2023-09-08 DIAGNOSIS — R73.9 HYPERGLYCEMIA: ICD-10-CM

## 2023-09-08 DIAGNOSIS — N04.9 NEPHROTIC SYNDROME: Primary | ICD-10-CM

## 2023-09-08 LAB
HBA1C MFR BLD: 5.3 % (ref 0–5.6)
TACROLIMUS BLD-MCNC: 6.3 UG/L (ref 5–15)
TME LAST DOSE: NORMAL H
TME LAST DOSE: NORMAL H

## 2023-09-08 PROCEDURE — 99213 OFFICE O/P EST LOW 20 MIN: CPT | Mod: GC | Performed by: INTERNAL MEDICINE

## 2023-09-08 PROCEDURE — G0463 HOSPITAL OUTPT CLINIC VISIT: HCPCS | Performed by: INTERNAL MEDICINE

## 2023-09-08 ASSESSMENT — PAIN SCALES - GENERAL: PAINLEVEL: NO PAIN (0)

## 2023-09-08 NOTE — NURSING NOTE
Chief Complaint   Patient presents with    RECHECK     3 months follow-up      Vital signs:      BP: 113/72 Pulse: 72     SpO2: 97 %     Height: 182.9 cm (6') Weight: 125.6 kg (277 lb)  Estimated body mass index is 37.57 kg/m  as calculated from the following:    Height as of this encounter: 1.829 m (6').    Weight as of this encounter: 125.6 kg (277 lb).      Shawanda Jeffrey CMA   9/8/2023 3:27 PM

## 2023-09-08 NOTE — PROGRESS NOTES
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 He was started on steroids 8/23/16.  Remainded on steroids 100 mg x several months.  He had more and more abdominal striae and his acne is significant  His proteinuria was down to 1-2 grams then subgram at 0.7g which is great, but unfortunately when he was down to 10mg prednisone, his proteinuria increased back to 5 grams In Jan 2018 Along with with low albumin and swelling.    He went back on Pred at 100 mg day  With a surprizing transient dip in protenuria to 1.5 in March, but eventually proteinuria decreased to UPCR < 3 startuing in April 2018   Last UPCR  1.24 g/g on 11/1/18.  He was seen on 11/29/18 at which time we initiated a tapering schedule of prednisone, as he was in partial remission on therapy (UPCR < 1).  Prednisone taper was started in Dec 2018  He had a relapse of proteinuria in February 2019  Prompting a halting of the prednisone taper and initiation of Tacrolimus at 0.5 mg twice daily  Tacrolimus was progressively increased to 2 mg twice daily starting on April 19, 2019, with documentation of trough levels in the therapeutic range.      He was enrolled in the study  On June 24, 2019.  Day #1 of study medication 7/16/19  Completed the study in Feb 2020:       June 4, 2020  He presents today with no acute complaints.  He denies any further occurrences of transient numbness or tingling.  Energy is OK.    Mild L Ext swelling.  No new striae  No GI symptoms, except for occasional loose stools.  Specifically no melena or hematochezia.  Has been working the entire pandemic.     Aug 4, 2022  No new symptoms or events since last clinic visit  Denies weight gain or swelling in legs  No change in energy.   No change in bladder or bowel habits  No new medications. No longer taking Calcium  carbonate - Vit D supplement.\    December 22, 2022  Feels well.  Had bronchitis a couple of weeks ago while in [place].  Got Abx  5 day course.  Now better although some mild residual cough.  No L Ext swelling.  Feels a bit woozy when he stands up  - for a few seconds.  At home BP ~ 140/80.      September 9, 2023  No recent concerns.  He has been studying for a masters and is hoping to finish in the next 1.5-2yrs.  Weight is stable, but fasting glucose elevated today concerning for prediabetes. Discussed initiation of metformin. Also added on tac level. Last dose was at 9:30 last PM     Allergies   Allergen Reactions    Amoxicillin      Other reaction(s): EENT - other (explain in comments), SKIN - rash  Rash and lip swelling after 8 days of Amoxicillin    Erythromycin     Penicillins      Current Outpatient Medications   Medication Sig Dispense Refill    furosemide (LASIX) 20 MG tablet Take 1 tablet (20 mg) by mouth 2 times daily As needed 180 tablet 3    lisinopril (ZESTRIL) 20 MG tablet TAKE 2 TABLETS BY MOUTH EVERY MORNING AND 1 IN THE EVENING 90 tablet 11           rosuvastatin (CRESTOR) 5 MG tablet Take 1 tablet (5 mg) by mouth daily 30 tablet 11    tacrolimus (GENERIC EQUIVALENT) 1 MG capsule Take 2 tablets (2mg) in the moring and 1 tablet (1 mg) in the evening 360 capsule 3    Calcium Carb-Cholecalciferol (CALCIUM 500 +D) 500-400 MG-UNIT TABS Take 1,000 mg by mouth daily (Patient not taking: Reported on 8/4/2022) 180 tablet 4              Past Medical History:   Diagnosis Date    Chronic kidney disease        Past Surgical History:   Procedure Laterality Date    BIOPSY  2016    renal       Social History     Tobacco Use    Smoking status: Never    Smokeless tobacco: Never   Substance Use Topics    Alcohol use: Yes     Comment: social    Drug use: No       No family history on file.    ROS: A 12 system review of systems was negative other than noted here or above.     Exam:  /72   Pulse 72   Ht 1.829 m  (6')   Wt 125.6 kg (277 lb)   SpO2 97%   BMI 37.57 kg/m      GENERAL APPEARANCE: alert and no distress  EYES: PERRL, no scleral icterus  NECK: supple, no adenopathy  RESP: lungs clear to auscultation   Extremities: no clubbing, cyanosis, or edema  SKIN: no rash  NEURO: mentation intact and speech normal  PSYCH: affect normal/bright    Results:    Lab on 12/22/2022   Component Date Value Ref Range Status    Color Urine 12/22/2022 Light Yellow  Colorless, Straw, Light Yellow, Yellow Final    Appearance Urine 12/22/2022 Clear  Clear Final    Glucose Urine 12/22/2022 Negative  Negative mg/dL Final    Bilirubin Urine 12/22/2022 Negative  Negative Final    Ketones Urine 12/22/2022 Negative  Negative mg/dL Final    Specific Gravity Urine 12/22/2022 1.014  1.003 - 1.035 Final    Blood Urine 12/22/2022 Negative  Negative Final    pH Urine 12/22/2022 5.5  5.0 - 7.0 Final    Protein Albumin Urine 12/22/2022 200 (A)  Negative mg/dL Final    Urobilinogen Urine 12/22/2022 Normal  Normal, 2.0 mg/dL Final    Nitrite Urine 12/22/2022 Negative  Negative Final    Leukocyte Esterase Urine 12/22/2022 Negative  Negative Final    Mucus Urine 12/22/2022 Present (A)  None Seen /LPF Final    RBC Urine 12/22/2022 0  <=2 /HPF Final    WBC Urine 12/22/2022 <1  <=5 /HPF Final    Hyaline Casts Urine 12/22/2022 4 (H)  <=2 /LPF Final    Total Protein Urine mg/dL 12/22/2022 184.0  mg/dL Final    The reference ranges have not been established in urine protein. The results should be integrated into the clinical context for interpretation.    Total Protein UR MG/MG CR 12/22/2022 1.57 (H)  0.00 - 0.20 mg/mg Cr Final    Creatinine Urine mg/dL 12/22/2022 117.0  mg/dL Final    The reference ranges have not been established in urine creatinine. The results should be integrated into the clinical context for interpretation.    WBC Count 12/22/2022 8.7  4.0 - 11.0 10e3/uL Final    RBC Count 12/22/2022 4.18 (L)  4.40 - 5.90 10e6/uL Final    Hemoglobin  12/22/2022 11.9 (L)  13.3 - 17.7 g/dL Final    Hematocrit 12/22/2022 34.9 (L)  40.0 - 53.0 % Final    MCV 12/22/2022 84  78 - 100 fL Final    MCH 12/22/2022 28.5  26.5 - 33.0 pg Final    MCHC 12/22/2022 34.1  31.5 - 36.5 g/dL Final    RDW 12/22/2022 12.3  10.0 - 15.0 % Final    Platelet Count 12/22/2022 347  150 - 450 10e3/uL Final    Sodium 12/22/2022 139  136 - 145 mmol/L Final    Potassium 12/22/2022 4.9  3.4 - 5.3 mmol/L Final    Chloride 12/22/2022 106  98 - 107 mmol/L Final    Carbon Dioxide (CO2) 12/22/2022 24  22 - 29 mmol/L Final    Anion Gap 12/22/2022 9  7 - 15 mmol/L Final    Glucose 12/22/2022 104 (H)  70 - 99 mg/dL Final    Urea Nitrogen 12/22/2022 24.3 (H)  6.0 - 20.0 mg/dL Final    Creatinine 12/22/2022 1.06  0.67 - 1.17 mg/dL Final    GFR Estimate 12/22/2022 >90  >60 mL/min/1.73m2 Final    Effective December 21, 2021 eGFRcr in adults is calculated using the 2021 CKD-EPI creatinine equation which includes age and gender (Sharon et al., NE, DOI: 10.1056/EEKFot5788038)    Calcium 12/22/2022 9.3  8.6 - 10.0 mg/dL Final    Albumin 12/22/2022 3.8  3.5 - 5.2 g/dL Final    Phosphorus 12/22/2022 4.2  2.5 - 4.5 mg/dL Final     Creatinine   Date Value Ref Range Status   09/07/2023 1.22 (H) 0.67 - 1.17 mg/dL Final   06/16/2023 1.16 0.67 - 1.17 mg/dL Final   12/22/2022 1.06 0.67 - 1.17 mg/dL Final   08/22/2022 0.95 0.66 - 1.25 mg/dL Final   06/01/2022 1.07 0.66 - 1.25 mg/dL Final   05/07/2021 1.15 0.66 - 1.25 mg/dL Final   02/05/2021 1.20 0.66 - 1.25 mg/dL Final   07/01/2020 1.05 0.66 - 1.25 mg/dL Final   04/25/2019 1.09 0.66 - 1.25 mg/dL Final   04/18/2019 0.91 0.66 - 1.25 mg/dL Final     Assessment/Plan:   28 year old male with nephrotic syndrome, biopsy in August 2016 showing FSGS. Baseline creatinine 0.9-1.2   Steroids started 8/23/2016 and relapsed during weaning in January 2018, with proteinuria up from 0.28 g/g rocky to 4.9 grams on 1/31/18.   He is s/p participation in the  trial of MCP1 receptor  blocker and subsequently has been on tacrolimus since with improvement in proteinuria.  Creatinine is within his suspected baseline today and blood pressure is well controlled  He seems to be tolerating the tacrolimus well, tac trough added on (last dose 9:30PM)  His most recent labs (reviewed with him) show stable kidney function and proteinuria in the subnephrotic range, also stable.  Fasting glucose today is slightly elevated concerning for prediabetes. A1C added on. We discussed lifestyle changes to improve insulin sensitivity. Additionally, discussed initiating metformin 500mg BID to try to control hyperglycemia.  Also reviewed additional options for reducing proteinuria. Preference would be to initiate an SGLT-2i, however with stable proteinuria will continue to monitor for now.      Hypertension   BP at goal. No changes to lisinopril and lasix for now     Immunosuppression changed: No    RTC in ~4 months    Patient seen and discussed with Dr. Ted Jaime MD  Division of Renal Disease and Hypertension  [facility]  jose armando Garcia Web Console

## 2023-09-08 NOTE — LETTER
9/8/2023       RE: Ethan Trevino  [address]  [facility] [address]     Dear Colleague,    Thank you for referring your patient, Ethan Trevino, to the [facility] [place] at [facility]. Please see a copy of my visit note below.    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 He was started on steroids 8/23/16.  Remainded on steroids 100 mg x several months.  He had more and more abdominal striae and his acne is significant  His proteinuria was down to 1-2 grams then subgram at 0.7g which is great, but unfortunately when he was down to 10mg prednisone, his proteinuria increased back to 5 grams In Jan 2018 Along with with low albumin and swelling.    He went back on Pred at 100 mg day  With a surprizing transient dip in protenuria to 1.5 in March, but eventually proteinuria decreased to UPCR < 3 startuing in April 2018   Last UPCR  1.24 g/g on 11/1/18.  He was seen on 11/29/18 at which time we initiated a tapering schedule of prednisone, as he was in partial remission on therapy (UPCR < 1).  Prednisone taper was started in Dec 2018  He had a relapse of proteinuria in February 2019  Prompting a halting of the prednisone taper and initiation of Tacrolimus at 0.5 mg twice daily  Tacrolimus was progressively increased to 2 mg twice daily starting on April 19, 2019, with documentation of trough levels in the therapeutic range.      He was enrolled in the study  On June 24, 2019.  Day #1 of study medication 7/16/19  Completed the study in Feb 2020:       June 4, 2020  He presents today with no acute complaints.  He denies any further occurrences of transient numbness or tingling.  Energy is OK.    Mild L Ext swelling.  No new striae  No GI symptoms, except  for occasional loose stools.  Specifically no melena or hematochezia.  Has been working the entire pandemic.     Aug 4, 2022  No new symptoms or events since last clinic visit  Denies weight gain or swelling in legs  No change in energy.   No change in bladder or bowel habits  No new medications. No longer taking Calcium carbonate - Vit D supplement.\    December 22, 2022  Feels well.  Had bronchitis a couple of weeks ago while in [place].  Got Abx  5 day course.  Now better although some mild residual cough.  No L Ext swelling.  Feels a bit woozy when he stands up  - for a few seconds.  At home BP ~ 140/80.      September 9, 2023  No recent concerns.  He has been studying for a masters and is hoping to finish in the next 1.5-2yrs.  Weight is stable, but fasting glucose elevated today concerning for prediabetes. Discussed initiation of metformin. Also added on tac level. Last dose was at 9:30 last PM     Allergies   Allergen Reactions    Amoxicillin      Other reaction(s): EENT - other (explain in comments), SKIN - rash  Rash and lip swelling after 8 days of Amoxicillin    Erythromycin     Penicillins      Current Outpatient Medications   Medication Sig Dispense Refill    furosemide (LASIX) 20 MG tablet Take 1 tablet (20 mg) by mouth 2 times daily As needed 180 tablet 3    lisinopril (ZESTRIL) 20 MG tablet TAKE 2 TABLETS BY MOUTH EVERY MORNING AND 1 IN THE EVENING 90 tablet 11           rosuvastatin (CRESTOR) 5 MG tablet Take 1 tablet (5 mg) by mouth daily 30 tablet 11    tacrolimus (GENERIC EQUIVALENT) 1 MG capsule Take 2 tablets (2mg) in the moring and 1 tablet (1 mg) in the evening 360 capsule 3    Calcium Carb-Cholecalciferol (CALCIUM 500 +D) 500-400 MG-UNIT TABS Take 1,000 mg by mouth daily (Patient not taking: Reported on 8/4/2022) 180 tablet 4              Past Medical History:   Diagnosis Date    Chronic kidney disease        Past Surgical History:   Procedure Laterality Date    BIOPSY  2016    renal        Social History     Tobacco Use    Smoking status: Never    Smokeless tobacco: Never   Substance Use Topics    Alcohol use: Yes     Comment: social    Drug use: No       No family history on file.    ROS: A 12 system review of systems was negative other than noted here or above.     Exam:  /72   Pulse 72   Ht 1.829 m (6')   Wt 125.6 kg (277 lb)   SpO2 97%   BMI 37.57 kg/m      GENERAL APPEARANCE: alert and no distress  EYES: PERRL, no scleral icterus  NECK: supple, no adenopathy  RESP: lungs clear to auscultation   Extremities: no clubbing, cyanosis, or edema  SKIN: no rash  NEURO: mentation intact and speech normal  PSYCH: affect normal/bright    Results:    Lab on 12/22/2022   Component Date Value Ref Range Status    Color Urine 12/22/2022 Light Yellow  Colorless, Straw, Light Yellow, Yellow Final    Appearance Urine 12/22/2022 Clear  Clear Final    Glucose Urine 12/22/2022 Negative  Negative mg/dL Final    Bilirubin Urine 12/22/2022 Negative  Negative Final    Ketones Urine 12/22/2022 Negative  Negative mg/dL Final    Specific Gravity Urine 12/22/2022 1.014  1.003 - 1.035 Final    Blood Urine 12/22/2022 Negative  Negative Final    pH Urine 12/22/2022 5.5  5.0 - 7.0 Final    Protein Albumin Urine 12/22/2022 200 (A)  Negative mg/dL Final    Urobilinogen Urine 12/22/2022 Normal  Normal, 2.0 mg/dL Final    Nitrite Urine 12/22/2022 Negative  Negative Final    Leukocyte Esterase Urine 12/22/2022 Negative  Negative Final    Mucus Urine 12/22/2022 Present (A)  None Seen /LPF Final    RBC Urine 12/22/2022 0  <=2 /HPF Final    WBC Urine 12/22/2022 <1  <=5 /HPF Final    Hyaline Casts Urine 12/22/2022 4 (H)  <=2 /LPF Final    Total Protein Urine mg/dL 12/22/2022 184.0  mg/dL Final    The reference ranges have not been established in urine protein. The results should be integrated into the clinical context for interpretation.    Total Protein UR MG/MG CR 12/22/2022 1.57 (H)  0.00 - 0.20 mg/mg Cr Final     Creatinine Urine mg/dL 12/22/2022 117.0  mg/dL Final    The reference ranges have not been established in urine creatinine. The results should be integrated into the clinical context for interpretation.    WBC Count 12/22/2022 8.7  4.0 - 11.0 10e3/uL Final    RBC Count 12/22/2022 4.18 (L)  4.40 - 5.90 10e6/uL Final    Hemoglobin 12/22/2022 11.9 (L)  13.3 - 17.7 g/dL Final    Hematocrit 12/22/2022 34.9 (L)  40.0 - 53.0 % Final    MCV 12/22/2022 84  78 - 100 fL Final    MCH 12/22/2022 28.5  26.5 - 33.0 pg Final    MCHC 12/22/2022 34.1  31.5 - 36.5 g/dL Final    RDW 12/22/2022 12.3  10.0 - 15.0 % Final    Platelet Count 12/22/2022 347  150 - 450 10e3/uL Final    Sodium 12/22/2022 139  136 - 145 mmol/L Final    Potassium 12/22/2022 4.9  3.4 - 5.3 mmol/L Final    Chloride 12/22/2022 106  98 - 107 mmol/L Final    Carbon Dioxide (CO2) 12/22/2022 24  22 - 29 mmol/L Final    Anion Gap 12/22/2022 9  7 - 15 mmol/L Final    Glucose 12/22/2022 104 (H)  70 - 99 mg/dL Final    Urea Nitrogen 12/22/2022 24.3 (H)  6.0 - 20.0 mg/dL Final    Creatinine 12/22/2022 1.06  0.67 - 1.17 mg/dL Final    GFR Estimate 12/22/2022 >90  >60 mL/min/1.73m2 Final    Effective December 21, 2021 eGFRcr in adults is calculated using the 2021 CKD-EPI creatinine equation which includes age and gender (Sharon et al., NEJM, DOI: 10.1056/XDNYey2162198)    Calcium 12/22/2022 9.3  8.6 - 10.0 mg/dL Final    Albumin 12/22/2022 3.8  3.5 - 5.2 g/dL Final    Phosphorus 12/22/2022 4.2  2.5 - 4.5 mg/dL Final     Creatinine   Date Value Ref Range Status   09/07/2023 1.22 (H) 0.67 - 1.17 mg/dL Final   06/16/2023 1.16 0.67 - 1.17 mg/dL Final   12/22/2022 1.06 0.67 - 1.17 mg/dL Final   08/22/2022 0.95 0.66 - 1.25 mg/dL Final   06/01/2022 1.07 0.66 - 1.25 mg/dL Final   05/07/2021 1.15 0.66 - 1.25 mg/dL Final   02/05/2021 1.20 0.66 - 1.25 mg/dL Final   07/01/2020 1.05 0.66 - 1.25 mg/dL Final   04/25/2019 1.09 0.66 - 1.25 mg/dL Final   04/18/2019 0.91 0.66 - 1.25 mg/dL Final      Assessment/Plan:   28 year old male with nephrotic syndrome, biopsy in August 2016 showing FSGS. Baseline creatinine 0.9-1.2   Steroids started 8/23/2016 and relapsed during weaning in January 2018, with proteinuria up from 0.28 g/g rocky to 4.9 grams on 1/31/18.   He is s/p participation in the  trial of MCP1 receptor blocker and subsequently has been on tacrolimus since with improvement in proteinuria.  Creatinine is within his suspected baseline today and blood pressure is well controlled  He seems to be tolerating the tacrolimus well, tac trough added on (last dose 9:30PM)  His most recent labs (reviewed with him) show stable kidney function and proteinuria in the subnephrotic range, also stable.  Fasting glucose today is slightly elevated concerning for prediabetes. A1C added on. We discussed lifestyle changes to improve insulin sensitivity. Additionally, discussed initiating metformin 500mg BID to try to control hyperglycemia.  Also reviewed additional options for reducing proteinuria. Preference would be to initiate an SGLT-2i, however with stable proteinuria will continue to monitor for now.      Hypertension   BP at goal. No changes to lisinopril and lasix for now     Immunosuppression changed: No    RTC in ~4 months    Patient seen and discussed with Dr. Ted Jaime MD  Division of Renal Disease and Hypertension  [facility]  [email or web address]  Vocera Web Console      Attestation signed by José Miguel Jean MD at 9/8/2023  7:40 PM:  Attestation:  This patient was evaluated by me, José Miguel Jean on September 8, 2023 jointly with Dr Jaime.  Discussed with Dr Camarena and agree with the findings and plan in this note.  I have reviewed  Medications, Vital Signs, and Labs.  /72   Pulse 72   Ht 1.829 m (6')   Wt 125.6 kg (277 lb)   SpO2 97%   BMI 37.57 kg/m      Electrolytes/Renal -   Recent Labs   Lab Test 09/07/23  0815 06/16/23  0727 12/22/22  1401    139 139   POTASSIUM  4.7 4.8 4.9   CO2 22 24 24   CR 1.22* 1.16 1.06   JAYASHREE 8.6 9.0 9.3   PHOS 3.8 4.2 4.2       CBC -   Recent Labs   Lab Test 09/07/23  0815 06/16/23  0727 12/22/22  1401   WBC 7.6 8.5 8.7   HGB 12.8* 12.6* 11.9*    290 347     Mr Trevino is clinically stable.  He continues to have relatively mild proteinuria (UPCR<0.5)   His BP is under good control.  His creatinine is modestly increased.  I am reassured that his HgbA1C is still normal, however he once again has mild hyperglycemia on a confirmed fasting lab. He is overweight and admits that he has not been able to lose weight despite dietary efforts.   He is unable to exercise routinely because of knee pain/injury.  We discussed starting metformin as a first step, and will consider an SGLT2i in the future if needed.  RTC in 4 m.    José Miguel Jean MD  [facility]  Department of Medicine  Division of Renal Disease and Hypertension  Pager

## 2023-09-08 NOTE — PATIENT INSTRUCTIONS
-We are still waiting on an A1C and your Tac level.   -We will start metformin 500mg twice a day for your elevated blood sugar, can continue to discuss this with your primary care physician.

## 2023-09-20 DIAGNOSIS — N04.9 NEPHROTIC SYNDROME: ICD-10-CM

## 2023-09-20 DIAGNOSIS — E78.5 HYPERLIPIDEMIA LDL GOAL <100: ICD-10-CM

## 2023-09-20 DIAGNOSIS — N05.1 FSGS (FOCAL SEGMENTAL GLOMERULOSCLEROSIS): ICD-10-CM

## 2023-09-20 RX ORDER — ROSUVASTATIN CALCIUM 5 MG/1
5 TABLET, COATED ORAL DAILY
Qty: 30 TABLET | Refills: 11 | Status: SHIPPED | OUTPATIENT
Start: 2023-09-20 | End: 2024-09-24

## 2023-09-20 RX ORDER — LISINOPRIL 20 MG/1
TABLET ORAL
Qty: 90 TABLET | Refills: 11 | Status: SHIPPED | OUTPATIENT
Start: 2023-09-20 | End: 2024-05-28

## 2023-09-21 RX ORDER — FUROSEMIDE 20 MG
20 TABLET ORAL 2 TIMES DAILY
Qty: 180 TABLET | Refills: 3 | Status: SHIPPED | OUTPATIENT
Start: 2023-09-21 | End: 2024-05-28

## 2023-11-09 ENCOUNTER — OFFICE VISIT (OUTPATIENT)
Dept: FAMILY MEDICINE | Facility: CLINIC | Age: 28
End: 2023-11-09
Payer: COMMERCIAL

## 2023-11-09 VITALS
BODY MASS INDEX: 35.88 KG/M2 | TEMPERATURE: 97.4 F | HEIGHT: 74 IN | WEIGHT: 279.6 LBS | DIASTOLIC BLOOD PRESSURE: 79 MMHG | OXYGEN SATURATION: 99 % | SYSTOLIC BLOOD PRESSURE: 124 MMHG | HEART RATE: 74 BPM | RESPIRATION RATE: 14 BRPM

## 2023-11-09 DIAGNOSIS — R73.9 HYPERGLYCEMIA: ICD-10-CM

## 2023-11-09 DIAGNOSIS — Z00.00 ROUTINE GENERAL MEDICAL EXAMINATION AT A HEALTH CARE FACILITY: Primary | ICD-10-CM

## 2023-11-09 PROCEDURE — 99395 PREV VISIT EST AGE 18-39: CPT | Performed by: FAMILY MEDICINE

## 2023-11-09 RX ORDER — METFORMIN HCL 500 MG
1000 TABLET, EXTENDED RELEASE 24 HR ORAL
Qty: 180 TABLET | Refills: 3 | Status: SHIPPED | OUTPATIENT
Start: 2023-11-09

## 2023-11-09 ASSESSMENT — ENCOUNTER SYMPTOMS
DIZZINESS: 0
PALPITATIONS: 0
CONSTIPATION: 0
MYALGIAS: 0
HEMATURIA: 0
CHILLS: 0
HEMATOCHEZIA: 0
COUGH: 0
SORE THROAT: 0
HEARTBURN: 0
NAUSEA: 0
ARTHRALGIAS: 0
EYE PAIN: 0
ABDOMINAL PAIN: 0
JOINT SWELLING: 0
FREQUENCY: 0
NERVOUS/ANXIOUS: 0
FEVER: 0
PARESTHESIAS: 0
DYSURIA: 0
HEADACHES: 0
WEAKNESS: 0
SHORTNESS OF BREATH: 0
DIARRHEA: 0

## 2023-11-09 ASSESSMENT — PAIN SCALES - GENERAL: PAINLEVEL: NO PAIN (0)

## 2023-11-09 NOTE — PROGRESS NOTES
"SUBJECTIVE:   CC: Ethan is an 28 year old who presents for preventative health visit.       11/9/2023     9:54 AM   Additional Questions   Roomed by Butch Felix       Healthy Habits:     Getting at least 3 servings of Calcium per day:  Yes    Bi-annual eye exam:  Yes    Dental care twice a year:  Yes    Sleep apnea or symptoms of sleep apnea:  None    Diet:  Low salt    Frequency of exercise:  None    Taking medications regularly:  Yes    Medication side effects:  Lightheadedness    Additional concerns today:  Yes    Have you ever done Advance Care Planning? (For example, a Health Directive, POLST, or a discussion with a medical provider or your loved ones about your wishes): No, advance care planning information given to patient to review.  Patient plans to discuss their wishes with loved ones or provider.      Social History     Tobacco Use    Smoking status: Never    Smokeless tobacco: Never   Substance Use Topics    Alcohol use: Yes     Comment: social             11/9/2023     9:43 AM   Alcohol Use   Prescreen: >3 drinks/day or >7 drinks/week? No          No data to display                Last PSA: No results found for: \"PSA\"    Reviewed orders with patient. Reviewed health maintenance and updated orders accordingly - Yes    Reviewed and updated as needed this visit by clinical staff   Tobacco  Allergies  Meds   Med Hx  Surg Hx  Fam Hx  Soc Hx        Reviewed and updated as needed this visit by Provider   Tobacco     Med Hx  Surg Hx  Fam Hx  Soc Hx         Review of Systems   Constitutional:  Negative for chills and fever.   HENT:  Negative for congestion, ear pain, hearing loss and sore throat.    Eyes:  Negative for pain and visual disturbance.   Respiratory:  Negative for cough and shortness of breath.    Cardiovascular:  Negative for chest pain, palpitations and peripheral edema.   Gastrointestinal:  Negative for abdominal pain, constipation, diarrhea, heartburn, hematochezia and nausea. " "  Genitourinary:  Negative for dysuria, frequency, genital sores, hematuria, impotence, penile discharge and urgency.   Musculoskeletal:  Negative for arthralgias, joint swelling and myalgias.   Skin:  Negative for rash.   Neurological:  Negative for dizziness, weakness, headaches and paresthesias.   Psychiatric/Behavioral:  Negative for mood changes. The patient is not nervous/anxious.      OBJECTIVE:   /79 (BP Location: Right arm, Patient Position: Sitting, Cuff Size: Adult Large)   Pulse 74   Temp 97.4  F (36.3  C) (Temporal)   Resp 14   Ht 1.892 m (6' 2.49\")   Wt 126.8 kg (279 lb 9.6 oz)   SpO2 99%   BMI 35.43 kg/m      Physical Exam  Constitutional:       General: He is not in acute distress.     Appearance: Normal appearance.   HENT:      Head: Normocephalic.      Right Ear: Tympanic membrane, ear canal and external ear normal.      Left Ear: Tympanic membrane, ear canal and external ear normal.      Mouth/Throat:      Mouth: Mucous membranes are moist.      Pharynx: No oropharyngeal exudate or posterior oropharyngeal erythema.   Eyes:      General: No scleral icterus.  Cardiovascular:      Rate and Rhythm: Normal rate and regular rhythm.      Heart sounds: No murmur heard.  Pulmonary:      Effort: Pulmonary effort is normal. No respiratory distress.      Breath sounds: Normal breath sounds.   Abdominal:      General: Abdomen is flat. Bowel sounds are normal.      Palpations: Abdomen is soft.   Lymphadenopathy:      Cervical: No cervical adenopathy.   Neurological:      General: No focal deficit present.      Mental Status: He is alert.   Psychiatric:         Mood and Affect: Mood normal.         Behavior: Behavior normal.         ASSESSMENT/PLAN:       ICD-10-CM    1. Routine general medical examination at a health care facility  Z00.00 REVIEW OF HEALTH MAINTENANCE PROTOCOL ORDERS      2. Hyperglycemia  R73.9 metFORMIN (GLUCOPHAGE XR) 500 MG 24 hr tablet          Patient has been advised of split " "billing requirements and indicates understanding: No      COUNSELING:   Reviewed preventive health counseling, as reflected in patient instructions      BMI:   Estimated body mass index is 35.43 kg/m  as calculated from the following:    Height as of this encounter: 1.892 m (6' 2.49\").    Weight as of this encounter: 126.8 kg (279 lb 9.6 oz).   Weight management plan: Patient referred to endocrine and/or weight management specialty      He reports that he has never smoked. He has never used smokeless tobacco.            Ac Treviño DO  [facility]  "

## 2024-01-15 DIAGNOSIS — N04.9 NEPHROTIC SYNDROME: ICD-10-CM

## 2024-01-15 DIAGNOSIS — N05.1 FSGS (FOCAL SEGMENTAL GLOMERULOSCLEROSIS): Primary | ICD-10-CM

## 2024-01-30 ENCOUNTER — LAB (OUTPATIENT)
Dept: LAB | Facility: CLINIC | Age: 29
End: 2024-01-30
Payer: COMMERCIAL

## 2024-01-30 DIAGNOSIS — E78.5 HYPERLIPIDEMIA LDL GOAL <100: ICD-10-CM

## 2024-01-30 DIAGNOSIS — N04.9 NEPHROTIC SYNDROME: ICD-10-CM

## 2024-01-30 DIAGNOSIS — N05.1 FSGS (FOCAL SEGMENTAL GLOMERULOSCLEROSIS): ICD-10-CM

## 2024-01-30 LAB
ALBUMIN MFR UR ELPH: 75.1 MG/DL
ALBUMIN SERPL BCG-MCNC: 4 G/DL (ref 3.5–5.2)
ALBUMIN UR-MCNC: 70 MG/DL
ANION GAP SERPL CALCULATED.3IONS-SCNC: 9 MMOL/L (ref 7–15)
APPEARANCE UR: CLEAR
BILIRUB UR QL STRIP: NEGATIVE
BUN SERPL-MCNC: 23.2 MG/DL (ref 6–20)
CALCIUM SERPL-MCNC: 9 MG/DL (ref 8.6–10)
CHLORIDE SERPL-SCNC: 107 MMOL/L (ref 98–107)
CHOLEST SERPL-MCNC: 130 MG/DL
COLOR UR AUTO: ABNORMAL
CREAT SERPL-MCNC: 1.15 MG/DL (ref 0.67–1.17)
CREAT UR-MCNC: 191 MG/DL
DEPRECATED HCO3 PLAS-SCNC: 24 MMOL/L (ref 22–29)
EGFRCR SERPLBLD CKD-EPI 2021: 88 ML/MIN/1.73M2
ERYTHROCYTE [DISTWIDTH] IN BLOOD BY AUTOMATED COUNT: 12.6 % (ref 10–15)
FASTING STATUS PATIENT QL REPORTED: YES
GLUCOSE SERPL-MCNC: 111 MG/DL (ref 70–99)
GLUCOSE UR STRIP-MCNC: NEGATIVE MG/DL
HCT VFR BLD AUTO: 37 % (ref 40–53)
HDLC SERPL-MCNC: 29 MG/DL
HGB BLD-MCNC: 12.6 G/DL (ref 13.3–17.7)
HGB UR QL STRIP: NEGATIVE
KETONES UR STRIP-MCNC: NEGATIVE MG/DL
LDLC SERPL CALC-MCNC: 69 MG/DL
LEUKOCYTE ESTERASE UR QL STRIP: NEGATIVE
MCH RBC QN AUTO: 28.9 PG (ref 26.5–33)
MCHC RBC AUTO-ENTMCNC: 34.1 G/DL (ref 31.5–36.5)
MCV RBC AUTO: 85 FL (ref 78–100)
NITRATE UR QL: NEGATIVE
NONHDLC SERPL-MCNC: 101 MG/DL
PH UR STRIP: 5.5 [PH] (ref 5–7)
PHOSPHATE SERPL-MCNC: 3.9 MG/DL (ref 2.5–4.5)
PLATELET # BLD AUTO: 274 10E3/UL (ref 150–450)
POTASSIUM SERPL-SCNC: 4.9 MMOL/L (ref 3.4–5.3)
PROT/CREAT 24H UR: 0.39 MG/MG CR (ref 0–0.2)
PTH-INTACT SERPL-MCNC: 22 PG/ML (ref 15–65)
RBC # BLD AUTO: 4.36 10E6/UL (ref 4.4–5.9)
RBC #/AREA URNS AUTO: NORMAL /HPF
SKIP: ABNORMAL
SODIUM SERPL-SCNC: 140 MMOL/L (ref 135–145)
SP GR UR STRIP: 1.01 (ref 1–1.03)
TACROLIMUS BLD-MCNC: 6.6 UG/L (ref 5–15)
TME LAST DOSE: NORMAL H
TME LAST DOSE: NORMAL H
TRIGL SERPL-MCNC: 161 MG/DL
UROBILINOGEN UR STRIP-MCNC: NORMAL MG/DL
VIT D+METAB SERPL-MCNC: 10 NG/ML (ref 20–50)
WBC # BLD AUTO: 7.3 10E3/UL (ref 4–11)
WBC #/AREA URNS AUTO: NORMAL /HPF

## 2024-01-30 PROCEDURE — 82306 VITAMIN D 25 HYDROXY: CPT

## 2024-01-30 PROCEDURE — 83970 ASSAY OF PARATHORMONE: CPT

## 2024-01-30 PROCEDURE — 81001 URINALYSIS AUTO W/SCOPE: CPT

## 2024-01-30 PROCEDURE — 84156 ASSAY OF PROTEIN URINE: CPT

## 2024-01-30 PROCEDURE — 80069 RENAL FUNCTION PANEL: CPT

## 2024-01-30 PROCEDURE — 80061 LIPID PANEL: CPT

## 2024-01-30 PROCEDURE — 36415 COLL VENOUS BLD VENIPUNCTURE: CPT

## 2024-01-30 PROCEDURE — 85027 COMPLETE CBC AUTOMATED: CPT

## 2024-01-30 PROCEDURE — 80197 ASSAY OF TACROLIMUS: CPT

## 2024-02-06 ENCOUNTER — OFFICE VISIT (OUTPATIENT)
Dept: NEPHROLOGY | Facility: CLINIC | Age: 29
End: 2024-02-06
Payer: COMMERCIAL

## 2024-02-06 VITALS
DIASTOLIC BLOOD PRESSURE: 84 MMHG | SYSTOLIC BLOOD PRESSURE: 126 MMHG | BODY MASS INDEX: 35.59 KG/M2 | WEIGHT: 280.9 LBS | OXYGEN SATURATION: 97 % | HEART RATE: 77 BPM

## 2024-02-06 DIAGNOSIS — N05.1 FSGS (FOCAL SEGMENTAL GLOMERULOSCLEROSIS): Primary | ICD-10-CM

## 2024-02-06 PROCEDURE — 99215 OFFICE O/P EST HI 40 MIN: CPT | Performed by: INTERNAL MEDICINE

## 2024-02-06 PROCEDURE — G2211 COMPLEX E/M VISIT ADD ON: HCPCS | Performed by: INTERNAL MEDICINE

## 2024-02-06 NOTE — NURSING NOTE
Ethan Trevino's goals for this visit include:   Chief Complaint   Patient presents with    RECHECK     Previous patient of Dr. Jean  Nephrotic Syndrome FSGS       He requests these members of his care team be copied on today's visit information: yes     PCP: Ac Treviño    Referring Provider:  José Miguel Jean MD  [address] [place] [id number]    /84 (BP Location: Left arm, Patient Position: Chair, Cuff Size: Adult Large)   Pulse 77   Wt 127.4 kg (280 lb 14.4 oz)   SpO2 97%   BMI 35.59 kg/m      Do you need any medication refills at today's visit? No      KEYONNA Mccoy   Neph/Pulm [facility]

## 2024-02-06 NOTE — PATIENT INSTRUCTIONS
It was a pleasure taking care of you today.  I've included a brief summary of our discussion and care plan from today's visit below.  Please review this information with your primary care provider.     My recommendations are summarized as follows:  -Will get back to you about the final plan regarding tapering tacrolimus or starting a new medication called farxiga or Jardiance    Who do I call with any questions after my visit?  Please be in touch if there are any further questions that arise following today's visit.  There are multiple ways to contact your nephrology care team.       During business hours, you may reach your Nephrology Care Team or schedule or reschedule an appointment or lab at [phone].       If you need to schedule imaging, please call [phone].   To schedule a COVID test, please call [phone].     You can always send a secure message through [employer]. [employer] messages are answered by your nurse or doctor typically within 24-48 hours. Please allow extra time on weekends and holidays.       For urgent/emergent questions after business hours, you may reach the on-call Nephrology Fellow by contacting the [facility]  at [phone].     How will I get the results of any tests ordered?    You will receive all of your results.  If you have signed up for [employer], any tests ordered at your visit will be available to you once resulted on "[employer]hart. Typically the physician reviews them and may or may not make further recommendations. If there are urgent results that require a change in your care plan, your physician or nurse will call you to discuss the next steps. If you are not on "[employer]hart, a letter may be generated and mailed to you with your results.

## 2024-02-06 NOTE — PROGRESS NOTES
PCP: Ac Treviño  Original Nephrologist: José Miguel whipple    CC: FSGS     Initial presentation  Condition dates to Summer 2016, when he presented with several months history of swelling, noted to be nephrotic, and renal biopsy in August 2016 showed FSGS. He was started on steroids 8/23/16. Remained on steroids 100 mg x several months. This was complicated by significant abdominal striae and significant acne.    His proteinuria was down to 1-2 grams then at 0.7g, but unfortunately when he was down to 10mg prednisone, his proteinuria increased back to 5 grams in Jan 2018 along with with low albumin and swelling.      He went back on Pred at 100 mg/day with a surprizing transient dip in protenuria to 1.5g in March, but eventually proteinuria decreased and his UPCR < 3 starting in April 2018 then at 1.24 g/g on 11/1/18.    He was seen on 11/29/18 at which time we initiated a tapering schedule of prednisone, as he was in partial remission on therapy (UPCR < 1).  Prednisone taper was started in Dec 2018  He had a relapse of proteinuria in February 2019  Prompting a halting of the prednisone taper and initiation of Tacrolimus at 0.5 mg twice daily  Tacrolimus was progressively increased to 2 mg twice daily starting on April 19, 2019, with documentation of trough levels in the therapeutic range.      He was enrolled in the study of MCP-1 receptor blocker [id number] June 24, 2019.  Day #1 of study medication 7/16/19  Completed the study in Feb 2020    His tacrolimus dose was decreased to 2mg/1 mg on January 6, 2022.    Overall, he has been doing fine. He is currently taking tacrolimus 2mg /1mg but doesn't exactly remember how long he has been on that dose. During his last visit with Dr. Whipple in September 2023, he was started on metformin given his obesity and fasting hyperglycemia. Unfortunately, he has not lost any weight. His schedule is hectic at this moment as he is flying back and forth to [place], where his wife  is finishing her studies in nursing.     Wt Readings from Last 4 Encounters:   02/06/24 127.4 kg (280 lb 14.4 oz)   11/09/23 126.8 kg (279 lb 9.6 oz)   09/08/23 125.6 kg (277 lb)   06/16/23 127.6 kg (281 lb 4 oz)     Allergies   Allergen Reactions    Amoxicillin      Other reaction(s): EENT - other (explain in comments), SKIN - rash  Rash and lip swelling after 8 days of Amoxicillin    Erythromycin     Penicillins        furosemide (LASIX) 20 MG tablet, Take 1 tablet (20 mg) by mouth 2 times daily As needed  lisinopril (ZESTRIL) 20 MG tablet, TAKE 2 TABLETS BY MOUTH EVERY MORNING AND 1 IN THE EVENING  metFORMIN (GLUCOPHAGE XR) 500 MG 24 hr tablet, Take 2 tablets (1,000 mg) by mouth daily (with dinner)  rosuvastatin (CRESTOR) 5 MG tablet, Take 1 tablet (5 mg) by mouth daily  tacrolimus (GENERIC EQUIVALENT) 1 MG capsule, Take 2 tablets (2mg) in the moring and 1 tablet (1 mg) in the evening    No current facility-administered medications on file prior to visit.    Past Medical History:   Diagnosis Date    Chronic kidney disease        Past Surgical History:   Procedure Laterality Date    BIOPSY  2016    renal     Social History     Tobacco Use    Smoking status: Never    Smokeless tobacco: Never   Vaping Use    Vaping Use: Never used   Substance Use Topics    Alcohol use: Yes     Comment: social    Drug use: No       Family History   Problem Relation Age of Onset    No Known Problems Brother     Heart Disease Maternal Grandmother     Lung Cancer Maternal Grandfather     Chronic Obstructive Pulmonary Disease Paternal Grandmother     Lung Cancer Paternal Grandfather        ROS: A 12 system review of systems was negative other than noted here or above.     Exam:  /84 (BP Location: Left arm, Patient Position: Chair, Cuff Size: Adult Large)   Pulse 77   Wt 127.4 kg (280 lb 14.4 oz)   SpO2 97%   BMI 35.59 kg/m      BP Readings from Last 6 Encounters:   02/06/24 126/84   11/09/23 124/79   09/08/23 113/72   06/16/23  126/86   12/22/22 118/79   08/04/22 138/83       GENERAL APPEARANCE: alert and no distress  EYES: PERRL  HENT: mouth without ulcers or lesions  NECK: supple, no adenopathy  RESP: lungs clear to auscultation - no rales, rhonchi or wheezes  CV: regular rhythm, normal rate, no rub   ABDOMEN:  soft, nontender, no HSM or masses and bowel sounds normal  MS: extremities normal- no gross deformities noted, no evidence of inflammation in joints, no muscle tenderness  SKIN: no rash  NEURO: Normal strength and tone, sensory exam grossly normal, mentation intact and speech normal  PSYCH: mentation appears normal. and affect normal/bright  No LE edema    Results:reviewed in details with the patient    Assessment/Plan:  #1 Biopsy Proven FSGS  #2 Obesity  #3 Immunosuppression  #4 Elevated fasting glucose    Diagnosed in Summer 2016, where he presented with nephrotic range proteinuria. His disease is Steroid responsive and dependent, so he was started on tacrolimus 2 mg bid (April 2019-January 2022) and currently on Tacrolimus 2/1 mg starting January 2022. His trough levels are in the therapeutic range.   He was enrolled in the study of MCP-1 receptor blocker [id number]  July 2019-Feb 2020  He remains in complete remission. His UPCR is 0.39 g/g  and his creatinine is at 1.15 mg/dl.  The plan today is to start farxiga at 10 mg daily and see him back in clinic in 3 months. If his numbers continue to be stable, I would further taper his tacrolimus to 1 mg /1 mg daily.  Advised to lose weight.    The total time of this encounter amounted to 40 minutes on the day of the encounter 2/6/2024. This time included face to face time spent with the patient, preparatory work and chart review, ordering tests, and performing post visit documentation. Plan discussed with his original nephrologist Dr. Jean.    The longitudinal plan of care for this patient was addressed during this visit. Due to the added complexity in care, I will continue to  support the patient with subsequent management of this condition(s) and with the ongoing continuity of care of this condition(s).     *This dictation was prepared in part using Dragon recognition software.  As a result errors may occur. When identified these transcription errors have been corrected.  While every attempt is made to correct errors during dictation, errors may still exist.     Myrna Caicedo MD   [facility]   Department of Medicine   Division of Renal Disease and Hypertension

## 2024-02-07 ENCOUNTER — TELEPHONE (OUTPATIENT)
Dept: NEPHROLOGY | Facility: CLINIC | Age: 29
End: 2024-02-07
Payer: COMMERCIAL

## 2024-02-07 NOTE — CONFIDENTIAL NOTE
Left Voicemail (1st Attempt) for the patient to call back and schedule the following:    Appointment type: return nephrology  Provider:   Return date: 05/06/2024  Specialty phone number: [phone]  Additional appointment(s) needed: labs prior to return visit  Additonal Notes: follow up in 3 months around 05/06/2024      Cheli barraza Complex   Gastroenterology, Infectious Diseases, Nephrology, Pulmonology and Rheumatology Specialties  [facility]

## 2024-02-09 RX ORDER — DAPAGLIFLOZIN 10 MG/1
10 TABLET, FILM COATED ORAL DAILY
Qty: 90 TABLET | Refills: 1 | Status: SHIPPED | OUTPATIENT
Start: 2024-02-09 | End: 2024-07-23

## 2024-02-13 ENCOUNTER — TELEPHONE (OUTPATIENT)
Dept: NEPHROLOGY | Facility: CLINIC | Age: 29
End: 2024-02-13
Payer: COMMERCIAL

## 2024-02-13 DIAGNOSIS — N05.1 FSGS (FOCAL SEGMENTAL GLOMERULOSCLEROSIS): ICD-10-CM

## 2024-02-13 DIAGNOSIS — N04.9 NEPHROTIC SYNDROME: Primary | ICD-10-CM

## 2024-02-13 NOTE — TELEPHONE ENCOUNTER
Health Call Center    Phone Message    May a detailed message be left on voicemail: yes     Reason for Call: Pt scheduled for 1st available 5/28 with labs 5/21.  Pt also mentioned (as noted in my chart message from 2/9) Dr Caicedo started pt on new meds and requested labs after 1 month of starting meds. Ethan stated he'll be starting these meds tomorrow so scheduled those labs for 3/14.  Orders needed for 3/14 and 3/21 labs    Thank you    Action Taken: Message routed to:  Other: Neph    Travel Screening: Not Applicable

## 2024-02-13 NOTE — CONFIDENTIAL NOTE
Left Voicemail (2nd Attempt) for the patient to call back and schedule the following:    Appointment type: return nephrology  Provider:   Return date: 05/06/2024  Specialty phone number: [phone]  Additional appointment(s) needed: labs prior to return visit  Additonal Notes: follow up in 3 months around 05/06/2024        Cheli barraza Complex   Gastroenterology, Infectious Diseases, Nephrology, Pulmonology and Rheumatology Specialties  [facility] and [facility]

## 2024-02-13 NOTE — TELEPHONE ENCOUNTER
Message sent to Vikki Sepulveda RN, BSN, PHN re: lab protocol.  Dr. Jean has standardized the labs for [facility] patients to   UA, Protein Random Urine (Creatinine Ratio included), CBC with diff, and Renal Panel.     Above orders were placed for next visit. Renal panel on order to follow up Farxiga.

## 2024-03-14 ENCOUNTER — LAB (OUTPATIENT)
Dept: LAB | Facility: CLINIC | Age: 29
End: 2024-03-14
Payer: COMMERCIAL

## 2024-03-14 DIAGNOSIS — Z11.4 SCREENING FOR HIV (HUMAN IMMUNODEFICIENCY VIRUS): Primary | ICD-10-CM

## 2024-03-14 DIAGNOSIS — N05.1 FSGS (FOCAL SEGMENTAL GLOMERULOSCLEROSIS): ICD-10-CM

## 2024-03-14 LAB
ALBUMIN SERPL BCG-MCNC: 4.1 G/DL (ref 3.5–5.2)
ANION GAP SERPL CALCULATED.3IONS-SCNC: 12 MMOL/L (ref 7–15)
BUN SERPL-MCNC: 30 MG/DL (ref 6–20)
CALCIUM SERPL-MCNC: 8.8 MG/DL (ref 8.6–10)
CHLORIDE SERPL-SCNC: 105 MMOL/L (ref 98–107)
CREAT SERPL-MCNC: 1.35 MG/DL (ref 0.67–1.17)
DEPRECATED HCO3 PLAS-SCNC: 21 MMOL/L (ref 22–29)
EGFRCR SERPLBLD CKD-EPI 2021: 72 ML/MIN/1.73M2
GLUCOSE SERPL-MCNC: 123 MG/DL (ref 70–99)
PHOSPHATE SERPL-MCNC: 4.6 MG/DL (ref 2.5–4.5)
POTASSIUM SERPL-SCNC: 4.6 MMOL/L (ref 3.4–5.3)
SODIUM SERPL-SCNC: 138 MMOL/L (ref 135–145)

## 2024-03-14 PROCEDURE — 80069 RENAL FUNCTION PANEL: CPT

## 2024-03-14 PROCEDURE — 36415 COLL VENOUS BLD VENIPUNCTURE: CPT

## 2024-03-20 ENCOUNTER — TELEPHONE (OUTPATIENT)
Dept: MULTI SPECIALTY CLINIC | Facility: CLINIC | Age: 29
End: 2024-03-20
Payer: COMMERCIAL

## 2024-03-20 NOTE — TELEPHONE ENCOUNTER
[facility] Call Center    Phone Message    May a detailed message be left on voicemail: no     Reason for Call: Medication Refill Request    Has the patient contacted the pharmacy for the refill? Yes   Name of medication being requested: Tacrolimus 1MG capsules  Provider who prescribed the medication: José Miguel Jean MD   Pharmacy: [facility] - [facility] [address] [place], [place] [address].   Date medication is needed: ASAP     Pharmacy called requesting a refill for this medication, writer did not see it in med list,  Action Taken: Message routed to:  [facility] Nephrology    Travel Screening: Not Applicable

## 2024-03-22 ENCOUNTER — TELEPHONE (OUTPATIENT)
Dept: MULTI SPECIALTY CLINIC | Facility: CLINIC | Age: 29
End: 2024-03-22
Payer: COMMERCIAL

## 2024-03-22 DIAGNOSIS — N05.1 FSGS (FOCAL SEGMENTAL GLOMERULOSCLEROSIS): ICD-10-CM

## 2024-03-22 NOTE — TELEPHONE ENCOUNTER
[facility] Call Center    Phone Message    May a detailed message be left on voicemail: no     Reason for Call: Other: [facility] from [facility] called to refill  tacrolimus (GENERIC EQUIVALENT) 1 MG capsule for the patient. Thanks.    Action Taken: Other: [facility]    Travel Screening: Not Applicable

## 2024-03-23 NOTE — TELEPHONE ENCOUNTER
Complete in a different encounter- routed to provider to confirm to discontinue tacrolimus as patient started on Farxiga.  Vikki Sepulveda RN, BSN, PHN  [facility] Nephrology Nurse Navigator  [phone]

## 2024-03-23 NOTE — TELEPHONE ENCOUNTER
Inquiry fwd to Dr. Caicedo, last clinic note and encounters reviewed and patient was switched to Farxiga, so fwd to confirm not to refill this med.  Vikki Sepulveda RN, BSN, PHN  [facility] Nephrology Nurse Navigator  [phone]

## 2024-03-25 RX ORDER — TACROLIMUS 1 MG/1
CAPSULE ORAL
Qty: 360 CAPSULE | Refills: 3 | Status: SHIPPED | OUTPATIENT
Start: 2024-03-25 | End: 2024-05-28

## 2024-03-25 NOTE — TELEPHONE ENCOUNTER
Per provider, ok to refill Tacrolimus   Sherita Lew RN, BSN   Nephrology RN Care Coordinator   [facility]

## 2024-05-21 ENCOUNTER — LAB (OUTPATIENT)
Dept: LAB | Facility: CLINIC | Age: 29
End: 2024-05-21
Payer: COMMERCIAL

## 2024-05-21 DIAGNOSIS — N05.1 FSGS (FOCAL SEGMENTAL GLOMERULOSCLEROSIS): ICD-10-CM

## 2024-05-21 DIAGNOSIS — N04.9 NEPHROTIC SYNDROME: ICD-10-CM

## 2024-05-21 LAB
ALBUMIN MFR UR ELPH: 62.2 MG/DL
ALBUMIN SERPL BCG-MCNC: 4 G/DL (ref 3.5–5.2)
ALBUMIN UR-MCNC: 70 MG/DL
ANION GAP SERPL CALCULATED.3IONS-SCNC: 8 MMOL/L (ref 7–15)
APPEARANCE UR: CLEAR
BASOPHILS # BLD AUTO: 0.1 10E3/UL (ref 0–0.2)
BASOPHILS NFR BLD AUTO: 1 %
BILIRUB UR QL STRIP: NEGATIVE
BUN SERPL-MCNC: 26.4 MG/DL (ref 6–20)
CALCIUM SERPL-MCNC: 8.6 MG/DL (ref 8.6–10)
CHLORIDE SERPL-SCNC: 107 MMOL/L (ref 98–107)
COLOR UR AUTO: ABNORMAL
CREAT SERPL-MCNC: 1.25 MG/DL (ref 0.67–1.17)
CREAT UR-MCNC: 152 MG/DL
DEPRECATED HCO3 PLAS-SCNC: 24 MMOL/L (ref 22–29)
EGFRCR SERPLBLD CKD-EPI 2021: 79 ML/MIN/1.73M2
EOSINOPHIL # BLD AUTO: 0.6 10E3/UL (ref 0–0.7)
EOSINOPHIL NFR BLD AUTO: 8 %
ERYTHROCYTE [DISTWIDTH] IN BLOOD BY AUTOMATED COUNT: 12.6 % (ref 10–15)
GLUCOSE SERPL-MCNC: 104 MG/DL (ref 70–99)
GLUCOSE UR STRIP-MCNC: 500 MG/DL
HCT VFR BLD AUTO: 34.8 % (ref 40–53)
HGB BLD-MCNC: 11.8 G/DL (ref 13.3–17.7)
HGB UR QL STRIP: NEGATIVE
HYALINE CASTS #/AREA URNS LPF: ABNORMAL /LPF
IMM GRANULOCYTES # BLD: 0.1 10E3/UL
IMM GRANULOCYTES NFR BLD: 1 %
KETONES UR STRIP-MCNC: NEGATIVE MG/DL
LEUKOCYTE ESTERASE UR QL STRIP: NEGATIVE
LYMPHOCYTES # BLD AUTO: 3.1 10E3/UL (ref 0.8–5.3)
LYMPHOCYTES NFR BLD AUTO: 38 %
MCH RBC QN AUTO: 28.7 PG (ref 26.5–33)
MCHC RBC AUTO-ENTMCNC: 33.9 G/DL (ref 31.5–36.5)
MCV RBC AUTO: 85 FL (ref 78–100)
MONOCYTES # BLD AUTO: 0.6 10E3/UL (ref 0–1.3)
MONOCYTES NFR BLD AUTO: 7 %
NEUTROPHILS # BLD AUTO: 3.6 10E3/UL (ref 1.6–8.3)
NEUTROPHILS NFR BLD AUTO: 45 %
NITRATE UR QL: NEGATIVE
NRBC # BLD AUTO: 0 10E3/UL
NRBC BLD AUTO-RTO: 0 /100
PH UR STRIP: 5.5 [PH] (ref 5–7)
PHOSPHATE SERPL-MCNC: 4.6 MG/DL (ref 2.5–4.5)
PLATELET # BLD AUTO: 285 10E3/UL (ref 150–450)
POTASSIUM SERPL-SCNC: 4.8 MMOL/L (ref 3.4–5.3)
PROT/CREAT 24H UR: 0.41 MG/MG CR (ref 0–0.2)
RBC # BLD AUTO: 4.11 10E6/UL (ref 4.4–5.9)
RBC #/AREA URNS AUTO: ABNORMAL /HPF
SKIP: ABNORMAL
SODIUM SERPL-SCNC: 139 MMOL/L (ref 135–145)
SP GR UR STRIP: 1.01 (ref 1–1.03)
UROBILINOGEN UR STRIP-MCNC: NORMAL MG/DL
WBC # BLD AUTO: 8 10E3/UL (ref 4–11)
WBC #/AREA URNS AUTO: ABNORMAL /HPF

## 2024-05-21 PROCEDURE — 80069 RENAL FUNCTION PANEL: CPT

## 2024-05-21 PROCEDURE — 84156 ASSAY OF PROTEIN URINE: CPT

## 2024-05-21 PROCEDURE — 81001 URINALYSIS AUTO W/SCOPE: CPT

## 2024-05-21 PROCEDURE — 36415 COLL VENOUS BLD VENIPUNCTURE: CPT

## 2024-05-21 PROCEDURE — 85025 COMPLETE CBC W/AUTO DIFF WBC: CPT

## 2024-05-28 ENCOUNTER — TELEPHONE (OUTPATIENT)
Dept: NEPHROLOGY | Facility: CLINIC | Age: 29
End: 2024-05-28

## 2024-05-28 ENCOUNTER — OFFICE VISIT (OUTPATIENT)
Dept: NEPHROLOGY | Facility: CLINIC | Age: 29
End: 2024-05-28
Payer: COMMERCIAL

## 2024-05-28 VITALS
DIASTOLIC BLOOD PRESSURE: 76 MMHG | SYSTOLIC BLOOD PRESSURE: 112 MMHG | HEART RATE: 77 BPM | WEIGHT: 278.4 LBS | BODY MASS INDEX: 35.28 KG/M2 | OXYGEN SATURATION: 97 %

## 2024-05-28 DIAGNOSIS — R10.13 ABDOMINAL PAIN, EPIGASTRIC: Primary | ICD-10-CM

## 2024-05-28 DIAGNOSIS — N05.1 FSGS (FOCAL SEGMENTAL GLOMERULOSCLEROSIS): ICD-10-CM

## 2024-05-28 DIAGNOSIS — N04.9 NEPHROTIC SYNDROME: ICD-10-CM

## 2024-05-28 PROCEDURE — 99214 OFFICE O/P EST MOD 30 MIN: CPT | Performed by: INTERNAL MEDICINE

## 2024-05-28 PROCEDURE — G2211 COMPLEX E/M VISIT ADD ON: HCPCS | Performed by: INTERNAL MEDICINE

## 2024-05-28 RX ORDER — TACROLIMUS 1 MG/1
CAPSULE ORAL
Qty: 360 CAPSULE | Refills: 3 | Status: SHIPPED | OUTPATIENT
Start: 2024-05-28 | End: 2024-05-31

## 2024-05-28 RX ORDER — LISINOPRIL 20 MG/1
TABLET ORAL
Qty: 90 TABLET | Refills: 11 | Status: SHIPPED | OUTPATIENT
Start: 2024-05-28

## 2024-05-28 NOTE — TELEPHONE ENCOUNTER
[facility] Call Center    Phone Message    May a detailed message be left on voicemail: yes     Reason for Call: Other: Pt was told to schedule a 2 month follow up with Dr. Caicedo at end of July/August and a 5 month follow up in October. No availabilities until December. Please call to schedule     Action Taken: Other: Neph    Travel Screening: Not Applicable

## 2024-05-28 NOTE — NURSING NOTE
Ethan Trevino's goals for this visit include:   Chief Complaint   Patient presents with    RECHECK     Follow up FSGS        He requests these members of his care team be copied on today's visit information: yes     PCP: Ac Treviño    Referring Provider:  Referred Self, MD  No address on file    /76 (BP Location: Left arm, Patient Position: Chair, Cuff Size: Adult Large)   Pulse 77   Wt 126.3 kg (278 lb 6.4 oz)   SpO2 97%   BMI 35.28 kg/m      Do you need any medication refills at today's visit? No     KEYONNA Mccoy   Neph/Pulm [facility]

## 2024-05-28 NOTE — PATIENT INSTRUCTIONS
It was a pleasure taking care of you today.  I've included a brief summary of our discussion and care plan from today's visit below.  Please review this information with your primary care provider.     My recommendations are summarized as follows:  -will stop lasix, cut down lisinopril to 40 mg daily and cut down tacrolimus to 1 mg twice daily  -ordered US abdomen and blood tests  -Please try to lose some weight, keep active     Who do I call with any questions after my visit?  Please be in touch if there are any further questions that arise following today's visit.  There are multiple ways to contact your nephrology care team.       During business hours, you may reach your Nephrology Care Team or schedule or reschedule an appointment or lab at [phone].       If you need to schedule imaging, please call [phone].   To schedule a COVID test, please call [phone].     You can always send a secure message through [employer]. [employer] messages are answered by your nurse or doctor typically within 24-48 hours. Please allow extra time on weekends and holidays.       For urgent/emergent questions after business hours, you may reach the on-call Nephrology Fellow by contacting the [facility]  at [phone].     How will I get the results of any tests ordered?    You will receive all of your results.  If you have signed up for [employer], any tests ordered at your visit will be available to you once resulted on [employer]. Typically the physician reviews them and may or may not make further recommendations. If there are urgent results that require a change in your care plan, your physician or nurse will call you to discuss the next steps. If you are not on MyChart, a letter may be generated and mailed to you with your results.

## 2024-05-28 NOTE — PROGRESS NOTES
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 He was started on steroids 8/23/16. Remained on steroids 100 mg x several months. This was complicated by significant abdominal striae and significant acne.    His proteinuria was down to 1-2 grams then at 0.7g, but unfortunately when he was down to 10mg prednisone, his proteinuria increased back to 5 grams in Jan 2018 along with with low albumin and swelling.      He went back on Pred at 100 mg/day with a surprizing transient dip in protenuria to 1.5g in March, but eventually proteinuria decreased and his UPCR < 3 starting in April 2018 then at 1.24 g/g on 11/1/18.    He was seen on 11/29/18 at which time, he was initiated on a tapering schedule of prednisone, as he was in partial remission on therapy (UPCR < 1).  Prednisone taper was started in Dec 2018  He had a relapse of proteinuria in February 2019, prompting a halting of the prednisone taper and initiation of Tacrolimus at 0.5 mg twice daily  Tacrolimus was progressively increased to 2 mg twice daily starting on April 19, 2019, with documentation of trough levels in the therapeutic range.      He was enrolled in the study of MCP-1 receptor blocker [id number] June 24, 2019.  Day #1 of study medication 7/16/19  Completed the study in Feb 2020    His tacrolimus dose was decreased to 2mg/1 mg on January 6, 2022 and he has been on it since then.     Overall, he has been doing fine. His weight has been stable. He has not lose any weight. His schedule is hectic at this moment as he is flying back and forth to [place], where his wife is finishing her studies in nursing, but she will finish in August and will move back to [place].    During his last visit 3 months ago, he was started on farxiga and he has been  tolerating it well. He does describe however that he had experienced 3 episodes of abdominal pain, which is new to him. The pain is epigastric in nature, seems like a heart burn, stabbing, not relieved by Tums, not related to any particular foods, no blood in stool, no melena, no vomiting but some nausea, 3 episodes  over the past 3 months. The episode stays for 2 hours then it resolves on its own. The last episode was most severe where he was on the ground but he did not seek any medical advise at that point.    Wt Readings from Last 4 Encounters:   05/28/24 126.3 kg (278 lb 6.4 oz)   02/06/24 127.4 kg (280 lb 14.4 oz)   11/09/23 126.8 kg (279 lb 9.6 oz)   09/08/23 125.6 kg (277 lb)     Allergies   Allergen Reactions    Amoxicillin      Other reaction(s): EENT - other (explain in comments), SKIN - rash  Rash and lip swelling after 8 days of Amoxicillin    Erythromycin     Penicillins        Current Outpatient Medications   Medication Sig Dispense Refill    dapagliflozin (FARXIGA) 10 MG TABS tablet Take 1 tablet (10 mg) by mouth daily 90 tablet 1    furosemide (LASIX) 20 MG tablet Take 1 tablet (20 mg) by mouth 2 times daily As needed 180 tablet 3    lisinopril (ZESTRIL) 20 MG tablet TAKE 2 TABLETS BY MOUTH EVERY MORNING AND 1 IN THE EVENING 90 tablet 11    metFORMIN (GLUCOPHAGE XR) 500 MG 24 hr tablet Take 2 tablets (1,000 mg) by mouth daily (with dinner) 180 tablet 3    rosuvastatin (CRESTOR) 5 MG tablet Take 1 tablet (5 mg) by mouth daily 30 tablet 11    tacrolimus (GENERIC EQUIVALENT) 1 MG capsule Take 2 tablets (2mg) in the moring and 1 tablet (1 mg) in the evening 360 capsule 3     No current facility-administered medications for this visit.     Past Medical History:   Diagnosis Date    Chronic kidney disease        Past Surgical History:   Procedure Laterality Date    BIOPSY  2016    renal     Social History     Tobacco Use    Smoking status: Never    Smokeless tobacco: Never   Vaping Use    Vaping status:  Never Used   Substance Use Topics    Alcohol use: Yes     Comment: social    Drug use: No       Family History   Problem Relation Age of Onset    No Known Problems Brother     Heart Disease Maternal Grandmother     Lung Cancer Maternal Grandfather     Chronic Obstructive Pulmonary Disease Paternal Grandmother     Lung Cancer Paternal Grandfather        ROS: A 12 system review of systems was negative other than noted here or above.     Exam:  /76 (BP Location: Left arm, Patient Position: Chair, Cuff Size: Adult Large)   Pulse 77   Wt 126.3 kg (278 lb 6.4 oz)   SpO2 97%   BMI 35.28 kg/m      BP Readings from Last 6 Encounters:   05/28/24 112/76   02/06/24 126/84   11/09/23 124/79   09/08/23 113/72   06/16/23 126/86   12/22/22 118/79     Wt Readings from Last 4 Encounters:   05/28/24 126.3 kg (278 lb 6.4 oz)   02/06/24 127.4 kg (280 lb 14.4 oz)   11/09/23 126.8 kg (279 lb 9.6 oz)   09/08/23 125.6 kg (277 lb)       GENERAL APPEARANCE: alert and no distress  EYES: PERRL  HENT: mouth without ulcers or lesions  NECK: supple, no adenopathy  RESP: lungs clear to auscultation - no rales, rhonchi or wheezes  CV: regular rhythm, normal rate, no rub   ABDOMEN:  soft, nontender, no HSM or masses and bowel sounds normal  MS: extremities normal- no gross deformities noted, no evidence of inflammation in joints, no muscle tenderness  SKIN: no rash  NEURO: Normal strength and tone, sensory exam grossly normal, mentation intact and speech normal  PSYCH: mentation appears normal. and affect normal/bright  No LE edema    Results:reviewed in details with the patient    Assessment/Plan:  #1 Biopsy Proven FSGS  #2 Obesity  #3 Immunosuppression  #4 Elevated fasting glucose  #5 Intermittent abdominal pain    Diagnosed in Summer 2016, where he presented with nephrotic range proteinuria. His disease is Steroid responsive and dependent, so he was started on tacrolimus 2 mg bid (April 2019-January 2022) and currently on Tacrolimus 2/1  mg starting January 2022. His trough levels are in the therapeutic range.   He was enrolled in the study of MCP-1 receptor blocker [id number]  July 2019-Feb 2020  He remains in complete remission. His UPCR is 0.4 g/g  and his creatinine is at 1.25 mg/dl.  -Will cut down on tacrolimus to 1/1 mg bid  -Will stop lasix  -Will cut down lisinopril from 60 to 40 mg daily.   -will continue farxiga 10 mg daily  - will see him back in clinic in 2 months  -Advised to lose weight  -for the abdominal pain, it is non-specific: could be gall stones, mild pancreatitis, gastritis, IBS..Will order US abdomen, LFTS, amylase, lipase, occult blood    The longitudinal plan of care for this patient was addressed during this visit. Due to the added complexity in care, I will continue to support the patient with subsequent management of this condition(s) and with the ongoing continuity of care of this condition(s).     *This dictation was prepared in part using Dragon recognition software.  As a result errors may occur. When identified these transcription errors have been corrected.  While every attempt is made to correct errors during dictation, errors may still exist.     Myrna Caicedo MD   [facility]   Department of Medicine   Division of Renal Disease and Hypertension

## 2024-05-29 NOTE — TELEPHONE ENCOUNTER
Attempted to contact pt.  No answer,  message left on VM to return call.    Calling to assist pt with scheduling Return appts with Dr. Caicedo.    LOV with Dr. Caicedo 5/24/24 per Dr. Hurd check out:  Dispositions Check-out Note   Return in about 2 months (around 7/28/2024). Overbook if needed for 2 months then 5 months from now (3 months from his next visit)       Susana Harmon LPN

## 2024-05-30 ENCOUNTER — ANCILLARY PROCEDURE (OUTPATIENT)
Dept: ULTRASOUND IMAGING | Facility: CLINIC | Age: 29
End: 2024-05-30
Attending: INTERNAL MEDICINE
Payer: COMMERCIAL

## 2024-05-30 ENCOUNTER — LAB (OUTPATIENT)
Dept: LAB | Facility: CLINIC | Age: 29
End: 2024-05-30
Payer: COMMERCIAL

## 2024-05-30 DIAGNOSIS — R10.13 ABDOMINAL PAIN, EPIGASTRIC: ICD-10-CM

## 2024-05-30 DIAGNOSIS — N04.9 NEPHROTIC SYNDROME: ICD-10-CM

## 2024-05-30 LAB
ALBUMIN SERPL BCG-MCNC: 4 G/DL (ref 3.5–5.2)
ALP SERPL-CCNC: 136 U/L (ref 40–150)
ALT SERPL W P-5'-P-CCNC: 33 U/L (ref 0–70)
AMYLASE SERPL-CCNC: 75 U/L (ref 28–100)
AST SERPL W P-5'-P-CCNC: 20 U/L (ref 0–45)
BILIRUB DIRECT SERPL-MCNC: <0.2 MG/DL (ref 0–0.3)
BILIRUB SERPL-MCNC: 0.2 MG/DL
HBA1C MFR BLD: 5.6 % (ref 0–5.6)
LIPASE SERPL-CCNC: 26 U/L (ref 13–60)
PROT SERPL-MCNC: 6.6 G/DL (ref 6.4–8.3)

## 2024-05-30 PROCEDURE — 76700 US EXAM ABDOM COMPLETE: CPT

## 2024-05-30 PROCEDURE — 82150 ASSAY OF AMYLASE: CPT

## 2024-05-30 PROCEDURE — 83036 HEMOGLOBIN GLYCOSYLATED A1C: CPT

## 2024-05-30 PROCEDURE — 80076 HEPATIC FUNCTION PANEL: CPT

## 2024-05-30 PROCEDURE — 83690 ASSAY OF LIPASE: CPT

## 2024-05-30 PROCEDURE — 36415 COLL VENOUS BLD VENIPUNCTURE: CPT

## 2024-05-31 ENCOUNTER — TELEPHONE (OUTPATIENT)
Dept: NEPHROLOGY | Facility: CLINIC | Age: 29
End: 2024-05-31
Payer: COMMERCIAL

## 2024-05-31 DIAGNOSIS — N05.1 FSGS (FOCAL SEGMENTAL GLOMERULOSCLEROSIS): ICD-10-CM

## 2024-05-31 RX ORDER — TACROLIMUS 1 MG/1
CAPSULE ORAL
Qty: 180 CAPSULE | Refills: 3 | Status: SHIPPED | OUTPATIENT
Start: 2024-05-31

## 2024-05-31 NOTE — TELEPHONE ENCOUNTER
[facility] Call Center    Phone Message    May a detailed message be left on voicemail: no     Reason for Call: Other: Pharmacy called stating that they need a 30 or 90 day supply order for tacrolimus (GENERIC EQUIVALENT) 1 MG capsule. Please re-send order to pharmacy.     Action Taken: Other: [facility]    Travel Screening: Not Applicable

## 2024-05-31 NOTE — TELEPHONE ENCOUNTER
Returned pharmacy call for clarification what was needed regarding refill of tacrolimus. The pharmacist stated the medication is filled by the [facility] specialty pharmacy. Per the pharmacist the order was for more than 90 days and the pharmacy will cannot fill for more than a 90 day supply. Order changed to a 90 day supply.  RAH Becker Care Coordinator  Nephrology

## 2024-06-03 ENCOUNTER — LAB (OUTPATIENT)
Dept: LAB | Facility: CLINIC | Age: 29
End: 2024-06-03
Payer: COMMERCIAL

## 2024-06-03 DIAGNOSIS — Z11.4 SCREENING FOR HIV (HUMAN IMMUNODEFICIENCY VIRUS): Primary | ICD-10-CM

## 2024-06-03 LAB — HEMOCCULT STL QL: NEGATIVE

## 2024-06-03 PROCEDURE — 82272 OCCULT BLD FECES 1-3 TESTS: CPT

## 2024-06-05 ENCOUNTER — OFFICE VISIT (OUTPATIENT)
Dept: FAMILY MEDICINE | Facility: CLINIC | Age: 29
End: 2024-06-05
Payer: COMMERCIAL

## 2024-06-05 VITALS
HEART RATE: 69 BPM | OXYGEN SATURATION: 98 % | BODY MASS INDEX: 35.87 KG/M2 | DIASTOLIC BLOOD PRESSURE: 75 MMHG | SYSTOLIC BLOOD PRESSURE: 114 MMHG | TEMPERATURE: 97 F | WEIGHT: 279.5 LBS | RESPIRATION RATE: 20 BRPM | HEIGHT: 74 IN

## 2024-06-05 DIAGNOSIS — R10.13 ABDOMINAL PAIN, EPIGASTRIC: Primary | ICD-10-CM

## 2024-06-05 DIAGNOSIS — R19.7 DIARRHEA, UNSPECIFIED TYPE: ICD-10-CM

## 2024-06-05 PROCEDURE — 99214 OFFICE O/P EST MOD 30 MIN: CPT | Performed by: FAMILY MEDICINE

## 2024-06-05 ASSESSMENT — PAIN SCALES - GENERAL: PAINLEVEL: NO PAIN (0)

## 2024-06-05 NOTE — PROGRESS NOTES
"  Assessment & Plan     (R10.13) Abdominal pain, epigastric  (primary encounter diagnosis)  Comment: 3 months of intermittent episodes of band like epigastric abdominal pain expanding to the both sides but not to the back. Severe episodes will disrupt his work and he sometimes has diarrhea from these. Had workup with nephrology for this with normal blood work and RUQ ultrasound. Pain does not respond to Tums. Has not associated with any specific foods but has not been closely monitoring. Has started to track food and bowel pattern. Reports more loose stools around 5-6 on [place] chart. No history of abdominal surgeries. Intermittent bloating.  Plan: Discussed using Metamucil for the next 2 months and adequate hydration. Follow up in 2 months.     (R19.7) Diarrhea, unspecified type  Comment: Diarrhea after episodes of abdominal pain and daily stool consistency around 5-6 on [place] chart.  Plan: Metamucil and hydration. Continue to monitor, follow up in 2 months.         BMI  Estimated body mass index is 35.49 kg/m  as calculated from the following:    Height as of this encounter: 1.89 m (6' 2.41\").    Weight as of this encounter: 126.8 kg (279 lb 8 oz).   Weight management plan: Discussed healthy diet and exercise guidelines    OTC metamucil 1-2x daily   Follow up 2 months    Subjective   Ethan is a 29 year old, presenting for the following health issues:  Gastrointestinal Problem and Abdominal Pain      6/5/2024     8:03 AM   Additional Questions   Roomed by Butch MANCIA     History of Present Illness       Reason for visit:  Stomach pains  Symptom onset:  More than a month  Symptoms include:  Cramping bloating diarhea  Symptom intensity:  Moderate  Symptom progression:  Worsening  Had these symptoms before:  No  What makes it worse:  Unknown, maybe yogurt  What makes it better:  Time for symptoms to dissipate    He eats 2-3 servings of fruits and vegetables daily.He consumes 0 sweetened beverage(s) daily.He exercises " "with enough effort to increase his heart rate 9 or less minutes per day.  He exercises with enough effort to increase his heart rate 3 or less days per week.   He is taking medications regularly.     Patient reports about 3 months of having episodes of severe pain with diarrhea sometimes. He reports he will have to leave work because of this.    He reports the pain is in the epigastric area spreading like a band. Cramping/heartburn sensation.   Really bad episodes last 30-1 hour then it just \"hurts\" for a few more hours followed by some nausea.   These occur about 1x a month for the last 3 months.  Will also have milder episodes but he can function with those occurring about every week and a half.   Will have nausea but no vomiting with episodes. Avoids eating with these episodes and will not for about 8 hours after.    Patient unsure if there has been any food ingestion associated with these symptoms. He started tracking his food yesterday.     Last bad episode was after yogurt and a burrito.   Reports having a single severe abdominal episode last year with yogurt as well.   Reports he eats lots of servings of dairy every day.   No known food allergies he knows of.     Stool in between 5-6 of the [place] stool chart with intermittent diarrhea.   Occasional bloating.   Not eating a fatty diet.     Review of Systems  Review Of Systems  Skin: negative  Eyes: negative  Ears/Nose/Throat: negative  Respiratory: No shortness of breath, dyspnea on exertion, cough, or hemoptysis  Cardiovascular: negative  Gastrointestinal: positive for intermittent nausea, abdominal pain, excessive gas or bloating, and diarrhea  Genitourinary: negative  Musculoskeletal: negative  Neurologic: negative  Psychiatric: negative  Hematologic/Lymphatic/Immunologic: negative  Endocrine: negative       Objective    /75 (BP Location: Left arm, Patient Position: Sitting, Cuff Size: Adult Large)   Pulse 69   Temp 97  F (36.1  C) (Temporal)   " "Resp 20   Ht 1.89 m (6' 2.41\")   Wt 126.8 kg (279 lb 8 oz)   SpO2 98%   BMI 35.49 kg/m    Body mass index is 35.49 kg/m .  Physical Exam  Constitutional:       Appearance: Normal appearance.   HENT:      Head: Normocephalic and atraumatic.      Right Ear: Tympanic membrane, ear canal and external ear normal.      Left Ear: Tympanic membrane, ear canal and external ear normal.      Nose: Nose normal.      Mouth/Throat:      Mouth: Mucous membranes are moist.      Pharynx: Oropharynx is clear.   Eyes:      Conjunctiva/sclera: Conjunctivae normal.   Cardiovascular:      Rate and Rhythm: Normal rate and regular rhythm.      Pulses: Normal pulses.      Heart sounds: Normal heart sounds.   Pulmonary:      Effort: Pulmonary effort is normal.      Breath sounds: Normal breath sounds.   Abdominal:      General: Bowel sounds are normal.      Palpations: Abdomen is soft.   Musculoskeletal:         General: Normal range of motion.      Cervical back: Normal range of motion.   Skin:     General: Skin is warm.   Neurological:      Mental Status: He is alert and oriented to person, place, and time.   Psychiatric:         Mood and Affect: Mood normal.         Behavior: Behavior normal.         Thought Content: Thought content normal.         Judgment: Judgment normal.     RENAE Cabral student          Signed Electronically by: Ac Treviño DO    "

## 2024-06-10 NOTE — TELEPHONE ENCOUNTER
Called and spoke with pt. Pt agreed to schedule return appt's recommended by Dr. Caicedo.  7/23/24 at 12:30pm and 10/22/24 at 12:30pm with non fasting lab appts at 12:00pm.    Encouraged pt to call or MyChart if further questions or concerns.    Susana Harmon LPN

## 2024-07-16 ENCOUNTER — DOCUMENTATION ONLY (OUTPATIENT)
Dept: NEPHROLOGY | Facility: CLINIC | Age: 29
End: 2024-07-16
Payer: COMMERCIAL

## 2024-07-16 DIAGNOSIS — N05.1 FSGS (FOCAL SEGMENTAL GLOMERULOSCLEROSIS): Primary | ICD-10-CM

## 2024-07-16 NOTE — PROGRESS NOTES
Ethan MARTIN Herbertrochelle has an upcoming lab appointment:    Future Appointments   Date Time Provider Department Center   7/23/2024 10:20 AM Pratik Acosta,  [facility]   7/23/2024 12:00 PM LAB FIRST FLOOR [facility]   7/23/2024 12:30 PM Myrna Caicedo MD [facility]   10/22/2024 12:00 PM LAB FIRST FLOOR [facility]   10/22/2024 12:30 PM Myrna Caicedo MD [facility]     Patient is scheduled for the following lab(s): Pt is requesting labs for 7/23/24 for sherlyn with no orders in chart.    There is no order available. Please review and place either future orders or HMPO (Review of Health Maintenance Protocol Orders), as appropriate.    Health Maintenance Due   Topic    HIV SCREENING      Thank you, Perla Denise

## 2024-07-22 NOTE — PROGRESS NOTES
ASSESSMENT & PLAN    Ethan was seen today for pain.    Diagnoses and all orders for this visit:    Chronic pain of left knee  -     XR Knee Standing AP [place] Bilat Lat Left  -     MR Knee Left w/o Contrast; Future        See Patient Instructions  Patient Instructions   Reviewed nature of the left anterior knee pain.  Fits best with patellofemoral source.  We discussed may also have some Hoffa fat pad syndrome component as well, given location of pain.  Discussed considerations around physical therapy, patellar stabilizing support/brace, also additional imaging with MRI.  Following discussion, plan MRI of the left knee next, given chronic issues limiting physical activity.    Advanced imaging is done by appointment. Please call [facility] ([facility]/[place]) [phone] , [facility] ([facility]/[place]) [phone] , or [facility] ([facility]) [phone]  to schedule your MRI.     Some insurance companies may require a prior authorization to be completed which can delay the time until you are able to schedule your appointment.       If you are active on [employer], you may have access to your test results before your provider is able to review the study and advise on next steps.      The clinic will plan to contact you with results either via phone or Bitmenu. If you have not heard from the clinic within 2-3 days following your MRI, please contact us at [phone] or via [employer].  Alternatively, if interested in further discussion with me about the findings and next steps, feel free to schedule a telephone visit, video visit, or recheck appointment in clinic.      If you have any further questions for your physician or physician s care team you can contact them thru [employer] or by calling [phone].      Pratik Acosta [facility] [place]    -----  Chief Complaint   Patient presents with     "Left Knee - Pain       SUBJECTIVE  Ethan Trevino is a/an 29 year old male who is seen as a self referral for evaluation of left knee.     The patient is seen by themselves.    Onset: 10+ years(s) ago. Reports insidious onset without acute precipitating event. Notes 10+ years ago, he was a runner in  and had severe pain. Increase in pain over the last 6+ months.  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  Additional history:  Pain near level of patellar tendon, and can go deeper.  Recalls initial onset with being out for a run, had pain with single step, collapsed. Was never able to return to running.  Goal still to be able to run again.  Recalls taking ibuprofen initially due to inflammation, presumed swelling; otherwise does not recall swelling.  Can get occasional pop, but not consistently. No pain associated. However, sometimes can pivot/twist, get a pop like knuckle cracking, and that can cause pain.    Able to see visit for left knee pain through [facility], was in [place], 2/29/12. Note reviewed.      REVIEW OF SYSTEMS:  Review of Systems    OBJECTIVE:  Ht 1.88 m (6' 2\")   Wt 126.6 kg (279 lb)   BMI 35.82 kg/m           Left Knee exam    Inspection:   no effusion   no ecchymosis    ROM:      Full active and passive ROM with flexion and extension    Patellar Motion:      Crepitus noted in the patellofemoral joint very slight bilat    Tender: some discomfort with patellar translation, but no significant focal tenderness noted    Non Tender: joint lines, patellar tendon    Special " Tests:      neg (-) Eleno       neg (-) Lachman       neg (-) anterior drawer       neg (-) posterior drawer       neg (-) varus        neg (-) valgus        no pain with forced extension      Evaluation of ipsilateral kinetic chain:   Anterior knee excursion with mini squat, single leg squat; some pain with each      RADIOLOGY:  Final results and radiologist's interpretation, available in the [facility] health record.  Images were reviewed with the patient in the office today.  My personal interpretation of the performed imaging: no acute bony abnormality noted. Joint spaces appear preserved.      Recent Results (from the past 24 hour(s))   XR Knee Standing AP [place] Bilat Lat Left    Narrative    KNEE STANDING AP [place] BILATERAL LATERAL LEFT   7/23/2024 10:05 AM     HISTORY: Anterior knee pain; Chronic pain of left knee.    COMPARISON: None.      Impression    IMPRESSION: Normal.    REYMUNDO DELAROSA MD         SYSTEM ID:  [id number]             Review of prior external note(s) from - as noted above

## 2024-07-23 ENCOUNTER — OFFICE VISIT (OUTPATIENT)
Dept: NEPHROLOGY | Facility: CLINIC | Age: 29
End: 2024-07-23
Payer: COMMERCIAL

## 2024-07-23 ENCOUNTER — OFFICE VISIT (OUTPATIENT)
Dept: ORTHOPEDICS | Facility: CLINIC | Age: 29
End: 2024-07-23
Payer: COMMERCIAL

## 2024-07-23 ENCOUNTER — ANCILLARY PROCEDURE (OUTPATIENT)
Dept: GENERAL RADIOLOGY | Facility: CLINIC | Age: 29
End: 2024-07-23
Attending: PEDIATRICS
Payer: COMMERCIAL

## 2024-07-23 ENCOUNTER — LAB (OUTPATIENT)
Dept: LAB | Facility: CLINIC | Age: 29
End: 2024-07-23
Payer: COMMERCIAL

## 2024-07-23 VITALS
DIASTOLIC BLOOD PRESSURE: 75 MMHG | BODY MASS INDEX: 35.58 KG/M2 | SYSTOLIC BLOOD PRESSURE: 115 MMHG | HEART RATE: 78 BPM | OXYGEN SATURATION: 98 % | WEIGHT: 277.1 LBS

## 2024-07-23 VITALS — BODY MASS INDEX: 35.81 KG/M2 | HEIGHT: 74 IN | WEIGHT: 279 LBS

## 2024-07-23 DIAGNOSIS — M25.562 CHRONIC PAIN OF LEFT KNEE: Primary | ICD-10-CM

## 2024-07-23 DIAGNOSIS — G89.29 CHRONIC PAIN OF LEFT KNEE: Primary | ICD-10-CM

## 2024-07-23 DIAGNOSIS — N05.1 FSGS (FOCAL SEGMENTAL GLOMERULOSCLEROSIS): Primary | ICD-10-CM

## 2024-07-23 DIAGNOSIS — N05.1 FSGS (FOCAL SEGMENTAL GLOMERULOSCLEROSIS): ICD-10-CM

## 2024-07-23 LAB
ALBUMIN MFR UR ELPH: 112 MG/DL
ALBUMIN SERPL BCG-MCNC: 3.8 G/DL (ref 3.5–5.2)
ALBUMIN UR-MCNC: 100 MG/DL
ANION GAP SERPL CALCULATED.3IONS-SCNC: 9 MMOL/L (ref 7–15)
APPEARANCE UR: CLEAR
BILIRUB UR QL STRIP: NEGATIVE
BUN SERPL-MCNC: 20.8 MG/DL (ref 6–20)
CALCIUM SERPL-MCNC: 9.3 MG/DL (ref 8.8–10.4)
CHLORIDE SERPL-SCNC: 105 MMOL/L (ref 98–107)
COLOR UR AUTO: ABNORMAL
CREAT SERPL-MCNC: 0.99 MG/DL (ref 0.67–1.17)
CREAT UR-MCNC: 70.6 MG/DL
CREAT UR-MCNC: 72.8 MG/DL
EGFRCR SERPLBLD CKD-EPI 2021: >90 ML/MIN/1.73M2
ERYTHROCYTE [DISTWIDTH] IN BLOOD BY AUTOMATED COUNT: 12.1 % (ref 10–15)
GLUCOSE SERPL-MCNC: 94 MG/DL (ref 70–99)
GLUCOSE UR STRIP-MCNC: NEGATIVE MG/DL
HCO3 SERPL-SCNC: 24 MMOL/L (ref 22–29)
HCT VFR BLD AUTO: 37.1 % (ref 40–53)
HGB BLD-MCNC: 12.5 G/DL (ref 13.3–17.7)
HGB UR QL STRIP: NEGATIVE
KETONES UR STRIP-MCNC: NEGATIVE MG/DL
LEUKOCYTE ESTERASE UR QL STRIP: NEGATIVE
MCH RBC QN AUTO: 28.5 PG (ref 26.5–33)
MCHC RBC AUTO-ENTMCNC: 33.7 G/DL (ref 31.5–36.5)
MCV RBC AUTO: 85 FL (ref 78–100)
MICROALBUMIN UR-MCNC: 829 MG/L
MICROALBUMIN/CREAT UR: 1174.22 MG/G CR (ref 0–17)
NITRATE UR QL: NEGATIVE
PH UR STRIP: 5.5 [PH] (ref 5–7)
PHOSPHATE SERPL-MCNC: 3.4 MG/DL (ref 2.5–4.5)
PLATELET # BLD AUTO: 377 10E3/UL (ref 150–450)
POTASSIUM SERPL-SCNC: 4.9 MMOL/L (ref 3.4–5.3)
PROT/CREAT 24H UR: 1.54 MG/MG CR (ref 0–0.2)
RBC # BLD AUTO: 4.39 10E6/UL (ref 4.4–5.9)
RBC #/AREA URNS AUTO: NORMAL /HPF
SKIP: ABNORMAL
SODIUM SERPL-SCNC: 138 MMOL/L (ref 135–145)
SP GR UR STRIP: 1.01 (ref 1–1.03)
UROBILINOGEN UR STRIP-MCNC: NORMAL MG/DL
VIT D+METAB SERPL-MCNC: 10 NG/ML (ref 20–50)
WBC # BLD AUTO: 9.4 10E3/UL (ref 4–11)
WBC #/AREA URNS AUTO: NORMAL /HPF

## 2024-07-23 PROCEDURE — 82306 VITAMIN D 25 HYDROXY: CPT

## 2024-07-23 PROCEDURE — 36415 COLL VENOUS BLD VENIPUNCTURE: CPT

## 2024-07-23 PROCEDURE — 81001 URINALYSIS AUTO W/SCOPE: CPT

## 2024-07-23 PROCEDURE — 82570 ASSAY OF URINE CREATININE: CPT

## 2024-07-23 PROCEDURE — 80069 RENAL FUNCTION PANEL: CPT

## 2024-07-23 PROCEDURE — 85027 COMPLETE CBC AUTOMATED: CPT

## 2024-07-23 PROCEDURE — 80197 ASSAY OF TACROLIMUS: CPT

## 2024-07-23 PROCEDURE — 84156 ASSAY OF PROTEIN URINE: CPT

## 2024-07-23 PROCEDURE — G2211 COMPLEX E/M VISIT ADD ON: HCPCS | Performed by: INTERNAL MEDICINE

## 2024-07-23 PROCEDURE — 73562 X-RAY EXAM OF KNEE 3: CPT | Mod: TC | Performed by: RADIOLOGY

## 2024-07-23 PROCEDURE — 99214 OFFICE O/P EST MOD 30 MIN: CPT | Performed by: INTERNAL MEDICINE

## 2024-07-23 PROCEDURE — 82043 UR ALBUMIN QUANTITATIVE: CPT

## 2024-07-23 PROCEDURE — 99204 OFFICE O/P NEW MOD 45 MIN: CPT | Performed by: PEDIATRICS

## 2024-07-23 RX ORDER — DAPAGLIFLOZIN 10 MG/1
10 TABLET, FILM COATED ORAL DAILY
Qty: 90 TABLET | Refills: 1 | Status: SHIPPED | OUTPATIENT
Start: 2024-07-23

## 2024-07-23 NOTE — PATIENT INSTRUCTIONS
Reviewed nature of the left anterior knee pain.  Fits best with patellofemoral source.  We discussed may also have some Hoffa fat pad syndrome component as well, given location of pain.  Discussed considerations around physical therapy, patellar stabilizing support/brace, also additional imaging with MRI.  Following discussion, plan MRI of the left knee next, given chronic issues limiting physical activity.    Advanced imaging is done by appointment. Please call [facility] ([facility]/[place]) [phone] , [facility] ([facility]/[place]) [phone] , or [facility] ([facility]) [phone]  to schedule your MRI.     Some insurance companies may require a prior authorization to be completed which can delay the time until you are able to schedule your appointment.       If you are active on [employer], you may have access to your test results before your provider is able to review the study and advise on next steps.      The clinic will plan to contact you with results either via phone or JamLegend. If you have not heard from the clinic within 2-3 days following your MRI, please contact us at [phone] or via Crucell.  Alternatively, if interested in further discussion with me about the findings and next steps, feel free to schedule a telephone visit, video visit, or recheck appointment in clinic.      If you have any further questions for your physician or physician s care team you can contact them thru Crucell or by calling [phone].

## 2024-07-23 NOTE — NURSING NOTE
Ethan Trevino's goals for this visit include:   Chief Complaint   Patient presents with    RECHECK     2 month follow up CKD     Refill Request     Farxiga        He requests these members of his care team be copied on today's visit information: yes     PCP: Ac Treviño    Referring Provider:  Referred Self, MD  No address on file    /75 (BP Location: Left arm, Patient Position: Chair, Cuff Size: Adult Large)   Pulse 78   Wt 125.7 kg (277 lb 1.6 oz)   SpO2 98%   BMI 35.58 kg/m      Do you need any medication refills at today's visit? Yes     KEYONNA Mccoy   Neph/Pulm [facility]

## 2024-07-23 NOTE — PATIENT INSTRUCTIONS
It was a pleasure taking care of you today.  I've included a brief summary of our discussion and care plan from today's visit below.  Please review this information with your primary care provider.     My recommendations are summarized as follows:  -If the urine protein is stable, will cut down further on tacrolimus.  -Lab tests every 2 months    Who do I call with any questions after my visit?  Please be in touch if there are any further questions that arise following today's visit.  There are multiple ways to contact your nephrology care team.       During business hours, you may reach your Nephrology Care Team or schedule or reschedule an appointment or lab at [phone].       If you need to schedule imaging, please call [phone].   To schedule a COVID test, please call [phone].     You can always send a secure message through uMentioned. uMentioned messages are answered by your nurse or doctor typically within 24-48 hours. Please allow extra time on weekends and holidays.       For urgent/emergent questions after business hours, you may reach the on-call Nephrology Fellow by contacting the [facility]  at [phone].     How will I get the results of any tests ordered?    You will receive all of your results.  If you have signed up for uMentioned, any tests ordered at your visit will be available to you once resulted on [employer]. Typically the physician reviews them and may or may not make further recommendations. If there are urgent results that require a change in your care plan, your physician or nurse will call you to discuss the next steps. If you are not on MyChart, a letter may be generated and mailed to you with your results.

## 2024-07-23 NOTE — LETTER
7/23/2024      Ethan Trevino  [address]  [place] [address]      Dear Colleague,    Thank you for referring your patient, Ethan Trevino, to the [facility] [place]. Please see a copy of my visit note below.    ASSESSMENT & PLAN    Ethan was seen today for pain.    Diagnoses and all orders for this visit:    Chronic pain of left knee  -     XR Knee Standing AP [place] Bilat Lat Left  -     MR Knee Left w/o Contrast; Future        See Patient Instructions  Patient Instructions   Reviewed nature of the left anterior knee pain.  Fits best with patellofemoral source.  We discussed may also have some Hoffa fat pad syndrome component as well, given location of pain.  Discussed considerations around physical therapy, patellar stabilizing support/brace, also additional imaging with MRI.  Following discussion, plan MRI of the left knee next, given chronic issues limiting physical activity.    Advanced imaging is done by appointment. Please call [facility] ([facility]/[place]) [phone] , [facility] ([facility]/[place]) [phone] , or [facility] ([facility]) [phone]  to schedule your MRI.     Some insurance companies may require a prior authorization to be completed which can delay the time until you are able to schedule your appointment.       If you are active on [employer], you may have access to your test results before your provider is able to review the study and advise on next steps.      The clinic will plan to contact you with results either via phone or [employer]t. If you have not heard from the clinic within 2-3 days following your MRI, please contact us at [phone] or via [employer].  Alternatively, if interested in further discussion with me about the findings and next steps, feel free to schedule a telephone visit, video visit, or recheck appointment in clinic.      If you  "have any further questions for your physician or physician s care team you can contact them thru MyChart or by calling [phone].      Pratik Acosta DO  [facility] [place]    -----  Chief Complaint   Patient presents with     Left Knee - Pain       SUBJECTIVE  Ethan Trevino is a/an 29 year old male who is seen as a self referral for evaluation of left knee.     The patient is seen by themselves.    Onset: 10+ years(s) ago. Reports insidious onset without acute precipitating event. Notes 10+ years ago, he was a runner in  and had severe pain. Increase in pain over the last 6+ months.  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  Additional history:  Pain near level of patellar tendon, and can go deeper.  Recalls initial onset with being out for a run, had pain with single step, collapsed. Was never able to return to running.  Goal still to be able to run again.  Recalls taking ibuprofen initially due to inflammation, presumed swelling; otherwise does not recall swelling.  Can get occasional pop, but not consistently. No pain associated. However, sometimes can pivot/twist, get a pop like knuckle cracking, and that can cause pain.    Able to see visit for left knee pain through [facility], was in [place], 2/29/12. Note reviewed.      REVIEW OF SYSTEMS:  Review of Systems    OBJECTIVE:  Ht 1.88 m (6' 2\")   Wt 126.6 kg (279 lb)   BMI 35.82 kg/m           Left Knee exam    Inspection:   no effusion   no ecchymosis    ROM:      Full active and passive ROM " with flexion and extension    Patellar Motion:      Crepitus noted in the patellofemoral joint very slight bilat    Tender: some discomfort with patellar translation, but no significant focal tenderness noted    Non Tender: joint lines, patellar tendon    Special Tests:      neg (-) Eleno       neg (-) Lachman       neg (-) anterior drawer       neg (-) posterior drawer       neg (-) varus        neg (-) valgus        no pain with forced extension      Evaluation of ipsilateral kinetic chain:   Anterior knee excursion with mini squat, single leg squat; some pain with each      RADIOLOGY:  Final results and radiologist's interpretation, available in the [facility] health record.  Images were reviewed with the patient in the office today.  My personal interpretation of the performed imaging: no acute bony abnormality noted. Joint spaces appear preserved.      Recent Results (from the past 24 hour(s))   XR Knee Standing AP [place] Bilat Lat Left    Narrative    KNEE STANDING AP [place] BILATERAL LATERAL LEFT   7/23/2024 10:05 AM     HISTORY: Anterior knee pain; Chronic pain of left knee.    COMPARISON: None.      Impression    IMPRESSION: Normal.    REYMUNDO DELAROSA MD         SYSTEM ID:  [id number]             Review of prior external note(s) from - as noted above           Again, thank you for allowing me to participate in the care of your patient.        Sincerely,        Pratik Acosta,

## 2024-07-23 NOTE — PROGRESS NOTES
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 He was started on steroids 8/23/16. Remained on steroids 100 mg x several months. This was complicated by significant abdominal striae and significant acne.    His proteinuria was down to 1-2 grams then at 0.7g, but unfortunately when he was down to 10mg prednisone, his proteinuria increased back to 5 grams in Jan 2018 along with with low albumin and swelling.      He went back on Pred at 100 mg/day with a surprizing transient dip in protenuria to 1.5g in March, but eventually proteinuria decreased and his UPCR < 3 starting in April 2018 then at 1.24 g/g on 11/1/18.    He was seen on 11/29/18 at which time, he was initiated on a tapering schedule of prednisone, as he was in partial remission on therapy (UPCR < 1).  Prednisone taper was started in Dec 2018  He had a relapse of proteinuria in February 2019, prompting a halting of the prednisone taper and initiation of Tacrolimus at 0.5 mg twice daily  Tacrolimus was progressively increased to 2 mg twice daily starting on April 19, 2019, with documentation of trough levels in the therapeutic range.      He was enrolled in the study of MCP-1 receptor blocker [id number] June 24, 2019.  Day #1 of study medication 7/16/19  Completed the study in Feb 2020    His tacrolimus dose was decreased to 2mg/1 mg on January 6, 2022 till May 2024, when his dose was cut further to tacrolimus 1mg/1mg.    He is also on farxiga 10 mg daily. However, he notes he did not take his medication this morning. He is on lisinopril 40 mg daily.    Overall, he has been doing fine. His weight has been stable. He has not lose any weight. He previously prescribed abdominal pain, which turned out to be secondary to constipation.     Wt Readings from  Last 4 Encounters:   07/23/24 126.6 kg (279 lb)   06/05/24 126.8 kg (279 lb 8 oz)   05/28/24 126.3 kg (278 lb 6.4 oz)   02/06/24 127.4 kg (280 lb 14.4 oz)     Allergies   Allergen Reactions    Amoxicillin      Other reaction(s): EENT - other (explain in comments), SKIN - rash  Rash and lip swelling after 8 days of Amoxicillin    Erythromycin     Penicillins        Current Outpatient Medications   Medication Sig Dispense Refill    dapagliflozin (FARXIGA) 10 MG TABS tablet Take 1 tablet (10 mg) by mouth daily 90 tablet 1    lisinopril (ZESTRIL) 20 MG tablet TAKE 2 TABLETS BY MOUTH EVERY MORNING 90 tablet 11    metFORMIN (GLUCOPHAGE XR) 500 MG 24 hr tablet Take 2 tablets (1,000 mg) by mouth daily (with dinner) 180 tablet 3    rosuvastatin (CRESTOR) 5 MG tablet Take 1 tablet (5 mg) by mouth daily 30 tablet 11    tacrolimus (GENERIC EQUIVALENT) 1 MG capsule Take 1 tablet in the moring and 1 tablet (1 mg) in the evening 180 capsule 3     No current facility-administered medications for this visit.     Past Medical History:   Diagnosis Date    Chronic kidney disease        Past Surgical History:   Procedure Laterality Date    BIOPSY  2016    renal     Social History     Tobacco Use    Smoking status: Never    Smokeless tobacco: Never   Vaping Use    Vaping status: Never Used   Substance Use Topics    Alcohol use: Yes     Comment: social    Drug use: No       Family History   Problem Relation Age of Onset    No Known Problems Brother     Heart Disease Maternal Grandmother     Lung Cancer Maternal Grandfather     Chronic Obstructive Pulmonary Disease Paternal Grandmother     Lung Cancer Paternal Grandfather        ROS: A 12 system review of systems was negative other than noted here or above.     Exam:  There were no vitals taken for this visit.    BP Readings from Last 6 Encounters:   06/05/24 114/75   05/28/24 112/76   02/06/24 126/84   11/09/23 124/79   09/08/23 113/72   06/16/23 126/86     Wt Readings from Last 4  Encounters:   07/23/24 126.6 kg (279 lb)   06/05/24 126.8 kg (279 lb 8 oz)   05/28/24 126.3 kg (278 lb 6.4 oz)   02/06/24 127.4 kg (280 lb 14.4 oz)       GENERAL APPEARANCE: alert and no distress  EYES: PERRL  HENT: mouth without ulcers or lesions  NECK: supple, no adenopathy  RESP: lungs clear to auscultation - no rales, rhonchi or wheezes  CV: regular rhythm, normal rate, no rub   ABDOMEN:  soft, nontender, no HSM or masses and bowel sounds normal  MS: extremities normal- no gross deformities noted, no evidence of inflammation in joints, no muscle tenderness  SKIN: no rash  NEURO: Normal strength and tone, sensory exam grossly normal, mentation intact and speech normal  PSYCH: mentation appears normal. and affect normal/bright  No LE edema    Results:reviewed in details with the patient    Assessment/Plan:  #1 Biopsy Proven FSGS  #2 Obesity  #3 Immunosuppression  #4 Elevated fasting glucose  #5 Intermittent abdominal pain/constipation    Diagnosed in Summer 2016, where he presented with nephrotic range proteinuria. His disease is Steroid responsive and dependent, so he was started on tacrolimus 2 mg bid (April 2019-January 2022), tapered to Tacrolimus 2/1 mg (January 2022-May 2024) and tacrolimus 1mg/1mg (May 2024-July 2024).    He was enrolled in the study of MCP-1 receptor blocker [id number]  July 2019-Feb 2020  His UPCR is slightly up to 1.5 g/g from 0.4  and his creatinine is down to 0.9 mg/dl.  -Will keep on tacrolimus to 1 mg twice daily for now and repeat UPCR in 2 weeks; if there is a trend for worsening proteinuria, will increase his tacrolimus dose to 2 mg bid again  -BP at goal; Will continue lisinopril 40 mg daily.   -will continue farxiga 10 mg daily. His UA doesn't show any glucose so I am concerned about his compliance, but he assures me that he is taking the medication and that he only missed today's dose.  - will see him back in clinic in 2 months  -Advised to lose weight  -we discussed the risk of  relapse in FSGS. It is approximately 25 to 35 % after a complete remission and in more than 50 percent of patients with a partial remission. Ethan is concerned about high dose steroids if he relapsed, given the associated side effects. We discussed that if he relapses, then will introduce tacrolimus early on with quick taper of prednisone.     The total time of this encounter amounted to 31  minutes on the day of the encounter 7/23/2024. This time included face to face time spent with the patient, preparatory work and chart review, ordering tests, and performing post visit documentation.    The longitudinal plan of care for this patient was addressed during this visit. Due to the added complexity in care, I will continue to support the patient with subsequent management of this condition(s) and with the ongoing continuity of care of this condition(s).     *This dictation was prepared in part using Dragon recognition software.  As a result errors may occur. When identified these transcription errors have been corrected.  While every attempt is made to correct errors during dictation, errors may still exist.     Myrna Caicedo MD   [facility]   Department of Medicine   Division of Renal Disease and Hypertension

## 2024-07-24 LAB
TACROLIMUS BLD-MCNC: 4 UG/L (ref 5–15)
TME LAST DOSE: ABNORMAL H
TME LAST DOSE: ABNORMAL H

## 2024-09-12 ENCOUNTER — ANCILLARY PROCEDURE (OUTPATIENT)
Dept: MRI IMAGING | Facility: CLINIC | Age: 29
End: 2024-09-12
Attending: PEDIATRICS
Payer: COMMERCIAL

## 2024-09-12 DIAGNOSIS — G89.29 CHRONIC PAIN OF LEFT KNEE: ICD-10-CM

## 2024-09-12 DIAGNOSIS — M25.562 CHRONIC PAIN OF LEFT KNEE: ICD-10-CM

## 2024-09-12 PROCEDURE — 73721 MRI JNT OF LWR EXTRE W/O DYE: CPT | Mod: LT | Performed by: RADIOLOGY

## 2024-09-24 DIAGNOSIS — E78.5 HYPERLIPIDEMIA LDL GOAL <100: ICD-10-CM

## 2024-09-24 RX ORDER — ROSUVASTATIN CALCIUM 5 MG/1
5 TABLET, COATED ORAL DAILY
Qty: 90 TABLET | Refills: 11 | Status: SHIPPED | OUTPATIENT
Start: 2024-09-24

## 2024-10-07 NOTE — PROGRESS NOTES
"ASSESSMENT & PLAN    Ethan was seen today for follow up.    Diagnoses and all orders for this visit:    Chondromalacia of knee, left  -     Orthopedic  Referral; Future      Discussed often would work through conservative management options first, though in this case with chronic symptoms will refer to ortho surgery knee subspecialty next, question whether candidate for cartilage preservation program.  See below.  Questions answered. Discussed signs and symptoms that may indicate more serious issues; the patient was instructed to seek appropriate care if noted. Ethan indicates understanding of these issues and agrees with the plan.      See Patient Instructions  Patient Instructions   Reviewed the left knee MRI, demonstrating relatively focal area of chondromalacia (articular cartilage abnormality) in the medial femoral condyle.  This may be related to remote injury, and primarily is consistent with some underlying degenerative change in the knee.  We discussed the following:  - Continuing to live with it (the symptom management, activity modification)  - Physical therapy (previously done in remote past)  - Support options (including compression/sleeve, bracing, also trial of  brace)  - Medications (limited due to renal issues)  - Injection options (steroid versus Visco supplement)  - Referral for further discussion with orthopedic surgery (given question of whether there is a possible \"fix\" for the issue, especially in a younger person)    Following discussion, referral placed to [facility] knee specialist next.  Additional considerations are as noted above. If questions/concerns, or desiring to change course, contact clinic.  Otherwise, will leave follow-up here open ended.    If you have any further questions for your physician or physician s care team you can contact them thru MyChart or by calling [phone].      Pratik Acosta [facility] " "[facility]    SUBJECTIVE- Interim History October 7, 2024    Chief Complaint   Patient presents with    Left Knee - Follow Up       Ethan Trevino is a 29 year old male who is seen in f/u up for left knee. Since last visit on 7/23/24 patient has felt that today so far is going well. Notes that pain is day to day. No interim injury. Pain is primarily located around patellar tendon and distal patella.    The patient is seen by themselves.    Orthopedic/Surgical history: NO  Social History/Occupation: Blood  for [employer]      REVIEW OF SYSTEMS:  Review of Systems    OBJECTIVE:  Ht 1.88 m (6' 2\")   Wt 125.6 kg (277 lb)   BMI 35.56 kg/m             RADIOLOGY:  Final results and radiologist's interpretation, available in the [facility] health record.  Images were reviewed with the patient in the office today.  My personal interpretation of the performed imaging: area of articular cartilage irregularity medial femoral condyle, with areas of adjacent subchondral marrow edema, and small joint effusion.        MR left knee without contrast 9/12/2024 10:20 AM     Techniques: Multiplanar multisequence imaging of the left knee was  obtained without administration of intra-articular or intravenous  contrast using routing protocol.     History: evaluate chronic knee pain, anterior knee; Chronic pain of  left knee; Chronic pain of left knee      Additional History from EMR: Anterior left knee pain     Comparison: X-ray right knee 7/23/2024     Findings:     MENISCI:  Medial meniscus: Intact.  Lateral meniscus: Intact.     LIGAMENTS  Cruciate ligaments: Intact.  Medial supporting structures: Intact.  Lateral supporting structures: Intact.     EXTENSOR MECHANISM  Intact.     FLUID  Small joint effusion. No substantial Baker's cyst.     OSSEOUS and ARTICULAR STRUCTURES  Bones: No fracture, contusion, or osseous lesion is seen.     Patellofemoral compartment: No hyaline cartilage disease.     Medial compartment: " Focal high-grade cartilage fissuring overlying the  anterior medial femoral condyle with adjacent focal edema-like T2  hyperintensity in the subchondral femoral condyle.     Lateral compartment: No hyaline cartilage disease.     ANCILLARY FINDINGS  None                                                                      Impression:  1. Focal areas of at least moderate grade cartilage fissuring  overlying the anterior medial femoral condyle.     2. The anterior and posterior cruciate ligaments, medial and lateral  supporting structures, and bilateral menisci are intact.     I have personally reviewed the examination and initial interpretation  and I agree with the findings.     DANNIE HERNÁNDEZ MD           25 minutes spent by me on the date of the encounter doing chart review, history and exam, documentation and further activities per the note

## 2024-10-08 ENCOUNTER — OFFICE VISIT (OUTPATIENT)
Dept: ORTHOPEDICS | Facility: CLINIC | Age: 29
End: 2024-10-08
Payer: COMMERCIAL

## 2024-10-08 VITALS — HEIGHT: 74 IN | BODY MASS INDEX: 35.55 KG/M2 | WEIGHT: 277 LBS

## 2024-10-08 DIAGNOSIS — M94.262 CHONDROMALACIA OF KNEE, LEFT: Primary | ICD-10-CM

## 2024-10-08 PROCEDURE — 99213 OFFICE O/P EST LOW 20 MIN: CPT | Performed by: PEDIATRICS

## 2024-10-08 NOTE — LETTER
"10/8/2024      Ethan Trevino  [address]  [place] [address]      Dear Colleague,    Thank you for referring your patient, Ethan Trevino, to the [facility]. Please see a copy of my visit note below.    ASSESSMENT & PLAN    Ethan was seen today for follow up.    Diagnoses and all orders for this visit:    Chondromalacia of knee, left  -     Orthopedic  Referral; Future      Discussed often would work through conservative management options first, though in this case with chronic symptoms will refer to ortho surgery knee subspecialty next, question whether candidate for cartilage preservation program.  See below.  Questions answered. Discussed signs and symptoms that may indicate more serious issues; the patient was instructed to seek appropriate care if noted. Ethan indicates understanding of these issues and agrees with the plan.      See Patient Instructions  Patient Instructions   Reviewed the left knee MRI, demonstrating relatively focal area of chondromalacia (articular cartilage abnormality) in the medial femoral condyle.  This may be related to remote injury, and primarily is consistent with some underlying degenerative change in the knee.  We discussed the following:  - Continuing to live with it (the symptom management, activity modification)  - Physical therapy (previously done in remote past)  - Support options (including compression/sleeve, bracing, also trial of  brace)  - Medications (limited due to renal issues)  - Injection options (steroid versus Visco supplement)  - Referral for further discussion with orthopedic surgery (given question of whether there is a possible \"fix\" for the issue, especially in a younger person)    Following discussion, referral placed to [facility] knee specialist next.  Additional considerations are as noted above. If questions/concerns, or desiring to change course, contact clinic.  Otherwise, " "will leave follow-up here open ended.    If you have any further questions for your physician or physician s care team you can contact them thru [employer] or by calling [phone].      Pratik Acosta [facility]    SUBJECTIVE- Interim History October 7, 2024    Chief Complaint   Patient presents with     Left Knee - Follow Up       Ethan Trevino is a 29 year old male who is seen in f/u up for left knee. Since last visit on 7/23/24 patient has felt that today so far is going well. Notes that pain is day to day. No interim injury. Pain is primarily located around patellar tendon and distal patella.    The patient is seen by themselves.    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  Images were reviewed with the patient in the office today.  My personal interpretation of the performed imaging: area of articular cartilage irregularity medial femoral condyle, with areas of adjacent subchondral marrow edema, and small joint effusion.        MR left knee without contrast 9/12/2024 10:20 AM     Techniques: Multiplanar multisequence imaging of the left knee was  obtained without administration of intra-articular or intravenous  contrast using routing protocol.     History: evaluate chronic knee pain, anterior knee; Chronic pain of  left knee; Chronic pain of left knee      Additional History from EMR: Anterior left knee pain     Comparison: X-ray right knee 7/23/2024     Findings:     MENISCI:  Medial meniscus: Intact.  Lateral meniscus: Intact.     LIGAMENTS  Cruciate ligaments: Intact.  Medial supporting structures: Intact.  Lateral supporting structures: Intact.     EXTENSOR " MECHANISM  Intact.     FLUID  Small joint effusion. No substantial Baker's cyst.     OSSEOUS and ARTICULAR STRUCTURES  Bones: No fracture, contusion, or osseous lesion is seen.     Patellofemoral compartment: No hyaline cartilage disease.     Medial compartment: Focal high-grade cartilage fissuring overlying the  anterior medial femoral condyle with adjacent focal edema-like T2  hyperintensity in the subchondral femoral condyle.     Lateral compartment: No hyaline cartilage disease.     ANCILLARY FINDINGS  None                                                                      Impression:  1. Focal areas of at least moderate grade cartilage fissuring  overlying the anterior medial femoral condyle.     2. The anterior and posterior cruciate ligaments, medial and lateral  supporting structures, and bilateral menisci are intact.     I have personally reviewed the examination and initial interpretation  and I agree with the findings.     DANNIE HERNÁNDEZ MD           25 minutes spent by me on the date of the encounter doing chart review, history and exam, documentation and further activities per the note           Again, thank you for allowing me to participate in the care of your patient.        Sincerely,        Pratik Acosta DO

## 2024-10-08 NOTE — PATIENT INSTRUCTIONS
"Reviewed the left knee MRI, demonstrating relatively focal area of chondromalacia (articular cartilage abnormality) in the medial femoral condyle.  This may be related to remote injury, and primarily is consistent with some underlying degenerative change in the knee.  We discussed the following:  - Continuing to live with it (the symptom management, activity modification)  - Physical therapy (previously done in remote past)  - Support options (including compression/sleeve, bracing, also trial of  brace)  - Medications (limited due to renal issues)  - Injection options (steroid versus Visco supplement)  - Referral for further discussion with orthopedic surgery (given question of whether there is a possible \"fix\" for the issue, especially in a younger person)    Following discussion, referral placed to [facility] knee specialist next.  Additional considerations are as noted above. If questions/concerns, or desiring to change course, contact clinic.  Otherwise, will leave follow-up here open ended.    If you have any further questions for your physician or physician s care team you can contact them thru [employer] or by calling [phone].    "

## 2024-10-09 NOTE — TELEPHONE ENCOUNTER
REASON FOR VISIT: medial femoral condyle chondromalacia in younger person    DATE OF APPT: 11/07/2024   NOTES (FOR ALL VISITS) STATUS DETAILS   OFFICE NOTE from referring provider Internal [facility] Pratik Monahan,  10/08/2024   EMG N/A    MEDICATION LIST Internal    IMAGING  (FOR ALL VISITS)     [facility]  [facility] Knee standing bilat left 7/23/2024   MRI [facility]HEAD, NECK, SPINE) [facility]  MR Knee left 9/12/2024   CT (HEAD, NECK, SPINE) N/A

## 2024-10-10 ENCOUNTER — PATIENT OUTREACH (OUTPATIENT)
Dept: CARE COORDINATION | Facility: CLINIC | Age: 29
End: 2024-10-10
Payer: COMMERCIAL

## 2024-10-22 ENCOUNTER — OFFICE VISIT (OUTPATIENT)
Dept: NEPHROLOGY | Facility: CLINIC | Age: 29
End: 2024-10-22
Payer: COMMERCIAL

## 2024-10-22 ENCOUNTER — LAB (OUTPATIENT)
Dept: LAB | Facility: CLINIC | Age: 29
End: 2024-10-22
Payer: COMMERCIAL

## 2024-10-22 VITALS
OXYGEN SATURATION: 99 % | HEART RATE: 66 BPM | DIASTOLIC BLOOD PRESSURE: 83 MMHG | SYSTOLIC BLOOD PRESSURE: 129 MMHG | BODY MASS INDEX: 34.38 KG/M2 | WEIGHT: 267.8 LBS

## 2024-10-22 DIAGNOSIS — E78.5 HYPERLIPIDEMIA LDL GOAL <100: ICD-10-CM

## 2024-10-22 DIAGNOSIS — N05.1 FSGS (FOCAL SEGMENTAL GLOMERULOSCLEROSIS): Primary | ICD-10-CM

## 2024-10-22 LAB
ALBUMIN MFR UR ELPH: 107 MG/DL
ALBUMIN MFR UR ELPH: 113 MG/DL
ALBUMIN SERPL BCG-MCNC: 3.8 G/DL (ref 3.5–5.2)
ALBUMIN UR-MCNC: 100 MG/DL
ALP SERPL-CCNC: 127 U/L (ref 40–150)
ALT SERPL W P-5'-P-CCNC: 35 U/L (ref 0–70)
ANION GAP SERPL CALCULATED.3IONS-SCNC: 9 MMOL/L (ref 7–15)
APPEARANCE UR: CLEAR
AST SERPL W P-5'-P-CCNC: 21 U/L (ref 0–45)
BILIRUB SERPL-MCNC: 0.5 MG/DL
BILIRUB UR QL STRIP: NEGATIVE
BUN SERPL-MCNC: 19.8 MG/DL (ref 6–20)
CALCIUM SERPL-MCNC: 9.3 MG/DL (ref 8.8–10.4)
CHLORIDE SERPL-SCNC: 106 MMOL/L (ref 98–107)
COLOR UR AUTO: ABNORMAL
CREAT SERPL-MCNC: 1.09 MG/DL (ref 0.67–1.17)
CREAT UR-MCNC: 61 MG/DL
CREAT UR-MCNC: 63.1 MG/DL
EGFRCR SERPLBLD CKD-EPI 2021: >90 ML/MIN/1.73M2
ERYTHROCYTE [DISTWIDTH] IN BLOOD BY AUTOMATED COUNT: 12.4 % (ref 10–15)
GLUCOSE SERPL-MCNC: 94 MG/DL (ref 70–99)
GLUCOSE UR STRIP-MCNC: 500 MG/DL
HCO3 SERPL-SCNC: 24 MMOL/L (ref 22–29)
HCT VFR BLD AUTO: 37 % (ref 40–53)
HGB BLD-MCNC: 12.7 G/DL (ref 13.3–17.7)
HGB UR QL STRIP: NEGATIVE
KETONES UR STRIP-MCNC: NEGATIVE MG/DL
LEUKOCYTE ESTERASE UR QL STRIP: NEGATIVE
MCH RBC QN AUTO: 28.9 PG (ref 26.5–33)
MCHC RBC AUTO-ENTMCNC: 34.3 G/DL (ref 31.5–36.5)
MCV RBC AUTO: 84 FL (ref 78–100)
NITRATE UR QL: NEGATIVE
PH UR STRIP: 5.5 [PH] (ref 5–7)
PLATELET # BLD AUTO: 284 10E3/UL (ref 150–450)
POTASSIUM SERPL-SCNC: 4.8 MMOL/L (ref 3.4–5.3)
PROT SERPL-MCNC: 6.4 G/DL (ref 6.4–8.3)
PROT/CREAT 24H UR: 1.75 MG/MG CR (ref 0–0.2)
PROT/CREAT 24H UR: 1.79 MG/MG CR (ref 0–0.2)
PTH-INTACT SERPL-MCNC: 23 PG/ML (ref 15–65)
RBC # BLD AUTO: 4.4 10E6/UL (ref 4.4–5.9)
RBC #/AREA URNS AUTO: NORMAL /HPF
SKIP: ABNORMAL
SODIUM SERPL-SCNC: 139 MMOL/L (ref 135–145)
SP GR UR STRIP: 1.01 (ref 1–1.03)
UROBILINOGEN UR STRIP-MCNC: NORMAL MG/DL
WBC # BLD AUTO: 7.2 10E3/UL (ref 4–11)
WBC #/AREA URNS AUTO: NORMAL /HPF

## 2024-10-22 PROCEDURE — 83970 ASSAY OF PARATHORMONE: CPT

## 2024-10-22 PROCEDURE — 85027 COMPLETE CBC AUTOMATED: CPT

## 2024-10-22 PROCEDURE — 99214 OFFICE O/P EST MOD 30 MIN: CPT | Performed by: INTERNAL MEDICINE

## 2024-10-22 PROCEDURE — 84156 ASSAY OF PROTEIN URINE: CPT

## 2024-10-22 PROCEDURE — G2211 COMPLEX E/M VISIT ADD ON: HCPCS | Performed by: INTERNAL MEDICINE

## 2024-10-22 PROCEDURE — 81001 URINALYSIS AUTO W/SCOPE: CPT

## 2024-10-22 PROCEDURE — 84156 ASSAY OF PROTEIN URINE: CPT | Performed by: INTERNAL MEDICINE

## 2024-10-22 PROCEDURE — 36415 COLL VENOUS BLD VENIPUNCTURE: CPT

## 2024-10-22 PROCEDURE — 80053 COMPREHEN METABOLIC PANEL: CPT

## 2024-10-22 RX ORDER — TACROLIMUS 1 MG/1
CAPSULE ORAL
Qty: 180 CAPSULE | Refills: 3 | Status: SHIPPED | OUTPATIENT
Start: 2024-10-22 | End: 2024-10-24

## 2024-10-22 NOTE — PATIENT INSTRUCTIONS
It was a pleasure taking care of you today.  I've included a brief summary of our discussion and care plan from today's visit below.  Please review this information with your primary care provider.     My recommendations are summarized as follows:  -increase tacrolimus to 2 mg twice daily with repeat labs in 4 weeks    Who do I call with any questions after my visit?  Please be in touch if there are any further questions that arise following today's visit.  There are multiple ways to contact your nephrology care team.       During business hours, you may reach your Nephrology Care Team or schedule or reschedule an appointment or lab at [phone].       If you need to schedule imaging, please call [phone].   To schedule a COVID test, please call [phone].     You can always send a secure message through [employer]. [employer] messages are answered by your nurse or doctor typically within 24-48 hours. Please allow extra time on weekends and holidays.       For urgent/emergent questions after business hours, you may reach the on-call Nephrology Fellow by contacting the [facility]  at [phone].     How will I get the results of any tests ordered?    You will receive all of your results.  If you have signed up for [employer], any tests ordered at your visit will be available to you once resulted on "[employer]t. Typically the physician reviews them and may or may not make further recommendations. If there are urgent results that require a change in your care plan, your physician or nurse will call you to discuss the next steps. If you are not on [employer], a letter may be generated and mailed to you with your results.

## 2024-10-22 NOTE — PROGRESS NOTES
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 He was started on steroids 8/23/16. Remained on steroids 100 mg x several months. This was complicated by significant abdominal striae and significant acne.    His proteinuria was down to 1-2 grams then at 0.7g, but unfortunately when he was down to 10mg prednisone, his proteinuria increased back to 5 grams in Jan 2018 along with with low albumin and swelling.      He went back on Pred at 100 mg/day with a surprizing transient dip in protenuria to 1.5g in March, but eventually proteinuria decreased and his UPCR < 3 starting in April 2018 then at 1.24 g/g on 11/1/18.    He was seen on 11/29/18 at which time, he was initiated on a tapering schedule of prednisone, as he was in partial remission on therapy (UPCR < 1).  Prednisone taper was started in Dec 2018  He had a relapse of proteinuria in February 2019, prompting a halting of the prednisone taper and initiation of Tacrolimus at 0.5 mg twice daily  Tacrolimus was progressively increased to 2 mg twice daily starting on April 19, 2019, with documentation of trough levels in the therapeutic range.      He was enrolled in the study of MCP-1 receptor blocker [id number] June 24, 2019.  Day #1 of study medication 7/16/19  Completed the study in Feb 2020    His tacrolimus dose was decreased to 2mg/1 mg on January 6, 2022 till May 2024, when his dose was cut further to tacrolimus 1mg/1mg.    He is also on farxiga 10 mg daily. He is on lisinopril 40 mg daily. He is complaint with medications. He is not checking his BP at home.    Overall, he has been doing fine. His weight has been stable. He has not lose any weight.     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     Wt Readings from Last 4 Encounters:    10/08/24 125.6 kg (277 lb)   07/23/24 125.7 kg (277 lb 1.6 oz)   07/23/24 126.6 kg (279 lb)   06/05/24 126.8 kg (279 lb 8 oz)     Allergies   Allergen Reactions    Amoxicillin      Other reaction(s): EENT - other (explain in comments), SKIN - rash  Rash and lip swelling after 8 days of Amoxicillin    Erythromycin     Penicillins        Current Outpatient Medications   Medication Sig Dispense Refill    dapagliflozin (FARXIGA) 10 MG TABS tablet Take 1 tablet (10 mg) by mouth daily 90 tablet 1    lisinopril (ZESTRIL) 20 MG tablet TAKE 2 TABLETS BY MOUTH EVERY MORNING 90 tablet 11    metFORMIN (GLUCOPHAGE XR) 500 MG 24 hr tablet Take 2 tablets (1,000 mg) by mouth daily (with dinner) 180 tablet 3    rosuvastatin (CRESTOR) 5 MG tablet Take 1 tablet (5 mg) by mouth daily. 90 tablet 11    tacrolimus (GENERIC EQUIVALENT) 1 MG capsule Take 1 tablet in the moring and 1 tablet (1 mg) in the evening 180 capsule 3     No current facility-administered medications for this visit.     Past Medical History:   Diagnosis Date    Chronic kidney disease        Past Surgical History:   Procedure Laterality Date    BIOPSY  2016    renal     Social History     Tobacco Use    Smoking status: Never    Smokeless tobacco: Never   Vaping Use    Vaping status: Never Used   Substance Use Topics    Alcohol use: Yes     Comment: social    Drug use: No       Family History   Problem Relation Age of Onset    No Known Problems Brother     Heart Disease Maternal Grandmother     Lung Cancer Maternal Grandfather     Chronic Obstructive Pulmonary Disease Paternal Grandmother     Lung Cancer Paternal Grandfather        ROS: A 12 system review of systems was negative other than noted here or above.     Exam:  There were no vitals taken for this visit.    BP Readings from Last 6 Encounters:   07/23/24 115/75   06/05/24 114/75   05/28/24 112/76   02/06/24 126/84   11/09/23 124/79   09/08/23 113/72     Wt Readings from Last 4 Encounters:   10/08/24 125.6 kg  (277 lb)   07/23/24 125.7 kg (277 lb 1.6 oz)   07/23/24 126.6 kg (279 lb)   06/05/24 126.8 kg (279 lb 8 oz)       GENERAL APPEARANCE: alert and no distress  EYES: PERRL  HENT: mouth without ulcers or lesions  NECK: supple, no adenopathy  RESP: lungs clear to auscultation - no rales, rhonchi or wheezes  CV: regular rhythm, normal rate, no rub   ABDOMEN:  soft, nontender, no HSM or masses and bowel sounds normal  MS: extremities normal- no gross deformities noted, no evidence of inflammation in joints, no muscle tenderness  SKIN: no rash  NEURO: Normal strength and tone, sensory exam grossly normal, mentation intact and speech normal  PSYCH: mentation appears normal. and affect normal/bright  No LE edema    Results:reviewed in details with the patient    Assessment/Plan:  #1 Biopsy Proven FSGS/ possible relapse  #2 Obesity  #3 Immunosuppression  #4 Elevated fasting glucose  #5 Intermittent abdominal pain/constipation  #6 vitamin D deficiency    Diagnosed in Summer 2016, where he presented with nephrotic range proteinuria. His disease is Steroid responsive and dependent, so he was started on tacrolimus 2 mg bid (April 2019-January 2022), tapered to Tacrolimus 2/1 mg (January 2022-May 2024) and tacrolimus 1mg/1mg (May 2024-July 2024).    He was enrolled in the study of MCP-1 receptor blocker [id number]  July 2019-Feb 2020  His UPCR is further up today to 1.75 g/g from 0.4 last year and his creatinine is at 1.09 mg/dl.  -we discussed the risk of relapse in FSGS. It is approximately 25 to 35 % after a complete remission and in more than 50 percent of patients with a partial remission.  -Will incraese tacrolimus to 2 mg twice daily for now and repeat renal panel, tacrolimus trough and UPCR in 4 weeks; if there is a trend for worsening proteinuria, will add prednisone.  Ethan is concerned about the high-dose steroids as it caused him several side effects in the past.  We will do a smaller dose and probably over a shorter  period of time.  -BP at goal; Will continue lisinopril 40 mg daily.   -will continue farxiga 10 mg daily.  -Advised to lose weight  -Labs in 1 month    The total time of this encounter amounted to 32 minutes on the day of the encounter 10/22/2024. This time included face to face time spent with the patient, preparatory work and chart review, ordering tests, and performing post visit documentation.    The longitudinal plan of care for this patient was addressed during this visit. Due to the added complexity in care, I will continue to support the patient with subsequent management of this condition(s) and with the ongoing continuity of care of this condition(s).     *This dictation was prepared in part using Dragon recognition software.  As a result errors may occur. When identified these transcription errors have been corrected.  While every attempt is made to correct errors during dictation, errors may still exist.     Myrna Caicedo MD   [facility]   Department of Medicine   Division of Renal Disease and Hypertension

## 2024-10-22 NOTE — NURSING NOTE
Ethan Trevino's goals for this visit include:   Chief Complaint   Patient presents with    RECHECK       He requests these members of his care team be copied on today's visit information: no     PCP: Ac Treviño    Referring Provider:  Ac Treviño DO  [address] [place] [address]    /83 (BP Location: Right arm, Patient Position: Chair, Cuff Size: Adult Large)   Pulse 66   Wt 121.5 kg (267 lb 12.8 oz)   SpO2 99%   BMI 34.38 kg/m      Do you need any medication refills at today's visit? Yes     KEYONNA Mccoy   Neph/Pulm [facility]       I have spoken with Francheska with [facility] and Reina(and her mom/dad) about Ms Ayala's status.  She is holding onto fluid and has been SOB.  She's taking BP meds but BP still high. Lab(8/7)-showed K+5.8, GFR at 14, H/H at 7/25%. I have also spoken with Dr Blank who is in agreement with gameplan-pt needs urgent eval in ER and plan to admit for dialysis/txment.Daughter and  will get her into the ER this afternoon.  I have spoken with Dr Crockett in ER who will be on look out for pt.

## 2024-10-24 ENCOUNTER — TELEPHONE (OUTPATIENT)
Dept: NEPHROLOGY | Facility: CLINIC | Age: 29
End: 2024-10-24
Payer: COMMERCIAL

## 2024-10-24 ENCOUNTER — PATIENT OUTREACH (OUTPATIENT)
Dept: CARE COORDINATION | Facility: CLINIC | Age: 29
End: 2024-10-24
Payer: COMMERCIAL

## 2024-10-24 DIAGNOSIS — N05.1 FSGS (FOCAL SEGMENTAL GLOMERULOSCLEROSIS): ICD-10-CM

## 2024-10-24 RX ORDER — TACROLIMUS 1 MG/1
CAPSULE ORAL
Qty: 360 CAPSULE | Refills: 3 | Status: SHIPPED | OUTPATIENT
Start: 2024-10-24 | End: 2024-10-29

## 2024-10-24 NOTE — TELEPHONE ENCOUNTER
[facility] Call Center    Phone Message    May a detailed message be left on voicemail: no     Reason for Call: Other: Pharmacy called stating that they need clarification on dosage for the patients tacrolimus (GENERIC EQUIVALENT) 1 MG capsule. Please call pharmacy back to discuss.     Action Taken: Other: [facility]    Travel Screening: Not Applicable     Date of Service:

## 2024-10-24 NOTE — TELEPHONE ENCOUNTER
Attempted to reach pharmacy. [facility] phone tree stated that location does not have pharmacy. In reviewing prescription, it appears that they may have question on the quantity sent. Sent new prescription.

## 2024-10-28 ENCOUNTER — TELEPHONE (OUTPATIENT)
Dept: NEPHROLOGY | Facility: CLINIC | Age: 29
End: 2024-10-28
Payer: COMMERCIAL

## 2024-10-28 DIAGNOSIS — R73.9 HYPERGLYCEMIA: ICD-10-CM

## 2024-10-28 NOTE — TELEPHONE ENCOUNTER
[facility] Call Center    Phone Message    May a detailed message be left on voicemail: no    Reason for Call: Medication Question or concern regarding medication   Prescription Clarification  Name of Medication: tacrolimus (GENERIC EQUIVALENT) 1 MG capsule  Prescribing Provider: Marifer   Pharmacy: [facility]    What on the order needs clarification? [facility] pharmacy called and is looking for clarification on a medication. Wondering if it is supposed to be capsules or tablets. Please call pharmacy to further discuss.   The general line will connect you to pharmacy   [phone]   Action Taken: Other: Nephrology    Travel Screening: Not Applicable

## 2024-10-28 NOTE — TELEPHONE ENCOUNTER
Per Dr. Caicedo last office visit note:  -increase tacrolimus to 2 mg twice daily with repeat labs in 4 weeks     Informed pharmacy. He had received capsules previously. They will continue to dispense the capsules for him.

## 2024-10-29 ENCOUNTER — MYC REFILL (OUTPATIENT)
Dept: NEPHROLOGY | Facility: CLINIC | Age: 29
End: 2024-10-29
Payer: COMMERCIAL

## 2024-10-29 DIAGNOSIS — N05.1 FSGS (FOCAL SEGMENTAL GLOMERULOSCLEROSIS): ICD-10-CM

## 2024-10-29 RX ORDER — TACROLIMUS 1 MG/1
CAPSULE ORAL
Qty: 360 CAPSULE | Refills: 3 | Status: SHIPPED | OUTPATIENT
Start: 2024-10-29

## 2024-10-29 RX ORDER — METFORMIN HYDROCHLORIDE 500 MG/1
1000 TABLET, EXTENDED RELEASE ORAL
Qty: 180 TABLET | Refills: 3 | Status: SHIPPED | OUTPATIENT
Start: 2024-10-29

## 2024-10-29 NOTE — TELEPHONE ENCOUNTER
Reviewed patient's note and patient requesting follow up to pharmacy to get filled.  Called Bee at [facility] who updated that patient's insurance is requiring to go thru [facility] Mail order pharmacy, retail cannot fill.  Bee updated that they transferred the script to [facility] on 10/25th 2pm.  New script forwarded to correct pharmacy for our records and confirm script received by [facility] pharmacy.  Provider updated in order encounter.  Updated patient via [facility] to change in pharmacy requirements.  Vikki Sepulveda RN, BSN, PHN  [facility] Nephrology Nurse Navigator  [phone]

## 2024-11-07 ENCOUNTER — OFFICE VISIT (OUTPATIENT)
Dept: ORTHOPEDICS | Facility: CLINIC | Age: 29
End: 2024-11-07
Attending: PEDIATRICS
Payer: COMMERCIAL

## 2024-11-07 ENCOUNTER — TELEPHONE (OUTPATIENT)
Dept: ORTHOPEDICS | Facility: CLINIC | Age: 29
End: 2024-11-07

## 2024-11-07 ENCOUNTER — PRE VISIT (OUTPATIENT)
Dept: ORTHOPEDICS | Facility: CLINIC | Age: 29
End: 2024-11-07

## 2024-11-07 VITALS — BODY MASS INDEX: 30.56 KG/M2 | WEIGHT: 251 LBS | HEIGHT: 76 IN

## 2024-11-07 DIAGNOSIS — M94.262 CHONDROMALACIA OF KNEE, LEFT: ICD-10-CM

## 2024-11-07 DIAGNOSIS — M94.262 CHONDROMALACIA OF KNEE, LEFT: Primary | ICD-10-CM

## 2024-11-07 PROCEDURE — 99204 OFFICE O/P NEW MOD 45 MIN: CPT | Performed by: ORTHOPAEDIC SURGERY

## 2024-11-07 NOTE — TELEPHONE ENCOUNTER
Phoned patient to schedule surgery with Dr Nguyen. I left him my direct number to call back at his convenience. [phone]

## 2024-11-07 NOTE — TELEPHONE ENCOUNTER
Procedure: Scope, chondroplasty and loose body removal.  Facility: [facility]  Length: 60 minutes  Anesthesia: Choice  Post-op appointments needed: 2 weeks provider only, 6 weeks with provider only.  Surgery packet/instructions given to patient?  Yes     Pre-Operative Teaching Flowsheet     Person(s) involved in teaching: Patient     Motivation Level:  Receptive (willing/able to accept information) and asks appropriate questions where applicable: Yes  Any cultural factors/Taoist beliefs that may influence understanding or compliance? No     Patient demonstrates understanding of the following:  Pre-operative planning, including the necessary appointments and preparation needed prior to surgery: Yes  Which situations necessitate calling provider and whom to contact: Yes  Pain management techniques pre and post op: Yes  How, and when, to access community resources: Yes    Who will drive and stay/ with patient after surgery: Wife  Pre-op exam [facility]  PT to be completed at          Additional Teaching Concerns Addressed:   Post-operative living arrangements and necessary adaptations to living environment.     Instructional Materials Used/Given: Yes, pre-op packet given including forms for Your surgery day, preparing for surgery, showering before surgery, Stop light tool introduced, Opioid pain medication guideline, pre-op physical form, and map  Patient expressed understanding of all forms given, questions were answered and will review in more detail at home.     Time spent with patient: 20 minutes.

## 2024-11-07 NOTE — NURSING NOTE
Reason For Visit:   Chief Complaint   Patient presents with    Consult     Left knee consult, chondromalacia        ?  No  Occupation. [employer]   Currently working? Yes.  Work status?  Full time.  Date of injury: initial injury ~15yrs ago, worsened over last year  Type of injury: sudden pain in knee when running then collapsed, last year was gradual increase without cause.  Date of surgery: na  Type of surgery: na.  Smoker: No  Request smoking cessation information: No    Sane Score  Left knee - Affected  Left Knee- 60  Right Knee- 100        Nirmal Hillman, ATC

## 2024-11-07 NOTE — LETTER
11/7/2024      Ethan Trevino  [address]  [facility] [address]      Dear Colleague,    Thank you for referring your patient, Ethan Trevino, to the [facility]. Please see a copy of my visit note below.    CHIEF CONCERN: Left knee pain    HISTORY:   Very pleasant 29-year-old male.  Presents to see me today for his left knee.  He describes pain since being a teenager.  Was running.  Felt a pop.  Unable to plan to sports.  Has largely ignored and given up running over the last 10 to 15 years.  No specific intervention or surgery was ever pursued.  Cannot do the things that he wants to do now.  Bothers him on a daily basis.  Has tried extensive physical therapy, and a inflammatories and activity modification without lasting benefit.    PAST MEDICAL HISTORY: (Reviewed with the patient and in the [facility] medical record)  None    PAST SURGICAL HISTORY: (Reviewed with the patient and in the [facility] medical record)  No knee surgery    MEDICATIONS: (Reviewed with the patient and in the [facility] medical record)      ALLERGIES: (Reviewed with the patient and in the [facility] medical record)  Multiple as listed in [facility]      SOCIAL HISTORY: (Reviewed with the patient and in the medical record)  --Tobacco: Non-smoker  --Occupation: In a masters program  --Avocation/Sport: Hiking running, outdoor activities    FAMILY HISTORY: (Reviewed with the patient and in the medical record)  -- No family history of bleeding, clotting, or difficulty with anesthesia    REVIEW OF SYSTEMS: (Reviewed with the patient and on the health intake form)  -- A comprehensive 10 point review of systems was conducted and is negative except as noted in the HPI      EXAM:     General: Awake, Alert and Oriented, No acute Distress. Articulate and Interactive    Body mass index is 30.96 kg/m .    Left lower extremity :  Skin is Warm and Well perfused, no suggestion of infection  Positive medial joint line tenderness  Less  tenderness laterally  Stable to varus valgus stress testing  Lachman 0, no pivot shift  1 quadrant medial lateral translation of patella  EHL/FHL/TA/GS 5/5  Sensation intact L3-S1  2+ Dorsalis Pedis Pulse        IMAGING:    Radiographs of the left knee from 7/23/2024 were independently reviewed by me and findings were discussed with the patient today. The imaging demonstrates no fractures dislocations well-preserved joint spaces.    MRI of the left knee from 9/12/2024  were independently reviewed by me and findings were discussed with the patient today. The imaging demonstrates high-grade cartilage defect of the medial femoral condyle with unstable cartilage flaps and a loose body in the anterior aspect of the knee.          ASSESSMENT:  Unstable osteochondral lesion with fissures and unstable flaps medial femoral condyle left knee    PLAN:  I long discussion with the patient.  Reviewed the diagnosis but of treatment options.  I discussed with her my thoughts regarding cartilage reconstruction.  I discussed with him a staged approach.  The surgical plan will be examination under anesthesia, knee arthroscopy, chondroplasty and staging for future cartilage surgery.  The patient did well then no further intervention be necessary.  If he fails to see last improvement I would likely recommend osteochondral allograft transplantation however we would use the surgery to define a specific plan going forward.      Again, thank you for allowing me to participate in the care of your patient.        Sincerely,        Johan Nguyen MD

## 2024-11-07 NOTE — PROGRESS NOTES
CHIEF CONCERN: Left knee pain    HISTORY:   Very pleasant 29-year-old male.  Presents to see me today for his left knee.  He describes pain since being a teenager.  Was running.  Felt a pop.  Unable to plan to sports.  Has largely ignored and given up running over the last 10 to 15 years.  No specific intervention or surgery was ever pursued.  Cannot do the things that he wants to do now.  Bothers him on a daily basis.  Has tried extensive physical therapy, and a inflammatories and activity modification without lasting benefit.    PAST MEDICAL HISTORY: (Reviewed with the patient and in the [facility] medical record)  None    PAST SURGICAL HISTORY: (Reviewed with the patient and in the [facility] medical record)  No knee surgery    MEDICATIONS: (Reviewed with the patient and in the [facility] medical record)      ALLERGIES: (Reviewed with the patient and in the [facility] medical record)  Multiple as listed in [facility]      SOCIAL HISTORY: (Reviewed with the patient and in the medical record)  --Tobacco: Non-smoker  --Occupation: In a masters program  --Avocation/Sport: Hiking running, outdoor activities    FAMILY HISTORY: (Reviewed with the patient and in the medical record)  -- No family history of bleeding, clotting, or difficulty with anesthesia    REVIEW OF SYSTEMS: (Reviewed with the patient and on the health intake form)  -- A comprehensive 10 point review of systems was conducted and is negative except as noted in the HPI    EXAM:     General: Awake, Alert and Oriented, No acute Distress. Articulate and Interactive    Body mass index is 30.96 kg/m .    Left lower extremity :  Skin is Warm and Well perfused, no suggestion of infection  Positive medial joint line tenderness  Less tenderness laterally  Stable to varus valgus stress testing  Lachman 0, no pivot shift  1 quadrant medial lateral translation of patella  EHL/FHL/TA/GS 5/5  Sensation intact L3-S1  2+ Dorsalis Pedis Pulse    IMAGING:    Radiographs of the left knee from 7/23/2024  were independently reviewed by me and findings were discussed with the patient today. The imaging demonstrates no fractures dislocations well-preserved joint spaces.    MRI of the left knee from 9/12/2024  were independently reviewed by me and findings were discussed with the patient today. The imaging demonstrates high-grade cartilage defect of the medial femoral condyle with unstable cartilage flaps and a loose body in the anterior aspect of the knee.    ASSESSMENT:  Unstable osteochondral lesion with fissures and unstable flaps medial femoral condyle left knee    PLAN:  I long discussion with the patient.  Reviewed the diagnosis but of treatment options.  I discussed with her my thoughts regarding cartilage reconstruction.  I discussed with him a staged approach.  The surgical plan will be examination under anesthesia, knee arthroscopy, chondroplasty and staging for future cartilage surgery.  The patient did well then no further intervention be necessary.  If he fails to see last improvement I would likely recommend osteochondral allograft transplantation however we would use the surgery to define a specific plan going forward.

## 2024-11-07 NOTE — TELEPHONE ENCOUNTER
Patient is scheduled for surgery with Dr. Nguyen    Spoke with: Patient    Date of Surgery: 12/13/24    Location: [facility]    Post op: 1 & 6 weeks (pt preferred early morning)    Pre op with Provider: Complete    H&P: Will schedule with PCPGELY     Additional imaging/appointments: N/A    Surgery packet: Received in clinic     Additional comments: N/A        Janey Ford MA on 11/7/2024 at 1:26 PM

## 2024-11-20 ENCOUNTER — TELEPHONE (OUTPATIENT)
Dept: ORTHOPEDICS | Facility: CLINIC | Age: 29
End: 2024-11-20
Payer: COMMERCIAL

## 2024-11-20 NOTE — TELEPHONE ENCOUNTER
Patient confirmed scheduled appointment:     Date: 12/19/24  Time: 310am  Visit type: post op msk  Visit mode: In Person  Provider:  Dr. Johan Nguyen  Location:     Additional Notes:

## 2024-12-03 ENCOUNTER — TELEPHONE (OUTPATIENT)
Dept: ORTHOPEDICS | Facility: CLINIC | Age: 29
End: 2024-12-03
Payer: COMMERCIAL

## 2024-12-03 NOTE — TELEPHONE ENCOUNTER
Phoned patient to offer to move up the date of his surgery due to a cancellation (12/13/24 to 12/6/24). I left him my direct number to call back as soon as he is able.

## 2024-12-03 NOTE — TELEPHONE ENCOUNTER
Other: Patient called just now, he is going to keep his surgery date for 12/13/2024.      Could we send this information to you in [facility] or would you prefer to receive a phone call?:   Patient would prefer a phone call   Okay to leave a detailed message?: Yes at Cell number on file:    Telephone Information:   Mobile [phone]

## 2024-12-05 ENCOUNTER — OFFICE VISIT (OUTPATIENT)
Dept: FAMILY MEDICINE | Facility: CLINIC | Age: 29
End: 2024-12-05
Attending: FAMILY MEDICINE
Payer: COMMERCIAL

## 2024-12-05 VITALS
SYSTOLIC BLOOD PRESSURE: 126 MMHG | WEIGHT: 272 LBS | HEIGHT: 75 IN | OXYGEN SATURATION: 98 % | RESPIRATION RATE: 16 BRPM | HEART RATE: 85 BPM | TEMPERATURE: 97.7 F | BODY MASS INDEX: 33.82 KG/M2 | DIASTOLIC BLOOD PRESSURE: 82 MMHG

## 2024-12-05 DIAGNOSIS — M94.262 CHONDROMALACIA OF KNEE, LEFT: Primary | ICD-10-CM

## 2024-12-05 DIAGNOSIS — Z01.818 PREOP GENERAL PHYSICAL EXAM: ICD-10-CM

## 2024-12-05 SDOH — HEALTH STABILITY: PHYSICAL HEALTH: ON AVERAGE, HOW MANY DAYS PER WEEK DO YOU ENGAGE IN MODERATE TO STRENUOUS EXERCISE (LIKE A BRISK WALK)?: 0 DAYS

## 2024-12-05 ASSESSMENT — PAIN SCALES - GENERAL: PAINLEVEL_OUTOF10: NO PAIN (0)

## 2024-12-05 ASSESSMENT — SOCIAL DETERMINANTS OF HEALTH (SDOH): HOW OFTEN DO YOU GET TOGETHER WITH FRIENDS OR RELATIVES?: TWICE A WEEK

## 2024-12-05 NOTE — PROGRESS NOTES
Preoperative Evaluation  [facility]  [address] [place], [place] [address]E [address]  [address]  [facility] [address]  Phone: [phone]  Fax: [phone]  Primary Provider: Ac Treviño DO  Pre-op Performing Provider: Ac Treviño DO  Dec 5, 2024             12/5/2024   Surgical Information   What procedure is being done? Examination under anesthesia, knee arthroscopy, chondroplasty and loose body removal and staging for future cartilage surgery    Facility or Hospital where procedure/surgery will be performed: [facility]    Who is doing the procedure / surgery? Johan Nguyen MD    Date of surgery / procedure: 12/13/2024    Time of surgery / procedure: 0715    Where do you plan to recover after surgery? at home with family        Patient-reported     Fax number for surgical facility: Note does not need to be faxed, will be available electronically in Epic.    Assessment & Plan     The proposed surgical procedure is considered LOW risk.    Chondromalacia of knee, left  Preop general physical exam  Low risk for low risk procedure.    Hold metformin day of surgery, restart day after      - No identified additional risk factors other than previously addressed    Recommendation  Approval given to proceed with proposed procedure, without further diagnostic evaluation.    Bradley Long is a 29 year old, presenting for the following:  Physical          12/5/2024     8:28 AM   Additional Questions   Roomed by Carito MONREAL     HPI related to upcoming procedure: left knee scope needed   Loose body in knee         12/5/2024   Pre-Op Questionnaire   Have you ever had a heart attack or stroke? No    Have you ever had surgery on your heart or blood vessels, such as a stent placement, a coronary artery bypass, or surgery on an artery in your head, neck, heart, or legs? No    Do you have chest pain with activity? No    Do you have a history of heart failure? No    Do you currently have a cold, bronchitis or symptoms  of other infection? No    Do you have a cough, shortness of breath, or wheezing? No    Do you or anyone in your family have previous history of blood clots? No    Do you or does anyone in your family have a serious bleeding problem such as prolonged bleeding following surgeries or cuts? No    Have you ever had problems with anemia or been told to take iron pills? (!) YES 12.7 hgb 10/22/24     Have you had any abnormal blood loss such as black, tarry or bloody stools? No    Have you ever had a blood transfusion? No    Are you willing to have a blood transfusion if it is medically needed before, during, or after your surgery? Yes    Have you or any of your relatives ever had problems with anesthesia? No    Do you have sleep apnea, excessive snoring or daytime drowsiness? No    Do you have any artifical heart valves or other implanted medical devices like a pacemaker, defibrillator, or continuous glucose monitor? No    Do you have artificial joints? No    Are you allergic to latex? No        Patient-reported     Health Care Directive  Patient does not have a Health Care Directive:  FULL CODE     Preoperative Review of    reviewed - no record of controlled substances prescribed.    Patient Active Problem List    Diagnosis Date Noted    Chondromalacia of knee, left 11/07/2024     Priority: Medium    FSGS (focal segmental glomerulosclerosis) 09/15/2016     Priority: Medium    Nephrotic syndrome 09/15/2016     Priority: Medium     Biopsy 8/206, FSGS, started prednisone 8/23/2016        Past Medical History:   Diagnosis Date    Chronic kidney disease      Past Surgical History:   Procedure Laterality Date    BIOPSY  2016    renal     Current Outpatient Medications   Medication Sig Dispense Refill    dapagliflozin (FARXIGA) 10 MG TABS tablet Take 1 tablet (10 mg) by mouth daily 90 tablet 1    lisinopril (ZESTRIL) 20 MG tablet TAKE 2 TABLETS BY MOUTH EVERY MORNING (Patient taking differently: Take 40 mg by mouth daily.  "TAKE 2 TABLETS BY MOUTH EVERY MORNING) 90 tablet 11    metFORMIN (GLUCOPHAGE XR) 500 MG 24 hr tablet Take 2 tablets (1,000 mg) by mouth daily (with dinner). 180 tablet 3    rosuvastatin (CRESTOR) 5 MG tablet Take 1 tablet (5 mg) by mouth daily. 90 tablet 11    tacrolimus (GENERIC EQUIVALENT) 1 MG capsule Take 2 tablets in the moring and 2 tablet (2 mg) in the evening 360 capsule 3       Allergies   Allergen Reactions    Amoxicillin      Other reaction(s): EENT - other (explain in comments), SKIN - rash  Rash and lip swelling after 8 days of Amoxicillin    Erythromycin     Penicillins         Social History     Tobacco Use    Smoking status: Never    Smokeless tobacco: Never   Substance Use Topics    Alcohol use: Yes     Comment: social     History   Drug Use No          Objective    /82 (BP Location: Right arm, Patient Position: Sitting, Cuff Size: Adult Large)   Pulse 85   Temp 97.7  F (36.5  C) (Temporal)   Resp 16   Ht 1.905 m (6' 3\")   Wt 123.4 kg (272 lb)   SpO2 98%   BMI 34.00 kg/m     Estimated body mass index is 34 kg/m  as calculated from the following:    Height as of this encounter: 1.905 m (6' 3\").    Weight as of this encounter: 123.4 kg (272 lb).  Physical Exam  Constitutional:       General: He is not in acute distress.     Appearance: Normal appearance.   HENT:      Head: Normocephalic.      Right Ear: Tympanic membrane, ear canal and external ear normal.      Left Ear: Tympanic membrane, ear canal and external ear normal.      Mouth/Throat:      Mouth: Mucous membranes are moist.      Pharynx: No oropharyngeal exudate or posterior oropharyngeal erythema.   Eyes:      General: No scleral icterus.  Cardiovascular:      Rate and Rhythm: Normal rate and regular rhythm.      Heart sounds: No murmur heard.  Pulmonary:      Effort: Pulmonary effort is normal. No respiratory distress.      Breath sounds: Normal breath sounds.   Lymphadenopathy:      Cervical: No cervical adenopathy. "   Neurological:      General: No focal deficit present.      Mental Status: He is alert.   Psychiatric:         Mood and Affect: Mood normal.         Behavior: Behavior normal.         Recent Labs   Lab Test 10/22/24  1149 07/23/24  1132 05/30/24  0728   HGB 12.7* 12.5*  --     377  --     138  --    POTASSIUM 4.8 4.9  --    CR 1.09 0.99  --    A1C  --   --  5.6        Diagnostics  No labs were ordered during this visit.   No EKG required for low risk surgery (cataract, skin procedure, breast biopsy, etc).    Revised Cardiac Risk Index (RCRI)  The patient has the following serious cardiovascular risks for perioperative complications:   - No serious cardiac risks = 0 points     RCRI Interpretation: 0 points: Class I (very low risk - 0.4% complication rate)         Signed Electronically by: Ac Treviño DO  A copy of this evaluation report is provided to the requesting physician.

## 2024-12-09 RX ORDER — LISINOPRIL 40 MG/1
TABLET ORAL
COMMUNITY
Start: 2024-11-29

## 2024-12-13 ENCOUNTER — HOSPITAL ENCOUNTER (OUTPATIENT)
Facility: AMBULATORY SURGERY CENTER | Age: 29
Discharge: HOME | End: 2024-12-13
Attending: ORTHOPAEDIC SURGERY
Payer: COMMERCIAL

## 2024-12-13 VITALS
OXYGEN SATURATION: 96 % | WEIGHT: 272 LBS | HEIGHT: 75 IN | SYSTOLIC BLOOD PRESSURE: 117 MMHG | BODY MASS INDEX: 33.82 KG/M2 | DIASTOLIC BLOOD PRESSURE: 79 MMHG | TEMPERATURE: 97.5 F | RESPIRATION RATE: 14 BRPM | HEART RATE: 68 BPM

## 2024-12-13 DIAGNOSIS — M94.262 CHONDROMALACIA OF KNEE, LEFT: ICD-10-CM

## 2024-12-13 LAB — GLUCOSE BLDC GLUCOMTR-MCNC: 97 MG/DL (ref 70–99)

## 2024-12-13 PROCEDURE — 29877 ARTHRS KNEE SURG DBRDMT/SHVG: CPT | Mod: LT | Performed by: ORTHOPAEDIC SURGERY

## 2024-12-13 PROCEDURE — 82962 GLUCOSE BLOOD TEST: CPT | Performed by: PATHOLOGY

## 2024-12-13 PROCEDURE — 29877 ARTHRS KNEE SURG DBRDMT/SHVG: CPT | Mod: LT

## 2024-12-13 RX ORDER — ONDANSETRON 4 MG/1
4 TABLET, ORALLY DISINTEGRATING ORAL EVERY 30 MIN PRN
Status: DISCONTINUED | OUTPATIENT
Start: 2024-12-13 | End: 2024-12-14 | Stop reason: HOSPADM

## 2024-12-13 RX ORDER — BUPIVACAINE HYDROCHLORIDE AND EPINEPHRINE 2.5; 5 MG/ML; UG/ML
INJECTION, SOLUTION INFILTRATION; PERINEURAL PRN
Status: DISCONTINUED | OUTPATIENT
Start: 2024-12-13 | End: 2024-12-13 | Stop reason: HOSPADM

## 2024-12-13 RX ORDER — AMOXICILLIN 250 MG
1-2 CAPSULE ORAL 2 TIMES DAILY
Qty: 30 TABLET | Refills: 0 | Status: SHIPPED | OUTPATIENT
Start: 2024-12-13

## 2024-12-13 RX ORDER — ONDANSETRON 4 MG/1
4 TABLET, ORALLY DISINTEGRATING ORAL
Status: CANCELLED | OUTPATIENT
Start: 2024-12-13

## 2024-12-13 RX ORDER — DEXAMETHASONE SODIUM PHOSPHATE 10 MG/ML
4 INJECTION, SOLUTION INTRAMUSCULAR; INTRAVENOUS
Status: DISCONTINUED | OUTPATIENT
Start: 2024-12-13 | End: 2024-12-14 | Stop reason: HOSPADM

## 2024-12-13 RX ORDER — OXYCODONE HYDROCHLORIDE 5 MG/1
5-10 TABLET ORAL EVERY 4 HOURS PRN
Qty: 15 TABLET | Refills: 0 | Status: SHIPPED | OUTPATIENT
Start: 2024-12-13

## 2024-12-13 RX ORDER — ONDANSETRON 2 MG/ML
4 INJECTION INTRAMUSCULAR; INTRAVENOUS EVERY 30 MIN PRN
Status: DISCONTINUED | OUTPATIENT
Start: 2024-12-13 | End: 2024-12-14 | Stop reason: HOSPADM

## 2024-12-13 RX ORDER — CEFAZOLIN SODIUM 2 G/50ML
2 SOLUTION INTRAVENOUS
Status: COMPLETED | OUTPATIENT
Start: 2024-12-13 | End: 2024-12-13

## 2024-12-13 RX ORDER — FENTANYL CITRATE 50 UG/ML
25 INJECTION, SOLUTION INTRAMUSCULAR; INTRAVENOUS EVERY 5 MIN PRN
Status: DISCONTINUED | OUTPATIENT
Start: 2024-12-13 | End: 2024-12-14 | Stop reason: HOSPADM

## 2024-12-13 RX ORDER — APREPITANT 40 MG/1
40 CAPSULE ORAL ONCE
Status: COMPLETED | OUTPATIENT
Start: 2024-12-13 | End: 2024-12-13

## 2024-12-13 RX ORDER — ACETAMINOPHEN 325 MG/1
650 TABLET ORAL EVERY 4 HOURS PRN
Qty: 50 TABLET | Refills: 0 | Status: SHIPPED | OUTPATIENT
Start: 2024-12-13

## 2024-12-13 RX ORDER — OXYCODONE HYDROCHLORIDE 5 MG/1
10 TABLET ORAL
Status: DISCONTINUED | OUTPATIENT
Start: 2024-12-13 | End: 2024-12-14 | Stop reason: HOSPADM

## 2024-12-13 RX ORDER — IBUPROFEN 600 MG/1
600 TABLET, FILM COATED ORAL EVERY 8 HOURS PRN
Qty: 30 TABLET | Refills: 0 | Status: SHIPPED | OUTPATIENT
Start: 2024-12-13

## 2024-12-13 RX ORDER — NALOXONE HYDROCHLORIDE 0.4 MG/ML
0.1 INJECTION, SOLUTION INTRAMUSCULAR; INTRAVENOUS; SUBCUTANEOUS
Status: DISCONTINUED | OUTPATIENT
Start: 2024-12-13 | End: 2024-12-14 | Stop reason: HOSPADM

## 2024-12-13 RX ORDER — FENTANYL CITRATE 50 UG/ML
25-50 INJECTION, SOLUTION INTRAMUSCULAR; INTRAVENOUS
Status: DISCONTINUED | OUTPATIENT
Start: 2024-12-13 | End: 2024-12-14 | Stop reason: HOSPADM

## 2024-12-13 RX ORDER — OXYCODONE HYDROCHLORIDE 5 MG/1
5 TABLET ORAL
Status: DISCONTINUED | OUTPATIENT
Start: 2024-12-13 | End: 2024-12-14 | Stop reason: HOSPADM

## 2024-12-13 RX ORDER — OXYCODONE HYDROCHLORIDE 5 MG/1
5 TABLET ORAL
Status: CANCELLED | OUTPATIENT
Start: 2024-12-13

## 2024-12-13 RX ORDER — NALOXONE HYDROCHLORIDE 0.4 MG/ML
0.2 INJECTION, SOLUTION INTRAMUSCULAR; INTRAVENOUS; SUBCUTANEOUS
Status: DISCONTINUED | OUTPATIENT
Start: 2024-12-13 | End: 2024-12-14 | Stop reason: HOSPADM

## 2024-12-13 RX ORDER — HYDROXYZINE HYDROCHLORIDE 25 MG/1
25 TABLET, FILM COATED ORAL
Status: CANCELLED | OUTPATIENT
Start: 2024-12-13

## 2024-12-13 RX ORDER — HYDROMORPHONE HYDROCHLORIDE 1 MG/ML
0.4 INJECTION, SOLUTION INTRAMUSCULAR; INTRAVENOUS; SUBCUTANEOUS EVERY 5 MIN PRN
Status: DISCONTINUED | OUTPATIENT
Start: 2024-12-13 | End: 2024-12-14 | Stop reason: HOSPADM

## 2024-12-13 RX ORDER — LIDOCAINE 40 MG/G
CREAM TOPICAL
Status: DISCONTINUED | OUTPATIENT
Start: 2024-12-13 | End: 2024-12-14 | Stop reason: HOSPADM

## 2024-12-13 RX ORDER — HYDROMORPHONE HYDROCHLORIDE 1 MG/ML
0.2 INJECTION, SOLUTION INTRAMUSCULAR; INTRAVENOUS; SUBCUTANEOUS EVERY 5 MIN PRN
Status: DISCONTINUED | OUTPATIENT
Start: 2024-12-13 | End: 2024-12-14 | Stop reason: HOSPADM

## 2024-12-13 RX ORDER — ACETAMINOPHEN 325 MG/1
975 TABLET ORAL ONCE
Status: DISCONTINUED | OUTPATIENT
Start: 2024-12-13 | End: 2024-12-14 | Stop reason: HOSPADM

## 2024-12-13 RX ORDER — ACETAMINOPHEN 325 MG/1
650 TABLET ORAL
Status: CANCELLED | OUTPATIENT
Start: 2024-12-13

## 2024-12-13 RX ORDER — ONDANSETRON 4 MG/1
4 TABLET, ORALLY DISINTEGRATING ORAL EVERY 8 HOURS PRN
Qty: 4 TABLET | Refills: 0 | Status: SHIPPED | OUTPATIENT
Start: 2024-12-13

## 2024-12-13 RX ORDER — NALOXONE HYDROCHLORIDE 0.4 MG/ML
0.4 INJECTION, SOLUTION INTRAMUSCULAR; INTRAVENOUS; SUBCUTANEOUS
Status: DISCONTINUED | OUTPATIENT
Start: 2024-12-13 | End: 2024-12-14 | Stop reason: HOSPADM

## 2024-12-13 RX ORDER — FENTANYL CITRATE 50 UG/ML
50 INJECTION, SOLUTION INTRAMUSCULAR; INTRAVENOUS EVERY 5 MIN PRN
Status: DISCONTINUED | OUTPATIENT
Start: 2024-12-13 | End: 2024-12-14 | Stop reason: HOSPADM

## 2024-12-13 RX ORDER — ALBUTEROL SULFATE 0.83 MG/ML
2.5 SOLUTION RESPIRATORY (INHALATION) EVERY 4 HOURS PRN
Status: DISCONTINUED | OUTPATIENT
Start: 2024-12-13 | End: 2024-12-14 | Stop reason: HOSPADM

## 2024-12-13 RX ORDER — ACETAMINOPHEN 325 MG/1
975 TABLET ORAL ONCE
Status: COMPLETED | OUTPATIENT
Start: 2024-12-13 | End: 2024-12-13

## 2024-12-13 RX ORDER — FLUMAZENIL 0.1 MG/ML
0.2 INJECTION, SOLUTION INTRAVENOUS
Status: DISCONTINUED | OUTPATIENT
Start: 2024-12-13 | End: 2024-12-14 | Stop reason: HOSPADM

## 2024-12-13 RX ORDER — CEFAZOLIN SODIUM 2 G/50ML
2 SOLUTION INTRAVENOUS SEE ADMIN INSTRUCTIONS
Status: DISCONTINUED | OUTPATIENT
Start: 2024-12-13 | End: 2024-12-14 | Stop reason: HOSPADM

## 2024-12-13 RX ADMIN — ACETAMINOPHEN 975 MG: 325 TABLET ORAL at 06:36

## 2024-12-13 RX ADMIN — APREPITANT 40 MG: 40 CAPSULE ORAL at 06:36

## 2024-12-13 NOTE — DISCHARGE INSTRUCTIONS
[facility]  Home Care Following Anesthesia  For 24 hours after surgery:  Get plenty of rest.  A responsible adult must stay with you for at least 24 hours after you leave the surgery center.  Do not drive or use heavy equipment.  If you have weakness or tingling, don't drive or use heavy equipment until this feeling goes away.   Do not drink alcohol.   Avoid strenuous or risky activities.  Ask for help when climbing stairs.  You may feel lightheaded.  IF so, sit for a few minutes before standing.  Have someone help you get up.   If you have nausea (feel sick to your stomach): Drink only clear liquids such as apple juice, ginger ale, broth or 7-Up.  Rest may also help.  Be sure to drink enough fluids.  Move to a regular diet as you feel able.   You may have a slight fever.  Call the doctor if your fever is over 100 F (37.7 C) (taken under the tongue) or lasts longer than 24 hours.  You may have a dry mouth, a sore throat, muscle aches or trouble sleeping. These should go away after 24 hours.  Do not make important or legal decisions.   It is recommended to avoid smoking.               Tips for taking pain medications  To get the best pain relief possible, remember these points:  Take pain medications as directed, before pain becomes severe.  Pain medication can upset your stomach: taking it with food may help.  Constipation is a common side effect of pain medication. Drink plenty of  fluids.  Eat foods high in fiber. Take a stool softener if recommended by your doctor or pharmacist.  Do not drink alcohol, drive or operate machinery while taking pain medications.  Ask about other ways to control pain, such as with heat, ice or relaxation.    Tylenol/Acetaminophen Consumption    If you feel your pain relief is insufficient, you may take Tylenol/Acetaminophen in addition to your narcotic pain medication.   Be careful not to exceed 4,000 mg of Tylenol/Acetaminophen in a 24 hour  period from all sources.  If you are taking extra strength Tylenol/acetaminophen (500 mg), the maximum dose is 8 tablets in 24 hours.  If you are taking regular strength acetaminophen (325 mg), the maximum dose is 12 tablets in 24 hours.    Call a doctor for any of the following:  Signs of infection (fever, growing tenderness at the surgery site, a large amount of drainage or bleeding, severe pain, foul-smelling drainage, redness, swelling).  It has been over 8 to 10 hours since surgery and you are still not able to urinate (pass water).  Headache for over 24 hours.  Numbness, tingling or weakness the day after surgery (if you had spinal anesthesia).  Signs of Covid-19 infection (temperature over 100 degrees, shortness of breath, cough, loss of taste/smell, generalized body aches, persistent headache, chills, sore throat, nausea/vomiting/diarrhea)  Your doctor is:       Dr. Johan Nguyen, Orthopaedics: [phone]               Or dial [phone] and ask for the resident on call for:  Orthopaedics  For emergency care, call the:  [facility]:  [phone] (TTY for hearing impaired: [phone])

## 2024-12-13 NOTE — OP NOTE
PREOPERATIVE DIAGNOSIS:   Left knee medial femoral condyle chondral defect    POSTOPERATIVE DIAGNOSIS:  Grade three 2 x 2 cm area of unstable cartilage with unstable cartilage flaps medial femoral condyle left knee    PROCEDURE:  Examination under anesthesia left knee  Left knee arthroscopy  Chondroplasty medial femoral condyle    DATE OF SURGERY: 12/13/2024    SURGEON: Johan Nguyen MD    ASSISTANT: Adriana Amanda PA-C. The assistance of Mrs. Amanda was necessary for positioning, arthroscopic visualization, retraction, and chondroplasty. There was no qualified available resident or fellow who was aware of the intricies of the procedure.     RESIDENT OR FELLOW: Nicole Lea MD    OPERATIVE INDICATIONS: Ethan Trevino is a pleasant 29 year old who I saw through my orthopedic clinic with a history, physical, imaging consistent with left knee pain and swelling with pain that isolates over the anteromedial joint line of the left knee.  It bothers him on a daily basis.  He cannot do the things that he wants to do.  He feels sufficiently limited.  I discussed with him my thoughts regarding his MRI.  We discussed cartilage reconstruction.  He desired to proceed..  I reviewed with the patient the risks, benefits, complications, techniques and alternatives to surgery.  We reviewed the expected course of recovery and the potential expected outcomes.  The patient understood both the risks and benefits and desired to proceed despite the risks.    OPERATIVE DETAILS: In the preoperative area the patient's informed consent was reviewed and they desired to proceed.  The left leg was marked and the patient was in agreement.  The patient was taken to the operating room where a timeout was performed and all parties were in agreement.  Preoperative antibiotics were given within 1 hour of the time of incision.  The patient was placed in the supine position and surrendered to LMA anesthesia.  No tourniquet was applied.  Egg  crate was placed beneath the well leg and a side post was utilized.  The operative leg was prepped and draped in the usual sterile fashion.     Examination Under Anesthesia:    Range of motion was 0 to 135 degrees.  Stable to varus valgus stress testing.  Stable anterior and posterior drawer testing.  No pivot shift.  Lachman 0.    Anteromedial and anterolateral scope portals repeated diagnostic arthroscopy was performed the following findings: Lateral femoral condyle, lateral tibial plateau was normal.  Lateral meniscus normal.  ACL PCL normal.  Medial tibial plateau normal.  Medial meniscus intact and stable to probing.  Grade 3 chondrosis 2 x 1 cm area of flexion zone of the medial femoral condyle.  Medial patella facet central ridge lateral patella facet central trochlea normal.  No loose bodies in suprapatellar pouch, medial gutter, lateral gutter.    Given that multiple unstable cartilage flaps were present in the medial femoral condyle the shaver was then introduced and a chondroplasty medial femoral condyle was performed until about stable rim of cartilage remained.    Copious irrigation was performed an a layered closure was initiated, sterile dressings were applied and the patient was transferred to the recovery room in stable condition with stable vital signs.    ESTIMATED BLOOD LOSS: 5 mL.    TOURNIQUET TIME: No tourniquet was placed.    COMPLICATIONS: None apparent.    DRAINS: None.    SPECIMENS: None.     POSTOPERATIVE PLAN:  Weightbearing as tolerated  Range of motion as tolerated  No sweating for 1 week.  No running jumping pounding sports for 6 weeks  If the patient does well then no further intervention is necessary  If the patient fails to see lasting improvement would consider osteochondral allograft transplantation to the medial femoral condyle plus or minus proximal tibial osteotomy  Obviously this is a big surgical undertaking 1 we would not want to come to lately we will wait for at least 4  to 6 months for full convalescence for this procedure before we would consider

## 2024-12-17 ENCOUNTER — THERAPY VISIT (OUTPATIENT)
Dept: PHYSICAL THERAPY | Facility: CLINIC | Age: 29
End: 2024-12-17
Attending: ORTHOPAEDIC SURGERY
Payer: COMMERCIAL

## 2024-12-17 DIAGNOSIS — M94.262 CHONDROMALACIA OF KNEE, LEFT: ICD-10-CM

## 2024-12-17 PROCEDURE — 97110 THERAPEUTIC EXERCISES: CPT | Mod: GP

## 2024-12-17 PROCEDURE — 97161 PT EVAL LOW COMPLEX 20 MIN: CPT | Mod: GP

## 2024-12-17 ASSESSMENT — ACTIVITIES OF DAILY LIVING (ADL)
PAIN: THE SYMPTOM AFFECTS MY ACTIVITY MODERATELY
HOW_WOULD_YOU_RATE_THE_OVERALL_FUNCTION_OF_YOUR_KNEE_DURING_YOUR_USUAL_DAILY_ACTIVITIES?: SEVERELY ABNORMAL
AS_A_RESULT_OF_YOUR_KNEE_INJURY,_HOW_WOULD_YOU_RATE_YOUR_CURRENT_LEVEL_OF_DAILY_ACTIVITY?: SEVERELY ABNORMAL
PLEASE_INDICATE_YOR_PRIMARY_REASON_FOR_REFERRAL_TO_THERAPY:: KNEE
AS_A_RESULT_OF_YOUR_KNEE_INJURY,_HOW_WOULD_YOU_RATE_YOUR_CURRENT_LEVEL_OF_DAILY_ACTIVITY?: SEVERELY ABNORMAL
HOW_WOULD_YOU_RATE_THE_CURRENT_FUNCTION_OF_YOUR_KNEE_DURING_YOUR_USUAL_DAILY_ACTIVITIES_ON_A_SCALE_FROM_0_TO_100_WITH_100_BEING_YOUR_LEVEL_OF_KNEE_FUNCTION_PRIOR_TO_YOUR_INJURY_AND_0_BEING_THE_INABILITY_TO_PERFORM_ANY_OF_YOUR_USUAL_DAILY_ACTIVITIES?: 5
KNEE_ACTIVITY_OF_DAILY_LIVING_SUM: 2
HOW_WOULD_YOU_RATE_THE_CURRENT_FUNCTION_OF_YOUR_KNEE_DURING_YOUR_USUAL_DAILY_ACTIVITIES_ON_A_SCALE_FROM_0_TO_100_WITH_100_BEING_YOUR_LEVEL_OF_KNEE_FUNCTION_PRIOR_TO_YOUR_INJURY_AND_0_BEING_THE_INABILITY_TO_PERFORM_ANY_OF_YOUR_USUAL_DAILY_ACTIVITIES?: 5
PAIN: THE SYMPTOM AFFECTS MY ACTIVITY MODERATELY
HOW_WOULD_YOU_RATE_THE_OVERALL_FUNCTION_OF_YOUR_KNEE_DURING_YOUR_USUAL_DAILY_ACTIVITIES?: SEVERELY ABNORMAL

## 2024-12-17 NOTE — PROGRESS NOTES
"PHYSICAL THERAPY EVALUATION  Type of Visit: Evaluation       Fall Risk Screen:  Fall screen completed by: PT  Have you fallen 2 or more times in the past year?: (Patient-Rptd) No  Have you fallen and had an injury in the past year?: (Patient-Rptd) No  Is patient a fall risk?: No    Subjective   Pt notes longstanding history of L knee pain of 14 years. He notes an incident when he was 15 years old collapsing during a run and \"living with it\" for this time. DOS of arthroscopy 12/13      Presenting condition or subjective complaint: (Patient-Rptd) knee surgery recovery  Date of onset:      Relevant medical history: (Patient-Rptd) High blood pressure; Kidney disease; Mental Illness   Dates & types of surgery:      Prior diagnostic imaging/testing results: (Patient-Rptd) MRI     Prior therapy history for the same diagnosis, illness or injury: (Patient-Rptd) Yes      Living Environment  Social support: (Patient-Rptd) With a significant other or spouse   Type of home: (Patient-Rptd) Apartment/condo   Stairs to enter the home: (Patient-Rptd) Yes       Ramp: (Patient-Rptd) No   Stairs inside the home: (Patient-Rptd) No       Help at home: (Patient-Rptd) None  Equipment owned: (Patient-Rptd) Crutches     Employment: (Patient-Rptd) Yes (Patient-Rptd) Manager  Hobbies/Interests: (Patient-Rptd) brewing, hiking,    Patient goals for therapy: (Patient-Rptd) walk, run, live normally       Objective   KNEE EVALUATION  PAIN: Pain Level at Rest: 2/10  Pain Level with Use: 2/10  Pain Location: knee  Pain Quality: Aching and Dull  Pain Frequency: constant  Pain is Worst: daytime  Pain is Exacerbated By: running, knee movement, prolonged immobility   Pain is Relieved By: NSAIDs, stretch, and use  POSTURE: WFL  GAIT:  Pt ambulating step through with crutches - still expressing PWB  ROM:   (Degrees) Left AROM Left PROM  Right AROM Right PROM   Knee Flexion 95      Knee Extension -10      STRENGTH:  Noted impairments to strength of R quad " and hamstring strength without pain   FUNCTIONAL TESTS:  STS: pt reliant on L LE strength for transfers  PALPATION:  Most notable tenderness over incision points, R VMO, and R hamstrings including R pes anserine attachment site    Assessment & Plan   CLINICAL IMPRESSIONS  Medical Diagnosis: (P) R knee pain s/p arthroscopic surgery    Treatment Diagnosis: (P) R knee pain s/p arthroscopic surgery   Impression/Assessment: Patient is a 29 year old male with R knee pain s/p arthroscopic surgery complaints.  The following significant findings have been identified: Pain, Decreased ROM/flexibility, Decreased joint mobility, Decreased strength, Impaired balance, Decreased proprioception, Edema, Impaired gait, Impaired muscle performance, and Decreased activity tolerance. These impairments interfere with their ability to perform self care tasks, work tasks, recreational activities, household chores, household mobility, and community mobility as compared to previous level of function.     Clinical Decision Making (Complexity):  Clinical Presentation: Evolving/Changing  Clinical Presentation Rationale: based on medical and personal factors listed in PT evaluation  Clinical Decision Making (Complexity): Low complexity    PLAN OF CARE  Treatment Interventions:  Interventions: Gait Training, Manual Therapy, Neuromuscular Re-education, Therapeutic Activity, Therapeutic Exercise, Self-Care/Home Management    Long Term Goals     PT Goal 1  Goal Identifier: (P) Running  Goal Description: (P) Pt will tolerate running 1 mile with 2/10 pain or less in knee  Rationale: (P) to maximize safety and independence with performance of ADLs and functional tasks  Target Date: (P) 02/27/25      Frequency of Treatment: 1x/week  Duration of Treatment: (P) 10 weeks    Education Assessment:   Learner/Method: Patient;Demonstration;Pictures/Video;No Barriers to Learning    Risks and benefits of evaluation/treatment have been explained.    Patient/Family/caregiver agrees with Plan of Care.     Evaluation Time:     PT Eval, Low Complexity Minutes (71124): (P) 20     Signing Clinician: SNEHA MCGRAW

## 2024-12-19 ENCOUNTER — TELEPHONE (OUTPATIENT)
Dept: ORTHOPEDICS | Facility: CLINIC | Age: 29
End: 2024-12-19

## 2024-12-19 ENCOUNTER — OFFICE VISIT (OUTPATIENT)
Dept: ORTHOPEDICS | Facility: CLINIC | Age: 29
End: 2024-12-19
Payer: COMMERCIAL

## 2024-12-19 DIAGNOSIS — M25.562 CHRONIC PAIN OF LEFT KNEE: Primary | ICD-10-CM

## 2024-12-19 DIAGNOSIS — G89.29 CHRONIC PAIN OF LEFT KNEE: Primary | ICD-10-CM

## 2024-12-19 NOTE — LETTER
12/19/2024      Ethan Trevino  [address]  [facility] [address]      Dear Colleague,    Thank you for referring your patient, Ethan Trevino, to the [facility]. Please see a copy of my visit note below.    DIAGNOSIS:   Chondrosis medial femoral condyle    PROCEDURES:  Medial femoral condyle chondroplasty; date of surgery 12/13/2024    HISTORY:  Doing well 1 week out from surgery.  Pain control.  Off opioids.  Doing therapy.    EXAM:     General: Awake, Alert, and oriented. Articulates and communicates with a normal affect     Left lower Extremity:  Incisions well healed without evidence of infection  Normal post-operative effusion and ecchymosis  Range of motion and stability exam not performed  Neurovascularly intact    IMAGING:  No new imaging    ASSESSMENT:  1 week status post arthroscopic chondroplasty    PLAN:   Weightbearing as tolerated  Range of motion as tolerated  Sutures removed in clinic  Leave steri-strips in place until they fall off  OK to shower allowing water to run over incision  No soaking, scrubbing, baths, or lake for 1 additional week  Continue PT as scheduled   Pain medications reviewed and no refills required.   Operative report provided and arthroscopic images reviewed  Follow up at 6 weeks from the date of surgery with no new X-Rays needed   If the patient does well then no further intervention is necessary    If the patient fails to see lasting improvement would consider osteochondral allograft plus or minus proximal tibial osteotomy    I think we should get standing 6 foot alignment films when he returns to see me again at the 6-week visit      Again, thank you for allowing me to participate in the care of your patient.        Sincerely,        Johan Nguyen MD

## 2024-12-19 NOTE — TELEPHONE ENCOUNTER
Other: Ethan Uribe is calling was seen today had stitches removed and stery strips were placed on the incision site but they have come off. He wants to know what he should do? Please call him     Could we send this information to you in [employer] or would you prefer to receive a phone call?:   Patient would prefer a phone call   Okay to leave a detailed message?: Yes at Cell number on file:    Telephone Information:   Mobile [phone]

## 2024-12-19 NOTE — PROGRESS NOTES
DIAGNOSIS:   Chondrosis medial femoral condyle    PROCEDURES:  Medial femoral condyle chondroplasty; date of surgery 12/13/2024    HISTORY:  Doing well 1 week out from surgery.  Pain control.  Off opioids.  Doing therapy.    EXAM:     General: Awake, Alert, and oriented. Articulates and communicates with a normal affect     Left lower Extremity:  Incisions well healed without evidence of infection  Normal post-operative effusion and ecchymosis  Range of motion and stability exam not performed  Neurovascularly intact    IMAGING:  No new imaging    ASSESSMENT:  1 week status post arthroscopic chondroplasty    PLAN:   Weightbearing as tolerated  Range of motion as tolerated  Sutures removed in clinic  Leave steri-strips in place until they fall off  OK to shower allowing water to run over incision  No soaking, scrubbing, baths, or lake for 1 additional week  Continue PT as scheduled   Pain medications reviewed and no refills required.   Operative report provided and arthroscopic images reviewed  Follow up at 6 weeks from the date of surgery with no new X-Rays needed   If the patient does well then no further intervention is necessary    If the patient fails to see lasting improvement would consider osteochondral allograft plus or minus proximal tibial osteotomy    I think we should get standing 6 foot alignment films when he returns to see me again at the 6-week visit

## 2024-12-19 NOTE — NURSING NOTE
Reason For Visit:   Chief Complaint   Patient presents with    Surgical Followup     1wk s/p  chondroplasty and staging for future cartilage surgery       ?  No  Occupation. [employer]   Currently working? Yes.  Work status?  Full time.  Date of surgery: 12/13/24  Type of surgery: chondroplasty and staging for future cartilage surgery .    Sane Score  Left knee - Affected  Left Knee- 30  Right Knee- 100      Nirmal Hillman, ATC

## 2024-12-26 ENCOUNTER — THERAPY VISIT (OUTPATIENT)
Dept: PHYSICAL THERAPY | Facility: CLINIC | Age: 29
End: 2024-12-26
Attending: ORTHOPAEDIC SURGERY
Payer: COMMERCIAL

## 2024-12-26 DIAGNOSIS — M94.262 CHONDROMALACIA OF KNEE, LEFT: Primary | ICD-10-CM

## 2025-01-12 ENCOUNTER — HEALTH MAINTENANCE LETTER (OUTPATIENT)
Age: 30
End: 2025-01-12

## 2025-01-14 ENCOUNTER — THERAPY VISIT (OUTPATIENT)
Dept: PHYSICAL THERAPY | Facility: CLINIC | Age: 30
End: 2025-01-14
Payer: COMMERCIAL

## 2025-01-14 DIAGNOSIS — M94.262 CHONDROMALACIA OF KNEE, LEFT: Primary | ICD-10-CM

## 2025-01-14 PROCEDURE — 97112 NEUROMUSCULAR REEDUCATION: CPT | Mod: GP | Performed by: PHYSICAL THERAPIST

## 2025-01-14 PROCEDURE — 97110 THERAPEUTIC EXERCISES: CPT | Mod: GP | Performed by: PHYSICAL THERAPIST

## 2025-01-21 ENCOUNTER — THERAPY VISIT (OUTPATIENT)
Dept: PHYSICAL THERAPY | Facility: CLINIC | Age: 30
End: 2025-01-21
Payer: COMMERCIAL

## 2025-01-21 DIAGNOSIS — M94.262 CHONDROMALACIA OF KNEE, LEFT: Primary | ICD-10-CM

## 2025-01-21 PROCEDURE — 97112 NEUROMUSCULAR REEDUCATION: CPT | Mod: GP | Performed by: PHYSICAL THERAPIST

## 2025-01-21 PROCEDURE — 97110 THERAPEUTIC EXERCISES: CPT | Mod: GP | Performed by: PHYSICAL THERAPIST

## 2025-01-21 ASSESSMENT — ACTIVITIES OF DAILY LIVING (ADL)
STIFFNESS: I HAVE THE SYMPTOM BUT IT DOES NOT AFFECT MY ACTIVITY
SWELLING: I HAVE THE SYMPTOM BUT IT DOES NOT AFFECT MY ACTIVITY
WALK: ACTIVITY IS NOT DIFFICULT
SIT WITH YOUR KNEE BENT: ACTIVITY IS MINIMALLY DIFFICULT
SQUAT: ACTIVITY IS SOMEWHAT DIFFICULT
GO UP STAIRS: ACTIVITY IS MINIMALLY DIFFICULT
LIMPING: I DO NOT HAVE THE SYMPTOM
WEAKNESS: THE SYMPTOM AFFECTS MY ACTIVITY SLIGHTLY
GO DOWN STAIRS: ACTIVITY IS SOMEWHAT DIFFICULT
HOW_WOULD_YOU_RATE_THE_CURRENT_FUNCTION_OF_YOUR_KNEE_DURING_YOUR_USUAL_DAILY_ACTIVITIES_ON_A_SCALE_FROM_0_TO_100_WITH_100_BEING_YOUR_LEVEL_OF_KNEE_FUNCTION_PRIOR_TO_YOUR_INJURY_AND_0_BEING_THE_INABILITY_TO_PERFORM_ANY_OF_YOUR_USUAL_DAILY_ACTIVITIES?: 65
KNEE_ACTIVITY_OF_DAILY_LIVING_SUM: 53
KNEE_ACTIVITY_OF_DAILY_LIVING_SCORE: 75.71
RISE FROM A CHAIR: ACTIVITY IS MINIMALLY DIFFICULT
PAIN: I HAVE THE SYMPTOM BUT IT DOES NOT AFFECT MY ACTIVITY
GIVING WAY, BUCKLING OR SHIFTING OF KNEE: THE SYMPTOM AFFECTS MY ACTIVITY SLIGHTLY
STAND: ACTIVITY IS NOT DIFFICULT
KNEEL ON THE FRONT OF YOUR KNEE: ACTIVITY IS FAIRLY DIFFICULT
AS_A_RESULT_OF_YOUR_KNEE_INJURY,_HOW_WOULD_YOU_RATE_YOUR_CURRENT_LEVEL_OF_DAILY_ACTIVITY?: ABNORMAL
HOW_WOULD_YOU_RATE_THE_OVERALL_FUNCTION_OF_YOUR_KNEE_DURING_YOUR_USUAL_DAILY_ACTIVITIES?: NEARLY NORMAL
RAW_SCORE: 53

## 2025-01-21 NOTE — PROGRESS NOTES
01/21/25 0500   Appointment Info   Signing clinician's name / credentials Ashlie Swann DPT   Total/Authorized Visits 10(E&T)   Visits Used 4   Medical Diagnosis Chondromalacia of L knee, s/p knee scope, chondroplasty medial femoral condyle and loose body removal and staging for future cartilage surgery DOS 12/13/24   PT Tx Diagnosis L knee pain s/p arthroscopic surgery   Progress Note/Certification   Therapy Frequency 1x/week   Predicted Duration 10 weeks   Progress Note Completed Date 12/19/24   PT Goal 1   Goal Identifier Running   Goal Description Pt will tolerate running 1 mile with 2/10 pain or less in knee   Rationale to maximize safety and independence with performance of ADLs and functional tasks   Goal Progress not able to run until after 6 weeks which is 1/24/25   Target Date 02/27/25   Subjective Report   Subjective Report L knee is doing pretty well and had to do a lot of stairs this weekend and noted the knee felt better than pre-surgery.  The left knee still feels weak and and not fully stable with stairs but definitely improving.  It no longer feels like the knee will collapse as is getting stronger.  Just a tiny bit of sorenss today.   Can reciprocally ascend about 1/2 flight of stairs and challenging especially descending steps reciprocally.  Feels has to be a little careful with the knee and of course has not yet been running, jumping etc.   Objective Measures   Objective Measures Objective Measure 1;Objective Measure 2;Objective Measure 3;Objective Measure 4;Objective Measure 5   Objective Measure 1   Objective Measure Normal non-antalgic gait pattern   Details AROM improved in clinic [id number]   Objective Measure 2   Objective Measure strength: MMT hip ext L 5-/5, R 5/5, hip ABD L 4-/5, R 4/5, knee ext B 5/5, knee flex B 5/5   Details SLR with slight intermittent extension lag once during reps   Objective Measure 3   Objective Measure SLS on L LE x60 seconds with mild left trunk lean due  to gluteal weakness.   Details sit to stand good controlled technique with even weight bearing   Objective Measure 4   Objective Measure Lateral step   Details L 3 inch, R 6 inch   Objective Measure 5   Objective Measure Knee Outcome Measure Activities of Daily Living Scale 76%   Treatment Interventions (PT)   Interventions Therapeutic Procedure/Exercise;Neuromuscular Re-education;Manual Therapy;Therapeutic Activity   Therapeutic Procedure/Exercise   Therapeutic Procedures: strength, endurance, ROM, flexibility minutes (37649) 25   Therapeutic Procedures Ther Proc 2;Ther Proc 3;Ther Proc 4;Ther Proc 5   Ther Proc 1 Review importance of gluteal activation and reasoning for strengthening to best support knees.   Ther Proc 2 Bike; sh 9, full revolution   Ther Proc 2 - Details x5'   PTRx Ther Proc 1 Supine Heel Slides   PTRx Ther Proc 1 - Details review HEP prn for stiffness   PTRx Ther Proc 2 Eccentric Gastroc Stretch   PTRx Ther Proc 2 - Details on wedge B at once 30 sec x 2   PTRx Ther Proc 3 Quad Set Without Towel Roll Under Knee   PTRx Ther Proc 3 - Details HEP   PTRx Ther Proc 4 Hip Flexion Straight Leg Raise   PTRx Ther Proc 4 - Details x15 educate relax between each rep to better re-establish neuromuscular connection and prevent extensor sag   PTRx Ther Proc 5 Hip Abduction Straight Leg Raise   PTRx Ther Proc 5 - Details x 10 for B   Skilled Intervention Progression of activity in clinic including range of motion, strength   Patient Response/Progress no pain, just muscle fatigue.  Improving quad and gluteal activation and ROM now equal to R knee.   PTRx Ther Proc 6 Functional Knee Extension with Tubing   PTRx Ther Proc 6 - Details GTB 5 sec x 10 with trunk weight shift onto L side   Therapeutic Activity   PTRx Ther Act 1 Step Up   PTRx Ther Act 1 - Details 6 inch stairs 3x4 focus on gluteal activation with ascending to prevent hip drop.  Also repeated 6 inch step up with lift of opposite LE for gluteal  activation x 8   Therapeutic Activities: dynamic activities to improve functional performance minutes (66315) 5   Ther Act 1 lateral step   Ther Act 1 - Details assessment of control 3, 4, 5, 6 inch for R, 3 inch for L   Neuromuscular Re-education   Neuromuscular re-ed of mvmt, balance, coord, kinesthetic sense, posture, proprioception minutes (31459) 15   Neuromuscular Re-education Neuro Re-ed 2   Neuro Re-ed 1 Gait drills; marching 2x25', , tandem walking 2x25'   PTRx Neuro Re-ed 1 Single Leg Stance - Balance Left Foot   PTRx Neuro Re-ed 1 - Details 60 seconds with L trunk lean cues for gluteal activation and keep trunk neutral.   PTRx Neuro Re-ed 2 Side Stepping   PTRx Neuro Re-ed 2 - Details GTB above soft knees 2x20 feet some fatigue   PTRx Neuro Re-ed 3 Sit to Stand   PTRx Neuro Re-ed 3 - Details x 10 sit to stand with tap of hips. caution reps- listen to body if reps irritating   Skilled Intervention Gait, proprioception, balance   Patient Response/Progress SLS fatiguing, no symptom aggravation.  improved SLS with cue for gluteal activation.   Manual Therapy   Manual Therapy 1 Supine: brief L patellar mobs all directions   Manual Therapy 1 - Details gentle scar mobilization   Skilled Intervention reduce scar formation, improve patellar mobility   Patient Response/Progress not today   Education   Learner/Method Patient;Demonstration;Pictures/Video;No Barriers to Learning   Plan   Home program PTRx::cristine on phone   Updates to plan of care Added SLR ABD, step up with gluteal activation   Plan for next session BIKE SH 9, SLS with gluteal activation to prevent L trunk lean, Rev new SLR ABD, step up, practice lateral step,  sidestepping GTB, ADD firehydrant for gluteal strength and proper pelvifemoral control.  Assess bridge, plank   Total Session Time   Timed Code Treatment Minutes 45   Total Treatment Time (sum of timed and untimed services) 45       PLAN  Continue therapy per current plan of  care.    Beginning/End Dates of Progress Note Reporting Period:  12/19/24 to 01/21/2025    Referring Provider:  Johan Nguyen

## 2025-01-23 ENCOUNTER — OFFICE VISIT (OUTPATIENT)
Dept: ORTHOPEDICS | Facility: CLINIC | Age: 30
End: 2025-01-23
Payer: COMMERCIAL

## 2025-01-23 DIAGNOSIS — G89.29 CHRONIC PAIN OF LEFT KNEE: Primary | ICD-10-CM

## 2025-01-23 DIAGNOSIS — M25.562 CHRONIC PAIN OF LEFT KNEE: Primary | ICD-10-CM

## 2025-01-23 NOTE — LETTER
1/23/2025      Ethan Trevino  [address]  [facility] [place] [facility] [address]      Dear Colleague,    Thank you for referring your patient, Ethan Trevino, to the [facility]. Please see a copy of my visit note below.    DIAGNOSIS:   Chondrosis medial femoral condyle    PROCEDURES:  Medial femoral condyle chondroplasty; date of surgery 12/13/2024    HISTORY:  6 weeks following the above.  Pain control.  Off opioids.  Doing well.  Feels improved from preoperative state.  SANE score of 75.    EXAM:     General: Awake, Alert, and oriented. Articulates and communicates with a normal affect     Left lower Extremity:  Incisions well healed without evidence of infection  No post-operative effusion or ecchymosis  Range of motion is full   Knee is stable to examination   Neurovascularly intact    IMAGING:  No new imaging    ASSESSMENT:  6 weeks status post arthroscopic chondroplasty    PLAN:   Weightbearing as tolerated  Range of motion as tolerated  No specific restrictions, utilize commonsense  Follow-up as needed    If the patient fails to see lasting improvement would consider osteochondral allograft plus or minus proximal tibial osteotomy.  If he has symptoms in the future and he returns to clinic to discuss options we would need to get standing 6 foot alignment films to assess that his coronal alignment          Again, thank you for allowing me to participate in the care of your patient.        Sincerely,        Johan Nguyen MD    Electronically signed

## 2025-01-23 NOTE — PROGRESS NOTES
DIAGNOSIS:   Chondrosis medial femoral condyle    PROCEDURES:  Medial femoral condyle chondroplasty; date of surgery 12/13/2024    HISTORY:  6 weeks following the above.  Pain control.  Off opioids.  Doing well.  Feels improved from preoperative state.  SANE score of 75.    EXAM:     General: Awake, Alert, and oriented. Articulates and communicates with a normal affect     Left lower Extremity:  Incisions well healed without evidence of infection  No post-operative effusion or ecchymosis  Range of motion is full   Knee is stable to examination   Neurovascularly intact    IMAGING:  No new imaging    ASSESSMENT:  6 weeks status post arthroscopic chondroplasty    PLAN:   Weightbearing as tolerated  Range of motion as tolerated  No specific restrictions, utilize commonsense  Follow-up as needed    If the patient fails to see lasting improvement would consider osteochondral allograft plus or minus proximal tibial osteotomy.  If he has symptoms in the future and he returns to clinic to discuss options we would need to get standing 6 foot alignment films to assess that his coronal alignment

## 2025-01-23 NOTE — NURSING NOTE
Reason For Visit:   Chief Complaint   Patient presents with    Left Knee - Surgical Followup     6wk s.p chondroplasty and future staging.          Occupation [employer] .  Currently working? Yes.  Work status?  Full time.  Date of surgery: 12/13/24  Type of surgery: Chondroplasty .  Smoker: No  Request smoking cessation information: No    Sane Score  Left knee - Affected  Left Knee- 75  Right Knee- 100      Nirmal Hillman, ATC

## 2025-01-28 ENCOUNTER — THERAPY VISIT (OUTPATIENT)
Dept: PHYSICAL THERAPY | Facility: CLINIC | Age: 30
End: 2025-01-28
Payer: COMMERCIAL

## 2025-01-28 DIAGNOSIS — M94.262 CHONDROMALACIA OF KNEE, LEFT: Primary | ICD-10-CM

## 2025-01-28 PROCEDURE — 97112 NEUROMUSCULAR REEDUCATION: CPT | Mod: GP | Performed by: PHYSICAL THERAPIST

## 2025-01-28 PROCEDURE — 97110 THERAPEUTIC EXERCISES: CPT | Mod: GP | Performed by: PHYSICAL THERAPIST

## 2025-03-04 ENCOUNTER — MYC REFILL (OUTPATIENT)
Dept: NEPHROLOGY | Facility: CLINIC | Age: 30
End: 2025-03-04
Payer: COMMERCIAL

## 2025-03-04 DIAGNOSIS — N05.1 FSGS (FOCAL SEGMENTAL GLOMERULOSCLEROSIS): ICD-10-CM

## 2025-03-05 RX ORDER — DAPAGLIFLOZIN 10 MG/1
10 TABLET, FILM COATED ORAL DAILY
Qty: 90 TABLET | Refills: 3 | Status: SHIPPED | OUTPATIENT
Start: 2025-03-05

## 2025-03-05 NOTE — TELEPHONE ENCOUNTER
[facility] Nephrology Note: Medication Refill Request    Medication Refill Request:     Medication/Dose/Frequency: Farxiga 10mg daily  Preferred Pharmacy: [facility]  Provider: Dr. Caicedo                         SITUATION/BACKROUND:                 Last neph office visit: 10/22/24        Future neph office visit: 3/11/25    ASSESSMENT:     Neph Assessments:    Recent Labs:    Last Renal Panel:  Sodium   Date Value Ref Range Status   10/22/2024 139 135 - 145 mmol/L Final   05/07/2021 143 133 - 144 mmol/L Final     Potassium   Date Value Ref Range Status   10/22/2024 4.8 3.4 - 5.3 mmol/L Final   08/22/2022 4.5 3.4 - 5.3 mmol/L Final   05/07/2021 5.1 3.4 - 5.3 mmol/L Final     Chloride   Date Value Ref Range Status   10/22/2024 106 98 - 107 mmol/L Final   08/22/2022 114 (H) 94 - 109 mmol/L Final   05/07/2021 114 (H) 94 - 109 mmol/L Final     Carbon Dioxide   Date Value Ref Range Status   05/07/2021 27 20 - 32 mmol/L Final     Carbon Dioxide (CO2)   Date Value Ref Range Status   10/22/2024 24 22 - 29 mmol/L Final   08/22/2022 24 20 - 32 mmol/L Final     Anion Gap   Date Value Ref Range Status   10/22/2024 9 7 - 15 mmol/L Final   08/22/2022 4 3 - 14 mmol/L Final   05/07/2021 2 (L) 3 - 14 mmol/L Final     Glucose   Date Value Ref Range Status   08/22/2022 107 (H) 70 - 99 mg/dL Final   05/07/2021 104 (H) 70 - 99 mg/dL Final     GLUCOSE BY METER POCT   Date Value Ref Range Status   12/13/2024 97 70 - 99 mg/dL Final     Urea Nitrogen   Date Value Ref Range Status   10/22/2024 19.8 6.0 - 20.0 mg/dL Final   08/22/2022 25 7 - 30 mg/dL Final   05/07/2021 22 7 - 30 mg/dL Final     Creatinine   Date Value Ref Range Status   10/22/2024 1.09 0.67 - 1.17 mg/dL Final   05/07/2021 1.15 0.66 - 1.25 mg/dL Final     GFR Estimate   Date Value Ref Range Status   10/22/2024 >90 >60 mL/min/1.73m2 Final     Comment:     eGFR calculated using 2021 CKD-EPI equation.   05/07/2021 87 >60 mL/min/[1.73_m2] Final      Comment:     Non  GFR Calc  Starting 12/18/2018, serum creatinine based estimated GFR (eGFR) will be   calculated using the Chronic Kidney Disease Epidemiology Collaboration   (CKD-EPI) equation.       Calcium   Date Value Ref Range Status   10/22/2024 9.3 8.8 - 10.4 mg/dL Final     Comment:     Reference intervals for this test were updated on 7/16/2024 to reflect our healthy population more accurately. There may be differences in the flagging of prior results with similar values performed with this method. Those prior results can be interpreted in the context of the updated reference intervals.   05/07/2021 8.6 8.5 - 10.1 mg/dL Final     Phosphorus   Date Value Ref Range Status   07/23/2024 3.4 2.5 - 4.5 mg/dL Final   05/07/2021 3.4 2.5 - 4.5 mg/dL Final     Albumin   Date Value Ref Range Status   10/22/2024 3.8 3.5 - 5.2 g/dL Final   08/22/2022 3.0 (L) 3.4 - 5.0 g/dL Final   05/07/2021 3.3 (L) 3.4 - 5.0 g/dL Final       Liver Function Studies:  Recent Labs   Lab Test 10/22/24  1149   PROTTOTAL 6.4   ALBUMIN 3.8   BILITOTAL 0.5   ALKPHOS 127   AST 21   ALT 35     CBC Results:  Recent Labs   Lab Test 10/22/24  1149   WBC 7.2   RBC 4.40   HGB 12.7*   HCT 37.0*   MCV 84   MCH 28.9   MCHC 34.3   RDW 12.4        Urinalysis:  Color Urine (no units)   Date Value   10/22/2024 Light Yellow   05/07/2021 Light Yellow     Appearance Urine (no units)   Date Value   10/22/2024 Clear   05/07/2021 Clear     Glucose Urine (mg/dL)   Date Value   10/22/2024 500 (A)   05/07/2021 Negative     Bilirubin Urine (no units)   Date Value   10/22/2024 Negative   05/07/2021 Negative     Ketones Urine (mg/dL)   Date Value   10/22/2024 Negative   05/07/2021 Negative     Specific Gravity Urine (no units)   Date Value   10/22/2024 1.007   05/07/2021 1.013     pH Urine   Date Value   10/22/2024 5.5   05/07/2021 5.5 pH     Protein Albumin Urine (mg/dL)   Date Value   10/22/2024 100 (A)   05/07/2021 100 (A)     Nitrite Urine  (no units)   Date Value   10/22/2024 Negative   05/07/2021 Negative     Leukocyte Esterase Urine (no units)   Date Value   10/22/2024 Negative   05/07/2021 Negative         Current Medication List:   Current Outpatient Medications (Antihypertensive, Cardiovascular, Diuretics, Beta blockers, Calcium blockers, Anticoagulants)   Medication Sig    lisinopril (ZESTRIL) 40 MG tablet      Current Outpatient Medications (Other)   Medication Sig    acetaminophen (TYLENOL) 325 MG tablet Take 2 tablets (650 mg) by mouth every 4 hours as needed for mild pain.    dapagliflozin (FARXIGA) 10 MG TABS tablet Take 1 tablet (10 mg) by mouth daily    ibuprofen (ADVIL/MOTRIN) 600 MG tablet Take 1 tablet (600 mg) by mouth every 8 hours as needed for other (mild and/or inflammatory pain).    metFORMIN (GLUCOPHAGE XR) 500 MG 24 hr tablet Take 2 tablets (1,000 mg) by mouth daily (with dinner).    rosuvastatin (CRESTOR) 5 MG tablet Take 1 tablet (5 mg) by mouth daily.    tacrolimus (GENERIC EQUIVALENT) 1 MG capsule Take 2 tablets in the moring and 2 tablet (2 mg) in the evening       Has patient had kidney transplant in the prior 1 year: no.   Has patient been seen the last 12 months: Yes.  Associated labs reviewed for medication: Yes    PLAN:     Medication refilled per protocol: Yes    Vikki Sepulveda RN, BSN, PHN   Care Coordinator  [phone]

## 2025-03-11 ENCOUNTER — LAB (OUTPATIENT)
Dept: LAB | Facility: CLINIC | Age: 30
End: 2025-03-11
Payer: COMMERCIAL

## 2025-03-11 ENCOUNTER — OFFICE VISIT (OUTPATIENT)
Dept: NEPHROLOGY | Facility: CLINIC | Age: 30
End: 2025-03-11
Payer: COMMERCIAL

## 2025-03-11 VITALS
DIASTOLIC BLOOD PRESSURE: 77 MMHG | HEART RATE: 80 BPM | SYSTOLIC BLOOD PRESSURE: 123 MMHG | BODY MASS INDEX: 33.62 KG/M2 | OXYGEN SATURATION: 96 % | WEIGHT: 269 LBS

## 2025-03-11 DIAGNOSIS — N05.1 FSGS (FOCAL SEGMENTAL GLOMERULOSCLEROSIS): ICD-10-CM

## 2025-03-11 DIAGNOSIS — Z11.4 SCREENING FOR HIV (HUMAN IMMUNODEFICIENCY VIRUS): ICD-10-CM

## 2025-03-11 DIAGNOSIS — N05.1 FSGS (FOCAL SEGMENTAL GLOMERULOSCLEROSIS): Primary | ICD-10-CM

## 2025-03-11 DIAGNOSIS — E78.5 HYPERLIPIDEMIA LDL GOAL <100: ICD-10-CM

## 2025-03-11 LAB
ALBUMIN MFR UR ELPH: 116 MG/DL
ALBUMIN SERPL BCG-MCNC: 3.6 G/DL (ref 3.5–5.2)
ALBUMIN UR-MCNC: 100 MG/DL
ANION GAP SERPL CALCULATED.3IONS-SCNC: 11 MMOL/L (ref 7–15)
APPEARANCE UR: CLEAR
BILIRUB UR QL STRIP: NEGATIVE
BUN SERPL-MCNC: 21.8 MG/DL (ref 6–20)
CALCIUM SERPL-MCNC: 8.8 MG/DL (ref 8.8–10.4)
CHLORIDE SERPL-SCNC: 107 MMOL/L (ref 98–107)
CHOLEST SERPL-MCNC: 127 MG/DL
COLOR UR AUTO: ABNORMAL
CREAT SERPL-MCNC: 1.15 MG/DL (ref 0.67–1.17)
CREAT UR-MCNC: 185 MG/DL
EGFRCR SERPLBLD CKD-EPI 2021: 87 ML/MIN/1.73M2
FASTING STATUS PATIENT QL REPORTED: YES
GLUCOSE SERPL-MCNC: 106 MG/DL (ref 70–99)
GLUCOSE UR STRIP-MCNC: 500 MG/DL
HCO3 SERPL-SCNC: 22 MMOL/L (ref 22–29)
HDLC SERPL-MCNC: 31 MG/DL
HGB UR QL STRIP: NEGATIVE
HIV 1+2 AB+HIV1 P24 AG SERPL QL IA: NONREACTIVE
KETONES UR STRIP-MCNC: NEGATIVE MG/DL
LDLC SERPL CALC-MCNC: 61 MG/DL
LEUKOCYTE ESTERASE UR QL STRIP: NEGATIVE
MUCOUS THREADS #/AREA URNS LPF: PRESENT /LPF
NITRATE UR QL: NEGATIVE
NONHDLC SERPL-MCNC: 96 MG/DL
PH UR STRIP: 5.5 [PH] (ref 5–7)
PHOSPHATE SERPL-MCNC: 4.9 MG/DL (ref 2.5–4.5)
POTASSIUM SERPL-SCNC: 4.5 MMOL/L (ref 3.4–5.3)
PROT/CREAT 24H UR: 0.63 MG/MG CR (ref 0–0.2)
RBC #/AREA URNS AUTO: ABNORMAL /HPF
SODIUM SERPL-SCNC: 140 MMOL/L (ref 135–145)
SP GR UR STRIP: 1.01 (ref 1–1.03)
TACROLIMUS BLD-MCNC: 8.9 UG/L (ref 5–15)
TME LAST DOSE: NORMAL H
TME LAST DOSE: NORMAL H
TRIGL SERPL-MCNC: 177 MG/DL
UROBILINOGEN UR STRIP-MCNC: NORMAL MG/DL
WBC #/AREA URNS AUTO: ABNORMAL /HPF

## 2025-03-11 PROCEDURE — 80197 ASSAY OF TACROLIMUS: CPT

## 2025-03-11 PROCEDURE — 84156 ASSAY OF PROTEIN URINE: CPT

## 2025-03-11 PROCEDURE — 87389 HIV-1 AG W/HIV-1&-2 AB AG IA: CPT

## 2025-03-11 PROCEDURE — 80069 RENAL FUNCTION PANEL: CPT

## 2025-03-11 PROCEDURE — 81001 URINALYSIS AUTO W/SCOPE: CPT

## 2025-03-11 PROCEDURE — 80061 LIPID PANEL: CPT

## 2025-03-11 PROCEDURE — 36415 COLL VENOUS BLD VENIPUNCTURE: CPT

## 2025-03-11 ASSESSMENT — PAIN SCALES - GENERAL: PAINLEVEL_OUTOF10: NO PAIN (0)

## 2025-03-11 NOTE — PATIENT INSTRUCTIONS
It was a pleasure taking care of you today.  I've included a brief summary of our discussion and care plan from today's visit below.  Please review this information with your primary care provider.     My recommendations are summarized as follows:  -Labs in 3 months and Follow-up in clinic in 6 months  -Weight loss is helpful     Who do I call with any questions after my visit?  Please be in touch if there are any further questions that arise following today's visit.  There are multiple ways to contact your nephrology care team.       During business hours, you may reach your Nephrology Care Team or schedule or reschedule an appointment or lab at [phone].       If you need to schedule imaging, please call [phone].   To schedule a COVID test, please call [phone].     You can always send a secure message through Unomy. Unomy messages are answered by your nurse or doctor typically within 24-48 hours. Please allow extra time on weekends and holidays.       For urgent/emergent questions after business hours, you may reach the on-call Nephrology Fellow by contacting the [facility]  at [phone].     How will I get the results of any tests ordered?    You will receive all of your results.  If you have signed up for Unomy, any tests ordered at your visit will be available to you once resulted on [employer]OTtecht. Typically the physician reviews them and may or may not make further recommendations. If there are urgent results that require a change in your care plan, your physician or nurse will call you to discuss the next steps. If you are not on [employer]umul[employer], a letter may be generated and mailed to you with your results.

## 2025-03-11 NOTE — NURSING NOTE
Ethan Trevino's goals for this visit include:   Chief Complaint   Patient presents with    RECHECK     4mo recheck FSGS       He requests these members of his care team be copied on today's visit information: .rfv    PCP: Ac Treviño    Referring Provider:  Ac Treviño DO  [address] [place] [address]    /77 (BP Location: Left arm, Patient Position: Sitting, Cuff Size: Adult Large)   Pulse 80   Wt 122 kg (269 lb)   SpO2 96%   BMI 33.62 kg/m      Do you need any medication refills at today's visit? Nuvia Harmon LPN

## 2025-03-11 NOTE — PROGRESS NOTES
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 He was started on steroids 8/23/16. Remained on steroids 100 mg x several months. This was complicated by significant abdominal striae and significant acne.    His proteinuria was down to 1-2 grams then at 0.7g, but unfortunately when he was down to 10mg prednisone, his proteinuria increased back to 5 grams in Jan 2018 along with with low albumin and swelling.      He went back on Pred at 100 mg/day with a surprizing transient dip in protenuria to 1.5g in March, but eventually proteinuria decreased and his UPCR < 3 starting in April 2018 then at 1.24 g/g on 11/1/18.    He was seen on 11/29/18 at which time, he was initiated on a tapering schedule of prednisone, as he was in partial remission on therapy (UPCR < 1).  Prednisone taper was started in Dec 2018  He had a relapse of proteinuria in February 2019, prompting a halting of the prednisone taper and initiation of Tacrolimus at 0.5 mg twice daily  Tacrolimus was progressively increased to 2 mg twice daily starting on April 19, 2019, with documentation of trough levels in the therapeutic range.      He was enrolled in the study of MCP-1 receptor blocker [id number] June 24, 2019.  Day #1 of study medication 7/16/19  Completed the study in Feb 2020    His tacrolimus dose was decreased to 2mg/1 mg on January 6, 2022 till May 2024, when his dose was cut further to tacrolimus 1mg/1mg.    He is also on farxiga 10 mg daily. He is on lisinopril 40 mg daily. He is complaint with medications. He is not checking his BP at home.    Overall, he has been doing fine. He lost some weight then gained some. He continue son tacrolimus 2 mg bid.    He had Knee surgery in December; so he is exercising more.     Social hx: managing  hospital department. Background in immunology.  with no kids.     Wt Readings from Last 4 Encounters:   03/11/25 122 kg (269 lb)   12/13/24 123.4 kg (272 lb)   12/05/24 123.4 kg (272 lb)   11/07/24 113.9 kg (251 lb)     Allergies   Allergen Reactions    Amoxicillin      Other reaction(s): EENT - other (explain in comments), SKIN - rash  Rash and lip swelling after 8 days of Amoxicillin    Erythromycin     Penicillins        Current Outpatient Medications   Medication Sig Dispense Refill    dapagliflozin (FARXIGA) 10 MG TABS tablet Take 1 tablet (10 mg) by mouth daily. 90 tablet 3    lisinopril (ZESTRIL) 40 MG tablet Take 40 mg by mouth daily.      metFORMIN (GLUCOPHAGE XR) 500 MG 24 hr tablet Take 2 tablets (1,000 mg) by mouth daily (with dinner). 180 tablet 3    rosuvastatin (CRESTOR) 5 MG tablet Take 1 tablet (5 mg) by mouth daily. 90 tablet 11    tacrolimus (GENERIC EQUIVALENT) 1 MG capsule Take 2 tablets in the moring and 2 tablet (2 mg) in the evening 360 capsule 3    acetaminophen (TYLENOL) 325 MG tablet Take 2 tablets (650 mg) by mouth every 4 hours as needed for mild pain. (Patient not taking: Reported on 3/11/2025) 50 tablet 0    ibuprofen (ADVIL/MOTRIN) 600 MG tablet Take 1 tablet (600 mg) by mouth every 8 hours as needed for other (mild and/or inflammatory pain). (Patient not taking: Reported on 3/11/2025) 30 tablet 0     No current facility-administered medications for this visit.     Past Medical History:   Diagnosis Date    Chronic kidney disease        Past Surgical History:   Procedure Laterality Date    ARTHROSCOPY KNEE WITH DEBRIDEMENT JOINT, COMBINED Left 12/13/2024    Procedure: Examination under anesthesia, knee arthroscopy, chondroplasty and staging for future cartilage surgery;  Surgeon: Johan Nugyen MD;  Location: [facility] [place] OR    BIOPSY  2016    renal     Social History     Tobacco Use    Smoking status: Never    Smokeless tobacco: Never   Vaping Use    Vaping status: Never  Used   Substance Use Topics    Alcohol use: Yes     Comment: social    Drug use: No       Family History   Problem Relation Age of Onset    No Known Problems Brother     Heart Disease Maternal Grandmother     Lung Cancer Maternal Grandfather     Chronic Obstructive Pulmonary Disease Paternal Grandmother     Lung Cancer Paternal Grandfather        ROS: A 12 system review of systems was negative other than noted here or above.     Exam:  /77 (BP Location: Left arm, Patient Position: Sitting, Cuff Size: Adult Large)   Pulse 80   Wt 122 kg (269 lb)   SpO2 96%   BMI 33.62 kg/m      BP Readings from Last 6 Encounters:   03/11/25 123/77   12/13/24 117/79   12/05/24 126/82   10/22/24 129/83   07/23/24 115/75   06/05/24 114/75     Wt Readings from Last 4 Encounters:   03/11/25 122 kg (269 lb)   12/13/24 123.4 kg (272 lb)   12/05/24 123.4 kg (272 lb)   11/07/24 113.9 kg (251 lb)       GENERAL APPEARANCE: alert and no distress  EYES: PERRL  HENT: mouth without ulcers or lesions  NECK: supple, no adenopathy  RESP: lungs clear to auscultation - no rales, rhonchi or wheezes  CV: regular rhythm, normal rate, no rub   ABDOMEN:  soft, nontender, no HSM or masses and bowel sounds normal  MS: extremities normal- no gross deformities noted, no evidence of inflammation in joints, no muscle tenderness  SKIN: no rash  NEURO: Normal strength and tone, sensory exam grossly normal, mentation intact and speech normal  PSYCH: mentation appears normal. and affect normal/bright  No LE edema    Results:reviewed in details with the patient    Assessment/Plan:  #1 Biopsy Proven FSGS (Steroid responsive and dependent) in remission  #2 Obesity class 2  #3 Immunosuppression  #4 Elevated fasting glucose  #5 vitamin D deficiency    Diagnosed in Summer 2016, where he presented with nephrotic range proteinuria. His disease is Steroid responsive and dependent, so he was started on tacrolimus 2 mg bid (April 2019-January 2022), tapered to  Tacrolimus 2/1 mg (January 2022-May 2024) and tacrolimus 1mg/1mg (May 2024-July 2024). In July 2024, his UPCR went from 0.4--> 1.5 g/g. In October 2024, his proteinuria worsened to 1.8g/g so his tacrolimus was increased to 2 mg bid. His UPCR today is down to 0.6 g/g. His last trough is at 4.  -Will plan for another 6 months of tacrolimus and reconsider tapering at that point. It is unclear whether his proteinuria at this stage is immune mediated or residual from prior scarring but since, his creatinine is stable, we elect to keep him on tacrolimus for now.    -He was enrolled in the study of MCP-1 receptor blocker [id number]  July 2019-Feb 2020  -we discussed the risk of relapse in FSGS. It is approximately 25 to 35 % after a complete remission and in more than 50 percent of patients with a partial remission.  -Ethan is concerned about the high-dose steroids as it caused him several side effects in the past.  We will do a smaller dose and probably over a shorter period of time. Other alternatives could be cellcept or rituximab.  -BP at goal; Will continue lisinopril 40 mg daily.   -will continue farxiga 10 mg daily.  -Advised to lose weight  -Labs in 3 months (ordered standing labs every 3 months)    The total time of this encounter amounted to 30 minutes on the day of the encounter 3/11/2025. This time included face to face time spent with the patient, preparatory work and chart review, ordering tests, and performing post visit documentation.    The longitudinal plan of care for this patient was addressed during this visit. Due to the added complexity in care, I will continue to support the patient with subsequent management of this condition(s) and with the ongoing continuity of care of this condition(s).     *This dictation was prepared in part using Dragon recognition software.  As a result errors may occur. When identified these transcription errors have been corrected.  While every attempt is made to correct  errors during dictation, errors may still exist.     Myrna Caicedo MD   [facility]   Department of Medicine   Division of Renal Disease and Hypertension

## 2025-05-08 ENCOUNTER — PATIENT OUTREACH (OUTPATIENT)
Dept: CARE COORDINATION | Facility: CLINIC | Age: 30
End: 2025-05-08
Payer: COMMERCIAL

## 2025-06-01 DIAGNOSIS — N05.1 UNSPECIFIED NEPHRITIC SYNDROME WITH FOCAL AND SEGMENTAL GLOMERULAR LESIONS: ICD-10-CM

## 2025-06-01 DIAGNOSIS — N04.9 NEPHROTIC SYNDROME WITH UNSPECIFIED MORPHOLOGIC CHANGES: ICD-10-CM

## 2025-06-02 RX ORDER — LISINOPRIL 40 MG/1
40 TABLET ORAL EVERY MORNING
Qty: 90 TABLET | Refills: 1 | Status: SHIPPED | OUTPATIENT
Start: 2025-06-02

## 2025-06-02 NOTE — TELEPHONE ENCOUNTER
Refill request received for patient antihypertensive agent: Lisinopril. Chart review completed. Last office visit with Dr. Caicedo was 3/11/25. Per Dr. Caicedo:  -BP at goal; Will continue lisinopril 40 mg daily.   Next visit 9/2/25.

## 2025-06-10 ENCOUNTER — LAB (OUTPATIENT)
Dept: LAB | Facility: CLINIC | Age: 30
End: 2025-06-10
Payer: COMMERCIAL

## 2025-06-10 DIAGNOSIS — N05.1 FSGS (FOCAL SEGMENTAL GLOMERULOSCLEROSIS): ICD-10-CM

## 2025-06-10 LAB
ALBUMIN MFR UR ELPH: 125 MG/DL
ALBUMIN SERPL BCG-MCNC: 3.7 G/DL (ref 3.5–5.2)
ALBUMIN UR-MCNC: 100 MG/DL
ANION GAP SERPL CALCULATED.3IONS-SCNC: 9 MMOL/L (ref 7–15)
APPEARANCE UR: CLEAR
BILIRUB UR QL STRIP: NEGATIVE
BUN SERPL-MCNC: 15.7 MG/DL (ref 6–20)
CALCIUM SERPL-MCNC: 8.7 MG/DL (ref 8.8–10.4)
CHLORIDE SERPL-SCNC: 109 MMOL/L (ref 98–107)
COLOR UR AUTO: ABNORMAL
CREAT SERPL-MCNC: 1.11 MG/DL (ref 0.67–1.17)
CREAT UR-MCNC: 170 MG/DL
EGFRCR SERPLBLD CKD-EPI 2021: >90 ML/MIN/1.73M2
ERYTHROCYTE [DISTWIDTH] IN BLOOD BY AUTOMATED COUNT: 12.9 % (ref 10–15)
GLUCOSE SERPL-MCNC: 105 MG/DL (ref 70–99)
GLUCOSE UR STRIP-MCNC: 500 MG/DL
HCO3 SERPL-SCNC: 22 MMOL/L (ref 22–29)
HCT VFR BLD AUTO: 36.5 % (ref 40–53)
HGB BLD-MCNC: 12.4 G/DL (ref 13.3–17.7)
HGB UR QL STRIP: NEGATIVE
KETONES UR STRIP-MCNC: NEGATIVE MG/DL
LEUKOCYTE ESTERASE UR QL STRIP: NEGATIVE
MCH RBC QN AUTO: 28.6 PG (ref 26.5–33)
MCHC RBC AUTO-ENTMCNC: 34 G/DL (ref 31.5–36.5)
MCV RBC AUTO: 84 FL (ref 78–100)
MUCOUS THREADS #/AREA URNS LPF: PRESENT /LPF
NITRATE UR QL: NEGATIVE
PH UR STRIP: 5.5 [PH] (ref 5–7)
PHOSPHATE SERPL-MCNC: 3.4 MG/DL (ref 2.5–4.5)
PLATELET # BLD AUTO: 277 10E3/UL (ref 150–450)
POTASSIUM SERPL-SCNC: 4.6 MMOL/L (ref 3.4–5.3)
PROT/CREAT 24H UR: 0.74 MG/MG CR (ref 0–0.2)
RBC # BLD AUTO: 4.34 10E6/UL (ref 4.4–5.9)
RBC #/AREA URNS AUTO: ABNORMAL /HPF
SODIUM SERPL-SCNC: 140 MMOL/L (ref 135–145)
SP GR UR STRIP: 1.01 (ref 1–1.03)
UROBILINOGEN UR STRIP-MCNC: NORMAL MG/DL
WBC # BLD AUTO: 6.7 10E3/UL (ref 4–11)
WBC #/AREA URNS AUTO: ABNORMAL /HPF

## 2025-06-10 PROCEDURE — 80069 RENAL FUNCTION PANEL: CPT

## 2025-06-10 PROCEDURE — 81001 URINALYSIS AUTO W/SCOPE: CPT

## 2025-06-10 PROCEDURE — 36415 COLL VENOUS BLD VENIPUNCTURE: CPT

## 2025-06-10 PROCEDURE — 85027 COMPLETE CBC AUTOMATED: CPT

## 2025-06-10 PROCEDURE — 84156 ASSAY OF PROTEIN URINE: CPT

## 2025-06-20 ENCOUNTER — RESULTS FOLLOW-UP (OUTPATIENT)
Dept: NEPHROLOGY | Facility: CLINIC | Age: 30
End: 2025-06-20

## 2025-08-27 DIAGNOSIS — R73.9 HYPERGLYCEMIA: ICD-10-CM

## 2025-08-27 RX ORDER — METFORMIN HYDROCHLORIDE 500 MG/1
1000 TABLET, EXTENDED RELEASE ORAL
Qty: 180 TABLET | Refills: 3 | OUTPATIENT
Start: 2025-08-27

## (undated) DEVICE — SUCTION MANIFOLD NEPTUNE 2 SYS 4 PORT 0702-020-000

## (undated) DEVICE — DRSG STERI STRIP 1/2X4" R1547

## (undated) DEVICE — LINEN DRAPE 54X72" 5467

## (undated) DEVICE — GLOVE BIOGEL PI MICRO SZ 8.0 48580

## (undated) DEVICE — PAD ARMBOARD FOAM EGGCRATE COVIDEN 3114367

## (undated) DEVICE — BUR ARTHREX COOLCUT SABRE 4.0MMX13CM AR-8400SR

## (undated) DEVICE — PACK ARTHROSCOPY CUSTOM ASC

## (undated) DEVICE — LINEN TOWEL PACK X5 5464

## (undated) DEVICE — SOL NACL 0.9% IRRIG 3000ML BAG 2B7477

## (undated) DEVICE — SU ETHILON 3-0 PS-1 18" 1663H

## (undated) DEVICE — GLOVE BIOGEL PI MICRO INDICATOR UNDERGLOVE SZ 8.0 48980

## (undated) DEVICE — PREP CHLORAPREP 26ML TINTED HI-LITE ORANGE 930815

## (undated) DEVICE — TUBING SYSTEM ARTHREX PATIENT REDEUCE AR-6421

## (undated) RX ORDER — PROPOFOL 10 MG/ML
INJECTION, EMULSION INTRAVENOUS
Status: DISPENSED
Start: 2024-12-13

## (undated) RX ORDER — CEFAZOLIN SODIUM 2 G/50ML
SOLUTION INTRAVENOUS
Status: DISPENSED
Start: 2024-12-13

## (undated) RX ORDER — HYDROMORPHONE HYDROCHLORIDE 1 MG/ML
INJECTION, SOLUTION INTRAMUSCULAR; INTRAVENOUS; SUBCUTANEOUS
Status: DISPENSED
Start: 2024-12-13

## (undated) RX ORDER — BUPIVACAINE HYDROCHLORIDE 2.5 MG/ML
INJECTION, SOLUTION EPIDURAL; INFILTRATION; INTRACAUDAL
Status: DISPENSED
Start: 2024-12-13

## (undated) RX ORDER — VANCOMYCIN HYDROCHLORIDE 1 G/20ML
INJECTION, POWDER, LYOPHILIZED, FOR SOLUTION INTRAVENOUS
Status: DISPENSED
Start: 2024-12-13

## (undated) RX ORDER — APREPITANT 40 MG/1
CAPSULE ORAL
Status: DISPENSED
Start: 2024-12-13

## (undated) RX ORDER — EPINEPHRINE 1 MG/ML
INJECTION, SOLUTION INTRAMUSCULAR; SUBCUTANEOUS
Status: DISPENSED
Start: 2024-12-13

## (undated) RX ORDER — DEXAMETHASONE SODIUM PHOSPHATE 4 MG/ML
INJECTION, SOLUTION INTRA-ARTICULAR; INTRALESIONAL; INTRAMUSCULAR; INTRAVENOUS; SOFT TISSUE
Status: DISPENSED
Start: 2024-12-13

## (undated) RX ORDER — GLYCOPYRROLATE 0.2 MG/ML
INJECTION INTRAMUSCULAR; INTRAVENOUS
Status: DISPENSED
Start: 2024-12-13

## (undated) RX ORDER — ONDANSETRON 2 MG/ML
INJECTION INTRAMUSCULAR; INTRAVENOUS
Status: DISPENSED
Start: 2024-12-13

## (undated) RX ORDER — DEXMEDETOMIDINE HYDROCHLORIDE 4 UG/ML
INJECTION, SOLUTION INTRAVENOUS
Status: DISPENSED
Start: 2024-12-13

## (undated) RX ORDER — ACETAMINOPHEN 325 MG/1
TABLET ORAL
Status: DISPENSED
Start: 2024-12-13

## (undated) RX ORDER — FENTANYL CITRATE 50 UG/ML
INJECTION, SOLUTION INTRAMUSCULAR; INTRAVENOUS
Status: DISPENSED
Start: 2024-12-13